# Patient Record
Sex: MALE | Race: WHITE | Employment: PART TIME | URBAN - METROPOLITAN AREA
[De-identification: names, ages, dates, MRNs, and addresses within clinical notes are randomized per-mention and may not be internally consistent; named-entity substitution may affect disease eponyms.]

---

## 2017-03-02 ENCOUNTER — EMPLOYEE WELLNESS (OUTPATIENT)
Dept: OTHER | Age: 62
End: 2017-03-02

## 2018-03-04 ENCOUNTER — NURSE TRIAGE (OUTPATIENT)
Dept: OTHER | Facility: CLINIC | Age: 63
End: 2018-03-04

## 2018-03-04 PROBLEM — R55 NEAR SYNCOPE: Status: ACTIVE | Noted: 2018-03-04

## 2018-03-12 ENCOUNTER — TELEPHONE (OUTPATIENT)
Dept: CARDIOLOGY CLINIC | Age: 63
End: 2018-03-12

## 2018-03-20 VITALS — BODY MASS INDEX: 34.21 KG/M2 | WEIGHT: 225 LBS

## 2018-04-02 ENCOUNTER — HOSPITAL ENCOUNTER (OUTPATIENT)
Dept: CARDIOLOGY | Age: 63
Discharge: HOME | End: 2018-04-02
Payer: COMMERCIAL

## 2018-04-02 LAB
LV EF: 60 %
LVEF MODALITY: NORMAL

## 2018-04-02 PROCEDURE — 93306 TTE W/DOPPLER COMPLETE: CPT

## 2018-05-07 ENCOUNTER — OFFICE VISIT (OUTPATIENT)
Dept: CARDIOLOGY CLINIC | Age: 63
End: 2018-05-07
Payer: COMMERCIAL

## 2018-05-07 VITALS
SYSTOLIC BLOOD PRESSURE: 100 MMHG | BODY MASS INDEX: 33.04 KG/M2 | HEIGHT: 68 IN | HEART RATE: 53 BPM | DIASTOLIC BLOOD PRESSURE: 70 MMHG | WEIGHT: 218 LBS | RESPIRATION RATE: 12 BRPM

## 2018-05-07 DIAGNOSIS — E78.2 MIXED HYPERLIPIDEMIA: ICD-10-CM

## 2018-05-07 DIAGNOSIS — E66.8 MODERATE OBESITY: ICD-10-CM

## 2018-05-07 DIAGNOSIS — I25.10 CORONARY ARTERY DISEASE INVOLVING NATIVE CORONARY ARTERY OF NATIVE HEART WITHOUT ANGINA PECTORIS: Primary | ICD-10-CM

## 2018-05-07 DIAGNOSIS — I10 ESSENTIAL HYPERTENSION: ICD-10-CM

## 2018-05-07 PROCEDURE — 99214 OFFICE O/P EST MOD 30 MIN: CPT | Performed by: INTERNAL MEDICINE

## 2018-05-07 PROCEDURE — 93000 ELECTROCARDIOGRAM COMPLETE: CPT | Performed by: INTERNAL MEDICINE

## 2018-05-18 ENCOUNTER — EMPLOYEE WELLNESS (OUTPATIENT)
Dept: OTHER | Age: 63
End: 2018-05-18

## 2018-05-19 LAB
CHOLESTEROL, TOTAL: 141 MG/DL (ref 0–199)
GLUCOSE BLD-MCNC: 123 MG/DL (ref 74–107)
HDLC SERPL-MCNC: 29 MG/DL
LDL CHOLESTEROL CALCULATED: 82 MG/DL (ref 0–99)
TRIGL SERPL-MCNC: 150 MG/DL (ref 0–149)

## 2018-05-29 VITALS — BODY MASS INDEX: 32.84 KG/M2 | WEIGHT: 216 LBS

## 2018-09-13 ENCOUNTER — HOSPITAL ENCOUNTER (OUTPATIENT)
Age: 63
Discharge: HOME | End: 2018-09-13
Payer: COMMERCIAL

## 2018-09-13 LAB
ALBUMIN SERPL-MCNC: 4.2 G/DL (ref 3.5–5.2)
ALP BLD-CCNC: 88 U/L (ref 40–129)
ALT SERPL-CCNC: 26 U/L (ref 0–40)
ANION GAP SERPL CALCULATED.3IONS-SCNC: 11 MMOL/L (ref 7–16)
AST SERPL-CCNC: 21 U/L (ref 0–39)
BASOPHILS ABSOLUTE: 0.06 E9/L (ref 0–0.2)
BASOPHILS RELATIVE PERCENT: 0.6 % (ref 0–2)
BILIRUB SERPL-MCNC: 1 MG/DL (ref 0–1.2)
BUN BLDV-MCNC: 18 MG/DL (ref 8–23)
CALCIUM SERPL-MCNC: 9.8 MG/DL (ref 8.6–10.2)
CHLORIDE BLD-SCNC: 100 MMOL/L (ref 98–107)
CHOLESTEROL, TOTAL: 150 MG/DL (ref 0–199)
CO2: 28 MMOL/L (ref 22–29)
CREAT SERPL-MCNC: 1.2 MG/DL (ref 0.7–1.2)
EOSINOPHILS ABSOLUTE: 0.23 E9/L (ref 0.05–0.5)
EOSINOPHILS RELATIVE PERCENT: 2.4 % (ref 0–6)
GFR AFRICAN AMERICAN: >60
GFR NON-AFRICAN AMERICAN: >60 ML/MIN/1.73
GLUCOSE BLD-MCNC: 126 MG/DL (ref 74–109)
HCT VFR BLD CALC: 43.7 % (ref 37–54)
HDLC SERPL-MCNC: 32 MG/DL
HEMOGLOBIN: 15.2 G/DL (ref 12.5–16.5)
IMMATURE GRANULOCYTES #: 0.04 E9/L
IMMATURE GRANULOCYTES %: 0.4 % (ref 0–5)
LDL CHOLESTEROL CALCULATED: 83 MG/DL (ref 0–99)
LYMPHOCYTES ABSOLUTE: 1.81 E9/L (ref 1.5–4)
LYMPHOCYTES RELATIVE PERCENT: 19.2 % (ref 20–42)
MCH RBC QN AUTO: 29.5 PG (ref 26–35)
MCHC RBC AUTO-ENTMCNC: 34.8 % (ref 32–34.5)
MCV RBC AUTO: 84.9 FL (ref 80–99.9)
MONOCYTES ABSOLUTE: 0.82 E9/L (ref 0.1–0.95)
MONOCYTES RELATIVE PERCENT: 8.7 % (ref 2–12)
NEUTROPHILS ABSOLUTE: 6.48 E9/L (ref 1.8–7.3)
NEUTROPHILS RELATIVE PERCENT: 68.7 % (ref 43–80)
PDW BLD-RTO: 12.9 FL (ref 11.5–15)
PLATELET # BLD: 274 E9/L (ref 130–450)
PMV BLD AUTO: 10.5 FL (ref 7–12)
POTASSIUM SERPL-SCNC: 3.8 MMOL/L (ref 3.5–5)
PROSTATE SPECIFIC ANTIGEN: 3.36 NG/ML (ref 0–4)
RBC # BLD: 5.15 E12/L (ref 3.8–5.8)
SODIUM BLD-SCNC: 139 MMOL/L (ref 132–146)
TOTAL PROTEIN: 7 G/DL (ref 6.4–8.3)
TRIGL SERPL-MCNC: 173 MG/DL (ref 0–149)
TSH SERPL DL<=0.05 MIU/L-ACNC: 1.65 UIU/ML (ref 0.27–4.2)
VITAMIN D 25-HYDROXY: 28 NG/ML (ref 30–100)
VLDLC SERPL CALC-MCNC: 35 MG/DL
WBC # BLD: 9.4 E9/L (ref 4.5–11.5)

## 2018-09-13 PROCEDURE — 80053 COMPREHEN METABOLIC PANEL: CPT

## 2018-09-13 PROCEDURE — 85025 COMPLETE CBC W/AUTO DIFF WBC: CPT

## 2018-09-13 PROCEDURE — 84443 ASSAY THYROID STIM HORMONE: CPT

## 2018-09-13 PROCEDURE — 82306 VITAMIN D 25 HYDROXY: CPT

## 2018-09-13 PROCEDURE — 80061 LIPID PANEL: CPT

## 2018-09-13 PROCEDURE — 84153 ASSAY OF PSA TOTAL: CPT

## 2018-09-13 PROCEDURE — 36415 COLL VENOUS BLD VENIPUNCTURE: CPT

## 2019-02-22 ENCOUNTER — HOSPITAL ENCOUNTER (OUTPATIENT)
Age: 64
Discharge: HOME | End: 2019-02-24
Payer: COMMERCIAL

## 2019-02-22 ENCOUNTER — HOSPITAL ENCOUNTER (OUTPATIENT)
Dept: GENERAL RADIOLOGY | Age: 64
Discharge: HOME | End: 2019-02-24
Payer: COMMERCIAL

## 2019-02-22 DIAGNOSIS — R52 PAIN: ICD-10-CM

## 2019-02-22 PROCEDURE — 71100 X-RAY EXAM RIBS UNI 2 VIEWS: CPT

## 2019-04-07 ENCOUNTER — HOSPITAL ENCOUNTER (OUTPATIENT)
Age: 64
Discharge: HOME | End: 2019-04-07
Payer: COMMERCIAL

## 2019-04-07 LAB
ALBUMIN SERPL-MCNC: 4 G/DL (ref 3.5–5.2)
ALP BLD-CCNC: 100 U/L (ref 40–129)
ALT SERPL-CCNC: 49 U/L (ref 0–40)
ANION GAP SERPL CALCULATED.3IONS-SCNC: 13 MMOL/L (ref 7–16)
AST SERPL-CCNC: 40 U/L (ref 0–39)
BASOPHILS ABSOLUTE: 0.06 E9/L (ref 0–0.2)
BASOPHILS RELATIVE PERCENT: 0.4 % (ref 0–2)
BILIRUB SERPL-MCNC: 0.8 MG/DL (ref 0–1.2)
BUN BLDV-MCNC: 16 MG/DL (ref 8–23)
CALCIUM SERPL-MCNC: 9.2 MG/DL (ref 8.6–10.2)
CHLORIDE BLD-SCNC: 102 MMOL/L (ref 98–107)
CHOLESTEROL, TOTAL: 131 MG/DL (ref 0–199)
CO2: 26 MMOL/L (ref 22–29)
CREAT SERPL-MCNC: 0.8 MG/DL (ref 0.7–1.2)
EOSINOPHILS ABSOLUTE: 0.29 E9/L (ref 0.05–0.5)
EOSINOPHILS RELATIVE PERCENT: 2 % (ref 0–6)
GFR AFRICAN AMERICAN: >60
GFR NON-AFRICAN AMERICAN: >60 ML/MIN/1.73
GLUCOSE BLD-MCNC: 128 MG/DL (ref 74–99)
HCT VFR BLD CALC: 42.7 % (ref 37–54)
HDLC SERPL-MCNC: 26 MG/DL
HEMOGLOBIN: 15.1 G/DL (ref 12.5–16.5)
IMMATURE GRANULOCYTES #: 0.08 E9/L
IMMATURE GRANULOCYTES %: 0.5 % (ref 0–5)
LDL CHOLESTEROL CALCULATED: 67 MG/DL (ref 0–99)
LYMPHOCYTES ABSOLUTE: 1.9 E9/L (ref 1.5–4)
LYMPHOCYTES RELATIVE PERCENT: 12.8 % (ref 20–42)
MCH RBC QN AUTO: 29.3 PG (ref 26–35)
MCHC RBC AUTO-ENTMCNC: 35.4 % (ref 32–34.5)
MCV RBC AUTO: 82.9 FL (ref 80–99.9)
MONOCYTES ABSOLUTE: 0.87 E9/L (ref 0.1–0.95)
MONOCYTES RELATIVE PERCENT: 5.9 % (ref 2–12)
NEUTROPHILS ABSOLUTE: 11.62 E9/L (ref 1.8–7.3)
NEUTROPHILS RELATIVE PERCENT: 78.4 % (ref 43–80)
PDW BLD-RTO: 13 FL (ref 11.5–15)
PLATELET # BLD: 250 E9/L (ref 130–450)
PMV BLD AUTO: 10 FL (ref 7–12)
POTASSIUM SERPL-SCNC: 3.8 MMOL/L (ref 3.5–5)
PROSTATE SPECIFIC ANTIGEN: 2.9 NG/ML (ref 0–4)
RBC # BLD: 5.15 E12/L (ref 3.8–5.8)
SODIUM BLD-SCNC: 141 MMOL/L (ref 132–146)
TOTAL PROTEIN: 6.8 G/DL (ref 6.4–8.3)
TRIGL SERPL-MCNC: 190 MG/DL (ref 0–149)
TSH SERPL DL<=0.05 MIU/L-ACNC: 0.99 UIU/ML (ref 0.27–4.2)
VITAMIN D 25-HYDROXY: 25 NG/ML (ref 30–100)
VLDLC SERPL CALC-MCNC: 38 MG/DL
WBC # BLD: 14.8 E9/L (ref 4.5–11.5)

## 2019-04-07 PROCEDURE — 85025 COMPLETE CBC W/AUTO DIFF WBC: CPT

## 2019-04-07 PROCEDURE — G0103 PSA SCREENING: HCPCS

## 2019-04-07 PROCEDURE — 84443 ASSAY THYROID STIM HORMONE: CPT

## 2019-04-07 PROCEDURE — 36415 COLL VENOUS BLD VENIPUNCTURE: CPT

## 2019-04-07 PROCEDURE — 82306 VITAMIN D 25 HYDROXY: CPT

## 2019-04-07 PROCEDURE — 80061 LIPID PANEL: CPT

## 2019-04-07 PROCEDURE — 80053 COMPREHEN METABOLIC PANEL: CPT

## 2019-05-01 ENCOUNTER — EMPLOYEE WELLNESS (OUTPATIENT)
Dept: OTHER | Age: 64
End: 2019-05-01

## 2019-05-01 LAB
CHOLESTEROL, TOTAL: 139 MG/DL (ref 0–199)
GLUCOSE BLD-MCNC: 214 MG/DL (ref 74–107)
HDLC SERPL-MCNC: 32 MG/DL
LDL CHOLESTEROL CALCULATED: 69 MG/DL (ref 0–99)
TRIGL SERPL-MCNC: 190 MG/DL (ref 0–149)

## 2019-05-02 LAB — HBA1C MFR BLD: 6.4 % (ref 4–5.6)

## 2019-05-06 VITALS — BODY MASS INDEX: 32.54 KG/M2 | WEIGHT: 214 LBS

## 2019-05-16 ENCOUNTER — OFFICE VISIT (OUTPATIENT)
Dept: CARDIOLOGY CLINIC | Age: 64
End: 2019-05-16
Payer: COMMERCIAL

## 2019-05-16 VITALS
DIASTOLIC BLOOD PRESSURE: 72 MMHG | HEART RATE: 52 BPM | RESPIRATION RATE: 12 BRPM | HEIGHT: 68 IN | WEIGHT: 214.2 LBS | BODY MASS INDEX: 32.46 KG/M2 | SYSTOLIC BLOOD PRESSURE: 132 MMHG

## 2019-05-16 DIAGNOSIS — I10 ESSENTIAL HYPERTENSION: ICD-10-CM

## 2019-05-16 DIAGNOSIS — E66.8 MODERATE OBESITY: ICD-10-CM

## 2019-05-16 DIAGNOSIS — I25.10 CORONARY ARTERY DISEASE INVOLVING NATIVE CORONARY ARTERY OF NATIVE HEART WITHOUT ANGINA PECTORIS: Primary | ICD-10-CM

## 2019-05-16 DIAGNOSIS — E78.00 PURE HYPERCHOLESTEROLEMIA: ICD-10-CM

## 2019-05-16 PROCEDURE — 99214 OFFICE O/P EST MOD 30 MIN: CPT | Performed by: INTERNAL MEDICINE

## 2019-05-16 PROCEDURE — 93000 ELECTROCARDIOGRAM COMPLETE: CPT | Performed by: INTERNAL MEDICINE

## 2019-05-16 NOTE — PROGRESS NOTES
OUTPATIENT CARDIOLOGY FOLLOW-UP    Name: Lois Dotson    Age: 58 y.o. Primary Care Physician: Rufina Soto DO    Date of Service: 5/16/2019    Chief Complaint: Coronary atherosclerosis, hypertension, moderate obesity    Interim History: Since most recent evaluation, the patient remains stable from a cardiovascular standpoint with no changes of his functional capabilities and no symptoms of anginal-like chest discomfort or other ischemic equivalents. He relates the development of mild dependent edema towards the evening hours with no additional clinical manifestations of decompensated left ventricular systolic dysfunction or volume overload. He denies any arrhythmia related symptoms. While on the day of his assessment borderline control of his blood pressure was documented, he relates an interim monitoring by nurses at his office that acceptable blood pressure control has been maintained. To his credit, he has lost approximately 5 pounds since his most recent assessment. Review of Systems: The remainder of a complete multisystem review including consitutional, central nervous, respiratory, circulatory, gastrointestinal, genitourinary, endocrinologic, hematologic, musculoskeletal and psychiatric are negative. Past Medical History:  Past Medical History:   Diagnosis Date    Anemia     Ankle fracture 8/2007    ORIF RIGHT    Anxiety attack     CAD (coronary artery disease) 16 YRS AGO    MI X 2. WITH STENTS, FOLLOWS WITH MERLE DOLL,  LAST VISIT 5/2014, SEE EPIC BOTES    GERD (gastroesophageal reflux disease)     Hiatal hernia     Hypercholesterolemia 1994    Hyperlipidemia     Hypertension 6/12/14    B/P IS ELEVATED, PATIENT STATES HE NEEDS TO GET HIS LOPRESSOR REFILLED    Left knee DJD 6/10/2014    OSTEO    NSTEMI (non-ST elevated myocardial infarction) ([facility] [address] 05/28/2012    Tilt table evaluation 03/2000    Negative.        Past Surgical History:  Past Surgical History:   Procedure Laterality Date    ANKLE SURGERY  6 YRS AGO    right ORIF    COLONOSCOPY      COLONOSCOPY  2015    CORONARY ANGIOPLASTY WITH STENT PLACEMENT  2012    Dr. Nathaniel Kenny, Stent Mid LAD    DIAGNOSTIC CARDIAC CATH LAB PROCEDURE  1998    [facility]. Cath/PTCA/stent of RCA; PTCA/stent of mid LAD with adjunctive ReoPro administration.  DIAGNOSTIC CARDIAC CATH LAB PROCEDURE  2002    [facility]. Kissing balloon angioplasty LAD>diag Perclose. Dr. Nathaniel Kenny and Dr. Bart Lerma.  DIAGNOSTIC CARDIAC CATH LAB PROCEDURE  2005    [facility]. Cath/stent (TAX\"US) to diagonal at [facility].  FINGER TRIGGER RELEASE Right 2015    Release right long trigger finger. Tee Brasher MD    HERNIA REPAIR [facility] YRS A[facility]    [facility]    INGUINAL HERNIA REPAIR Right 2017    robotic assisted    KNEE ARTHROPLASTY Left 2014    Left TKA.   Tee Brasher MD    UPPER GASTROINTESTINAL ENDOSCOPY         Family History:  Family History   Problem Relation Age of Onset    Hypertension Mother [de-identified]         from renal failure    Diabetes Mother     Coronary Art Dis Mother     Osteoporosis Mother     Osteoarthritis Father 80    High Cholesterol Brother     High Cholesterol Brother        Social History:  Social History     Socioeconomic History    Marital status:      Spouse name: Not on file    Number of children: Not on file    Years of education: Not on file    Highest education level: Not on file   Occupational History    Not on file   Social Needs    Financial resource strain: Not on file    Food insecurity:     Worry: Not on file     Inability: Not on file    Transportation needs:     Medical: Not on file     Non-medical: Not on file   Tobacco Use    Smoking status: Never Smoker    Smokeless tobacco: Never Used   Substance and Sexual Activity    Alcohol use: No     Comment:  2 cups coffee/day    Drug use: No    Sexual activity: Not on file   Lifestyle    Physical activity:     Days per week: Not on file     Minutes per session: Not on file    Stress: Not on file   Relationships    Social connections:     Talks on phone: Not on file     Gets together: Not on file     Attends [employer] service: Not on file     Active member of club or organization: Not on file     Attends meetings of clubs or organizations: Not on file     Relationship status: Not on file    Intimate partner violence:     Fear of current or ex partner: Not on file     Emotionally abused: Not on file     Physically abused: Not on file     Forced sexual activity: Not on file   Other Topics Concern    Not on file   Social History Narrative    Not on file       Allergies: Allergies   Allergen Reactions    Penicillins Hives       Current Medications:  Current Outpatient Medications   Medication Sig Dispense Refill    amLODIPine (NORVASC) 5 MG tablet Take 1 tablet by mouth daily (Patient taking differently: Take 10 mg by mouth daily ) 30 tablet 3    ferrous sulfate 325 (65 Fe) MG tablet Take 325 mg by mouth daily (with breakfast)      cloNIDine (CATAPRES) 0.3 MG tablet Take 0.5 tablets by mouth daily (Patient taking differently: Take 0.3 mg by mouth nightly ) 30 tablet 3    Cholecalciferol (VITAMIN D3) 5000 UNITS TABS Take by mouth daily      aspirin EC 81 MG EC tablet Take 1 tablet by mouth daily. (Patient taking differently:   Take 81 mg by mouth daily LD 7/16/15) 30 tablet 3    losartan-hydrochlorothiazide (HYZAAR) 100-25 MG per tablet   Take 1 tablet by mouth daily       metoprolol (LOPRESSOR) 25 MG tablet Take 25 mg by mouth 2 times daily. Instructed to take morning of surgery with a sip of water      omeprazole (PRILOSEC) 40 MG capsule Take 40 mg by mouth 2 times daily BRING       nitroGLYCERIN (NITROSTAT) 0.4 MG SL tablet Place 1 tablet under the tongue every 5 minutes as needed for Chest pain. If chest pain: put 1 NTG tab under tongue - sit down - wait 5 min - if no relief, call 911.   May repeat dose 2 more times 5 min apart while waiting for EMS (total of 3 doses). If dizzy do not take any further doses. 30 tablet 6    Ascorbic Acid (VITAMIN C) 500 MG tablet Take 500 mg by mouth daily       fish oil-omega-3 fatty acids 1000 MG capsule   Take 3 capsules by mouth daily LD 7/16/15      Citalopram Hydrobromide (CELEXA PO) Take 60 mg by mouth daily Instructed to take morning of surgery with a sip of water      Ezetimibe (ZETIA PO) Take 10 mg by mouth nightly.  Atorvastatin Calcium (LIPITOR PO) Take 80 mg by mouth nightly. No current facility-administered medications for this visit. Physical Exam:  /72   Pulse 52   Resp 12   Ht 5' 8\" (1.727 m)   Wt 214 lb 3.2 oz (97.2 kg)   BMI 32.57 kg/m²   Wt Readings from Last 3 Encounters:   05/16/19 214 lb 3.2 oz (97.2 kg)   05/01/19 214 lb (97.1 kg)   05/18/18 216 lb (98 kg)     The patient is awake, alert and in no discomfort or distress. No gross musculoskeletal deformity is present. No significant skin or nail changes are present. Gross examination of head, eyes, nose and throat are negative. Jugular venous pressure is normal and no carotid bruits are present. Normal respiratory effort is noted with no accessory muscle usage present. Lung fields are clear to ascultation. Cardiac examination is notable for a regular rate and rhythm with no palpable thrill. No gallop rhythm or cardiac murmur are identified. A benign abdominal examination is present with no masses or organomegaly. Intact pulses are present throughout all extremities and no peripheral edema is present. No focal neurologic deficits are present.     Laboratory Tests:  Lab Results   Component Value Date    CREATININE 0.8 04/07/2019    BUN 16 04/07/2019     04/07/2019    K 3.8 04/07/2019     04/07/2019    CO2 26 04/07/2019     Lab Results   Component Value Date    BNP 32 05/27/2012     Lab Results   Component Value Date    WBC 14.8 04/07/2019    RBC 5.15 04/07/2019    HGB 15.1 04/07/2019    HCT 42.7 04/07/2019    MCV 82.9 04/07/2019    MCH 29.3 04/07/2019    MCHC 35.4 04/07/2019    RDW 13.0 04/07/2019     04/07/2019    MPV 10.0 04/07/2019     No results for input(s): ALKPHOS, ALT, AST, PROT, BILITOT, BILIDIR, LABALBU in the last 72 hours. Lab Results   Component Value Date    MG 1.7 03/04/2018     Lab Results   Component Value Date    PROTIME 11.3 03/04/2018    PROTIME 12.4 05/29/2012    INR 1.0 03/04/2018     Lab Results   Component Value Date    TSH 0.986 04/07/2019     No components found for: CHLPL  Lab Results   Component Value Date    TRIG 190 (H) 05/01/2019    TRIG 190 (H) 04/07/2019    TRIG 173 (H) 09/13/2018     Lab Results   Component Value Date    HDL 32 05/01/2019    HDL 26 04/07/2019    HDL 32 09/13/2018     Lab Results   Component Value Date    LDLCALC 69 05/01/2019    LDLCALC 67 04/07/2019    LDLCALC 83 09/13/2018       Cardiac Tests:  ECG: A echocardiogram demonstrates evidence of sinus bradycardia with nonspecific ST changes      ASSESSMENT / PLAN:  On a clinical basis, the patient remains stable from a cardiovascular standpoint with mild dependent edema likely multifactorial nature including the effects of his amlodipine. At present, I have not altered his existing medical regimen and have encouraged continued appropriate lifestyle modification to achieve additional weight reduction which will further benefit diastolic cardiac performance. Continued aggressive risk factor modification of blood pressure and serum lipids will remain essential to reducing risk of future atherosclerotic development. Based on the duration since most recent objective assessment of his coronary circulation, I have recommended a repeat functional assessment predominantly on a prognostic basis in the form of a gated vasodilator myocardial perfusion imaging study. I will provide additional recommendations following its completion is appropriate and otherwise plan his clinical reassessment in one year.  I would happily reassess him in the interim should additional cardiovascular difficulties or concerns arise. The patient's current medication list, allergies, problem list and results of all previously ordered testing were reviewed at today's visit. Follow-up office visit in 1 year      Note: This report was completed using computerized voice recognition software. Every effort has been made to ensure accuracy, however; and invert and computerized transcription errors may be present. Teresa De Santiago.  Gloria Bates, [facility]

## 2019-05-29 ENCOUNTER — TELEPHONE (OUTPATIENT)
Dept: CARDIOLOGY CLINIC | Age: 64
End: 2019-05-29

## 2019-05-29 ENCOUNTER — HOSPITAL ENCOUNTER (OUTPATIENT)
Dept: CARDIOLOGY | Age: 64
Discharge: HOME | End: 2019-05-29
Payer: COMMERCIAL

## 2019-05-29 VITALS
WEIGHT: 214 LBS | DIASTOLIC BLOOD PRESSURE: 80 MMHG | BODY MASS INDEX: 32.43 KG/M2 | SYSTOLIC BLOOD PRESSURE: 142 MMHG | HEART RATE: 56 BPM | HEIGHT: 68 IN

## 2019-05-29 DIAGNOSIS — I25.10 CORONARY ARTERY DISEASE INVOLVING NATIVE CORONARY ARTERY OF NATIVE HEART WITHOUT ANGINA PECTORIS: ICD-10-CM

## 2019-05-29 PROCEDURE — 6360000002 HC RX W HCPCS: Performed by: INTERNAL MEDICINE

## 2019-05-29 PROCEDURE — 2580000003 HC RX 258: Performed by: INTERNAL MEDICINE

## 2019-05-29 PROCEDURE — 3430000000 HC RX DIAGNOSTIC RADIOPHARMACEUTICAL: Performed by: INTERNAL MEDICINE

## 2019-05-29 PROCEDURE — A9502 TC99M TETROFOSMIN: HCPCS | Performed by: INTERNAL MEDICINE

## 2019-05-29 PROCEDURE — 78452 HT MUSCLE IMAGE SPECT MULT: CPT

## 2019-05-29 PROCEDURE — 93017 CV STRESS TEST TRACING ONLY: CPT

## 2019-05-29 RX ORDER — SODIUM CHLORIDE 0.9 % (FLUSH) 0.9 %
10 SYRINGE (ML) INJECTION PRN
Status: DISCONTINUED | OUTPATIENT
Start: 2019-05-29 | End: 2019-05-30 | Stop reason: HOSPADM

## 2019-05-29 RX ADMIN — Medication 10 ML: at 08:49

## 2019-05-29 RX ADMIN — Medication 10 ML: at 08:44

## 2019-05-29 RX ADMIN — TETROFOSMIN 8 MILLICURIE: 0.23 INJECTION, POWDER, LYOPHILIZED, FOR SOLUTION INTRAVENOUS at 07:49

## 2019-05-29 RX ADMIN — Medication 10 ML: at 07:48

## 2019-05-29 RX ADMIN — REGADENOSON 0.4 MG: 0.08 INJECTION, SOLUTION INTRAVENOUS at 08:45

## 2019-05-29 RX ADMIN — TETROFOSMIN 26.4 MILLICURIE: 0.23 INJECTION, POWDER, LYOPHILIZED, FOR SOLUTION INTRAVENOUS at 08:50

## 2019-05-29 NOTE — PROCEDURES
[address] [facility] [address] and Vascular Lab - [address]. [address]  [phone]               Pharmacologic Stress Nuclear Gated SPECT Study    Name: [facility] Account Number: [de-identified]    :  1955          Sex: male         Date of Study:  2019    Height: 5' 8\" (172.7 cm)         Weight: 214 lb (97.1 kg)     Ordering Provider: Khushboo Lopez          PCP: Anali Laurent DO      Cardiologist: Khushboo Lopez             Interpreting Physician: Khushboo Lopez  _________________________________________________________________________________    Indication:   Evaluation of extent and severity of coronary artery disease    Clinical History:   Patient has prior history of coronary artery disease. Resting ECG:     HR 52 bpm  Nonspecific ST-T wave changes    Procedure:   Pharmacologic stress testing was performed with regadenoson 0.4 mg for 15 seconds. The heart rate was 52 at baseline and sigrid to 60 beats during the infusion. The blood pressure at baseline was 132/80 and blood pressure at the end of infusion was 130/80. Blood pressure response was normal during the stress procedure. The patient tolerated the infusion well. ECG during the infusion did not change. IMAGING: Myocardial perfusion imaging was performed at rest 30-35 minutes following the intravenous injection of 8.0 mCi of (Tc-tetrofosmin) followed by 10 ml of Normal Saline. As per infusion protocol, the patient was injected intravenously with 26.4 mCi of (Tc-tetrofosmin) followed by 10 ml of Normal Saline. Gated post-stress tomographic imaging was performed 45 minutes after stress. FINDINGS: The overall quality of the study was good. Left ventricular cavity size was noted to be normal.    Rotational analog analysis demonstrated no patient motion or abnormal extracardiac radioactivity.     The gated SPECT stress imaging in the short, vertical long, and horizontal long axis demonstrated normal homogeneous tracer distribution throughout the myocardium both on the post regadenoson and resting images. Gated SPECT left ventricular ejection fraction was calculated to be 72%, with normal myocardial thickening and wall motion. Impression:    1. Electrocardiographically normal regadenoson infusion with a clinicallynonischemic response. 2. Myocardial perfusion imaging was normal.    3. Overall left ventricular systolic function was normal without regional wall motion abnormalities. 4. Low risk pharmacologic stress test  5. Compared to perfusion imaging of June, 2013, no significant changes are present. Thank you for sending your patient to this [employer].      Electronically signed by Kishor Larson MD on 5/29/19 at 10:38 AM

## 2019-05-29 NOTE — TELEPHONE ENCOUNTER
----- Message from Amari Sutton MD sent at 5/29/2019 10:39 AM EDT -----  Please notify patient that stress test is normal, continuous directed and follow-up as scheduled

## 2019-05-29 NOTE — TELEPHONE ENCOUNTER
Spoke with patient per Dr. Mary Jane Arevalo on stress results  Please notify patient that stress test is normal, continuous directed and follow-up as scheduled  No further testing is required  F/u 4/2020  Copy sent to PCP Dr. Ethan Wiley. Continue current medications as directed.     JEOVANNY

## 2019-07-05 ENCOUNTER — APPOINTMENT (OUTPATIENT)
Dept: CT IMAGING | Age: 64
End: 2019-07-05
Payer: COMMERCIAL

## 2019-07-05 ENCOUNTER — HOSPITAL ENCOUNTER (EMERGENCY)
Age: 64
Discharge: HOME | End: 2019-07-05
Attending: EMERGENCY MEDICINE
Payer: COMMERCIAL

## 2019-07-05 VITALS
WEIGHT: 210 LBS | TEMPERATURE: 98.3 F | HEIGHT: 68 IN | HEART RATE: 61 BPM | DIASTOLIC BLOOD PRESSURE: 84 MMHG | SYSTOLIC BLOOD PRESSURE: 137 MMHG | BODY MASS INDEX: 31.83 KG/M2 | OXYGEN SATURATION: 97 % | RESPIRATION RATE: 14 BRPM

## 2019-07-05 DIAGNOSIS — R10.84 GENERALIZED ABDOMINAL PAIN: ICD-10-CM

## 2019-07-05 DIAGNOSIS — R10.9 FLANK PAIN: Primary | ICD-10-CM

## 2019-07-05 DIAGNOSIS — K80.20 GALL STONES: ICD-10-CM

## 2019-07-05 LAB
ALBUMIN SERPL-MCNC: 4.3 G/DL (ref 3.5–5.2)
ALP BLD-CCNC: 95 U/L (ref 40–129)
ALT SERPL-CCNC: 28 U/L (ref 0–40)
ANION GAP SERPL CALCULATED.3IONS-SCNC: 10 MMOL/L (ref 7–16)
AST SERPL-CCNC: 19 U/L (ref 0–39)
BASOPHILS ABSOLUTE: 0.07 E9/L (ref 0–0.2)
BASOPHILS RELATIVE PERCENT: 0.7 % (ref 0–2)
BILIRUB SERPL-MCNC: 0.8 MG/DL (ref 0–1.2)
BILIRUBIN URINE: NEGATIVE
BLOOD, URINE: NEGATIVE
BUN BLDV-MCNC: 19 MG/DL (ref 8–23)
CALCIUM SERPL-MCNC: 10.1 MG/DL (ref 8.6–10.2)
CHLORIDE BLD-SCNC: 98 MMOL/L (ref 98–107)
CLARITY: CLEAR
CO2: 29 MMOL/L (ref 22–29)
COLOR: YELLOW
CREAT SERPL-MCNC: 1 MG/DL (ref 0.7–1.2)
D DIMER: 269 NG/ML DDU
EOSINOPHILS ABSOLUTE: 0.37 E9/L (ref 0.05–0.5)
EOSINOPHILS RELATIVE PERCENT: 3.7 % (ref 0–6)
GFR AFRICAN AMERICAN: >60
GFR NON-AFRICAN AMERICAN: >60 ML/MIN/1.73
GLUCOSE BLD-MCNC: 132 MG/DL (ref 74–99)
GLUCOSE URINE: NEGATIVE MG/DL
HCT VFR BLD CALC: 43.1 % (ref 37–54)
HEMOGLOBIN: 15.2 G/DL (ref 12.5–16.5)
IMMATURE GRANULOCYTES #: 0.04 E9/L
IMMATURE GRANULOCYTES %: 0.4 % (ref 0–5)
KETONES, URINE: NEGATIVE MG/DL
LEUKOCYTE ESTERASE, URINE: NEGATIVE
LIPASE: 39 U/L (ref 13–60)
LYMPHOCYTES ABSOLUTE: 2.62 E9/L (ref 1.5–4)
LYMPHOCYTES RELATIVE PERCENT: 25.9 % (ref 20–42)
MCH RBC QN AUTO: 30.1 PG (ref 26–35)
MCHC RBC AUTO-ENTMCNC: 35.3 % (ref 32–34.5)
MCV RBC AUTO: 85.3 FL (ref 80–99.9)
MONOCYTES ABSOLUTE: 0.81 E9/L (ref 0.1–0.95)
MONOCYTES RELATIVE PERCENT: 8 % (ref 2–12)
NEUTROPHILS ABSOLUTE: 6.22 E9/L (ref 1.8–7.3)
NEUTROPHILS RELATIVE PERCENT: 61.3 % (ref 43–80)
NITRITE, URINE: NEGATIVE
PDW BLD-RTO: 12.7 FL (ref 11.5–15)
PH UA: 6 (ref 5–9)
PLATELET # BLD: 249 E9/L (ref 130–450)
PMV BLD AUTO: 10.3 FL (ref 7–12)
POTASSIUM REFLEX MAGNESIUM: 3.8 MMOL/L (ref 3.5–5)
PROTEIN UA: NEGATIVE MG/DL
RBC # BLD: 5.05 E12/L (ref 3.8–5.8)
SODIUM BLD-SCNC: 137 MMOL/L (ref 132–146)
SPECIFIC GRAVITY UA: 1.02 (ref 1–1.03)
TOTAL PROTEIN: 6.9 G/DL (ref 6.4–8.3)
TROPONIN: <0.01 NG/ML (ref 0–0.03)
UROBILINOGEN, URINE: 0.2 E.U./DL
WBC # BLD: 10.1 E9/L (ref 4.5–11.5)

## 2019-07-05 PROCEDURE — 6360000004 HC RX CONTRAST MEDICATION: Performed by: RADIOLOGY

## 2019-07-05 PROCEDURE — 85378 FIBRIN DEGRADE SEMIQUANT: CPT

## 2019-07-05 PROCEDURE — 2580000003 HC RX 258: Performed by: RADIOLOGY

## 2019-07-05 PROCEDURE — 85025 COMPLETE CBC W/AUTO DIFF WBC: CPT

## 2019-07-05 PROCEDURE — 71275 CT ANGIOGRAPHY CHEST: CPT

## 2019-07-05 PROCEDURE — 81003 URINALYSIS AUTO W/O SCOPE: CPT

## 2019-07-05 PROCEDURE — 74176 CT ABD & PELVIS W/O CONTRAST: CPT

## 2019-07-05 PROCEDURE — 99284 EMERGENCY DEPT VISIT MOD MDM: CPT

## 2019-07-05 PROCEDURE — 84484 ASSAY OF TROPONIN QUANT: CPT

## 2019-07-05 PROCEDURE — 2580000003 HC RX 258: Performed by: EMERGENCY MEDICINE

## 2019-07-05 PROCEDURE — 36415 COLL VENOUS BLD VENIPUNCTURE: CPT

## 2019-07-05 PROCEDURE — 83690 ASSAY OF LIPASE: CPT

## 2019-07-05 PROCEDURE — 80053 COMPREHEN METABOLIC PANEL: CPT

## 2019-07-05 PROCEDURE — 93005 ELECTROCARDIOGRAM TRACING: CPT | Performed by: EMERGENCY MEDICINE

## 2019-07-05 RX ORDER — SODIUM CHLORIDE 0.9 % (FLUSH) 0.9 %
10 SYRINGE (ML) INJECTION PRN
Status: COMPLETED | OUTPATIENT
Start: 2019-07-05 | End: 2019-07-05

## 2019-07-05 RX ORDER — IBUPROFEN 600 MG/1
600 TABLET ORAL EVERY 8 HOURS PRN
Qty: 30 TABLET | Refills: 1 | Status: ON HOLD | OUTPATIENT
Start: 2019-07-05 | End: 2019-07-17 | Stop reason: HOSPADM

## 2019-07-05 RX ORDER — 0.9 % SODIUM CHLORIDE 0.9 %
1000 INTRAVENOUS SOLUTION INTRAVENOUS ONCE
Status: COMPLETED | OUTPATIENT
Start: 2019-07-05 | End: 2019-07-05

## 2019-07-05 RX ADMIN — IOPAMIDOL 80 ML: 755 INJECTION, SOLUTION INTRAVENOUS at 19:10

## 2019-07-05 RX ADMIN — Medication 10 ML: at 19:08

## 2019-07-05 RX ADMIN — SODIUM CHLORIDE 1000 ML: 9 INJECTION, SOLUTION INTRAVENOUS at 18:09

## 2019-07-05 ASSESSMENT — ENCOUNTER SYMPTOMS
RESPIRATORY NEGATIVE: 1
SORE THROAT: 0
FLATUS: 0
DIARRHEA: 0
ABDOMINAL PAIN: 1
COUGH: 0
VOMITING: 0
SHORTNESS OF BREATH: 0
BELCHING: 0
HEMATEMESIS: 0
EYES NEGATIVE: 1
HEMATOCHEZIA: 0
NAUSEA: 0
CONSTIPATION: 0
ALLERGIC/IMMUNOLOGIC NEGATIVE: 1

## 2019-07-05 ASSESSMENT — PAIN DESCRIPTION - FREQUENCY: FREQUENCY: CONTINUOUS

## 2019-07-05 ASSESSMENT — PAIN DESCRIPTION - ORIENTATION: ORIENTATION: RIGHT

## 2019-07-05 ASSESSMENT — PAIN DESCRIPTION - PROGRESSION: CLINICAL_PROGRESSION: GRADUALLY WORSENING

## 2019-07-05 ASSESSMENT — PAIN SCALES - GENERAL: PAINLEVEL_OUTOF10: 10

## 2019-07-05 ASSESSMENT — PAIN DESCRIPTION - PAIN TYPE: TYPE: ACUTE PAIN

## 2019-07-05 ASSESSMENT — PAIN DESCRIPTION - LOCATION: LOCATION: FLANK

## 2019-07-05 ASSESSMENT — PAIN DESCRIPTION - DESCRIPTORS: DESCRIPTORS: ACHING

## 2019-07-05 ASSESSMENT — PAIN DESCRIPTION - ONSET: ONSET: ON-GOING

## 2019-07-05 NOTE — ED PROVIDER NOTES
This is a 71years old male with a past medical history significant for hypertension who presented to our emergency room with a chief complaint of right flank pain off and on for past 2 months, and recently has been radiating to the right upper and right lower quadrant abdominal area. Patient denies nausea vomiting diarrhea. Patient denies fever. No chest pain or shortness of breath. No other complaints at this time. The history is provided by the patient. No  was used. Abdominal Pain   Pain location:  R flank  Pain quality: aching    Pain radiates to:  RUQ and RLQ  Pain severity:  Moderate  Onset quality:  Gradual  Duration:  8 weeks (Approximately 2 months)  Timing:  Intermittent  Progression:  Waxing and waning  Chronicity:  New  Context: not alcohol use, not awakening from sleep, not diet changes, not eating, not laxative use, not medication withdrawal, not previous surgeries, not recent illness, not recent sexual activity, not recent travel, not retching, not sick contacts, not suspicious food intake and not trauma    Relieved by:  Nothing  Worsened by:  Nothing  Ineffective treatments:  None tried  Associated symptoms: no anorexia, no belching, no chest pain, no chills, no constipation, no cough, no diarrhea, no dysuria, no fatigue, no fever, no flatus, no hematemesis, no hematochezia, no hematuria, no melena, no nausea, no shortness of breath, no sore throat and no vomiting    Risk factors: no alcohol abuse, no aspirin use, not elderly, has not had multiple surgeries, no NSAID use, not obese and no recent hospitalization        Review of Systems   Constitutional: Negative. Negative for chills, fatigue and fever. HENT: Negative. Negative for sore throat. Eyes: Negative. Respiratory: Negative. Negative for cough and shortness of breath. Cardiovascular: Negative for chest pain. Gastrointestinal: Positive for abdominal pain.  Negative for anorexia, constipation, Neutrophils # 6.22 1.80 - 7.30 E9/L    Immature Granulocytes # 0.04 E9/L    Lymphocytes # 2.62 1.50 - 4.00 E9/L    Monocytes # 0.81 0.10 - 0.95 E9/L    Eosinophils # 0.37 0.05 - 0.50 E9/L    Basophils # 0.07 0.00 - 0.20 E9/L   Comprehensive Metabolic Panel w/ Reflex to MG   Result Value Ref Range    Sodium 137 132 - 146 mmol/L    Potassium reflex Magnesium 3.8 3.5 - 5.0 mmol/L    Chloride 98 98 - 107 mmol/L    CO2 29 22 - 29 mmol/L    Anion Gap 10 7 - 16 mmol/L    Glucose 132 (H) 74 - 99 mg/dL    BUN 19 8 - 23 mg/dL    CREATININE 1.0 0.7 - 1.2 mg/dL    GFR Non-African American >60 >=60 mL/min/1.73    GFR African American >60     Calcium 10.1 8.6 - 10.2 mg/dL    Total Protein 6.9 6.4 - 8.3 g/dL    Alb 4.3 3.5 - 5.2 g/dL    Total Bilirubin 0.8 0.0 - 1.2 mg/dL    Alkaline Phosphatase 95 40 - 129 U/L    ALT 28 0 - 40 U/L    AST 19 0 - 39 U/L   Lipase   Result Value Ref Range    Lipase 39 13 - 60 U/L   Urinalysis, reflex to microscopic   Result Value Ref Range    Color, UA Yellow Straw/Yellow    Clarity, UA Clear Clear    Glucose, Ur Negative Negative mg/dL    Bilirubin Urine Negative Negative    Ketones, Urine Negative Negative mg/dL    Specific Gravity, UA 1.025 1.005 - 1.030    Blood, Urine Negative Negative    pH, UA 6.0 5.0 - 9.0    Protein, UA Negative Negative mg/dL    Urobilinogen, Urine 0.2 <2.0 E.U./dL    Nitrite, Urine Negative Negative    Leukocyte Esterase, Urine Negative Negative   Troponin   Result Value Ref Range    Troponin <0.01 0.00 - 0.03 ng/mL   D-Dimer, Quantitative   Result Value Ref Range    D-Dimer, Quant 269 ng/mL DDU       Radiology:  CTA CHEST W CONTRAST   Final Result      1. No evidence of acute pulmonary embolic disease. 2. Large hiatus hernia. 3. Cardiomegaly. CT ABDOMEN PELVIS WO CONTRAST Additional Contrast? None   Final Result   No evidence of urinary tract stone disease or urinary tract   obstruction. Cholelithiasis. Large hiatus hernia.

## 2019-07-06 LAB
EKG ATRIAL RATE: 57 BPM
EKG P AXIS: 68 DEGREES
EKG P-R INTERVAL: 152 MS
EKG Q-T INTERVAL: 438 MS
EKG QRS DURATION: 98 MS
EKG QTC CALCULATION (BAZETT): 426 MS
EKG R AXIS: 2 DEGREES
EKG T AXIS: 13 DEGREES
EKG VENTRICULAR RATE: 57 BPM

## 2019-07-06 PROCEDURE — 93010 ELECTROCARDIOGRAM REPORT: CPT | Performed by: INTERNAL MEDICINE

## 2019-07-08 ENCOUNTER — TELEPHONE (OUTPATIENT)
Dept: ADMINISTRATIVE | Age: 64
End: 2019-07-08

## 2019-07-15 ENCOUNTER — OFFICE VISIT (OUTPATIENT)
Dept: SURGERY | Age: 64
End: 2019-07-15
Payer: COMMERCIAL

## 2019-07-15 VITALS
SYSTOLIC BLOOD PRESSURE: 129 MMHG | DIASTOLIC BLOOD PRESSURE: 83 MMHG | HEIGHT: 69 IN | TEMPERATURE: 98 F | OXYGEN SATURATION: 94 % | HEART RATE: 53 BPM | RESPIRATION RATE: 16 BRPM | WEIGHT: 216.4 LBS | BODY MASS INDEX: 32.05 KG/M2

## 2019-07-15 DIAGNOSIS — K44.9 HIATAL HERNIA: ICD-10-CM

## 2019-07-15 DIAGNOSIS — K80.50 BILIARY COLIC: ICD-10-CM

## 2019-07-15 DIAGNOSIS — K80.20 GALLSTONES: Primary | ICD-10-CM

## 2019-07-15 PROCEDURE — 99214 OFFICE O/P EST MOD 30 MIN: CPT | Performed by: SURGERY

## 2019-07-15 NOTE — PROGRESS NOTES
Cath/PTCA/stent of RCA; PTCA/stent of mid LAD with adjunctive ReoPro administration.  DIAGNOSTIC CARDIAC CATH LAB PROCEDURE  6/25/2002    [facility]. Kissing balloon angioplasty LAD>diag Perclose. Dr. Calvin Velazquez and Dr. Jemal Davila.  DIAGNOSTIC CARDIAC CATH LAB PROCEDURE  4/13/2005    [address] Cath/stent (TAX\"US) to diagonal at Heart Lab.  FINGER TRIGGER RELEASE Right 11/20/2015    Release right long trigger finger. Monica Noe MD    HERNIA REPAIR Left YRS AGO    [facility]    INGUINAL HERNIA REPAIR Right 08/01/2017    robotic assisted    KNEE ARTHROPLASTY Left 06/16/2014    Left TKA. Monica Noe MD    UPPER GASTROINTESTINAL ENDOSCOPY          Allergies: Penicillins     Medications:   Current Outpatient Medications   Medication Sig Dispense Refill    ibuprofen (IBU) 600 MG tablet Take 1 tablet by mouth every 8 hours as needed for Pain Take with food. 30 tablet 1    amLODIPine (NORVASC) 5 MG tablet Take 1 tablet by mouth daily (Patient taking differently: Take 10 mg by mouth daily ) 30 tablet 3    ferrous sulfate 325 (65 Fe) MG tablet Take 325 mg by mouth daily (with breakfast)      cloNIDine (CATAPRES) 0.3 MG tablet Take 0.5 tablets by mouth daily (Patient taking differently: Take 0.3 mg by mouth nightly ) 30 tablet 3    Cholecalciferol (VITAMIN D3) 5000 UNITS TABS Take by mouth daily      aspirin EC 81 MG EC tablet Take 1 tablet by mouth daily. (Patient taking differently:   Take 81 mg by mouth daily LD 7/16/15) 30 tablet 3    losartan-hydrochlorothiazide (HYZAAR) 100-25 MG per tablet   Take 1 tablet by mouth daily       metoprolol (LOPRESSOR) 25 MG tablet Take 25 mg by mouth 2 times daily. Instructed to take morning of surgery with a sip of water      omeprazole (PRILOSEC) 40 MG capsule Take 40 mg by mouth 2 times daily BRING       nitroGLYCERIN (NITROSTAT) 0.4 MG SL tablet Place 1 tablet under the tongue every 5 minutes as needed for Chest pain.  If chest pain: put 1 NTG tab under tongue - sit down - wait 5 min - if no relief, call 911. May repeat dose 2 more times 5 min apart while waiting for EMS (total of 3 doses). If dizzy do not take any further doses. 30 tablet 6    Ascorbic Acid (VITAMIN C) 500 MG tablet Take 500 mg by mouth daily       fish oil-omega-3 fatty acids 1000 MG capsule   Take 3 capsules by mouth daily LD 7/16/15      Citalopram Hydrobromide (CELEXA PO) Take 60 mg by mouth daily Instructed to take morning of surgery with a sip of water      Ezetimibe (ZETIA PO) Take 10 mg by mouth nightly.  Atorvastatin Calcium (LIPITOR PO) Take 80 mg by mouth nightly. No current facility-administered medications for this visit. Social History:   Social History     Tobacco Use    Smoking status: Never Smoker    Smokeless tobacco: Never Used   Substance Use Topics    Alcohol use: No     Comment:  2 cups coffee/day        Family History:   Family History   Problem Relation Age of Onset    Hypertension Mother [de-identified]         from renal failure    Diabetes Mother     Coronary Art Dis Mother     Osteoporosis Mother     Osteoarthritis Father 80    High Cholesterol Brother     High Cholesterol Brother        REVIEW OF SYSTEMS:    Constitutional: negative  Eyes: negative  Ears, nose, mouth, throat, and face: negative  Respiratory: negative  Cardiovascular: negative  Gastrointestinal: as in HPI   Genitourinary:negative  Integument/breast: negative   Hematologic/lymphatic: negative  Musculoskeletal:negative  Neurological: negative  Allergic/Immunologic: negative    PHYSICAL EXAM   /83 (Site: Right Upper Arm, Position: Sitting, Cuff Size: Medium Adult)   Pulse 53   Temp 98 °F (36.7 °C) (Oral)   Resp 16   Ht 5' 8.5\" (1.74 m)   Wt 216 lb 6.4 oz (98.2 kg)   SpO2 94%   BMI 32.42 kg/m²     General appearance: alert, cooperative and in no acute distress.   Eyes: Grossly normal   Lungs: Clear to auscultation bilaterally  Heart: regular rate and rhythm  Abdomen: mild RUQ tenderness,

## 2019-07-16 ENCOUNTER — TELEPHONE (OUTPATIENT)
Dept: SURGERY | Age: 64
End: 2019-07-16

## 2019-07-16 DIAGNOSIS — R10.11 RIGHT UPPER QUADRANT ABDOMINAL PAIN: Primary | ICD-10-CM

## 2019-07-16 NOTE — PROGRESS NOTES
Spoke to pt. With date,time and prep of us and made f/u appt.   Electronically signed by Nisa Solis MA on 7/16/2019 at 2:40 PM

## 2019-07-17 ENCOUNTER — APPOINTMENT (OUTPATIENT)
Dept: ULTRASOUND IMAGING | Age: 64
End: 2019-07-17
Payer: COMMERCIAL

## 2019-07-17 ENCOUNTER — HOSPITAL ENCOUNTER (OUTPATIENT)
Age: 64
Setting detail: OBSERVATION
Discharge: HOME | End: 2019-07-17
Attending: EMERGENCY MEDICINE | Admitting: SURGERY
Payer: COMMERCIAL

## 2019-07-17 ENCOUNTER — APPOINTMENT (OUTPATIENT)
Dept: NUCLEAR MEDICINE | Age: 64
End: 2019-07-17
Payer: COMMERCIAL

## 2019-07-17 VITALS
WEIGHT: 211.2 LBS | HEIGHT: 68 IN | TEMPERATURE: 98.9 F | BODY MASS INDEX: 32.01 KG/M2 | DIASTOLIC BLOOD PRESSURE: 86 MMHG | HEART RATE: 66 BPM | SYSTOLIC BLOOD PRESSURE: 158 MMHG | OXYGEN SATURATION: 98 % | RESPIRATION RATE: 18 BRPM

## 2019-07-17 DIAGNOSIS — K80.50 BILIARY COLIC: ICD-10-CM

## 2019-07-17 DIAGNOSIS — K80.80 BILIARY CALCULUS OF OTHER SITE WITHOUT OBSTRUCTION: Primary | ICD-10-CM

## 2019-07-17 PROBLEM — K80.20 SYMPTOMATIC CHOLELITHIASIS: Status: ACTIVE | Noted: 2019-07-17

## 2019-07-17 LAB
ALBUMIN SERPL-MCNC: 4.2 G/DL (ref 3.5–5.2)
ALP BLD-CCNC: 93 U/L (ref 40–129)
ALT SERPL-CCNC: 26 U/L (ref 0–40)
ANION GAP SERPL CALCULATED.3IONS-SCNC: 11 MMOL/L (ref 7–16)
AST SERPL-CCNC: 18 U/L (ref 0–39)
BASOPHILS ABSOLUTE: 0.06 E9/L (ref 0–0.2)
BASOPHILS RELATIVE PERCENT: 0.6 % (ref 0–2)
BILIRUB SERPL-MCNC: 0.6 MG/DL (ref 0–1.2)
BILIRUBIN URINE: NEGATIVE
BLOOD, URINE: NEGATIVE
BUN BLDV-MCNC: 15 MG/DL (ref 8–23)
CALCIUM SERPL-MCNC: 9.9 MG/DL (ref 8.6–10.2)
CHLORIDE BLD-SCNC: 98 MMOL/L (ref 98–107)
CLARITY: CLEAR
CO2: 28 MMOL/L (ref 22–29)
COLOR: NORMAL
CREAT SERPL-MCNC: 1 MG/DL (ref 0.7–1.2)
EKG ATRIAL RATE: 61 BPM
EKG P AXIS: 55 DEGREES
EKG P-R INTERVAL: 160 MS
EKG Q-T INTERVAL: 400 MS
EKG QRS DURATION: 96 MS
EKG QTC CALCULATION (BAZETT): 402 MS
EKG R AXIS: -2 DEGREES
EKG T AXIS: 31 DEGREES
EKG VENTRICULAR RATE: 61 BPM
EOSINOPHILS ABSOLUTE: 0.24 E9/L (ref 0.05–0.5)
EOSINOPHILS RELATIVE PERCENT: 2.4 % (ref 0–6)
GFR AFRICAN AMERICAN: >60
GFR NON-AFRICAN AMERICAN: >60 ML/MIN/1.73
GLUCOSE BLD-MCNC: 231 MG/DL (ref 74–99)
GLUCOSE URINE: NEGATIVE MG/DL
HCT VFR BLD CALC: 41.5 % (ref 37–54)
HEMOGLOBIN: 14.9 G/DL (ref 12.5–16.5)
IMMATURE GRANULOCYTES #: 0.04 E9/L
IMMATURE GRANULOCYTES %: 0.4 % (ref 0–5)
KETONES, URINE: NEGATIVE MG/DL
LEUKOCYTE ESTERASE, URINE: NEGATIVE
LIPASE: 45 U/L (ref 13–60)
LYMPHOCYTES ABSOLUTE: 2.16 E9/L (ref 1.5–4)
LYMPHOCYTES RELATIVE PERCENT: 21.5 % (ref 20–42)
MAGNESIUM: 1.5 MG/DL (ref 1.6–2.6)
MCH RBC QN AUTO: 30.3 PG (ref 26–35)
MCHC RBC AUTO-ENTMCNC: 35.9 % (ref 32–34.5)
MCV RBC AUTO: 84.3 FL (ref 80–99.9)
MONOCYTES ABSOLUTE: 0.5 E9/L (ref 0.1–0.95)
MONOCYTES RELATIVE PERCENT: 5 % (ref 2–12)
NEUTROPHILS ABSOLUTE: 7.05 E9/L (ref 1.8–7.3)
NEUTROPHILS RELATIVE PERCENT: 70.1 % (ref 43–80)
NITRITE, URINE: NEGATIVE
PDW BLD-RTO: 12.8 FL (ref 11.5–15)
PH UA: 6 (ref 5–9)
PLATELET # BLD: 228 E9/L (ref 130–450)
PMV BLD AUTO: 10.4 FL (ref 7–12)
POTASSIUM REFLEX MAGNESIUM: 3.2 MMOL/L (ref 3.5–5)
PROTEIN UA: NEGATIVE MG/DL
RBC # BLD: 4.92 E12/L (ref 3.8–5.8)
SODIUM BLD-SCNC: 137 MMOL/L (ref 132–146)
SPECIFIC GRAVITY UA: 1.01 (ref 1–1.03)
TOTAL PROTEIN: 6.7 G/DL (ref 6.4–8.3)
TROPONIN: <0.01 NG/ML (ref 0–0.03)
UROBILINOGEN, URINE: 0.2 E.U./DL
WBC # BLD: 10.1 E9/L (ref 4.5–11.5)

## 2019-07-17 PROCEDURE — 2580000003 HC RX 258: Performed by: STUDENT IN AN ORGANIZED HEALTH CARE EDUCATION/TRAINING PROGRAM

## 2019-07-17 PROCEDURE — 6360000002 HC RX W HCPCS: Performed by: STUDENT IN AN ORGANIZED HEALTH CARE EDUCATION/TRAINING PROGRAM

## 2019-07-17 PROCEDURE — A9537 TC99M MEBROFENIN: HCPCS | Performed by: RADIOLOGY

## 2019-07-17 PROCEDURE — 93005 ELECTROCARDIOGRAM TRACING: CPT | Performed by: EMERGENCY MEDICINE

## 2019-07-17 PROCEDURE — 80053 COMPREHEN METABOLIC PANEL: CPT

## 2019-07-17 PROCEDURE — 3430000000 HC RX DIAGNOSTIC RADIOPHARMACEUTICAL: Performed by: RADIOLOGY

## 2019-07-17 PROCEDURE — 6370000000 HC RX 637 (ALT 250 FOR IP): Performed by: EMERGENCY MEDICINE

## 2019-07-17 PROCEDURE — 6370000000 HC RX 637 (ALT 250 FOR IP): Performed by: SURGERY

## 2019-07-17 PROCEDURE — APPSS60 APP SPLIT SHARED TIME 46-60 MINUTES: Performed by: NURSE PRACTITIONER

## 2019-07-17 PROCEDURE — 6360000002 HC RX W HCPCS: Performed by: EMERGENCY MEDICINE

## 2019-07-17 PROCEDURE — 78226 HEPATOBILIARY SYSTEM IMAGING: CPT

## 2019-07-17 PROCEDURE — G0378 HOSPITAL OBSERVATION PER HR: HCPCS

## 2019-07-17 PROCEDURE — 96372 THER/PROPH/DIAG INJ SC/IM: CPT

## 2019-07-17 PROCEDURE — 96365 THER/PROPH/DIAG IV INF INIT: CPT

## 2019-07-17 PROCEDURE — 99244 OFF/OP CNSLTJ NEW/EST MOD 40: CPT | Performed by: INTERNAL MEDICINE

## 2019-07-17 PROCEDURE — 6360000002 HC RX W HCPCS: Performed by: NURSE PRACTITIONER

## 2019-07-17 PROCEDURE — 96366 THER/PROPH/DIAG IV INF ADDON: CPT

## 2019-07-17 PROCEDURE — 99219 PR INITIAL OBSERVATION CARE/DAY 50 MINUTES: CPT | Performed by: SURGERY

## 2019-07-17 PROCEDURE — 2580000003 HC RX 258

## 2019-07-17 PROCEDURE — 83735 ASSAY OF MAGNESIUM: CPT

## 2019-07-17 PROCEDURE — 81003 URINALYSIS AUTO W/O SCOPE: CPT

## 2019-07-17 PROCEDURE — 76705 ECHO EXAM OF ABDOMEN: CPT

## 2019-07-17 PROCEDURE — 96375 TX/PRO/DX INJ NEW DRUG ADDON: CPT

## 2019-07-17 PROCEDURE — 85025 COMPLETE CBC W/AUTO DIFF WBC: CPT

## 2019-07-17 PROCEDURE — 96376 TX/PRO/DX INJ SAME DRUG ADON: CPT

## 2019-07-17 PROCEDURE — 99285 EMERGENCY DEPT VISIT HI MDM: CPT

## 2019-07-17 PROCEDURE — 84484 ASSAY OF TROPONIN QUANT: CPT

## 2019-07-17 PROCEDURE — 83690 ASSAY OF LIPASE: CPT

## 2019-07-17 PROCEDURE — 93010 ELECTROCARDIOGRAM REPORT: CPT | Performed by: INTERNAL MEDICINE

## 2019-07-17 RX ORDER — POTASSIUM CHLORIDE 20 MEQ/1
20 TABLET, EXTENDED RELEASE ORAL ONCE
Status: COMPLETED | OUTPATIENT
Start: 2019-07-17 | End: 2019-07-17

## 2019-07-17 RX ORDER — DICYCLOMINE HCL 20 MG
20 TABLET ORAL EVERY 6 HOURS PRN
COMMUNITY
End: 2019-10-21

## 2019-07-17 RX ORDER — ACETAMINOPHEN 325 MG/1
650 TABLET ORAL EVERY 4 HOURS PRN
Status: DISCONTINUED | OUTPATIENT
Start: 2019-07-17 | End: 2019-07-17 | Stop reason: HOSPADM

## 2019-07-17 RX ORDER — ONDANSETRON 2 MG/ML
4 INJECTION INTRAMUSCULAR; INTRAVENOUS EVERY 6 HOURS PRN
Status: DISCONTINUED | OUTPATIENT
Start: 2019-07-17 | End: 2019-07-17 | Stop reason: HOSPADM

## 2019-07-17 RX ORDER — OXYCODONE HYDROCHLORIDE AND ACETAMINOPHEN 5; 325 MG/1; MG/1
2 TABLET ORAL EVERY 4 HOURS PRN
Status: DISCONTINUED | OUTPATIENT
Start: 2019-07-17 | End: 2019-07-17 | Stop reason: HOSPADM

## 2019-07-17 RX ORDER — SODIUM CHLORIDE 0.9 % (FLUSH) 0.9 %
10 SYRINGE (ML) INJECTION EVERY 12 HOURS SCHEDULED
Status: DISCONTINUED | OUTPATIENT
Start: 2019-07-17 | End: 2019-07-17 | Stop reason: HOSPADM

## 2019-07-17 RX ORDER — ATORVASTATIN CALCIUM 80 MG/1
80 TABLET, FILM COATED ORAL NIGHTLY
COMMUNITY
End: 2019-11-07 | Stop reason: SDUPTHER

## 2019-07-17 RX ORDER — MORPHINE SULFATE 2 MG/ML
2 INJECTION, SOLUTION INTRAMUSCULAR; INTRAVENOUS
Status: DISCONTINUED | OUTPATIENT
Start: 2019-07-17 | End: 2019-07-17 | Stop reason: HOSPADM

## 2019-07-17 RX ORDER — SODIUM CHLORIDE 0.9 % (FLUSH) 0.9 %
SYRINGE (ML) INJECTION
Status: COMPLETED
Start: 2019-07-17 | End: 2019-07-17

## 2019-07-17 RX ORDER — EZETIMIBE 10 MG/1
10 TABLET ORAL NIGHTLY
COMMUNITY
End: 2019-12-27 | Stop reason: SDUPTHER

## 2019-07-17 RX ORDER — OXYCODONE HYDROCHLORIDE AND ACETAMINOPHEN 5; 325 MG/1; MG/1
1 TABLET ORAL EVERY 4 HOURS PRN
Status: DISCONTINUED | OUTPATIENT
Start: 2019-07-17 | End: 2019-07-17 | Stop reason: HOSPADM

## 2019-07-17 RX ORDER — MAGNESIUM SULFATE 1 G/100ML
1 INJECTION INTRAVENOUS ONCE
Status: COMPLETED | OUTPATIENT
Start: 2019-07-17 | End: 2019-07-17

## 2019-07-17 RX ORDER — SODIUM CHLORIDE, SODIUM LACTATE, POTASSIUM CHLORIDE, CALCIUM CHLORIDE 600; 310; 30; 20 MG/100ML; MG/100ML; MG/100ML; MG/100ML
INJECTION, SOLUTION INTRAVENOUS CONTINUOUS
Status: DISCONTINUED | OUTPATIENT
Start: 2019-07-17 | End: 2019-07-17 | Stop reason: HOSPADM

## 2019-07-17 RX ORDER — SODIUM CHLORIDE 0.9 % (FLUSH) 0.9 %
10 SYRINGE (ML) INJECTION PRN
Status: DISCONTINUED | OUTPATIENT
Start: 2019-07-17 | End: 2019-07-17 | Stop reason: HOSPADM

## 2019-07-17 RX ORDER — ALPHA-D-GALACTOSIDASE
2 TABLET ORAL 3 TIMES DAILY PRN
COMMUNITY
End: 2019-09-09

## 2019-07-17 RX ORDER — MORPHINE SULFATE 4 MG/ML
4 INJECTION, SOLUTION INTRAMUSCULAR; INTRAVENOUS
Status: DISCONTINUED | OUTPATIENT
Start: 2019-07-17 | End: 2019-07-17 | Stop reason: HOSPADM

## 2019-07-17 RX ORDER — CITALOPRAM HYDROBROMIDE 40 MG
60 TABLET ORAL EVERY MORNING
COMMUNITY
End: 2019-12-27 | Stop reason: SDUPTHER

## 2019-07-17 RX ORDER — OXYCODONE HYDROCHLORIDE AND ACETAMINOPHEN 5; 325 MG/1; MG/1
1 TABLET ORAL EVERY 6 HOURS PRN
Qty: 28 TABLET | Refills: 0 | Status: SHIPPED | OUTPATIENT
Start: 2019-07-17 | End: 2019-07-24

## 2019-07-17 RX ORDER — ONDANSETRON 2 MG/ML
4 INJECTION INTRAMUSCULAR; INTRAVENOUS ONCE
Status: COMPLETED | OUTPATIENT
Start: 2019-07-17 | End: 2019-07-17

## 2019-07-17 RX ORDER — ONDANSETRON 4 MG/1
4 TABLET, FILM COATED ORAL 3 TIMES DAILY PRN
Qty: 30 TABLET | Refills: 0 | Status: SHIPPED | OUTPATIENT
Start: 2019-07-17 | End: 2019-09-09

## 2019-07-17 RX ADMIN — POTASSIUM CHLORIDE 20 MEQ: 20 TABLET, EXTENDED RELEASE ORAL at 04:39

## 2019-07-17 RX ADMIN — ONDANSETRON 4 MG: 2 INJECTION INTRAMUSCULAR; INTRAVENOUS at 03:35

## 2019-07-17 RX ADMIN — MAGNESIUM SULFATE HEPTAHYDRATE 1 G: 1 INJECTION, SOLUTION INTRAVENOUS at 04:38

## 2019-07-17 RX ADMIN — MAGNESIUM SULFATE HEPTAHYDRATE 1 G: 1 INJECTION, SOLUTION INTRAVENOUS at 14:04

## 2019-07-17 RX ADMIN — Medication 10 ML: at 07:18

## 2019-07-17 RX ADMIN — OXYCODONE HYDROCHLORIDE AND ACETAMINOPHEN 1 TABLET: 5; 325 TABLET ORAL at 15:25

## 2019-07-17 RX ADMIN — Medication 10 ML: at 10:29

## 2019-07-17 RX ADMIN — Medication 6 MILLICURIE: at 12:30

## 2019-07-17 RX ADMIN — MORPHINE SULFATE 2 MG: 2 INJECTION, SOLUTION INTRAMUSCULAR; INTRAVENOUS at 14:08

## 2019-07-17 RX ADMIN — HYDROMORPHONE HYDROCHLORIDE 1 MG: 1 INJECTION, SOLUTION INTRAMUSCULAR; INTRAVENOUS; SUBCUTANEOUS at 03:36

## 2019-07-17 RX ADMIN — SODIUM CHLORIDE, POTASSIUM CHLORIDE, SODIUM LACTATE AND CALCIUM CHLORIDE: 600; 310; 30; 20 INJECTION, SOLUTION INTRAVENOUS at 10:28

## 2019-07-17 RX ADMIN — HYDROMORPHONE HYDROCHLORIDE 1 MG: 1 INJECTION, SOLUTION INTRAMUSCULAR; INTRAVENOUS; SUBCUTANEOUS at 07:18

## 2019-07-17 RX ADMIN — MORPHINE SULFATE 2 MG: 2 INJECTION, SOLUTION INTRAMUSCULAR; INTRAVENOUS at 10:36

## 2019-07-17 ASSESSMENT — PAIN SCALES - GENERAL
PAINLEVEL_OUTOF10: 10
PAINLEVEL_OUTOF10: 2
PAINLEVEL_OUTOF10: 3
PAINLEVEL_OUTOF10: 0
PAINLEVEL_OUTOF10: 6
PAINLEVEL_OUTOF10: 8
PAINLEVEL_OUTOF10: 10
PAINLEVEL_OUTOF10: 3
PAINLEVEL_OUTOF10: 5
PAINLEVEL_OUTOF10: 8
PAINLEVEL_OUTOF10: 3

## 2019-07-17 ASSESSMENT — PAIN DESCRIPTION - ORIENTATION
ORIENTATION: RIGHT

## 2019-07-17 ASSESSMENT — PAIN DESCRIPTION - PROGRESSION
CLINICAL_PROGRESSION: NOT CHANGED
CLINICAL_PROGRESSION: GRADUALLY IMPROVING

## 2019-07-17 ASSESSMENT — PAIN DESCRIPTION - FREQUENCY
FREQUENCY: CONTINUOUS

## 2019-07-17 ASSESSMENT — PAIN - FUNCTIONAL ASSESSMENT
PAIN_FUNCTIONAL_ASSESSMENT: ACTIVITIES ARE NOT PREVENTED
PAIN_FUNCTIONAL_ASSESSMENT: PREVENTS OR INTERFERES SOME ACTIVE ACTIVITIES AND ADLS
PAIN_FUNCTIONAL_ASSESSMENT: PREVENTS OR INTERFERES SOME ACTIVE ACTIVITIES AND ADLS

## 2019-07-17 ASSESSMENT — PAIN DESCRIPTION - LOCATION
LOCATION: ABDOMEN
LOCATION: ABDOMEN
LOCATION: FLANK

## 2019-07-17 ASSESSMENT — PAIN DESCRIPTION - DESCRIPTORS
DESCRIPTORS: ACHING;DISCOMFORT;SORE
DESCRIPTORS: CONSTANT
DESCRIPTORS: CONSTANT;THROBBING

## 2019-07-17 ASSESSMENT — ENCOUNTER SYMPTOMS
CONSTIPATION: 0
RESPIRATORY NEGATIVE: 1
SORE THROAT: 0
SHORTNESS OF BREATH: 0
EYES NEGATIVE: 1
ABDOMINAL PAIN: 1
VOMITING: 0
NAUSEA: 0
BELCHING: 0
FLATUS: 0
HEMATEMESIS: 0
DIARRHEA: 0
COUGH: 0
HEMATOCHEZIA: 0

## 2019-07-17 ASSESSMENT — PAIN DESCRIPTION - PAIN TYPE
TYPE: ACUTE PAIN

## 2019-07-17 ASSESSMENT — PAIN DESCRIPTION - ONSET
ONSET: ON-GOING

## 2019-07-17 NOTE — ED NOTES
Pt states that he was seen earlier in the month for the same pain and was diagnosed with gallstones. Pt was following up with pcp and there are more tests scheduled. Pt states that the pain became severe this evening and felt like he needed to be seen. Pt states that he took a tramadol PTA and there was no relief. Pt has bowel sounds in all 4 quadrants. Pt is in NARD with ABC's in tact. Pt states that there are no changes in his bowel or bladder habits. Blood work obtained and sent to lab. Pt is resting in bed at this time. Will continue to monitor.      Kehinde Arboleda, WU  07/17/19 [id number]

## 2019-07-17 NOTE — CONSULTS
Inpatient Cardiology Consultation      Reason for Consult:  Cardiac Clearance for future Cholcystectomy    Consulting Physician: Dr. Marco Evangelista     Requesting Physician:  Dr. Rupinder Dugan    Date of Consultation: 7/17/2019    HISTORY OF PRESENT ILLNESS:   Mr. Elicia Dumont is a 71year old  male who follows with Dr. Samuel Roth. He was most recently seen in the office with Dr. Samuel Roth on 05/16/2019. His medical history includes CAD with most recent PCI in 05/2012, HTN, HLD, GERD, obesity, and anxiety. Mr. Elicia Dumont presented to [facility] ED on 07/17/2019 with complaints of right flank pain. He states that over the past 6 months he has been having right flank intermittent pain and has been evaluated for \"gallstones\". He states taht he has been ahaving accompanying intermittent diarrhea without nausea or vomiting. He states that his right flanl pain \"has really ramped up in the past 3-4 weeks\". He states that he ambulates well up and down stairs without accomanying chest pain or dyspnea. He also denies orthopnea, PND, and edema to BLE. Upon arrival to the ED his VS were 97.7-64-/94-96% on RA. EKG SR. RUQ ultrasound with suspected small gallstones without gallbladder wall thickening or pericholecystic fluid. He received Dilaudid, Zofran and was placed on IVF. General Surgery was consulted. Subsequently, Cardiology was consulted for cardiac clearance prior to future cholecystectomy. Please note: past medical records were reviewed per electronic medical record (EMR) - see detailed reports under Past Medical/ Surgical History. Past Medical and Surgical History:    1. Reported cardiac catheterization 09/17/1998 with stenting to the PCA and mid LAD (specifics unknown)  2. Reported cardiac catheterization 06/25/2002 with lissing balloon angioplasty to LAD>Diagonal (specifics unknown)  3. Reported cardiac catheterization with stenting to the Diagonal 04/13/2005 (specifics unknown)  4.  Cardiac catheterization (Dr. Josue Stevenson)
left ventricular concentric hypertrophy noted.   No regional wall motion abnormalities seen.   Normal left ventricular ejection fraction.   The left atrium is borderline dilated.   Mild mitral regurgitation is present.   Physiologic and/or trace aortic regurgitation is noted.   Mild tricuspid regurgitation.     Assessment;  · Preop cardiac evaluation  · CAD status post multiple stents, status post PCI/BERRY to LAD and RCA in the last intervention was performed in 2012 -asymptomatic and normal myocardial perfusion scan - 5/2019  · Right upper quadrant pain secondary to cholelithiasis  · Hypertension not well controlled secondary to abdominal pain and also not taking his medications  · Hyperlipidemia on statin  · Anxiety disorder  · Mild hypokalemia  Plan:  · His revised cardiac risk index is 0 and has good functional capacity. He had a recent stress test in May 2019 which showed no ischemia or infarct with normal LV function. He does not have any active cardiac conditions such as chest pain, decompensated failure, uncontrolled arrhythmias or obstructive valve lesions. He is at low risk for perioperative cardiac events and should proceed with cholecystectomy as planned. No further cardiac testing is planned at this time. · Continue beta-blocker metoprolol perioperatively and rest of the antihypertensive medical regimen. · Continue aspirin if no concern for bleeding. · Will see him as needed and please call us if you have any questions or concerns.     Thank you for consulting and allowing us to participate in . Marline Tuttle MD, [facility] Cardiology

## 2019-07-17 NOTE — PATIENT CARE CONFERENCE
[facility] Quality Flow/Interdisciplinary Rounds Progress Note        Quality Flow Rounds held on July 17, 2019    Disciplines Attending:  Bedside Nurse, ,  and Nursing Unit Leadership    Guanako Bianchi was admitted on 7/17/2019  3:01 AM    Anticipated Discharge Date:  Expected Discharge Date: 07/22/19    Disposition:    Amos Score:  Amos Scale Score: 22    Readmission Risk              Risk of Unplanned Readmission:        8           Discussed patient goal for the day, patient clinical progression, and barriers to discharge.   The following Goal(s) of the Day/Commitment(s) have been identified:  Diagnostics - Report Results and Labs - Report Results      Sharri Leiva  July 17, 2019

## 2019-07-18 ENCOUNTER — CARE COORDINATION (OUTPATIENT)
Dept: CASE MANAGEMENT | Age: 64
End: 2019-07-18

## 2019-07-18 RX ORDER — CLONIDINE HYDROCHLORIDE 0.3 MG/1
0.3 TABLET ORAL NIGHTLY
COMMUNITY
End: 2019-11-07 | Stop reason: SDUPTHER

## 2019-07-19 ENCOUNTER — ANESTHESIA (OUTPATIENT)
Dept: OPERATING ROOM | Age: 64
End: 2019-07-19
Payer: COMMERCIAL

## 2019-07-19 ENCOUNTER — ANESTHESIA EVENT (OUTPATIENT)
Dept: OPERATING ROOM | Age: 64
End: 2019-07-19
Payer: COMMERCIAL

## 2019-07-19 ENCOUNTER — HOSPITAL ENCOUNTER (OUTPATIENT)
Dept: GENERAL RADIOLOGY | Age: 64
Discharge: HOME | End: 2019-07-21
Attending: SURGERY
Payer: COMMERCIAL

## 2019-07-19 ENCOUNTER — HOSPITAL ENCOUNTER (OUTPATIENT)
Age: 64
Setting detail: OUTPATIENT SURGERY
Discharge: HOME | End: 2019-07-19
Attending: SURGERY | Admitting: SURGERY
Payer: COMMERCIAL

## 2019-07-19 VITALS
OXYGEN SATURATION: 99 % | DIASTOLIC BLOOD PRESSURE: 96 MMHG | SYSTOLIC BLOOD PRESSURE: 179 MMHG | TEMPERATURE: 98.8 F | RESPIRATION RATE: 1 BRPM

## 2019-07-19 VITALS
OXYGEN SATURATION: 93 % | DIASTOLIC BLOOD PRESSURE: 83 MMHG | SYSTOLIC BLOOD PRESSURE: 153 MMHG | BODY MASS INDEX: 31.83 KG/M2 | HEART RATE: 64 BPM | WEIGHT: 210 LBS | TEMPERATURE: 98 F | RESPIRATION RATE: 16 BRPM | HEIGHT: 68 IN

## 2019-07-19 DIAGNOSIS — R52 PAIN: ICD-10-CM

## 2019-07-19 LAB — METER GLUCOSE: 139 MG/DL (ref 74–99)

## 2019-07-19 PROCEDURE — 3700000001 HC ADD 15 MINUTES (ANESTHESIA): Performed by: SURGERY

## 2019-07-19 PROCEDURE — 6360000002 HC RX W HCPCS: Performed by: SURGERY

## 2019-07-19 PROCEDURE — 7100000001 HC PACU RECOVERY - ADDTL 15 MIN: Performed by: SURGERY

## 2019-07-19 PROCEDURE — 2709999900 HC NON-CHARGEABLE SUPPLY: Performed by: SURGERY

## 2019-07-19 PROCEDURE — 3700000000 HC ANESTHESIA ATTENDED CARE: Performed by: SURGERY

## 2019-07-19 PROCEDURE — 7100000010 HC PHASE II RECOVERY - FIRST 15 MIN: Performed by: SURGERY

## 2019-07-19 PROCEDURE — 7100000011 HC PHASE II RECOVERY - ADDTL 15 MIN: Performed by: SURGERY

## 2019-07-19 PROCEDURE — 88304 TISSUE EXAM BY PATHOLOGIST: CPT

## 2019-07-19 PROCEDURE — 2500000003 HC RX 250 WO HCPCS: Performed by: SURGERY

## 2019-07-19 PROCEDURE — 2500000003 HC RX 250 WO HCPCS: Performed by: NURSE ANESTHETIST, CERTIFIED REGISTERED

## 2019-07-19 PROCEDURE — 6370000000 HC RX 637 (ALT 250 FOR IP): Performed by: ANESTHESIOLOGY

## 2019-07-19 PROCEDURE — 47562 LAPAROSCOPIC CHOLECYSTECTOMY: CPT | Performed by: SURGERY

## 2019-07-19 PROCEDURE — 2780000010 HC IMPLANT OTHER: Performed by: SURGERY

## 2019-07-19 PROCEDURE — 82962 GLUCOSE BLOOD TEST: CPT

## 2019-07-19 PROCEDURE — 3600000004 HC SURGERY LEVEL 4 BASE: Performed by: SURGERY

## 2019-07-19 PROCEDURE — 3600000014 HC SURGERY LEVEL 4 ADDTL 15MIN: Performed by: SURGERY

## 2019-07-19 PROCEDURE — 2580000003 HC RX 258: Performed by: NURSE ANESTHETIST, CERTIFIED REGISTERED

## 2019-07-19 PROCEDURE — 7100000000 HC PACU RECOVERY - FIRST 15 MIN: Performed by: SURGERY

## 2019-07-19 PROCEDURE — 6360000002 HC RX W HCPCS: Performed by: NURSE ANESTHETIST, CERTIFIED REGISTERED

## 2019-07-19 RX ORDER — SODIUM CHLORIDE 9 MG/ML
INJECTION, SOLUTION INTRAVENOUS CONTINUOUS PRN
Status: DISCONTINUED | OUTPATIENT
Start: 2019-07-19 | End: 2019-07-19 | Stop reason: SDUPTHER

## 2019-07-19 RX ORDER — SODIUM CHLORIDE 0.9 % (FLUSH) 0.9 %
10 SYRINGE (ML) INJECTION PRN
Status: DISCONTINUED | OUTPATIENT
Start: 2019-07-19 | End: 2019-07-19 | Stop reason: HOSPADM

## 2019-07-19 RX ORDER — EPHEDRINE SULFATE/0.9% NACL/PF 50 MG/5 ML
SYRINGE (ML) INTRAVENOUS PRN
Status: DISCONTINUED | OUTPATIENT
Start: 2019-07-19 | End: 2019-07-19 | Stop reason: SDUPTHER

## 2019-07-19 RX ORDER — SODIUM CHLORIDE, SODIUM LACTATE, POTASSIUM CHLORIDE, CALCIUM CHLORIDE 600; 310; 30; 20 MG/100ML; MG/100ML; MG/100ML; MG/100ML
INJECTION, SOLUTION INTRAVENOUS CONTINUOUS PRN
Status: DISCONTINUED | OUTPATIENT
Start: 2019-07-19 | End: 2019-07-19 | Stop reason: SDUPTHER

## 2019-07-19 RX ORDER — NEOSTIGMINE METHYLSULFATE 1 MG/ML
INJECTION, SOLUTION INTRAVENOUS PRN
Status: DISCONTINUED | OUTPATIENT
Start: 2019-07-19 | End: 2019-07-19 | Stop reason: SDUPTHER

## 2019-07-19 RX ORDER — SODIUM CHLORIDE 0.9 % (FLUSH) 0.9 %
10 SYRINGE (ML) INJECTION EVERY 12 HOURS SCHEDULED
Status: DISCONTINUED | OUTPATIENT
Start: 2019-07-19 | End: 2019-07-19 | Stop reason: HOSPADM

## 2019-07-19 RX ORDER — LIDOCAINE HYDROCHLORIDE 20 MG/ML
INJECTION, SOLUTION EPIDURAL; INFILTRATION; INTRACAUDAL; PERINEURAL PRN
Status: DISCONTINUED | OUTPATIENT
Start: 2019-07-19 | End: 2019-07-19 | Stop reason: SDUPTHER

## 2019-07-19 RX ORDER — DEXAMETHASONE SODIUM PHOSPHATE 4 MG/ML
INJECTION, SOLUTION INTRA-ARTICULAR; INTRALESIONAL; INTRAMUSCULAR; INTRAVENOUS; SOFT TISSUE PRN
Status: DISCONTINUED | OUTPATIENT
Start: 2019-07-19 | End: 2019-07-19 | Stop reason: SDUPTHER

## 2019-07-19 RX ORDER — FENTANYL CITRATE 50 UG/ML
25 INJECTION, SOLUTION INTRAMUSCULAR; INTRAVENOUS EVERY 5 MIN PRN
Status: DISCONTINUED | OUTPATIENT
Start: 2019-07-19 | End: 2019-07-19 | Stop reason: HOSPADM

## 2019-07-19 RX ORDER — HYDROCODONE BITARTRATE AND ACETAMINOPHEN 5; 325 MG/1; MG/1
1 TABLET ORAL
Status: COMPLETED | OUTPATIENT
Start: 2019-07-19 | End: 2019-07-19

## 2019-07-19 RX ORDER — INDOCYANINE GREEN AND WATER 25 MG
2.5 KIT INJECTION ONCE
Status: COMPLETED | OUTPATIENT
Start: 2019-07-19 | End: 2019-07-19

## 2019-07-19 RX ORDER — GLYCOPYRROLATE 1 MG/5 ML
SYRINGE (ML) INTRAVENOUS PRN
Status: DISCONTINUED | OUTPATIENT
Start: 2019-07-19 | End: 2019-07-19 | Stop reason: SDUPTHER

## 2019-07-19 RX ORDER — MIDAZOLAM HYDROCHLORIDE 1 MG/ML
INJECTION INTRAMUSCULAR; INTRAVENOUS PRN
Status: DISCONTINUED | OUTPATIENT
Start: 2019-07-19 | End: 2019-07-19 | Stop reason: SDUPTHER

## 2019-07-19 RX ORDER — ROCURONIUM BROMIDE 10 MG/ML
INJECTION, SOLUTION INTRAVENOUS PRN
Status: DISCONTINUED | OUTPATIENT
Start: 2019-07-19 | End: 2019-07-19 | Stop reason: SDUPTHER

## 2019-07-19 RX ORDER — LABETALOL HYDROCHLORIDE 5 MG/ML
INJECTION, SOLUTION INTRAVENOUS PRN
Status: DISCONTINUED | OUTPATIENT
Start: 2019-07-19 | End: 2019-07-19 | Stop reason: SDUPTHER

## 2019-07-19 RX ORDER — PROPOFOL 10 MG/ML
INJECTION, EMULSION INTRAVENOUS PRN
Status: DISCONTINUED | OUTPATIENT
Start: 2019-07-19 | End: 2019-07-19 | Stop reason: SDUPTHER

## 2019-07-19 RX ORDER — FENTANYL CITRATE 50 UG/ML
INJECTION, SOLUTION INTRAMUSCULAR; INTRAVENOUS PRN
Status: DISCONTINUED | OUTPATIENT
Start: 2019-07-19 | End: 2019-07-19 | Stop reason: SDUPTHER

## 2019-07-19 RX ORDER — ONDANSETRON 2 MG/ML
INJECTION INTRAMUSCULAR; INTRAVENOUS PRN
Status: DISCONTINUED | OUTPATIENT
Start: 2019-07-19 | End: 2019-07-19 | Stop reason: SDUPTHER

## 2019-07-19 RX ADMIN — LIDOCAINE HYDROCHLORIDE 100 MG: 20 INJECTION, SOLUTION EPIDURAL; INFILTRATION; INTRACAUDAL; PERINEURAL at 12:49

## 2019-07-19 RX ADMIN — LABETALOL HYDROCHLORIDE 5 MG: 5 INJECTION INTRAVENOUS at 13:58

## 2019-07-19 RX ADMIN — Medication 2 G: at 12:42

## 2019-07-19 RX ADMIN — HYDROCODONE BITARTRATE AND ACETAMINOPHEN 1 TABLET: 5; 325 TABLET ORAL at 15:49

## 2019-07-19 RX ADMIN — FENTANYL CITRATE 100 MCG: 50 INJECTION, SOLUTION INTRAMUSCULAR; INTRAVENOUS at 12:49

## 2019-07-19 RX ADMIN — Medication 5 MG: at 13:40

## 2019-07-19 RX ADMIN — ROCURONIUM BROMIDE 10 MG: 10 SOLUTION INTRAVENOUS at 13:32

## 2019-07-19 RX ADMIN — ROCURONIUM BROMIDE 40 MG: 10 SOLUTION INTRAVENOUS at 12:49

## 2019-07-19 RX ADMIN — Medication 10 MG: at 13:38

## 2019-07-19 RX ADMIN — SODIUM CHLORIDE, POTASSIUM CHLORIDE, SODIUM LACTATE AND CALCIUM CHLORIDE: 600; 310; 30; 20 INJECTION, SOLUTION INTRAVENOUS at 13:58

## 2019-07-19 RX ADMIN — FENTANYL CITRATE 50 MCG: 50 INJECTION, SOLUTION INTRAMUSCULAR; INTRAVENOUS at 13:22

## 2019-07-19 RX ADMIN — INDOCYANINE GREEN AND WATER 2.5 MG: KIT at 11:49

## 2019-07-19 RX ADMIN — MIDAZOLAM HYDROCHLORIDE 2 MG: 1 INJECTION, SOLUTION INTRAMUSCULAR; INTRAVENOUS at 12:42

## 2019-07-19 RX ADMIN — DEXAMETHASONE SODIUM PHOSPHATE 10 MG: 4 INJECTION, SOLUTION INTRAMUSCULAR; INTRAVENOUS at 12:49

## 2019-07-19 RX ADMIN — Medication 10 MG: at 13:37

## 2019-07-19 RX ADMIN — Medication 0.6 MG: at 14:01

## 2019-07-19 RX ADMIN — ONDANSETRON HYDROCHLORIDE 4 MG: 2 INJECTION, SOLUTION INTRAMUSCULAR; INTRAVENOUS at 12:49

## 2019-07-19 RX ADMIN — SODIUM CHLORIDE: 9 INJECTION, SOLUTION INTRAVENOUS at 12:42

## 2019-07-19 RX ADMIN — Medication 3 MG: at 14:01

## 2019-07-19 RX ADMIN — FENTANYL CITRATE 25 MCG: 50 INJECTION, SOLUTION INTRAMUSCULAR; INTRAVENOUS at 13:57

## 2019-07-19 RX ADMIN — PROPOFOL 200 MG: 10 INJECTION, EMULSION INTRAVENOUS at 12:49

## 2019-07-19 ASSESSMENT — PULMONARY FUNCTION TESTS
PIF_VALUE: 20
PIF_VALUE: 22
PIF_VALUE: 28
PIF_VALUE: 20
PIF_VALUE: 29
PIF_VALUE: 22
PIF_VALUE: 1
PIF_VALUE: 23
PIF_VALUE: 19
PIF_VALUE: 20
PIF_VALUE: 29
PIF_VALUE: 29
PIF_VALUE: 28
PIF_VALUE: 17
PIF_VALUE: 2
PIF_VALUE: 29
PIF_VALUE: 17
PIF_VALUE: 19
PIF_VALUE: 19
PIF_VALUE: 1
PIF_VALUE: 28
PIF_VALUE: 29
PIF_VALUE: 1
PIF_VALUE: 28
PIF_VALUE: 21
PIF_VALUE: 54
PIF_VALUE: 3
PIF_VALUE: 20
PIF_VALUE: 30
PIF_VALUE: 29
PIF_VALUE: 4
PIF_VALUE: 28
PIF_VALUE: 28
PIF_VALUE: 3
PIF_VALUE: 27
PIF_VALUE: 29
PIF_VALUE: 1
PIF_VALUE: 3
PIF_VALUE: 29
PIF_VALUE: 29
PIF_VALUE: 28
PIF_VALUE: 29
PIF_VALUE: 1
PIF_VALUE: 20
PIF_VALUE: 20
PIF_VALUE: 19
PIF_VALUE: 1
PIF_VALUE: 29
PIF_VALUE: 21
PIF_VALUE: 1
PIF_VALUE: 19
PIF_VALUE: 27
PIF_VALUE: 29
PIF_VALUE: 8
PIF_VALUE: 3
PIF_VALUE: 19
PIF_VALUE: 3
PIF_VALUE: 19
PIF_VALUE: 28
PIF_VALUE: 29
PIF_VALUE: 28
PIF_VALUE: 29
PIF_VALUE: 29
PIF_VALUE: 20
PIF_VALUE: 21
PIF_VALUE: 11
PIF_VALUE: 3
PIF_VALUE: 28
PIF_VALUE: 1
PIF_VALUE: 19
PIF_VALUE: 1
PIF_VALUE: 3
PIF_VALUE: 29
PIF_VALUE: 28
PIF_VALUE: 19
PIF_VALUE: 29
PIF_VALUE: 29
PIF_VALUE: 19
PIF_VALUE: 1
PIF_VALUE: 29

## 2019-07-19 ASSESSMENT — PAIN SCALES - GENERAL
PAINLEVEL_OUTOF10: 3
PAINLEVEL_OUTOF10: 2
PAINLEVEL_OUTOF10: 5
PAINLEVEL_OUTOF10: 2
PAINLEVEL_OUTOF10: 3

## 2019-07-19 ASSESSMENT — ENCOUNTER SYMPTOMS: SHORTNESS OF BREATH: 1

## 2019-07-19 NOTE — H&P
with adjunctive ReoPro administration.  DIAGNOSTIC CARDIAC CATH LAB PROCEDURE  2002    [facility]. Kissing balloon angioplasty LAD>diag Perclose. Dr. Rachid Chery and Dr. Debby Jenkins.  DIAGNOSTIC CARDIAC CATH LAB PROCEDURE  2005    [address] Cath/stent (TAX\"US) to diagonal at Heart Lab.  FINGER TRIGGER RELEASE Right 2015    Release right long trigger finger. Carlin Severe, MD    HERNIA REPAIR Left YRS AGO    [facility]    INGUINAL HERNIA REPAIR Right 2017    robotic assisted    KNEE ARTHROPLASTY Left 2014    Left TKA. Carlin Severe, MD    UPPER GASTROINTESTINAL ENDOSCOPY          Allergies: Penicillins     Medications:   No current facility-administered medications for this encounter. Social History:   Social History     Tobacco Use    Smoking status: Never Smoker    Smokeless tobacco: Never Used   Substance Use Topics    Alcohol use: No     Comment:  2 cups coffee/day        Family History:   Family History   Problem Relation Age of Onset    Hypertension Mother [de-identified]         from renal failure    Diabetes Mother     Coronary Art Dis Mother     Osteoporosis Mother     Osteoarthritis Father 80    High Cholesterol Brother     High Cholesterol Brother        Medications Prior to Admission:   Medications Prior to Admission: cloNIDine (CATAPRES) 0.3 MG tablet, Take 0.3 mg by mouth nightly  aspirin 81 MG tablet, Take 81 mg by mouth daily 7/15/19 per surgeon  atorvastatin (LIPITOR) 80 MG tablet, Take 80 mg by mouth nightly  citalopram (CELEXA) 40 MG tablet, Take 60 mg by mouth every morning   ezetimibe (ZETIA) 10 MG tablet, Take 10 mg by mouth nightly  dicyclomine (BENTYL) 20 MG tablet, Take 20 mg by mouth every 6 hours as needed  alpha-galactosidase (BEANO) TABS chewable tablet, Take 2 tablets by mouth 3 times daily as needed  oxyCODONE-acetaminophen (PERCOCET) 5-325 MG per tablet, Take 1 tablet by mouth every 6 hours as needed for Pain for up to 7 days. Intended supply: 7 days. Take lowest dose possible to manage pain  ondansetron (ZOFRAN) 4 MG tablet, Take 1 tablet by mouth 3 times daily as needed for Nausea or Vomiting  amLODIPine (NORVASC) 5 MG tablet, Take 1 tablet by mouth daily (Patient taking differently: Take 5 mg by mouth every morning )  ferrous sulfate 325 (65 Fe) MG tablet, Take 325 mg by mouth daily (with breakfast)  7/15/19  Cholecalciferol (VITAMIN D3) 5000 UNITS TABS, Take 1 tablet by mouth every morning  7/15/19  losartan-hydrochlorothiazide (HYZAAR) 100-25 MG per tablet, Take 1 tablet by mouth every morning   metoprolol (LOPRESSOR) 25 MG tablet, Take 25 mg by mouth 2 times daily Take morning of surgery with a sip of water  omeprazole (PRILOSEC) 40 MG capsule, Take 40 mg by mouth 2 times daily   nitroGLYCERIN (NITROSTAT) 0.4 MG SL tablet, Place 1 tablet under the tongue every 5 minutes as needed for Chest pain. If chest pain: put 1 NTG tab under tongue - sit down - wait 5 min - if no relief, call 911. May repeat dose 2 more times 5 min apart while waiting for EMS (total of 3 doses). If dizzy do not take any further doses. Ascorbic Acid (VITAMIN C) 500 MG tablet, Take 500 mg by mouth every morning  7/15/19  fish oil-omega-3 fatty acids 1000 MG capsule, Take 1 g by mouth every morning  7/15/19    REVIEW OF SYSTEMS:    Constitutional: negative  Eyes: negative  Ears, nose, mouth, throat, and face: negative  Respiratory: negative  Cardiovascular: negative  Gastrointestinal: as in hpi  Genitourinary:negative  Integument/breast: negative  Hematologic/lymphatic: negative  Musculoskeletal:negative  Neurological: negative  Allergic/Immunologic: negative    PHYSICAL EXAM   BP (!) 148/89   Pulse (!) 47   Temp 98.4 °F (36.9 °C) (Temporal)   Resp 18   Ht 5' 8\" (1.727 m)   Wt 210 lb (95.3 kg)   SpO2 93%   BMI 31.93 kg/m²     General appearance: alert, cooperative and in no acute distress.   Head: NCAT  Eyes: PERRLA  Chest: no skin abnormalities, no masses  Lungs: clear

## 2019-07-19 NOTE — ANESTHESIA PRE PROCEDURE
Never Smoker    Smokeless tobacco: Never Used   Substance Use Topics    Alcohol use: No     Comment:  2 cups coffee/day                                Counseling given: Not Answered      Vital Signs (Current):   Vitals:    07/18/19 1600 07/19/19 1020   BP:  (!) 148/89   Pulse:  (!) 47   Resp:  18   Temp:  98.4 °F (36.9 °C)   TempSrc:  Temporal   SpO2:  93%   Weight: 210 lb (95.3 kg) 210 lb (95.3 kg)   Height: 5' 8\" (1.727 m) 5' 8\" (1.727 m)                                              BP Readings from Last 3 Encounters:   07/19/19 (!) 148/89   07/17/19 (!) 158/86   07/15/19 129/83       NPO Status: Time of last liquid consumption: 2030                        Time of last solid consumption: 2030                        Date of last liquid consumption: 07/18/19                        Date of last solid food consumption: 07/18/19    BMI:   Wt Readings from Last 3 Encounters:   07/19/19 210 lb (95.3 kg)   07/17/19 211 lb 3.2 oz (95.8 kg)   07/15/19 216 lb 6.4 oz (98.2 kg)     Body mass index is 31.93 kg/m². CBC:   Lab Results   Component Value Date    WBC 10.1 07/17/2019    RBC 4.92 07/17/2019    HGB 14.9 07/17/2019    HCT 41.5 07/17/2019    MCV 84.3 07/17/2019    RDW 12.8 07/17/2019     07/17/2019       CMP:   Lab Results   Component Value Date     07/17/2019    K 3.2 07/17/2019    CL 98 07/17/2019    CO2 28 07/17/2019    BUN 15 07/17/2019    CREATININE 1.0 07/17/2019    GFRAA >60 07/17/2019    LABGLOM >60 07/17/2019    GLUCOSE 231 07/17/2019    GLUCOSE 103 05/30/2012    PROT 6.7 07/17/2019    CALCIUM 9.9 07/17/2019    BILITOT 0.6 07/17/2019    ALKPHOS 93 07/17/2019    AST 18 07/17/2019    ALT 26 07/17/2019       POC Tests: No results for input(s): POCGLU, POCNA, POCK, POCCL, POCBUN, POCHEMO, POCHCT in the last 72 hours.     Coags:   Lab Results   Component Value Date    PROTIME 11.3 03/04/2018    PROTIME 12.4 05/29/2012    INR 1.0 03/04/2018    APTT 24.7 05/27/2012       HCG (If Applicable): No results

## 2019-07-19 NOTE — OP NOTE
Operative Note - Robotic Assisted Laparoscopic Cholecystectomy    Guerline Espinoza     DATE OF PROCEDURE: 7/19/2019    SURGEON: Surgeon(s):  Taylor Aleman MD    PREOPERATIVE DIAGNOSIS: Biliary colic, gallstones    POSTOPERATIVE DIAGNOSIS: Same    OPERATION: Robotic Assisted Laparoscopic cholecystectomy. ANESTHESIA: General endotracheal.     ESTIMATED BLOOD LOSS: less than 50ml    SPECIMEN: Gallbladder    COMPLICATIONS: None. BRIEF HISTORY: Guerline Espinoza is a 71 y.o.  male who presented with RUQ pain. I discussed the procedure with the patient, including the procedure, benefits, risks, and alternatives. He agreed. ICG was given in preoperative holding prior to the procedure. PROCEDURE: The patient was taken to the operative suite and was placed on the table in the supine position. General endotracheal anesthesia was administered. An orogastric tube was placed to decompress the stomach. The abdomen was then prepped with Chloraprep and draped in a sterile fashion. An 8 mm periumbilical incision was made with an 11-blade scalpel, and then while tenting the abdominal wall upward, a Veress needle was easily inserted through the abdominal cavity. After confirmation of its placement using the saline drop test, a total of approximately 3 L of carbon dioxide was insufflated, creating a pneumoperitoneum. The Veress needle was removed, and an 8 mm trocar was inserted while tenting the abdominal wall upward. A prepared laparoscope was inserted, and the right upper quadrant was visualized. An 8-mm port was placed in the left upper quadrant, another two 8 mm ports were placed in the RLQ. There was no injury from port placement. The robot was then placed over the patient and the ports docked. I then broke scrub and began the case at the surgeon console. The robotic scope was introduced into the abdomen and two Cadiere graspers were placed in arms 1 and 2 under direct vision.  A hook was then

## 2019-08-01 ENCOUNTER — OFFICE VISIT (OUTPATIENT)
Dept: SURGERY | Age: 64
End: 2019-08-01

## 2019-08-01 VITALS
RESPIRATION RATE: 16 BRPM | DIASTOLIC BLOOD PRESSURE: 76 MMHG | HEIGHT: 68 IN | HEART RATE: 56 BPM | SYSTOLIC BLOOD PRESSURE: 116 MMHG | BODY MASS INDEX: 31.8 KG/M2 | WEIGHT: 209.8 LBS | OXYGEN SATURATION: 97 %

## 2019-08-01 DIAGNOSIS — K80.20 GALLSTONES: Primary | ICD-10-CM

## 2019-08-01 DIAGNOSIS — K44.9 HIATAL HERNIA: Primary | ICD-10-CM

## 2019-08-01 PROCEDURE — 99024 POSTOP FOLLOW-UP VISIT: CPT | Performed by: SURGERY

## 2019-08-01 NOTE — LETTER
[facility] CARE AT [address], [address] [facility] [address]  Phone: [phone]  Fax: [phone]    Fede Mcneal DO        August 1, 2019       Patient: Arden Peace   MR Number: [id number]   YOB: 1955   Date of Visit: 8/1/2019       Dear Dr. Arianna Walker:    Thank you for the request for consultation for Arden Peace to me for the evaluation of his GI issues. Below are the relevant portions of my assessment and plan of care. Data Reviewed: Pathology reviewed with patient  Diagnosis:  Gallbladder: Chronic cholecystitis    Assessment/Plan:    71y.o. year old male s/p robotic assisted laparoscopic cholecystectomy    Patient recovering well from surgery  Wound care discussed  Discussed with him his hiatal hernia. Will make referral to Dr. Pia Major for evaluation. If you have questions, please do not hesitate to call me. I look forward to following Tabatha Alegria along with you.     Sincerely,        Nicole Courtney MD    CC providers:  Lula Tena DO  [address] [facility]  [phone]  VIA Mail

## 2019-08-01 NOTE — PROGRESS NOTES
Progress Note - Post-op follow up     Patient's Name/Date of Birth: Guerline Espinoza / 1955    Date: 8/1/2019    PCP: Mitchell Crocker DO    Referring Physician:   Candace Mandujano [place]  [phone]    Chief Complaint   Patient presents with    Post-Op Check     Gallbladder       Subjective:  Patient presents to the office following cholecystectomy. The patient is tolerating a regular diet. Denies n/v/d/c. Pain controlled with pain medication. Patient's medications, allergies, past medical, surgical, social and family histories were reviewed and updated as appropriate. Physical Exam:  /76   Pulse 56   Resp 16   Ht 5' 8\" (1.727 m)   Wt 209 lb 12.8 oz (95.2 kg)   SpO2 97%   BMI 31.90 kg/m²   General Appearance: NAD  Abdomen: soft, non-distended mild incisional tenderness, no rebound or guarding, incision C/D/I    Data Reviewed: Pathology reviewed with patient  Diagnosis:  Gallbladder: Chronic cholecystitis    Assessment/Plan:    71y.o. year old male s/p robotic assisted laparoscopic cholecystectomy    Patient recovering well from surgery  Wound care discussed  Discussed with him his hiatal hernia. Will make referral to Dr. Stephie Munoz for evaluation.     Taylor Aleman MD 8/1/2019 9:26 AM     Send copy of H&P to PCP, Mitchell Crocker DO and referring physician, Candace Mandujano DO

## 2019-08-26 ENCOUNTER — OFFICE VISIT (OUTPATIENT)
Dept: SURGERY | Age: 64
End: 2019-08-26
Payer: COMMERCIAL

## 2019-08-26 ENCOUNTER — TELEPHONE (OUTPATIENT)
Dept: SURGERY | Age: 64
End: 2019-08-26

## 2019-08-26 VITALS
RESPIRATION RATE: 20 BRPM | BODY MASS INDEX: 31.22 KG/M2 | DIASTOLIC BLOOD PRESSURE: 72 MMHG | HEIGHT: 68 IN | WEIGHT: 206 LBS | HEART RATE: 84 BPM | SYSTOLIC BLOOD PRESSURE: 135 MMHG

## 2019-08-26 DIAGNOSIS — K21.9 GASTROESOPHAGEAL REFLUX DISEASE, ESOPHAGITIS PRESENCE NOT SPECIFIED: ICD-10-CM

## 2019-08-26 DIAGNOSIS — K44.9 PARAESOPHAGEAL HERNIA: Primary | ICD-10-CM

## 2019-08-26 DIAGNOSIS — T81.32XD DISRUPTION OR DEHISCENCE OF CLOSURE OF MUSCLE OR MUSCLE FLAP, SUBSEQUENT ENCOUNTER: ICD-10-CM

## 2019-08-26 PROCEDURE — 99204 OFFICE O/P NEW MOD 45 MIN: CPT | Performed by: SURGERY

## 2019-08-26 NOTE — TELEPHONE ENCOUNTER
Per Dr. Mariah Lu, patient is scheduled for EGD at [facility] on 8/29/19. Surgery scheduling form faxed with confirmation, surgeon's calendar updated. Dr. Mariah Lu to enter orders. Follow up appointment scheduled. Will check if pre-cert is required. Per Dr. Mariah Lu, patient is scheduled for Paraesophageal hernia repair, laparoscopic, robotic at [facility] on 10/22/19. Surgery scheduling form faxed with confirmation, surgeon's calendar updated. Dr. Mariah Lu to enter orders. Follow up appointment scheduled. Will check if pre-cert is required. Electronically signed by Farrukh Culp RN on 8/26/2019 at 9:49 AM    Per Chinyere Robins [facility] rep, no prior authorization is required for scheduled outpatient procedure (cpt [phone] [id number] [phone]). Call ref# [id number].     Electronically signed by Farrukh Culp RN on 8/27/2019 at 3:09 PM

## 2019-08-26 NOTE — PROGRESS NOTES
Alcohol use: No     Comment:  2 cups coffee/day    Drug use: No    Sexual activity: Not on file   Lifestyle    Physical activity:     Days per week: Not on file     Minutes per session: Not on file    Stress: Not on file   Relationships    Social connections:     Talks on phone: Not on file     Gets together: Not on file     Attends Restoration service: Not on file     Active member of club or organization: Not on file     Attends meetings of clubs or organizations: Not on file     Relationship status: Not on file    Intimate partner violence:     Fear of current or ex partner: Not on file     Emotionally abused: Not on file     Physically abused: Not on file     Forced sexual activity: Not on file   Other Topics Concern    Not on file   Social History Narrative    Not on file           Review of Systems  Review of Systems -  General ROS: negative for - chills, fatigue or malaise  ENT ROS: negative for - hearing change, nasal congestion or nasal discharge  Allergy and Immunology ROS: negative for - hives, itchy/watery eyes or nasal congestion  Hematological and Lymphatic ROS: negative for - blood clots, blood transfusions, bruising or fatigue  Endocrine ROS: negative for - malaise/lethargy, mood swings, palpitations or polydipsia/polyuria  Respiratory ROS: negative for - sputum changes, stridor, tachypnea or wheezing  Cardiovascular ROS: negative for - irregular heartbeat, loss of consciousness, murmur or orthopnea  Gastrointestinal ROS: negative for - constipation, diarrhea, gas/bloating, heartburn or hematemesis  Genito-Urinary ROS: negative for -  genital discharge, genital ulcers or hematuria  Musculoskeletal ROS: negative for - gait disturbance, muscle pain or muscular weakness  Psych/Soc ROS: Neg suicidal ideation or visual/auditory hallucinations    Physical exam:   /72   Pulse 84   Resp 20   Ht 5' 8\" (1.727 m)   Wt 206 lb (93.4 kg)   BMI 31.32 kg/m²   General appearance:  NAD  Head: NCAT,

## 2019-08-27 NOTE — TELEPHONE ENCOUNTER
Per patient request, EGD has been rescheduled from 8/29/19 to 9/3/19. Keo  from surgery scheduling notified, surgeons calendar updated.      Electronically signed by Madina Jerry RN on 8/27/2019 at 9:48 AM

## 2019-08-27 NOTE — TELEPHONE ENCOUNTER
Patient requested to change EGD again due to inability to take day off of work and requested 9/10/19. Angie from surgery scheduling confirmed date/time availability. EGD rescheduled from 9/3/19 to 9/10/19. Surgeons calendar updated.      Electronically signed by Neftali Ordonez RN on 8/27/2019 at 3:07 PM

## 2019-09-10 ENCOUNTER — ANESTHESIA EVENT (OUTPATIENT)
Dept: ENDOSCOPY | Age: 64
End: 2019-09-10
Payer: COMMERCIAL

## 2019-09-10 ENCOUNTER — HOSPITAL ENCOUNTER (OUTPATIENT)
Age: 64
Setting detail: OUTPATIENT SURGERY
Discharge: HOME | End: 2019-09-10
Attending: SURGERY | Admitting: SURGERY
Payer: COMMERCIAL

## 2019-09-10 ENCOUNTER — ANESTHESIA (OUTPATIENT)
Dept: ENDOSCOPY | Age: 64
End: 2019-09-10
Payer: COMMERCIAL

## 2019-09-10 VITALS
HEART RATE: 48 BPM | RESPIRATION RATE: 18 BRPM | SYSTOLIC BLOOD PRESSURE: 132 MMHG | OXYGEN SATURATION: 97 % | BODY MASS INDEX: 18.46 KG/M2 | HEIGHT: 68 IN | TEMPERATURE: 97.3 F | WEIGHT: 121.8 LBS | DIASTOLIC BLOOD PRESSURE: 70 MMHG

## 2019-09-10 VITALS
DIASTOLIC BLOOD PRESSURE: 61 MMHG | RESPIRATION RATE: 8 BRPM | SYSTOLIC BLOOD PRESSURE: 115 MMHG | OXYGEN SATURATION: 97 %

## 2019-09-10 PROCEDURE — 2580000003 HC RX 258: Performed by: ANESTHESIOLOGY

## 2019-09-10 PROCEDURE — 43239 EGD BIOPSY SINGLE/MULTIPLE: CPT | Performed by: SURGERY

## 2019-09-10 PROCEDURE — 2709999900 HC NON-CHARGEABLE SUPPLY: Performed by: SURGERY

## 2019-09-10 PROCEDURE — 3700000000 HC ANESTHESIA ATTENDED CARE: Performed by: SURGERY

## 2019-09-10 PROCEDURE — 7100000010 HC PHASE II RECOVERY - FIRST 15 MIN: Performed by: SURGERY

## 2019-09-10 PROCEDURE — 7100000011 HC PHASE II RECOVERY - ADDTL 15 MIN: Performed by: SURGERY

## 2019-09-10 PROCEDURE — 3700000001 HC ADD 15 MINUTES (ANESTHESIA): Performed by: SURGERY

## 2019-09-10 PROCEDURE — 88305 TISSUE EXAM BY PATHOLOGIST: CPT

## 2019-09-10 PROCEDURE — 88342 IMHCHEM/IMCYTCHM 1ST ANTB: CPT

## 2019-09-10 PROCEDURE — 6360000002 HC RX W HCPCS: Performed by: NURSE ANESTHETIST, CERTIFIED REGISTERED

## 2019-09-10 PROCEDURE — 3609012400 HC EGD TRANSORAL BIOPSY SINGLE/MULTIPLE: Performed by: SURGERY

## 2019-09-10 RX ORDER — PROPOFOL 10 MG/ML
INJECTION, EMULSION INTRAVENOUS PRN
Status: DISCONTINUED | OUTPATIENT
Start: 2019-09-10 | End: 2019-09-10 | Stop reason: SDUPTHER

## 2019-09-10 RX ORDER — SODIUM CHLORIDE 0.9 % (FLUSH) 0.9 %
10 SYRINGE (ML) INJECTION EVERY 12 HOURS SCHEDULED
Status: DISCONTINUED | OUTPATIENT
Start: 2019-09-10 | End: 2019-09-10 | Stop reason: HOSPADM

## 2019-09-10 RX ORDER — SODIUM CHLORIDE 0.9 % (FLUSH) 0.9 %
10 SYRINGE (ML) INJECTION PRN
Status: DISCONTINUED | OUTPATIENT
Start: 2019-09-10 | End: 2019-09-10 | Stop reason: HOSPADM

## 2019-09-10 RX ORDER — SODIUM CHLORIDE, SODIUM LACTATE, POTASSIUM CHLORIDE, CALCIUM CHLORIDE 600; 310; 30; 20 MG/100ML; MG/100ML; MG/100ML; MG/100ML
INJECTION, SOLUTION INTRAVENOUS CONTINUOUS
Status: DISCONTINUED | OUTPATIENT
Start: 2019-09-10 | End: 2019-09-10 | Stop reason: HOSPADM

## 2019-09-10 RX ADMIN — SODIUM CHLORIDE, POTASSIUM CHLORIDE, SODIUM LACTATE AND CALCIUM CHLORIDE: 600; 310; 30; 20 INJECTION, SOLUTION INTRAVENOUS at 12:03

## 2019-09-10 RX ADMIN — PROPOFOL 25 MG: 10 INJECTION, EMULSION INTRAVENOUS at 14:00

## 2019-09-10 RX ADMIN — PROPOFOL 100 MG: 10 INJECTION, EMULSION INTRAVENOUS at 13:55

## 2019-09-10 RX ADMIN — PROPOFOL 25 MG: 10 INJECTION, EMULSION INTRAVENOUS at 13:57

## 2019-09-10 ASSESSMENT — PAIN - FUNCTIONAL ASSESSMENT: PAIN_FUNCTIONAL_ASSESSMENT: 0-10

## 2019-09-10 ASSESSMENT — ENCOUNTER SYMPTOMS: SHORTNESS OF BREATH: 1

## 2019-09-10 ASSESSMENT — PAIN SCALES - GENERAL: PAINLEVEL_OUTOF10: 0

## 2019-09-10 NOTE — PROGRESS NOTES
Pt has an order for Bravo placement, discussed plan to hold Bravo and discuss further treatment with patient for paraesophageal hernia as per Dr Priscilla Aguilar.

## 2019-09-30 ENCOUNTER — OFFICE VISIT (OUTPATIENT)
Dept: SURGERY | Age: 64
End: 2019-09-30
Payer: COMMERCIAL

## 2019-09-30 VITALS
TEMPERATURE: 98.5 F | SYSTOLIC BLOOD PRESSURE: 157 MMHG | WEIGHT: 207 LBS | HEART RATE: 51 BPM | HEIGHT: 68 IN | OXYGEN SATURATION: 99 % | BODY MASS INDEX: 31.37 KG/M2 | DIASTOLIC BLOOD PRESSURE: 81 MMHG

## 2019-09-30 DIAGNOSIS — T81.32XD DISRUPTION OR DEHISCENCE OF CLOSURE OF MUSCLE OR MUSCLE FLAP, SUBSEQUENT ENCOUNTER: ICD-10-CM

## 2019-09-30 DIAGNOSIS — K21.9 GASTROESOPHAGEAL REFLUX DISEASE, ESOPHAGITIS PRESENCE NOT SPECIFIED: ICD-10-CM

## 2019-09-30 DIAGNOSIS — K44.9 PARAESOPHAGEAL HERNIA: Primary | ICD-10-CM

## 2019-09-30 PROCEDURE — 1036F TOBACCO NON-USER: CPT | Performed by: SURGERY

## 2019-09-30 PROCEDURE — 4040F PNEUMOC VAC/ADMIN/RCVD: CPT | Performed by: SURGERY

## 2019-09-30 PROCEDURE — G8598 ASA/ANTIPLAT THER USED: HCPCS | Performed by: SURGERY

## 2019-09-30 PROCEDURE — G8417 CALC BMI ABV UP PARAM F/U: HCPCS | Performed by: SURGERY

## 2019-09-30 PROCEDURE — 1123F ACP DISCUSS/DSCN MKR DOCD: CPT | Performed by: SURGERY

## 2019-09-30 PROCEDURE — 3017F COLORECTAL CA SCREEN DOC REV: CPT | Performed by: SURGERY

## 2019-09-30 PROCEDURE — G8427 DOCREV CUR MEDS BY ELIG CLIN: HCPCS | Performed by: SURGERY

## 2019-09-30 PROCEDURE — 99214 OFFICE O/P EST MOD 30 MIN: CPT | Performed by: SURGERY

## 2019-09-30 NOTE — PROGRESS NOTES
History:   Procedure Laterality Date    ANKLE SURGERY  6 YRS AGO    right ORIF    CHOLECYSTECTOMY, LAPAROSCOPIC N/A 7/19/2019    CHOLECYSTECTOMY LAPAROSCOPIC ROBOTIC ASSISTED  XI performed by Michael Morel MD at [address] COLONOSCOPY  07/20/2015    CORONARY ANGIOPLASTY WITH STENT PLACEMENT  5/29/2012    Dr. Christina Wallace, Stent Mid LAD    DIAGNOSTIC CARDIAC CATH LAB PROCEDURE  9/17/1998    [facility]. Cath/PTCA/stent of RCA; PTCA/stent of mid LAD with adjunctive ReoPro administration.  DIAGNOSTIC CARDIAC CATH LAB PROCEDURE  6/25/2002    [facility]. Kissing balloon angioplasty LAD>diag Perclose. Dr. Christina Wallace and Dr. Jeanne Khanna.  DIAGNOSTIC CARDIAC CATH LAB PROCEDURE  4/13/2005    [facility]. Cath/stent (TAX\"US) to diagonal at Heart Lab.  ENDOSCOPY, COLON, DIAGNOSTIC      FINGER TRIGGER RELEASE Right 11/20/2015    Release right long trigger finger. Shruthi Canas MD    [facility]ERN[facility]PA[facility]    [facility]    INGUINAL HERNIA REPAIR Right 08/01/2017    robotic assisted    KNEE ARTHROPLASTY Left 06/16/2014    Left TKA. Shruthi Canas MD    [facility] UPPER GASTROINTESTINAL ENDOSCOPY      UPPER GASTROINTESTINAL ENDOSCOPY N/A 9/10/2019    EGD BIOPSY performed by Nette Miller MD at [address] [place]   Allergen Reactions    Penicillins Hives       The patient has a family history that is negative for severe cardiovascular or respiratory issues, negative for reaction to anesthesia. Time spent reviewing past medical, surgical, social and family history, vitals, nursing assessment and images. No changes from above documented history.     Social History     Socioeconomic History    Marital status:      Spouse name: Not on file    Number of children: Not on file    Years of education: Not on file    Highest education level: Not on file   Occupational History    Not on file   Social Needs    Financial resource strain: Not on file    Food insecurity:     Worry: Not to the patient's satisfaction and they freely signed the consent.              Ruth Bender MD  8:41 AM  9/30/2019

## 2019-10-01 ENCOUNTER — OFFICE VISIT (OUTPATIENT)
Dept: PRIMARY CARE CLINIC | Age: 64
End: 2019-10-01
Payer: COMMERCIAL

## 2019-10-01 VITALS
WEIGHT: 214 LBS | HEIGHT: 68 IN | TEMPERATURE: 98 F | SYSTOLIC BLOOD PRESSURE: 130 MMHG | OXYGEN SATURATION: 98 % | DIASTOLIC BLOOD PRESSURE: 80 MMHG | HEART RATE: 47 BPM | BODY MASS INDEX: 32.43 KG/M2

## 2019-10-01 DIAGNOSIS — I25.10 CORONARY ARTERY DISEASE INVOLVING NATIVE CORONARY ARTERY OF NATIVE HEART WITHOUT ANGINA PECTORIS: Primary | ICD-10-CM

## 2019-10-01 DIAGNOSIS — I10 ESSENTIAL HYPERTENSION: ICD-10-CM

## 2019-10-01 DIAGNOSIS — E78.00 PURE HYPERCHOLESTEROLEMIA: ICD-10-CM

## 2019-10-01 PROCEDURE — 3017F COLORECTAL CA SCREEN DOC REV: CPT | Performed by: INTERNAL MEDICINE

## 2019-10-01 PROCEDURE — G8427 DOCREV CUR MEDS BY ELIG CLIN: HCPCS | Performed by: INTERNAL MEDICINE

## 2019-10-01 PROCEDURE — G8417 CALC BMI ABV UP PARAM F/U: HCPCS | Performed by: INTERNAL MEDICINE

## 2019-10-01 PROCEDURE — 4040F PNEUMOC VAC/ADMIN/RCVD: CPT | Performed by: INTERNAL MEDICINE

## 2019-10-01 PROCEDURE — 1036F TOBACCO NON-USER: CPT | Performed by: INTERNAL MEDICINE

## 2019-10-01 PROCEDURE — G8484 FLU IMMUNIZE NO ADMIN: HCPCS | Performed by: INTERNAL MEDICINE

## 2019-10-01 PROCEDURE — 99203 OFFICE O/P NEW LOW 30 MIN: CPT | Performed by: INTERNAL MEDICINE

## 2019-10-01 PROCEDURE — 1123F ACP DISCUSS/DSCN MKR DOCD: CPT | Performed by: INTERNAL MEDICINE

## 2019-10-01 PROCEDURE — G8598 ASA/ANTIPLAT THER USED: HCPCS | Performed by: INTERNAL MEDICINE

## 2019-10-01 ASSESSMENT — PATIENT HEALTH QUESTIONNAIRE - PHQ9
SUM OF ALL RESPONSES TO PHQ9 QUESTIONS 1 & 2: 0
SUM OF ALL RESPONSES TO PHQ QUESTIONS 1-9: 0
SUM OF ALL RESPONSES TO PHQ QUESTIONS 1-9: 0
2. FEELING DOWN, DEPRESSED OR HOPELESS: 0
1. LITTLE INTEREST OR PLEASURE IN DOING THINGS: 0

## 2019-10-21 ENCOUNTER — HOSPITAL ENCOUNTER (OUTPATIENT)
Dept: PREADMISSION TESTING | Age: 64
Discharge: HOME | End: 2019-10-21
Payer: COMMERCIAL

## 2019-10-21 VITALS
DIASTOLIC BLOOD PRESSURE: 81 MMHG | RESPIRATION RATE: 16 BRPM | WEIGHT: 213 LBS | BODY MASS INDEX: 32.28 KG/M2 | HEIGHT: 68 IN | HEART RATE: 53 BPM | SYSTOLIC BLOOD PRESSURE: 160 MMHG | TEMPERATURE: 98.2 F | OXYGEN SATURATION: 97 %

## 2019-10-21 DIAGNOSIS — Z01.818 PREOPERATIVE TESTING: Primary | ICD-10-CM

## 2019-10-21 LAB
ANION GAP SERPL CALCULATED.3IONS-SCNC: 9 MMOL/L (ref 7–16)
BUN BLDV-MCNC: 17 MG/DL (ref 8–23)
CALCIUM SERPL-MCNC: 9.5 MG/DL (ref 8.6–10.2)
CHLORIDE BLD-SCNC: 99 MMOL/L (ref 98–107)
CO2: 31 MMOL/L (ref 22–29)
CREAT SERPL-MCNC: 0.9 MG/DL (ref 0.7–1.2)
GFR AFRICAN AMERICAN: >60
GFR NON-AFRICAN AMERICAN: >60 ML/MIN/1.73
GLUCOSE BLD-MCNC: 187 MG/DL (ref 74–99)
HCT VFR BLD CALC: 42.1 % (ref 37–54)
HEMOGLOBIN: 14.5 G/DL (ref 12.5–16.5)
MCH RBC QN AUTO: 29.2 PG (ref 26–35)
MCHC RBC AUTO-ENTMCNC: 34.4 % (ref 32–34.5)
MCV RBC AUTO: 84.9 FL (ref 80–99.9)
PDW BLD-RTO: 12.5 FL (ref 11.5–15)
PLATELET # BLD: 233 E9/L (ref 130–450)
PMV BLD AUTO: 10.4 FL (ref 7–12)
POTASSIUM REFLEX MAGNESIUM: 4 MMOL/L (ref 3.5–5)
RBC # BLD: 4.96 E12/L (ref 3.8–5.8)
SODIUM BLD-SCNC: 139 MMOL/L (ref 132–146)
WBC # BLD: 9.4 E9/L (ref 4.5–11.5)

## 2019-10-21 PROCEDURE — 85027 COMPLETE CBC AUTOMATED: CPT

## 2019-10-21 PROCEDURE — 36415 COLL VENOUS BLD VENIPUNCTURE: CPT

## 2019-10-21 PROCEDURE — 80048 BASIC METABOLIC PNL TOTAL CA: CPT

## 2019-10-22 ENCOUNTER — ANESTHESIA EVENT (OUTPATIENT)
Dept: OPERATING ROOM | Age: 64
End: 2019-10-22
Payer: COMMERCIAL

## 2019-10-22 ENCOUNTER — ANESTHESIA (OUTPATIENT)
Dept: OPERATING ROOM | Age: 64
End: 2019-10-22
Payer: COMMERCIAL

## 2019-10-22 ENCOUNTER — HOSPITAL ENCOUNTER (OUTPATIENT)
Age: 64
Setting detail: OBSERVATION
Discharge: HOME | End: 2019-10-23
Attending: SURGERY | Admitting: SURGERY
Payer: COMMERCIAL

## 2019-10-22 VITALS
RESPIRATION RATE: 18 BRPM | TEMPERATURE: 98 F | OXYGEN SATURATION: 100 % | SYSTOLIC BLOOD PRESSURE: 124 MMHG | DIASTOLIC BLOOD PRESSURE: 71 MMHG

## 2019-10-22 DIAGNOSIS — G89.18 POSTOPERATIVE PAIN: Primary | ICD-10-CM

## 2019-10-22 PROCEDURE — 6360000002 HC RX W HCPCS: Performed by: NURSE ANESTHETIST, CERTIFIED REGISTERED

## 2019-10-22 PROCEDURE — 7100000000 HC PACU RECOVERY - FIRST 15 MIN: Performed by: SURGERY

## 2019-10-22 PROCEDURE — 2580000003 HC RX 258: Performed by: SURGERY

## 2019-10-22 PROCEDURE — 2500000003 HC RX 250 WO HCPCS: Performed by: SURGERY

## 2019-10-22 PROCEDURE — 2580000003 HC RX 258: Performed by: NURSE ANESTHETIST, CERTIFIED REGISTERED

## 2019-10-22 PROCEDURE — 3700000000 HC ANESTHESIA ATTENDED CARE: Performed by: SURGERY

## 2019-10-22 PROCEDURE — 49659 UNLSTD LAPS PX HRNAP HRNRPHY: CPT | Performed by: SURGERY

## 2019-10-22 PROCEDURE — 7100000001 HC PACU RECOVERY - ADDTL 15 MIN: Performed by: SURGERY

## 2019-10-22 PROCEDURE — 43281 LAP PARAESOPHAG HERN REPAIR: CPT | Performed by: SURGERY

## 2019-10-22 PROCEDURE — 3700000001 HC ADD 15 MINUTES (ANESTHESIA): Performed by: SURGERY

## 2019-10-22 PROCEDURE — 2500000003 HC RX 250 WO HCPCS: Performed by: NURSE ANESTHETIST, CERTIFIED REGISTERED

## 2019-10-22 PROCEDURE — 2720000010 HC SURG SUPPLY STERILE: Performed by: SURGERY

## 2019-10-22 PROCEDURE — S2900 ROBOTIC SURGICAL SYSTEM: HCPCS | Performed by: SURGERY

## 2019-10-22 PROCEDURE — 88302 TISSUE EXAM BY PATHOLOGIST: CPT

## 2019-10-22 PROCEDURE — 3600000009 HC SURGERY ROBOT BASE: Performed by: SURGERY

## 2019-10-22 PROCEDURE — 6370000000 HC RX 637 (ALT 250 FOR IP): Performed by: INTERNAL MEDICINE

## 2019-10-22 PROCEDURE — G0378 HOSPITAL OBSERVATION PER HR: HCPCS

## 2019-10-22 PROCEDURE — 6360000002 HC RX W HCPCS: Performed by: SURGERY

## 2019-10-22 PROCEDURE — 6370000000 HC RX 637 (ALT 250 FOR IP): Performed by: SURGERY

## 2019-10-22 PROCEDURE — 3600000019 HC SURGERY ROBOT ADDTL 15MIN: Performed by: SURGERY

## 2019-10-22 PROCEDURE — 2709999900 HC NON-CHARGEABLE SUPPLY: Performed by: SURGERY

## 2019-10-22 RX ORDER — PHENYLEPHRINE HYDROCHLORIDE 10 MG/ML
INJECTION INTRAVENOUS PRN
Status: DISCONTINUED | OUTPATIENT
Start: 2019-10-22 | End: 2019-10-22 | Stop reason: SDUPTHER

## 2019-10-22 RX ORDER — PROPOFOL 10 MG/ML
INJECTION, EMULSION INTRAVENOUS PRN
Status: DISCONTINUED | OUTPATIENT
Start: 2019-10-22 | End: 2019-10-22 | Stop reason: SDUPTHER

## 2019-10-22 RX ORDER — BUPIVACAINE HYDROCHLORIDE AND EPINEPHRINE 2.5; 5 MG/ML; UG/ML
INJECTION, SOLUTION EPIDURAL; INFILTRATION; INTRACAUDAL; PERINEURAL PRN
Status: DISCONTINUED | OUTPATIENT
Start: 2019-10-22 | End: 2019-10-22 | Stop reason: ALTCHOICE

## 2019-10-22 RX ORDER — MORPHINE SULFATE 2 MG/ML
2 INJECTION, SOLUTION INTRAMUSCULAR; INTRAVENOUS
Status: DISCONTINUED | OUTPATIENT
Start: 2019-10-22 | End: 2019-10-23 | Stop reason: HOSPADM

## 2019-10-22 RX ORDER — LIDOCAINE HYDROCHLORIDE 20 MG/ML
INJECTION, SOLUTION INTRAVENOUS PRN
Status: DISCONTINUED | OUTPATIENT
Start: 2019-10-22 | End: 2019-10-22 | Stop reason: SDUPTHER

## 2019-10-22 RX ORDER — PROMETHAZINE HYDROCHLORIDE 25 MG/ML
6.25 INJECTION, SOLUTION INTRAMUSCULAR; INTRAVENOUS
Status: DISCONTINUED | OUTPATIENT
Start: 2019-10-22 | End: 2019-10-22 | Stop reason: HOSPADM

## 2019-10-22 RX ORDER — HYDROMORPHONE HYDROCHLORIDE 1 MG/ML
0.25 INJECTION, SOLUTION INTRAMUSCULAR; INTRAVENOUS; SUBCUTANEOUS EVERY 5 MIN PRN
Status: DISCONTINUED | OUTPATIENT
Start: 2019-10-22 | End: 2019-10-22 | Stop reason: HOSPADM

## 2019-10-22 RX ORDER — ATORVASTATIN CALCIUM 40 MG/1
80 TABLET, FILM COATED ORAL NIGHTLY
Status: DISCONTINUED | OUTPATIENT
Start: 2019-10-22 | End: 2019-10-23 | Stop reason: HOSPADM

## 2019-10-22 RX ORDER — DEXAMETHASONE SODIUM PHOSPHATE 10 MG/ML
INJECTION, SOLUTION INTRAMUSCULAR; INTRAVENOUS PRN
Status: DISCONTINUED | OUTPATIENT
Start: 2019-10-22 | End: 2019-10-22 | Stop reason: SDUPTHER

## 2019-10-22 RX ORDER — MIDAZOLAM HYDROCHLORIDE 1 MG/ML
INJECTION INTRAMUSCULAR; INTRAVENOUS PRN
Status: DISCONTINUED | OUTPATIENT
Start: 2019-10-22 | End: 2019-10-22 | Stop reason: SDUPTHER

## 2019-10-22 RX ORDER — DEXTROSE AND SODIUM CHLORIDE 5; .45 G/100ML; G/100ML
INJECTION, SOLUTION INTRAVENOUS CONTINUOUS
Status: DISCONTINUED | OUTPATIENT
Start: 2019-10-22 | End: 2019-10-23 | Stop reason: HOSPADM

## 2019-10-22 RX ORDER — ONDANSETRON 2 MG/ML
INJECTION INTRAMUSCULAR; INTRAVENOUS PRN
Status: DISCONTINUED | OUTPATIENT
Start: 2019-10-22 | End: 2019-10-22 | Stop reason: SDUPTHER

## 2019-10-22 RX ORDER — SODIUM CHLORIDE 0.9 % (FLUSH) 0.9 %
10 SYRINGE (ML) INJECTION PRN
Status: DISCONTINUED | OUTPATIENT
Start: 2019-10-22 | End: 2019-10-22 | Stop reason: HOSPADM

## 2019-10-22 RX ORDER — FERROUS SULFATE 325(65) MG
325 TABLET ORAL
Status: DISCONTINUED | OUTPATIENT
Start: 2019-10-23 | End: 2019-10-23 | Stop reason: HOSPADM

## 2019-10-22 RX ORDER — CITALOPRAM 20 MG/1
60 TABLET ORAL EVERY MORNING
Status: DISCONTINUED | OUTPATIENT
Start: 2019-10-23 | End: 2019-10-23 | Stop reason: HOSPADM

## 2019-10-22 RX ORDER — SODIUM CHLORIDE 0.9 % (FLUSH) 0.9 %
10 SYRINGE (ML) INJECTION PRN
Status: DISCONTINUED | OUTPATIENT
Start: 2019-10-22 | End: 2019-10-23 | Stop reason: HOSPADM

## 2019-10-22 RX ORDER — MEPERIDINE HYDROCHLORIDE 25 MG/ML
12.5 INJECTION INTRAMUSCULAR; INTRAVENOUS; SUBCUTANEOUS EVERY 5 MIN PRN
Status: DISCONTINUED | OUTPATIENT
Start: 2019-10-22 | End: 2019-10-22 | Stop reason: HOSPADM

## 2019-10-22 RX ORDER — HYDROMORPHONE HYDROCHLORIDE 1 MG/ML
0.5 INJECTION, SOLUTION INTRAMUSCULAR; INTRAVENOUS; SUBCUTANEOUS EVERY 5 MIN PRN
Status: DISCONTINUED | OUTPATIENT
Start: 2019-10-22 | End: 2019-10-22 | Stop reason: HOSPADM

## 2019-10-22 RX ORDER — NEOSTIGMINE METHYLSULFATE 1 MG/ML
INJECTION, SOLUTION INTRAVENOUS PRN
Status: DISCONTINUED | OUTPATIENT
Start: 2019-10-22 | End: 2019-10-22 | Stop reason: SDUPTHER

## 2019-10-22 RX ORDER — AMLODIPINE BESYLATE 5 MG/1
5 TABLET ORAL DAILY
Status: DISCONTINUED | OUTPATIENT
Start: 2019-10-22 | End: 2019-10-23 | Stop reason: HOSPADM

## 2019-10-22 RX ORDER — ROCURONIUM BROMIDE 10 MG/ML
INJECTION, SOLUTION INTRAVENOUS PRN
Status: DISCONTINUED | OUTPATIENT
Start: 2019-10-22 | End: 2019-10-22 | Stop reason: SDUPTHER

## 2019-10-22 RX ORDER — ONDANSETRON 2 MG/ML
4 INJECTION INTRAMUSCULAR; INTRAVENOUS
Status: DISCONTINUED | OUTPATIENT
Start: 2019-10-22 | End: 2019-10-22 | Stop reason: HOSPADM

## 2019-10-22 RX ORDER — DOCUSATE SODIUM 100 MG/1
100 CAPSULE, LIQUID FILLED ORAL 2 TIMES DAILY
Status: DISCONTINUED | OUTPATIENT
Start: 2019-10-22 | End: 2019-10-23 | Stop reason: HOSPADM

## 2019-10-22 RX ORDER — EZETIMIBE 10 MG/1
10 TABLET ORAL NIGHTLY
Status: DISCONTINUED | OUTPATIENT
Start: 2019-10-22 | End: 2019-10-22 | Stop reason: ALTCHOICE

## 2019-10-22 RX ORDER — LABETALOL 20 MG/4 ML (5 MG/ML) INTRAVENOUS SYRINGE
5 EVERY 10 MIN PRN
Status: DISCONTINUED | OUTPATIENT
Start: 2019-10-22 | End: 2019-10-22 | Stop reason: HOSPADM

## 2019-10-22 RX ORDER — METHOCARBAMOL 500 MG/1
500 TABLET, FILM COATED ORAL 4 TIMES DAILY PRN
Status: DISCONTINUED | OUTPATIENT
Start: 2019-10-22 | End: 2019-10-23 | Stop reason: HOSPADM

## 2019-10-22 RX ORDER — MEPERIDINE HYDROCHLORIDE 50 MG/ML
12.5 INJECTION INTRAMUSCULAR; INTRAVENOUS; SUBCUTANEOUS EVERY 5 MIN PRN
Status: DISCONTINUED | OUTPATIENT
Start: 2019-10-22 | End: 2019-10-22 | Stop reason: SDUPTHER

## 2019-10-22 RX ORDER — KETOROLAC TROMETHAMINE 30 MG/ML
INJECTION, SOLUTION INTRAMUSCULAR; INTRAVENOUS PRN
Status: DISCONTINUED | OUTPATIENT
Start: 2019-10-22 | End: 2019-10-22 | Stop reason: SDUPTHER

## 2019-10-22 RX ORDER — ACETAMINOPHEN 325 MG/1
650 TABLET ORAL EVERY 4 HOURS PRN
Status: DISCONTINUED | OUTPATIENT
Start: 2019-10-22 | End: 2019-10-23 | Stop reason: HOSPADM

## 2019-10-22 RX ORDER — SODIUM CHLORIDE 0.9 % (FLUSH) 0.9 %
10 SYRINGE (ML) INJECTION EVERY 12 HOURS SCHEDULED
Status: DISCONTINUED | OUTPATIENT
Start: 2019-10-22 | End: 2019-10-22 | Stop reason: HOSPADM

## 2019-10-22 RX ORDER — SODIUM CHLORIDE 9 MG/ML
INJECTION, SOLUTION INTRAVENOUS CONTINUOUS PRN
Status: DISCONTINUED | OUTPATIENT
Start: 2019-10-22 | End: 2019-10-22 | Stop reason: SDUPTHER

## 2019-10-22 RX ORDER — SODIUM CHLORIDE 0.9 % (FLUSH) 0.9 %
10 SYRINGE (ML) INJECTION EVERY 12 HOURS SCHEDULED
Status: DISCONTINUED | OUTPATIENT
Start: 2019-10-22 | End: 2019-10-23 | Stop reason: HOSPADM

## 2019-10-22 RX ORDER — KETOROLAC TROMETHAMINE 15 MG/ML
15 INJECTION, SOLUTION INTRAMUSCULAR; INTRAVENOUS EVERY 6 HOURS PRN
Status: DISCONTINUED | OUTPATIENT
Start: 2019-10-22 | End: 2019-10-23 | Stop reason: HOSPADM

## 2019-10-22 RX ORDER — CIPROFLOXACIN 2 MG/ML
400 INJECTION, SOLUTION INTRAVENOUS ONCE
Status: COMPLETED | OUTPATIENT
Start: 2019-10-22 | End: 2019-10-22

## 2019-10-22 RX ORDER — OXYCODONE HYDROCHLORIDE 5 MG/1
5 TABLET ORAL EVERY 4 HOURS PRN
Status: DISCONTINUED | OUTPATIENT
Start: 2019-10-22 | End: 2019-10-23 | Stop reason: HOSPADM

## 2019-10-22 RX ORDER — SODIUM CHLORIDE 9 MG/ML
INJECTION, SOLUTION INTRAVENOUS CONTINUOUS
Status: DISCONTINUED | OUTPATIENT
Start: 2019-10-22 | End: 2019-10-22

## 2019-10-22 RX ORDER — HYDRALAZINE HYDROCHLORIDE 20 MG/ML
5 INJECTION INTRAMUSCULAR; INTRAVENOUS EVERY 10 MIN PRN
Status: DISCONTINUED | OUTPATIENT
Start: 2019-10-22 | End: 2019-10-22 | Stop reason: HOSPADM

## 2019-10-22 RX ORDER — ONDANSETRON 2 MG/ML
4 INJECTION INTRAMUSCULAR; INTRAVENOUS EVERY 6 HOURS PRN
Status: DISCONTINUED | OUTPATIENT
Start: 2019-10-22 | End: 2019-10-23 | Stop reason: HOSPADM

## 2019-10-22 RX ORDER — OXYCODONE HYDROCHLORIDE 5 MG/1
10 TABLET ORAL EVERY 4 HOURS PRN
Status: DISCONTINUED | OUTPATIENT
Start: 2019-10-22 | End: 2019-10-23 | Stop reason: HOSPADM

## 2019-10-22 RX ORDER — GLYCOPYRROLATE 1 MG/5 ML
SYRINGE (ML) INTRAVENOUS PRN
Status: DISCONTINUED | OUTPATIENT
Start: 2019-10-22 | End: 2019-10-22 | Stop reason: SDUPTHER

## 2019-10-22 RX ORDER — ASPIRIN 81 MG/1
81 TABLET ORAL DAILY
Status: DISCONTINUED | OUTPATIENT
Start: 2019-10-23 | End: 2019-10-23 | Stop reason: HOSPADM

## 2019-10-22 RX ORDER — EPHEDRINE SULFATE/0.9% NACL/PF 50 MG/5 ML
SYRINGE (ML) INTRAVENOUS PRN
Status: DISCONTINUED | OUTPATIENT
Start: 2019-10-22 | End: 2019-10-22 | Stop reason: SDUPTHER

## 2019-10-22 RX ORDER — FENTANYL CITRATE 50 UG/ML
INJECTION, SOLUTION INTRAMUSCULAR; INTRAVENOUS PRN
Status: DISCONTINUED | OUTPATIENT
Start: 2019-10-22 | End: 2019-10-22 | Stop reason: SDUPTHER

## 2019-10-22 RX ADMIN — Medication 0.2 MG: at 15:39

## 2019-10-22 RX ADMIN — Medication 0.6 MG: at 17:33

## 2019-10-22 RX ADMIN — DEXTROSE AND SODIUM CHLORIDE: 5; 450 INJECTION, SOLUTION INTRAVENOUS at 20:01

## 2019-10-22 RX ADMIN — Medication 10 MG: at 16:53

## 2019-10-22 RX ADMIN — FENTANYL CITRATE 50 MCG: 50 INJECTION, SOLUTION INTRAMUSCULAR; INTRAVENOUS at 16:53

## 2019-10-22 RX ADMIN — METOPROLOL TARTRATE 25 MG: 25 TABLET ORAL at 21:56

## 2019-10-22 RX ADMIN — Medication 10 MG: at 16:34

## 2019-10-22 RX ADMIN — ATORVASTATIN CALCIUM 80 MG: 40 TABLET, FILM COATED ORAL at 20:01

## 2019-10-22 RX ADMIN — Medication 10 MG: at 16:30

## 2019-10-22 RX ADMIN — FENTANYL CITRATE 50 MCG: 50 INJECTION, SOLUTION INTRAMUSCULAR; INTRAVENOUS at 15:41

## 2019-10-22 RX ADMIN — Medication 10 MG: at 16:05

## 2019-10-22 RX ADMIN — Medication 30 MG: at 15:24

## 2019-10-22 RX ADMIN — SODIUM CHLORIDE: 9 INJECTION, SOLUTION INTRAVENOUS at 15:21

## 2019-10-22 RX ADMIN — ONDANSETRON HYDROCHLORIDE 4 MG: 2 INJECTION, SOLUTION INTRAMUSCULAR; INTRAVENOUS at 17:30

## 2019-10-22 RX ADMIN — SODIUM CHLORIDE: 9 INJECTION, SOLUTION INTRAVENOUS at 16:13

## 2019-10-22 RX ADMIN — CIPROFLOXACIN 400 MG: 2 INJECTION INTRAVENOUS at 15:32

## 2019-10-22 RX ADMIN — SODIUM CHLORIDE: 9 INJECTION, SOLUTION INTRAVENOUS at 12:41

## 2019-10-22 RX ADMIN — Medication 10 MG: at 16:13

## 2019-10-22 RX ADMIN — FENTANYL CITRATE 50 MCG: 50 INJECTION, SOLUTION INTRAMUSCULAR; INTRAVENOUS at 16:54

## 2019-10-22 RX ADMIN — PHENYLEPHRINE HYDROCHLORIDE 100 MCG: 10 INJECTION INTRAVENOUS at 16:01

## 2019-10-22 RX ADMIN — Medication 3 MG: at 17:33

## 2019-10-22 RX ADMIN — Medication 10 MG: at 15:57

## 2019-10-22 RX ADMIN — PHENYLEPHRINE HYDROCHLORIDE 100 MCG: 10 INJECTION INTRAVENOUS at 15:56

## 2019-10-22 RX ADMIN — KETOROLAC TROMETHAMINE 30 MG: 30 INJECTION, SOLUTION INTRAMUSCULAR; INTRAVENOUS at 17:30

## 2019-10-22 RX ADMIN — FENTANYL CITRATE 100 MCG: 50 INJECTION, SOLUTION INTRAMUSCULAR; INTRAVENOUS at 15:24

## 2019-10-22 RX ADMIN — Medication 10 MG: at 16:56

## 2019-10-22 RX ADMIN — DOCUSATE SODIUM 100 MG: 100 CAPSULE, LIQUID FILLED ORAL at 21:56

## 2019-10-22 RX ADMIN — LIDOCAINE HYDROCHLORIDE 100 MG: 20 INJECTION, SOLUTION INTRAVENOUS at 15:24

## 2019-10-22 RX ADMIN — SODIUM CHLORIDE: 9 INJECTION, SOLUTION INTRAVENOUS at 17:09

## 2019-10-22 RX ADMIN — CLONIDINE HYDROCHLORIDE 0.3 MG: 0.2 TABLET ORAL at 21:56

## 2019-10-22 RX ADMIN — DEXAMETHASONE SODIUM PHOSPHATE 10 MG: 10 INJECTION, SOLUTION INTRAMUSCULAR; INTRAVENOUS at 15:41

## 2019-10-22 RX ADMIN — Medication 10 MG: at 16:02

## 2019-10-22 RX ADMIN — MIDAZOLAM 2 MG: 1 INJECTION INTRAMUSCULAR; INTRAVENOUS at 15:21

## 2019-10-22 RX ADMIN — PROPOFOL 200 MG: 10 INJECTION, EMULSION INTRAVENOUS at 15:24

## 2019-10-22 ASSESSMENT — PULMONARY FUNCTION TESTS
PIF_VALUE: 35
PIF_VALUE: 13
PIF_VALUE: 32
PIF_VALUE: 35
PIF_VALUE: 33
PIF_VALUE: 32
PIF_VALUE: 35
PIF_VALUE: 33
PIF_VALUE: 35
PIF_VALUE: 33
PIF_VALUE: 35
PIF_VALUE: 12
PIF_VALUE: 23
PIF_VALUE: 12
PIF_VALUE: 35
PIF_VALUE: 27
PIF_VALUE: 16
PIF_VALUE: 32
PIF_VALUE: 8
PIF_VALUE: 35
PIF_VALUE: 11
PIF_VALUE: 29
PIF_VALUE: 16
PIF_VALUE: 35
PIF_VALUE: 35
PIF_VALUE: 34
PIF_VALUE: 15
PIF_VALUE: 16
PIF_VALUE: 33
PIF_VALUE: 27
PIF_VALUE: 33
PIF_VALUE: 35
PIF_VALUE: 12
PIF_VALUE: 30
PIF_VALUE: 35
PIF_VALUE: 34
PIF_VALUE: 29
PIF_VALUE: 35
PIF_VALUE: 22
PIF_VALUE: 35
PIF_VALUE: 35
PIF_VALUE: 34
PIF_VALUE: 0
PIF_VALUE: 23
PIF_VALUE: 32
PIF_VALUE: 35
PIF_VALUE: 27
PIF_VALUE: 27
PIF_VALUE: 33
PIF_VALUE: 34
PIF_VALUE: 32
PIF_VALUE: 33
PIF_VALUE: 34
PIF_VALUE: 34
PIF_VALUE: 35
PIF_VALUE: 33
PIF_VALUE: 31
PIF_VALUE: 32
PIF_VALUE: 35
PIF_VALUE: 27
PIF_VALUE: 27
PIF_VALUE: 35
PIF_VALUE: 34
PIF_VALUE: 27
PIF_VALUE: 34
PIF_VALUE: 33
PIF_VALUE: 12
PIF_VALUE: 35
PIF_VALUE: 27
PIF_VALUE: 29
PIF_VALUE: 33
PIF_VALUE: 34
PIF_VALUE: 16
PIF_VALUE: 1
PIF_VALUE: 25
PIF_VALUE: 24
PIF_VALUE: 35
PIF_VALUE: 12
PIF_VALUE: 14
PIF_VALUE: 35
PIF_VALUE: 0
PIF_VALUE: 29
PIF_VALUE: 28
PIF_VALUE: 30
PIF_VALUE: 35
PIF_VALUE: 27
PIF_VALUE: 35
PIF_VALUE: 29
PIF_VALUE: 33
PIF_VALUE: 28
PIF_VALUE: 24
PIF_VALUE: 34
PIF_VALUE: 34
PIF_VALUE: 35
PIF_VALUE: 32
PIF_VALUE: 19
PIF_VALUE: 33
PIF_VALUE: 35
PIF_VALUE: 5
PIF_VALUE: 32
PIF_VALUE: 12
PIF_VALUE: 14
PIF_VALUE: 35
PIF_VALUE: 35
PIF_VALUE: 2
PIF_VALUE: 27
PIF_VALUE: 35
PIF_VALUE: 33
PIF_VALUE: 35
PIF_VALUE: 27
PIF_VALUE: 33
PIF_VALUE: 32
PIF_VALUE: 23
PIF_VALUE: 1
PIF_VALUE: 35
PIF_VALUE: 34
PIF_VALUE: 33
PIF_VALUE: 35
PIF_VALUE: 35
PIF_VALUE: 33
PIF_VALUE: 32
PIF_VALUE: 29
PIF_VALUE: 24
PIF_VALUE: 35
PIF_VALUE: 35
PIF_VALUE: 17
PIF_VALUE: 33
PIF_VALUE: 23
PIF_VALUE: 34
PIF_VALUE: 13
PIF_VALUE: 35
PIF_VALUE: 27
PIF_VALUE: 35
PIF_VALUE: 34
PIF_VALUE: 14
PIF_VALUE: 31
PIF_VALUE: 18
PIF_VALUE: 32

## 2019-10-22 ASSESSMENT — PAIN SCALES - GENERAL
PAINLEVEL_OUTOF10: 2
PAINLEVEL_OUTOF10: 3
PAINLEVEL_OUTOF10: 0

## 2019-10-22 ASSESSMENT — PAIN DESCRIPTION - ONSET: ONSET: GRADUAL

## 2019-10-22 ASSESSMENT — PAIN DESCRIPTION - LOCATION
LOCATION: ABDOMEN
LOCATION: ABDOMEN

## 2019-10-22 ASSESSMENT — PAIN DESCRIPTION - DESCRIPTORS
DESCRIPTORS: ACHING
DESCRIPTORS: PRESSURE

## 2019-10-22 ASSESSMENT — PAIN DESCRIPTION - PAIN TYPE: TYPE: SURGICAL PAIN

## 2019-10-22 ASSESSMENT — ENCOUNTER SYMPTOMS: SHORTNESS OF BREATH: 1

## 2019-10-22 ASSESSMENT — PAIN DESCRIPTION - ORIENTATION: ORIENTATION: UPPER

## 2019-10-22 ASSESSMENT — PAIN - FUNCTIONAL ASSESSMENT: PAIN_FUNCTIONAL_ASSESSMENT: 0-10

## 2019-10-23 VITALS
HEIGHT: 68 IN | RESPIRATION RATE: 14 BRPM | WEIGHT: 214 LBS | SYSTOLIC BLOOD PRESSURE: 150 MMHG | TEMPERATURE: 97.7 F | BODY MASS INDEX: 32.43 KG/M2 | OXYGEN SATURATION: 92 % | HEART RATE: 85 BPM | DIASTOLIC BLOOD PRESSURE: 66 MMHG

## 2019-10-23 LAB
ALBUMIN SERPL-MCNC: 4 G/DL (ref 3.5–5.2)
ALP BLD-CCNC: 76 U/L (ref 40–129)
ALT SERPL-CCNC: 56 U/L (ref 0–40)
ANION GAP SERPL CALCULATED.3IONS-SCNC: 12 MMOL/L (ref 7–16)
AST SERPL-CCNC: 42 U/L (ref 0–39)
BASOPHILS ABSOLUTE: 0.01 E9/L (ref 0–0.2)
BASOPHILS RELATIVE PERCENT: 0.1 % (ref 0–2)
BILIRUB SERPL-MCNC: 1.1 MG/DL (ref 0–1.2)
BILIRUBIN DIRECT: 0.3 MG/DL (ref 0–0.3)
BILIRUBIN, INDIRECT: 0.8 MG/DL (ref 0–1)
BUN BLDV-MCNC: 17 MG/DL (ref 8–23)
CALCIUM SERPL-MCNC: 9 MG/DL (ref 8.6–10.2)
CHLORIDE BLD-SCNC: 97 MMOL/L (ref 98–107)
CO2: 25 MMOL/L (ref 22–29)
CREAT SERPL-MCNC: 0.8 MG/DL (ref 0.7–1.2)
EOSINOPHILS ABSOLUTE: 0 E9/L (ref 0.05–0.5)
EOSINOPHILS RELATIVE PERCENT: 0 % (ref 0–6)
GFR AFRICAN AMERICAN: >60
GFR NON-AFRICAN AMERICAN: >60 ML/MIN/1.73
GLUCOSE BLD-MCNC: 199 MG/DL (ref 74–99)
HCT VFR BLD CALC: 42.2 % (ref 37–54)
HEMOGLOBIN: 14.8 G/DL (ref 12.5–16.5)
IMMATURE GRANULOCYTES #: 0.08 E9/L
IMMATURE GRANULOCYTES %: 0.7 % (ref 0–5)
LYMPHOCYTES ABSOLUTE: 0.69 E9/L (ref 1.5–4)
LYMPHOCYTES RELATIVE PERCENT: 5.8 % (ref 20–42)
MAGNESIUM: 1.5 MG/DL (ref 1.6–2.6)
MCH RBC QN AUTO: 29.4 PG (ref 26–35)
MCHC RBC AUTO-ENTMCNC: 35.1 % (ref 32–34.5)
MCV RBC AUTO: 83.7 FL (ref 80–99.9)
MONOCYTES ABSOLUTE: 0.27 E9/L (ref 0.1–0.95)
MONOCYTES RELATIVE PERCENT: 2.3 % (ref 2–12)
NEUTROPHILS ABSOLUTE: 10.93 E9/L (ref 1.8–7.3)
NEUTROPHILS RELATIVE PERCENT: 91.1 % (ref 43–80)
PDW BLD-RTO: 12.4 FL (ref 11.5–15)
PLATELET # BLD: 231 E9/L (ref 130–450)
PMV BLD AUTO: 10.3 FL (ref 7–12)
POTASSIUM REFLEX MAGNESIUM: 3.6 MMOL/L (ref 3.5–5)
RBC # BLD: 5.04 E12/L (ref 3.8–5.8)
SODIUM BLD-SCNC: 134 MMOL/L (ref 132–146)
TOTAL PROTEIN: 6.6 G/DL (ref 6.4–8.3)
TROPONIN: <0.01 NG/ML (ref 0–0.03)
WBC # BLD: 12 E9/L (ref 4.5–11.5)

## 2019-10-23 PROCEDURE — 80076 HEPATIC FUNCTION PANEL: CPT

## 2019-10-23 PROCEDURE — 85025 COMPLETE CBC W/AUTO DIFF WBC: CPT

## 2019-10-23 PROCEDURE — 6360000002 HC RX W HCPCS: Performed by: SURGERY

## 2019-10-23 PROCEDURE — 83735 ASSAY OF MAGNESIUM: CPT

## 2019-10-23 PROCEDURE — 96372 THER/PROPH/DIAG INJ SC/IM: CPT

## 2019-10-23 PROCEDURE — 2700000000 HC OXYGEN THERAPY PER DAY

## 2019-10-23 PROCEDURE — G0378 HOSPITAL OBSERVATION PER HR: HCPCS

## 2019-10-23 PROCEDURE — 80048 BASIC METABOLIC PNL TOTAL CA: CPT

## 2019-10-23 PROCEDURE — 6370000000 HC RX 637 (ALT 250 FOR IP): Performed by: SURGERY

## 2019-10-23 PROCEDURE — 84484 ASSAY OF TROPONIN QUANT: CPT

## 2019-10-23 PROCEDURE — 6370000000 HC RX 637 (ALT 250 FOR IP): Performed by: INTERNAL MEDICINE

## 2019-10-23 PROCEDURE — 96374 THER/PROPH/DIAG INJ IV PUSH: CPT

## 2019-10-23 PROCEDURE — 36415 COLL VENOUS BLD VENIPUNCTURE: CPT

## 2019-10-23 RX ORDER — DOCUSATE SODIUM 100 MG/1
100 CAPSULE, LIQUID FILLED ORAL 2 TIMES DAILY
Qty: 30 CAPSULE | Refills: 0 | Status: SHIPPED | OUTPATIENT
Start: 2019-10-23 | End: 2019-11-07

## 2019-10-23 RX ORDER — IBUPROFEN 800 MG/1
800 TABLET ORAL EVERY 6 HOURS PRN
Qty: 90 TABLET | Refills: 1 | Status: SHIPPED | OUTPATIENT
Start: 2019-10-23 | End: 2021-07-09

## 2019-10-23 RX ORDER — OXYCODONE HYDROCHLORIDE AND ACETAMINOPHEN 5; 325 MG/1; MG/1
1 TABLET ORAL EVERY 6 HOURS PRN
Qty: 12 TABLET | Refills: 0 | Status: SHIPPED | OUTPATIENT
Start: 2019-10-23 | End: 2019-10-28

## 2019-10-23 RX ADMIN — OXYCODONE HYDROCHLORIDE 5 MG: 5 TABLET ORAL at 00:06

## 2019-10-23 RX ADMIN — ASPIRIN 81 MG: 81 TABLET, COATED ORAL at 08:25

## 2019-10-23 RX ADMIN — ENOXAPARIN SODIUM 40 MG: 40 INJECTION SUBCUTANEOUS at 08:24

## 2019-10-23 RX ADMIN — FERROUS SULFATE TAB 325 MG (65 MG ELEMENTAL FE) 325 MG: 325 (65 FE) TAB at 08:25

## 2019-10-23 RX ADMIN — CITALOPRAM HYDROBROMIDE 60 MG: 20 TABLET ORAL at 08:25

## 2019-10-23 RX ADMIN — DOCUSATE SODIUM 100 MG: 100 CAPSULE, LIQUID FILLED ORAL at 08:25

## 2019-10-23 RX ADMIN — AMLODIPINE BESYLATE 5 MG: 5 TABLET ORAL at 08:25

## 2019-10-23 RX ADMIN — METOPROLOL TARTRATE 25 MG: 25 TABLET ORAL at 08:25

## 2019-10-23 RX ADMIN — KETOROLAC TROMETHAMINE 15 MG: 15 INJECTION, SOLUTION INTRAMUSCULAR; INTRAVENOUS at 02:41

## 2019-10-23 ASSESSMENT — PAIN DESCRIPTION - DESCRIPTORS
DESCRIPTORS: TENDER
DESCRIPTORS: ACHING

## 2019-10-23 ASSESSMENT — PAIN SCALES - GENERAL
PAINLEVEL_OUTOF10: 0
PAINLEVEL_OUTOF10: 5
PAINLEVEL_OUTOF10: 4
PAINLEVEL_OUTOF10: 1

## 2019-10-23 ASSESSMENT — PAIN - FUNCTIONAL ASSESSMENT: PAIN_FUNCTIONAL_ASSESSMENT: PREVENTS OR INTERFERES SOME ACTIVE ACTIVITIES AND ADLS

## 2019-10-23 ASSESSMENT — PAIN DESCRIPTION - LOCATION
LOCATION: ABDOMEN
LOCATION: ABDOMEN

## 2019-10-23 ASSESSMENT — PAIN DESCRIPTION - PAIN TYPE
TYPE: SURGICAL PAIN
TYPE: SURGICAL PAIN

## 2019-10-23 ASSESSMENT — PAIN DESCRIPTION - FREQUENCY: FREQUENCY: INTERMITTENT

## 2019-10-23 ASSESSMENT — PAIN DESCRIPTION - ORIENTATION: ORIENTATION: UPPER

## 2019-10-23 ASSESSMENT — PAIN DESCRIPTION - ONSET: ONSET: GRADUAL

## 2019-10-24 ENCOUNTER — CARE COORDINATION (OUTPATIENT)
Dept: CASE MANAGEMENT | Age: 64
End: 2019-10-24

## 2019-10-25 ENCOUNTER — CARE COORDINATION (OUTPATIENT)
Dept: CASE MANAGEMENT | Age: 64
End: 2019-10-25

## 2019-10-25 DIAGNOSIS — K44.9 HIATAL HERNIA: Primary | ICD-10-CM

## 2019-10-28 ENCOUNTER — CARE COORDINATION (OUTPATIENT)
Dept: OTHER | Facility: CLINIC | Age: 64
End: 2019-10-28

## 2019-10-29 ENCOUNTER — CARE COORDINATION (OUTPATIENT)
Dept: OTHER | Facility: CLINIC | Age: 64
End: 2019-10-29

## 2019-10-31 ENCOUNTER — OFFICE VISIT (OUTPATIENT)
Dept: PRIMARY CARE CLINIC | Age: 64
End: 2019-10-31
Payer: COMMERCIAL

## 2019-10-31 VITALS
DIASTOLIC BLOOD PRESSURE: 80 MMHG | HEART RATE: 54 BPM | WEIGHT: 205 LBS | BODY MASS INDEX: 31.17 KG/M2 | TEMPERATURE: 97 F | SYSTOLIC BLOOD PRESSURE: 138 MMHG | OXYGEN SATURATION: 98 %

## 2019-10-31 DIAGNOSIS — I25.10 CORONARY ARTERY DISEASE INVOLVING NATIVE CORONARY ARTERY OF NATIVE HEART WITHOUT ANGINA PECTORIS: Primary | ICD-10-CM

## 2019-10-31 DIAGNOSIS — E78.00 PURE HYPERCHOLESTEROLEMIA: ICD-10-CM

## 2019-10-31 DIAGNOSIS — I10 ESSENTIAL HYPERTENSION: ICD-10-CM

## 2019-10-31 DIAGNOSIS — K44.9 HIATAL HERNIA: ICD-10-CM

## 2019-10-31 PROCEDURE — 99495 TRANSJ CARE MGMT MOD F2F 14D: CPT | Performed by: INTERNAL MEDICINE

## 2019-10-31 PROCEDURE — 1111F DSCHRG MED/CURRENT MED MERGE: CPT | Performed by: INTERNAL MEDICINE

## 2019-10-31 ASSESSMENT — ENCOUNTER SYMPTOMS
CHEST TIGHTNESS: 0
CHOKING: 0
VOMITING: 0
WHEEZING: 0
CONSTIPATION: 0
SHORTNESS OF BREATH: 0
ABDOMINAL DISTENTION: 0
DIARRHEA: 0
NAUSEA: 0
BACK PAIN: 0

## 2019-11-04 ENCOUNTER — OFFICE VISIT (OUTPATIENT)
Dept: SURGERY | Age: 64
End: 2019-11-04

## 2019-11-04 VITALS
BODY MASS INDEX: 31.07 KG/M2 | HEIGHT: 68 IN | SYSTOLIC BLOOD PRESSURE: 141 MMHG | HEART RATE: 57 BPM | DIASTOLIC BLOOD PRESSURE: 85 MMHG | WEIGHT: 205 LBS | OXYGEN SATURATION: 96 % | TEMPERATURE: 97.8 F

## 2019-11-04 DIAGNOSIS — T81.32XD DISRUPTION OR DEHISCENCE OF CLOSURE OF MUSCLE OR MUSCLE FLAP, SUBSEQUENT ENCOUNTER: ICD-10-CM

## 2019-11-04 DIAGNOSIS — K44.9 PARAESOPHAGEAL HERNIA: Primary | ICD-10-CM

## 2019-11-04 DIAGNOSIS — K21.9 GASTROESOPHAGEAL REFLUX DISEASE, ESOPHAGITIS PRESENCE NOT SPECIFIED: ICD-10-CM

## 2019-11-04 PROCEDURE — 99024 POSTOP FOLLOW-UP VISIT: CPT | Performed by: SURGERY

## 2019-11-05 ENCOUNTER — HOSPITAL ENCOUNTER (OUTPATIENT)
Age: 64
Discharge: HOME | End: 2019-11-07
Payer: COMMERCIAL

## 2019-11-05 ENCOUNTER — NURSE ONLY (OUTPATIENT)
Dept: PRIMARY CARE CLINIC | Age: 64
End: 2019-11-05

## 2019-11-05 ENCOUNTER — CARE COORDINATION (OUTPATIENT)
Dept: OTHER | Facility: CLINIC | Age: 64
End: 2019-11-05

## 2019-11-05 DIAGNOSIS — I10 ESSENTIAL HYPERTENSION: ICD-10-CM

## 2019-11-05 DIAGNOSIS — I25.10 CORONARY ARTERY DISEASE INVOLVING NATIVE CORONARY ARTERY OF NATIVE HEART WITHOUT ANGINA PECTORIS: ICD-10-CM

## 2019-11-05 LAB
ALBUMIN SERPL-MCNC: 4.2 G/DL (ref 3.5–5.2)
ALP BLD-CCNC: 100 U/L (ref 40–129)
ALT SERPL-CCNC: 29 U/L (ref 0–40)
ANION GAP SERPL CALCULATED.3IONS-SCNC: 19 MMOL/L (ref 7–16)
AST SERPL-CCNC: 19 U/L (ref 0–39)
BILIRUB SERPL-MCNC: 0.8 MG/DL (ref 0–1.2)
BUN BLDV-MCNC: 15 MG/DL (ref 8–23)
CALCIUM SERPL-MCNC: 10.1 MG/DL (ref 8.6–10.2)
CHLORIDE BLD-SCNC: 94 MMOL/L (ref 98–107)
CHOLESTEROL, TOTAL: 140 MG/DL (ref 0–199)
CO2: 26 MMOL/L (ref 22–29)
CREAT SERPL-MCNC: 1 MG/DL (ref 0.7–1.2)
GFR AFRICAN AMERICAN: >60
GFR NON-AFRICAN AMERICAN: >60 ML/MIN/1.73
GLUCOSE BLD-MCNC: 159 MG/DL (ref 74–99)
HCT VFR BLD CALC: 48.7 % (ref 37–54)
HDLC SERPL-MCNC: 31 MG/DL
HEMOGLOBIN: 16.1 G/DL (ref 12.5–16.5)
LDL CHOLESTEROL CALCULATED: 73 MG/DL (ref 0–99)
MCH RBC QN AUTO: 28.6 PG (ref 26–35)
MCHC RBC AUTO-ENTMCNC: 33.1 % (ref 32–34.5)
MCV RBC AUTO: 86.7 FL (ref 80–99.9)
PDW BLD-RTO: 12.4 FL (ref 11.5–15)
PLATELET # BLD: 303 E9/L (ref 130–450)
PMV BLD AUTO: 11.2 FL (ref 7–12)
POTASSIUM SERPL-SCNC: 3.6 MMOL/L (ref 3.5–5)
RBC # BLD: 5.62 E12/L (ref 3.8–5.8)
SODIUM BLD-SCNC: 139 MMOL/L (ref 132–146)
TOTAL PROTEIN: 7.2 G/DL (ref 6.4–8.3)
TRIGL SERPL-MCNC: 182 MG/DL (ref 0–149)
VLDLC SERPL CALC-MCNC: 36 MG/DL
WBC # BLD: 10.3 E9/L (ref 4.5–11.5)

## 2019-11-05 PROCEDURE — 85027 COMPLETE CBC AUTOMATED: CPT

## 2019-11-05 PROCEDURE — 80053 COMPREHEN METABOLIC PANEL: CPT

## 2019-11-05 PROCEDURE — 80061 LIPID PANEL: CPT

## 2019-11-07 RX ORDER — CLONIDINE HYDROCHLORIDE 0.3 MG/1
0.3 TABLET ORAL NIGHTLY
Qty: 90 TABLET | Refills: 1 | Status: SHIPPED
Start: 2019-11-07 | End: 2020-05-18 | Stop reason: SDUPTHER

## 2019-11-07 RX ORDER — ATORVASTATIN CALCIUM 80 MG/1
80 TABLET, FILM COATED ORAL NIGHTLY
Qty: 90 TABLET | Refills: 1 | Status: SHIPPED
Start: 2019-11-07 | End: 2020-05-18 | Stop reason: SDUPTHER

## 2019-11-12 ENCOUNTER — CARE COORDINATION (OUTPATIENT)
Dept: OTHER | Facility: CLINIC | Age: 64
End: 2019-11-12

## 2019-11-21 RX ORDER — LOSARTAN POTASSIUM AND HYDROCHLOROTHIAZIDE 25; 100 MG/1; MG/1
1 TABLET ORAL EVERY MORNING
Qty: 90 TABLET | Refills: 1 | Status: SHIPPED
Start: 2019-11-21 | End: 2020-05-18

## 2019-12-27 RX ORDER — CITALOPRAM 40 MG/1
60 TABLET ORAL EVERY MORNING
Qty: 135 TABLET | Refills: 1 | Status: SHIPPED
Start: 2019-12-27 | End: 2020-07-01 | Stop reason: SDUPTHER

## 2019-12-27 RX ORDER — EZETIMIBE 10 MG/1
10 TABLET ORAL NIGHTLY
Qty: 90 TABLET | Refills: 1 | Status: SHIPPED
Start: 2019-12-27 | End: 2020-05-18

## 2020-04-13 RX ORDER — AMLODIPINE BESYLATE 5 MG/1
5 TABLET ORAL DAILY
Qty: 30 TABLET | Refills: 3 | Status: SHIPPED
Start: 2020-04-13 | End: 2020-05-18 | Stop reason: SDUPTHER

## 2020-05-08 ENCOUNTER — TELEPHONE (OUTPATIENT)
Dept: FAMILY MEDICINE CLINIC | Age: 65
End: 2020-05-08

## 2020-05-18 ENCOUNTER — VIRTUAL VISIT (OUTPATIENT)
Dept: PRIMARY CARE CLINIC | Age: 65
End: 2020-05-18
Payer: COMMERCIAL

## 2020-05-18 PROCEDURE — G8427 DOCREV CUR MEDS BY ELIG CLIN: HCPCS | Performed by: INTERNAL MEDICINE

## 2020-05-18 PROCEDURE — 4040F PNEUMOC VAC/ADMIN/RCVD: CPT | Performed by: INTERNAL MEDICINE

## 2020-05-18 PROCEDURE — 3017F COLORECTAL CA SCREEN DOC REV: CPT | Performed by: INTERNAL MEDICINE

## 2020-05-18 PROCEDURE — 99214 OFFICE O/P EST MOD 30 MIN: CPT | Performed by: INTERNAL MEDICINE

## 2020-05-18 PROCEDURE — 1036F TOBACCO NON-USER: CPT | Performed by: INTERNAL MEDICINE

## 2020-05-18 PROCEDURE — G8417 CALC BMI ABV UP PARAM F/U: HCPCS | Performed by: INTERNAL MEDICINE

## 2020-05-18 PROCEDURE — 1123F ACP DISCUSS/DSCN MKR DOCD: CPT | Performed by: INTERNAL MEDICINE

## 2020-05-18 RX ORDER — AMLODIPINE BESYLATE 5 MG/1
5 TABLET ORAL DAILY
Qty: 90 TABLET | Refills: 1 | Status: SHIPPED
Start: 2020-05-18 | End: 2020-07-07

## 2020-05-18 RX ORDER — LOSARTAN POTASSIUM AND HYDROCHLOROTHIAZIDE 12.5; 5 MG/1; MG/1
TABLET ORAL
COMMUNITY
Start: 2020-02-19 | End: 2020-05-18 | Stop reason: SDUPTHER

## 2020-05-18 RX ORDER — CLONIDINE HYDROCHLORIDE 0.3 MG/1
0.3 TABLET ORAL NIGHTLY
Qty: 90 TABLET | Refills: 1 | Status: SHIPPED
Start: 2020-05-18 | End: 2020-12-02 | Stop reason: SDUPTHER

## 2020-05-18 RX ORDER — LOSARTAN POTASSIUM AND HYDROCHLOROTHIAZIDE 12.5; 5 MG/1; MG/1
1 TABLET ORAL 2 TIMES DAILY
Qty: 180 TABLET | Refills: 1 | Status: SHIPPED
Start: 2020-05-18 | End: 2020-07-07

## 2020-05-18 RX ORDER — ATORVASTATIN CALCIUM 80 MG/1
80 TABLET, FILM COATED ORAL NIGHTLY
Qty: 90 TABLET | Refills: 1 | Status: SHIPPED
Start: 2020-05-18 | End: 2020-12-02 | Stop reason: SDUPTHER

## 2020-05-18 ASSESSMENT — PATIENT HEALTH QUESTIONNAIRE - PHQ9
1. LITTLE INTEREST OR PLEASURE IN DOING THINGS: 0
SUM OF ALL RESPONSES TO PHQ QUESTIONS 1-9: 0
SUM OF ALL RESPONSES TO PHQ QUESTIONS 1-9: 0
2. FEELING DOWN, DEPRESSED OR HOPELESS: 0
SUM OF ALL RESPONSES TO PHQ9 QUESTIONS 1 & 2: 0

## 2020-05-18 NOTE — PROGRESS NOTES
5/18/20    Bandar Kunz, a male of 71 y.o. came to the office     Bandar Kunz presents today for 6 month follow up. He has been having issues with his bowels. He had a similar issue prior to his gallbladder removal and henrnia repair. Hyperactive bowel, sounds, gas etc.  Food does not change his symptoms. This has been going for the past month. He has been taking a probiotic/ beano that does help occasionally. He also occasionally does take prilosec. Patient Active Problem List   Diagnosis    Coronary artery disease involving native coronary artery of native heart without angina pectoris    Hypertension    Pure hypercholesterolemia    Anemia    GERD (gastroesophageal reflux disease)    GARZA (dyspnea on exertion)    Left knee DJD    Moderate obesity    Near syncope    Anxiety attack    NSTEMI (non-ST elevated myocardial infarction) ([facility] [address]    Hiatal hernia    Dizziness    Cholelithiasis    Biliary colic      Allergies   Allergen Reactions    Penicillins Hives     Current Outpatient Medications on File Prior to Visit   Medication Sig Dispense Refill    citalopram (CELEXA) 40 MG tablet Take 1.5 tablets by mouth every morning 135 tablet 1    ibuprofen (IBU) 800 MG tablet Take 1 tablet by mouth every 6 hours as needed for Pain 90 tablet 1    aspirin 81 MG tablet Take 81 mg by mouth daily Last dose 10/19/19      ferrous sulfate 325 (65 Fe) MG tablet Take 325 mg by mouth daily (with breakfast) LD 7/15/19      Cholecalciferol (VITAMIN D3) 5000 UNITS TABS Take 1 tablet by mouth every morning       omeprazole (PRILOSEC) 40 MG capsule Take 40 mg by mouth 2 times daily       nitroGLYCERIN (NITROSTAT) 0.4 MG SL tablet Place 1 tablet under the tongue every 5 minutes as needed for Chest pain. If chest pain: put 1 NTG tab under tongue - sit down - wait 5 min - if no relief, call 911. May repeat dose 2 more times 5 min apart while waiting for EMS (total of 3 doses).   If dizzy do not take any further

## 2020-05-29 ENCOUNTER — TELEPHONE (OUTPATIENT)
Dept: SURGERY | Age: 65
End: 2020-05-29

## 2020-06-04 ENCOUNTER — OFFICE VISIT (OUTPATIENT)
Dept: SURGERY | Age: 65
End: 2020-06-04
Payer: COMMERCIAL

## 2020-06-04 VITALS
SYSTOLIC BLOOD PRESSURE: 156 MMHG | BODY MASS INDEX: 31.73 KG/M2 | OXYGEN SATURATION: 97 % | RESPIRATION RATE: 18 BRPM | TEMPERATURE: 97.6 F | HEART RATE: 53 BPM | DIASTOLIC BLOOD PRESSURE: 92 MMHG | WEIGHT: 214.2 LBS | HEIGHT: 69 IN

## 2020-06-04 PROCEDURE — 4040F PNEUMOC VAC/ADMIN/RCVD: CPT | Performed by: SURGERY

## 2020-06-04 PROCEDURE — G8427 DOCREV CUR MEDS BY ELIG CLIN: HCPCS | Performed by: SURGERY

## 2020-06-04 PROCEDURE — 3017F COLORECTAL CA SCREEN DOC REV: CPT | Performed by: SURGERY

## 2020-06-04 PROCEDURE — 99213 OFFICE O/P EST LOW 20 MIN: CPT | Performed by: SURGERY

## 2020-06-04 PROCEDURE — G8417 CALC BMI ABV UP PARAM F/U: HCPCS | Performed by: SURGERY

## 2020-06-04 PROCEDURE — 1036F TOBACCO NON-USER: CPT | Performed by: SURGERY

## 2020-06-04 PROCEDURE — 1123F ACP DISCUSS/DSCN MKR DOCD: CPT | Performed by: SURGERY

## 2020-06-04 RX ORDER — CHOLESTYRAMINE 4 G/9G
1 POWDER, FOR SUSPENSION ORAL 2 TIMES DAILY
Qty: 90 PACKET | Refills: 3 | Status: SHIPPED
Start: 2020-06-04 | End: 2022-07-06 | Stop reason: ALTCHOICE

## 2020-06-04 NOTE — PROGRESS NOTES
Progress Note - Follow up    Patient's Name/Date of Birth: Melissa Mason / 1955    Date: 6/4/2020    PCP: Shahzad Martin DO    Referring Physician:   Roz Roy [place]  [phone]    Chief Complaint   Patient presents with    Abdominal Pain     Abdominal pain and constipation.  Constipation       HPI:    The patient said he has started having chronic diarrhea, gas bloating. He said this has increased to 5-6 days a week. I removed his gallbladder a year ago. He said he is taking a probioticand thinks that may make him feel more bloated, too. He said he is belching a lot, too. Dr. Conrad Palafox did a paraesophageal hernia repair last October. Patient's medications, allergies, past medical, surgical, social and family histories were reviewed and updated as appropriate. Review of Systems  Constitutional: negative  Respiratory: negative  Cardiovascular: negative  Gastrointestinal: as in hpi  Genitourinary:negative  Integument/breast: negative    Physical Exam:  BP (!) 156/92 (Site: Left Upper Arm, Position: Sitting, Cuff Size: Medium Adult)   Pulse 53   Temp 97.6 °F (36.4 °C) (Temporal)   Resp 18   Ht 5' 8.5\" (1.74 m)   Wt 214 lb 3.2 oz (97.2 kg)   SpO2 97%   BMI 32.10 kg/m²   General appearance: alert, cooperative and in no acute distress. Lungs: clear to auscultation bilaterally  Heart: regular rate and rhythm  Abdomen:  soft, nontender, nondistended  Musculoskeletal: symmetrical without clubbing cyanosis or edema.   Skin: normal     Data Reviewed:   Pathology: n/a    Impression/Plan:  71y.o. year old male with Diarrhea, urgency, s/p cholecystectomy last year     Cholestyramine  Follow up if symptoms don't resolve    Electronically by Brittanie Zhou MD, General Surgery  on 6/4/2020 at 11:16 AM      Send copy of H&P to PCP, Shahzad Martin DO and referring physician, Roz Roy DO      6/4/2020

## 2020-06-15 ENCOUNTER — TELEPHONE (OUTPATIENT)
Dept: PRIMARY CARE CLINIC | Age: 65
End: 2020-06-15

## 2020-06-15 RX ORDER — FLUTICASONE PROPIONATE 50 MCG
2 SPRAY, SUSPENSION (ML) NASAL DAILY
Qty: 1 BOTTLE | Refills: 0 | Status: SHIPPED
Start: 2020-06-15 | End: 2021-07-09

## 2020-06-15 NOTE — TELEPHONE ENCOUNTER
Pt requesting Flonase be prescribed to save on cost. Rx is less expensive than OTC. Please send to [facility].

## 2020-07-01 RX ORDER — CITALOPRAM 40 MG/1
60 TABLET ORAL EVERY MORNING
Qty: 135 TABLET | Refills: 1 | Status: SHIPPED
Start: 2020-07-01 | End: 2020-12-02 | Stop reason: SDUPTHER

## 2020-07-07 ENCOUNTER — OFFICE VISIT (OUTPATIENT)
Dept: CARDIOLOGY CLINIC | Age: 65
End: 2020-07-07
Payer: COMMERCIAL

## 2020-07-07 VITALS
BODY MASS INDEX: 33.08 KG/M2 | SYSTOLIC BLOOD PRESSURE: 138 MMHG | DIASTOLIC BLOOD PRESSURE: 82 MMHG | HEIGHT: 68 IN | RESPIRATION RATE: 16 BRPM | WEIGHT: 218.3 LBS | HEART RATE: 63 BPM

## 2020-07-07 PROCEDURE — 93000 ELECTROCARDIOGRAM COMPLETE: CPT | Performed by: INTERNAL MEDICINE

## 2020-07-07 PROCEDURE — 99214 OFFICE O/P EST MOD 30 MIN: CPT | Performed by: INTERNAL MEDICINE

## 2020-07-07 RX ORDER — LOSARTAN POTASSIUM AND HYDROCHLOROTHIAZIDE 25; 100 MG/1; MG/1
1 TABLET ORAL DAILY
COMMUNITY
End: 2020-12-02 | Stop reason: SDUPTHER

## 2020-07-07 RX ORDER — AMLODIPINE BESYLATE 10 MG/1
10 TABLET ORAL DAILY
COMMUNITY
End: 2020-12-02 | Stop reason: SDUPTHER

## 2020-07-07 RX ORDER — EZETIMIBE 10 MG/1
10 TABLET ORAL DAILY
COMMUNITY
End: 2020-08-24 | Stop reason: SDUPTHER

## 2020-07-07 NOTE — PROGRESS NOTES
OUTPATIENT CARDIOLOGY FOLLOW-UP    Name: Manuel Jones    Age: 76 y.o. Primary Care Physician: Paul Ramos DO    Date of Service: 7/7/2020    Chief Complaint:   Chief Complaint   Patient presents with    Coronary Artery Disease    1 Year Follow Up    Hypertension        Interim History:   13-year-old male previously seen by my partner Dr. Aliza Dickerson who is requested transfer to my care. He has history of coronary disease status post multiple PCI's over the years to the RCA and mid LAD in 1998, 2002 angioplasty alone to the LAD into the diagonal, diagonal drug-eluting stent 2005, and most recent with NSTEMI in 2012 with drug-eluting stent to the mid LAD. He also has hypertension hyperlipidemia. Presents today for routine annual follow-up. 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 He is doing very well. He has not been exercising at the gym during the quarantine crisis but had been previously. He has chronic mild exertional dyspnea when going up stairs but it is not limiting and is unchanged. Was not getting any symptoms when he was exercising. Knows that he needs to watch what he eats and keep trying to lose some weight. He denies any chest pressure heaviness. 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 Otherwise no palpitations, syncope. Occasional mild edema of the ankles at the end of the day. No orthopnea or PND. Review of Systems:   Negative except as scribed above    Past Medical History:  Past Medical History:   Diagnosis Date    Anemia     Anxiety attack     controlled with citolpram    CAD (coronary artery disease) 16 YRS AGO    MI X 2.  WITH STENTS, FOLLOWS WITH MERLE DOLL,  LAST VISIT 5/2014, SEE EPIC TIKI    GERD (gastroesophageal reflux disease)     Hiatal hernia     Hypercholesterolemia 1994    Hyperlipidemia     Hypertension 6/12/14    B/P IS ELEVATED, PATIENT STATES HE NEEDS TO GET HIS LOPRESSOR REFILLED    Left knee DJD 6/10/2014    OSTEO    NSTEMI (non-ST elevated myocardial infarction) ([facility] [address] 2012    Pre-operative clearance     cardiac clearance done       Past Surgical History:  Past Surgical History:   Procedure Laterality Date    ANKLE SURGERY  6 YRS AGO    right ORIF    CHOLECYSTECTOMY, LAPAROSCOPIC N/A 2019    CHOLECYSTECTOMY LAPAROSCOPIC ROBOTIC ASSISTED  XI performed by Carolin Townsend MD at [facility] COLONOSCOPY  2015    CORONARY ANGIOPLASTY WITH STENT PLACEMENT  2012    Dr. Jenny Plascencia, Stent Mid LAD    DIAGNOSTIC CARDIAC CATH LAB PROCEDURE  1998    [facility]. Cath/PTCA/stent of RCA; PTCA/stent of mid LAD with adjunctive ReoPro administration.  DIAGNOSTIC CARDIAC CATH LAB PROCEDURE  2002    [facility]. Kissing balloon angioplasty LAD>diag Perclose. Dr. Jenny Plascencia and Dr. Parker Aparicio.  DIAGNOSTIC CARDIAC CATH LAB PROCEDURE  2005    [facility]. Cath/stent (TAX\"US) to diagonal at [facility].  ENDOSCOPY, COLON, DIAGNOSTIC      FINGER TRIGGER RELEASE Right 2015    Release right long trigger finger. Deepthi Castanon MD    HERNIA REPAIR Left YRS AGO    [facility]    HIATAL HERNIA REPAIR N/A 10/22/2019    LAPAROSCOPIC ROBOTIC XI ASSISTED PARAESOPHAGEAL HERNIA REPAIR WITH PARTIAL FUNDOPLICATION AND MYOFASCIAL FLAP, UPPER ENDOSCOPY performed by Sofya Chau MD at [facility] Right 2017    robotic assisted    JOINT REPLACEMENT Left 2014    knee    KNEE ARTHROPLASTY Left 2014    Left TKA.   Deepthi Castanon MD    TONSILLECTOMY      UPPER GASTROINTESTINAL ENDOSCOPY      UPPER GASTROINTESTINAL ENDOSCOPY N/A 9/10/2019    EGD BIOPSY performed by Sofya Chau MD at [facility] History:  Family History   Problem Relation Age of Onset    Hypertension Mother [de-identified]         from renal failure    Diabetes Mother     Coronary Art Dis Mother     Osteoporosis Mother     Osteoarthritis Father 80    High Cholesterol Brother     High Cholesterol Brother        Social History:  Social History     Tobacco Use    Smoking status: Never Smoker    Smokeless tobacco: Never Used   Substance Use Topics    Alcohol use: Yes     Comment: socially    Drug use: No        Allergies: Allergies   Allergen Reactions    Penicillins Hives       Current Medications:    Current Outpatient Medications:     losartan-hydrochlorothiazide (HYZAAR) 100-25 MG per tablet, Take 1 tablet by mouth daily, Disp: , Rfl:     Citalopram Hydrobromide (CELEXA PO), Take 60 mg by mouth daily, Disp: , Rfl:     ezetimibe (ZETIA) 10 MG tablet, Take 10 mg by mouth daily, Disp: , Rfl:     amLODIPine (NORVASC) 10 MG tablet, Take 10 mg by mouth daily, Disp: , Rfl:     fluticasone (FLONASE) 50 MCG/ACT nasal spray, 2 sprays by Each Nostril route daily, Disp: 1 Bottle, Rfl: 0    cholestyramine (QUESTRAN) 4 g packet, Take 1 packet by mouth 2 times daily, Disp: 90 packet, Rfl: 3    metoprolol tartrate (LOPRESSOR) 25 MG tablet, Take 1 tablet by mouth 2 times daily, Disp: 180 tablet, Rfl: 1    atorvastatin (LIPITOR) 80 MG tablet, Take 1 tablet by mouth nightly, Disp: 90 tablet, Rfl: 1    cloNIDine (CATAPRES) 0.3 MG tablet, Take 1 tablet by mouth nightly, Disp: 90 tablet, Rfl: 1    aspirin 81 MG tablet, Take 81 mg by mouth daily Last dose 10/19/19, Disp: , Rfl:     ferrous sulfate 325 (65 Fe) MG tablet, Take 325 mg by mouth daily (with breakfast) LD 7/15/19, Disp: , Rfl:     Cholecalciferol (VITAMIN D3) 5000 UNITS TABS, Take 1 tablet by mouth every morning , Disp: , Rfl:     nitroGLYCERIN (NITROSTAT) 0.4 MG SL tablet, Place 1 tablet under the tongue every 5 minutes as needed for Chest pain. If chest pain: put 1 NTG tab under tongue - sit down - wait 5 min - if no relief, call 911. May repeat dose 2 more times 5 min apart while waiting for EMS (total of 3 doses). If dizzy do not take any further doses. , Disp: 30 tablet, Rfl: 6    Ascorbic Acid (VITAMIN C) 500 MG tablet, Take 500 mg by mouth every morning , Disp: , Rfl:     fish oil-omega-3 fatty acids 1000 MG capsule, Take 1 g by mouth every morning , Disp: , Rfl:     citalopram (CELEXA) 40 MG tablet, Take 1.5 tablets by mouth every morning (Patient not taking: Reported on 7/7/2020), Disp: 135 tablet, Rfl: 1    ibuprofen (IBU) 800 MG tablet, Take 1 tablet by mouth every 6 hours as needed for Pain, Disp: 90 tablet, Rfl: 1    omeprazole (PRILOSEC) 40 MG capsule, Take 40 mg by mouth 2 times daily , Disp: , Rfl:     Physical Exam:  /82   Pulse 63   Resp 16   Ht 5' 8\" (1.727 m)   Wt 218 lb 4.8 oz (99 kg)   BMI 33.19 kg/m²   Wt Readings from Last 3 Encounters:   07/07/20 218 lb 4.8 oz (99 kg)   06/04/20 214 lb 3.2 oz (97.2 kg)   11/04/19 205 lb (93 kg)     Appearance: Awake, alert and oriented x 3, no acute respiratory distress  Skin: Intact, no rash  Head: Normocephalic, atraumatic  Eyes: EOMI, no conjunctival erythema  ENMT: No pharyngeal erythema, MMM, no rhinorrhea  Neck: Supple, no elevated JVP, no carotid bruits  Lungs: Clear to auscultation bilaterally. No wheezes, rales, or rhonchi.   Cardiac: Regular rate and rhythm, +S1S2, no murmurs apparent  Abdomen: Soft, nontender, +bowel sounds  Extremities: Moves all extremities x 4, no lower extremity edema  Neurologic: No focal motor deficits apparent, normal mood and affect, alert and oriented x 3  Peripheral Pulses: Intact posterior tibial pulses bilaterally    Laboratory Tests:  Lab Results   Component Value Date    CREATININE 1.0 11/05/2019    BUN 15 11/05/2019     11/05/2019    K 3.6 11/05/2019    CL 94 (L) 11/05/2019    CO2 26 11/05/2019     Lab Results   Component Value Date    MG 1.5 10/23/2019     Lab Results   Component Value Date    WBC 10.3 11/05/2019    HGB 16.1 11/05/2019    HCT 48.7 11/05/2019    MCV 86.7 11/05/2019     11/05/2019     Lab Results   Component Value Date    ALT 29 11/05/2019    AST 19 11/05/2019 ALKPHOS 100 11/05/2019    BILITOT 0.8 11/05/2019     Lab Results   Component Value Date    CKTOTAL 138 05/27/2012    CKMB 5.5 (H) 05/27/2012    TROPONINI <0.01 10/23/2019    TROPONINI <0.01 07/17/2019    TROPONINI <0.01 07/05/2019     Lab Results   Component Value Date    INR 1.0 03/04/2018    INR 1.3 05/29/2012    INR 1.2 05/27/2012    PROTIME 11.3 03/04/2018    PROTIME 12.4 05/29/2012    PROTIME 11.3 05/27/2012     Lab Results   Component Value Date    TSH 0.986 04/07/2019     Lab Results   Component Value Date    LABA1C 6.4 (H) 05/01/2019     No results found for: EAG  Lab Results   Component Value Date    CHOL 140 11/05/2019    CHOL 139 05/01/2019    CHOL 131 04/07/2019     Lab Results   Component Value Date    TRIG 182 (H) 11/05/2019    TRIG 190 (H) 05/01/2019    TRIG 190 (H) 04/07/2019     Lab Results   Component Value Date    HDL 31 11/05/2019    HDL 32 05/01/2019    HDL 26 04/07/2019     Lab Results   Component Value Date    LDLCALC 73 11/05/2019    LDLCALC 69 05/01/2019    LDLCALC 67 04/07/2019     Lab Results   Component Value Date    LABVLDL 36 11/05/2019    LABVLDL 38 04/07/2019    LABVLDL 35 09/13/2018     No results found for: CHOLHDLRATIO  No results for input(s): PROBNP in the last 72 hours. Cardiac Tests:  ECG: Sinus rhythm 63 bpm.  Normal axis. Nonspecific interventricular conduction delay with QRS duration 106 ms. Inferior Q waves suggestive of prior MI. Nonspecific T wave changes. Echocardiogram 04/02/2018 (Dr. Aaron Stewart): Left ventricular internal dimensions were normal in diastole and systole. EF 60%. Mild left ventricular concentric hypertrophy noted. No regional wall motion abnormalities seen. Normal left ventricular ejection fraction. The left atrium is borderline dilated. Mild mitral regurgitation is present. Physiologic and/or trace aortic regurgitation is noted. Mild tricuspid regurgitation.     Stress test:   Pharmacologic MPS 5/2019  Gated SPECT left ventricular ejection fraction was calculated to   be 72%, with normal myocardial thickening and wall motion. Impression:    1. Electrocardiographically normal regadenoson infusion with a   clinicallynonischemic response. 2. Myocardial perfusion imaging was normal.    3. Overall left ventricular systolic function was normal without   regional wall motion abnormalities. 4.   Low risk pharmacologic stress test  5.   Compared to perfusion imaging of June, 2013, no significant   changes are present. Cardiac catheterization:   (Dr. Joseph Ash) 05/27/2012: The left ventricular systolic function is well preserved with an overall ejection fraction of 70%.  There is no mitral regurgitation. The main stem of the left main coronary artery is patent. The left anterior descending coronary artery has an 80% stenosis in mid portion with good run-off.  The diagonal and septal  branches of the LAD appear to be free of significant disease. The circumflex coronary, its obtuse marginal and posterolateral branches appear to be free of significant disease. The right coronary artery is dominant and supplies the posterior descending artery.  The RCA has several 30% stenotic lesions involving its proximal, mid, and distal segments.  The stented segment in the RCA proximally is widely patent. A 2.75 x 12 mm Promus Element drug eluting stent was carefully positioned at the site of the lesion in the mid LAD, JERALD grade 3 flow    Cardiac catheterization 09/17/1998 with stenting to the PCA and mid LAD (specifics unknown)  Cardiac catheterization 06/25/2002 with lissing balloon angioplasty to LAD>Diagonal (specifics unknown)  Cardiac catheterization with stenting to the Diagonal 04/13/2005 (specifics unknown)    Orders Placed This Encounter   Procedures    EKG 12 lead        Requested Prescriptions      No prescriptions requested or ordered in this encounter        ASSESSMENT / PLAN:  1. Coronary artery disease as above. Prior PCI to RCA, diagonal, LAD as above. Stable  2. Hypertension, fairly well controlled  3. Hyperlipidemia  4. Type II diabetes, A1c 6.4% 2019 (previously 7.0), diet controlled  5. Comorbid disease: Cholelithiasis status post cholecystectomy 2019, GERD/hiatal hernia status post paraesophageal repair 2019, left TKA, anxiety    Recommendations:  Presently remain stable from a cardiac standpoint and is doing quite well. · Continue aspirin/statin indefinitely  · Continue metoprolol, amlodipine, losartan-hydrochlorothiazide, and clonidine  · Aggressive risk factor modification including continued efforts for weight loss and increased exercise  · Follow-up in 1 year or sooner if need arises    The patient's current medication list, allergies, problem list and results of all previously ordered testing were reviewed at today's visit.     Savana Ahumada MD   [facility]) Cardiology

## 2020-08-24 RX ORDER — EZETIMIBE 10 MG/1
10 TABLET ORAL DAILY
Qty: 90 TABLET | Refills: 1 | Status: SHIPPED
Start: 2020-08-24 | End: 2020-12-02 | Stop reason: SDUPTHER

## 2020-11-20 ENCOUNTER — HOSPITAL ENCOUNTER (EMERGENCY)
Age: 65
Discharge: HOME | End: 2020-11-20
Attending: EMERGENCY MEDICINE
Payer: COMMERCIAL

## 2020-11-20 ENCOUNTER — APPOINTMENT (OUTPATIENT)
Dept: GENERAL RADIOLOGY | Age: 65
End: 2020-11-20
Payer: COMMERCIAL

## 2020-11-20 VITALS
BODY MASS INDEX: 31.1 KG/M2 | TEMPERATURE: 98.3 F | WEIGHT: 210 LBS | HEART RATE: 57 BPM | SYSTOLIC BLOOD PRESSURE: 138 MMHG | DIASTOLIC BLOOD PRESSURE: 83 MMHG | HEIGHT: 69 IN | OXYGEN SATURATION: 98 % | RESPIRATION RATE: 18 BRPM

## 2020-11-20 LAB
ANION GAP SERPL CALCULATED.3IONS-SCNC: 12 MMOL/L (ref 7–16)
BUN BLDV-MCNC: 27 MG/DL (ref 8–23)
CALCIUM SERPL-MCNC: 9.5 MG/DL (ref 8.6–10.2)
CHLORIDE BLD-SCNC: 96 MMOL/L (ref 98–107)
CO2: 27 MMOL/L (ref 22–29)
CREAT SERPL-MCNC: 1.5 MG/DL (ref 0.7–1.2)
EKG ATRIAL RATE: 49 BPM
EKG P AXIS: 38 DEGREES
EKG P-R INTERVAL: 160 MS
EKG Q-T INTERVAL: 502 MS
EKG QRS DURATION: 102 MS
EKG QTC CALCULATION (BAZETT): 453 MS
EKG R AXIS: -13 DEGREES
EKG T AXIS: -10 DEGREES
EKG VENTRICULAR RATE: 49 BPM
GFR AFRICAN AMERICAN: 57
GFR NON-AFRICAN AMERICAN: 47 ML/MIN/1.73
GLUCOSE BLD-MCNC: 290 MG/DL (ref 74–99)
HCT VFR BLD CALC: 39.2 % (ref 37–54)
HEMOGLOBIN: 13.7 G/DL (ref 12.5–16.5)
MCH RBC QN AUTO: 29.7 PG (ref 26–35)
MCHC RBC AUTO-ENTMCNC: 34.9 % (ref 32–34.5)
MCV RBC AUTO: 85 FL (ref 80–99.9)
PDW BLD-RTO: 13.4 FL (ref 11.5–15)
PLATELET # BLD: 237 E9/L (ref 130–450)
PMV BLD AUTO: 10.4 FL (ref 7–12)
POTASSIUM SERPL-SCNC: 3.6 MMOL/L (ref 3.5–5)
RBC # BLD: 4.61 E12/L (ref 3.8–5.8)
SODIUM BLD-SCNC: 135 MMOL/L (ref 132–146)
TROPONIN: <0.01 NG/ML (ref 0–0.03)
WBC # BLD: 9.4 E9/L (ref 4.5–11.5)

## 2020-11-20 PROCEDURE — 85027 COMPLETE CBC AUTOMATED: CPT

## 2020-11-20 PROCEDURE — 71045 X-RAY EXAM CHEST 1 VIEW: CPT

## 2020-11-20 PROCEDURE — 84484 ASSAY OF TROPONIN QUANT: CPT

## 2020-11-20 PROCEDURE — 93010 ELECTROCARDIOGRAM REPORT: CPT | Performed by: INTERNAL MEDICINE

## 2020-11-20 PROCEDURE — 99285 EMERGENCY DEPT VISIT HI MDM: CPT

## 2020-11-20 PROCEDURE — 80048 BASIC METABOLIC PNL TOTAL CA: CPT

## 2020-11-20 PROCEDURE — 93005 ELECTROCARDIOGRAM TRACING: CPT | Performed by: EMERGENCY MEDICINE

## 2020-11-20 PROCEDURE — 2580000003 HC RX 258: Performed by: EMERGENCY MEDICINE

## 2020-11-20 RX ORDER — 0.9 % SODIUM CHLORIDE 0.9 %
1000 INTRAVENOUS SOLUTION INTRAVENOUS ONCE
Status: COMPLETED | OUTPATIENT
Start: 2020-11-20 | End: 2020-11-20

## 2020-11-20 RX ADMIN — SODIUM CHLORIDE 1000 ML: 9 INJECTION, SOLUTION INTRAVENOUS at 11:39

## 2020-11-20 RX ADMIN — SODIUM CHLORIDE 1000 ML: 9 INJECTION, SOLUTION INTRAVENOUS at 09:55

## 2020-11-20 ASSESSMENT — ENCOUNTER SYMPTOMS
DIFFICULTY BREATHING: 0
SHORTNESS OF BREATH: 1
CHEST TIGHTNESS: 0
NAUSEA: 0
ABDOMINAL PAIN: 0
VOMITING: 0

## 2020-11-20 NOTE — ED PROVIDER NOTES
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 He had a near-syncopal episode on the F. He denies CP. He states at the time he was slightly SOB. He has history of cardiac stents. He admits to not eating breakfast this morning. The history is provided by the patient. Loss of Consciousness   Episode history:  Single  Most recent episode: Today  Duration:  10 minutes  Timing:  Constant  Progression:  Improving  Chronicity:  New  Context: normal activity    Witnessed: yes    Relieved by:  Lying down  Worsened by:  Nothing  Associated symptoms: diaphoresis, recent fall, shortness of breath and weakness    Associated symptoms: no chest pain, no difficulty breathing, no dizziness, no fever, no focal weakness, no headaches, no malaise/fatigue, no nausea, no palpitations and no vomiting    Risk factors: coronary artery disease        Review of Systems   Constitutional: Positive for diaphoresis. Negative for activity change, appetite change, chills, fatigue, fever and malaise/fatigue. HENT: Negative. Respiratory: Positive for shortness of breath. Negative for chest tightness. Cardiovascular: Positive for syncope. Negative for chest pain, palpitations and leg swelling. Gastrointestinal: Negative for abdominal pain, nausea and vomiting. Genitourinary: Negative for flank pain. Musculoskeletal: Negative. Skin: Positive for pallor. Neurological: Positive for weakness and light-headedness. Negative for dizziness, focal weakness, syncope, numbness and headaches. Physical Exam  Vitals signs and nursing note reviewed. Constitutional:       General: He is not in acute distress. Appearance: Normal appearance. He is well-developed and normal weight. He is not ill-appearing or toxic-appearing. HENT:      Head: Normocephalic and atraumatic.       Nose: Nose normal.      Mouth/Throat:      Mouth: Mucous membranes are moist.      Pharynx: Oropharynx (coronary artery disease), GERD (gastroesophageal reflux disease), Hiatal hernia, Hypercholesterolemia, Hyperlipidemia, Hypertension, Left knee DJD, NSTEMI (non-ST elevated myocardial infarction) ([facility] [address] and Pre-operative clearance. Past Surgical History:  has a past surgical history that includes Ankle surgery (6 YRS AGO); Colonoscopy; Upper gastrointestinal endoscopy; Coronary angioplasty with stent (5/29/2012); Diagnostic Cardiac Cath Lab Procedure (9/17/1998); Diagnostic Cardiac Cath Lab Procedure (6/25/2002); Diagnostic Cardiac Cath Lab Procedure (4/13/2005); hernia repair (Left, YRS AGO); Knee Arthroplasty (Left, 06/16/2014); Colonoscopy (07/20/2015); Inguinal hernia repair (Right, 08/01/2017); Finger trigger release (Right, 11/20/2015); Cholecystectomy, laparoscopic (N/A, 7/19/2019); Endoscopy, colon, diagnostic; Tonsillectomy; Upper gastrointestinal endoscopy (N/A, 9/10/2019); joint replacement (Left, 2014); and hiatal hernia repair (N/A, 10/22/2019). Social History:  reports that he has never smoked. He has never used smokeless tobacco. He reports current alcohol use. He reports that he does not use drugs. Family History: family history includes Coronary Art Dis in his mother; Diabetes in his mother; High Cholesterol in his brother and brother; Hypertension (age of onset: [de-identified]) in his mother; Osteoarthritis (age of onset: 80) in his father; Osteoporosis in his mother. The patients home medications have been reviewed.     Allergies: Penicillins    -------------------------------------------------- RESULTS -------------------------------------------------  Labs:  Results for orders placed or performed during the hospital encounter of 77/24/93   Basic metabolic panel   Result Value Ref Range    Sodium 135 132 - 146 mmol/L    Potassium 3.6 3.5 - 5.0 mmol/L    Chloride 96 (L) 98 - 107 mmol/L    CO2 27 22 - 29 mmol/L    Anion Gap 12 7 - 16 mmol/L    Glucose 290 (H) 74 - 99 mg/dL    BUN 27 (H) 8 - 23 mg/dL    CREATININE 1.5 (H) 0.7 - 1.2 mg/dL    GFR Non-African American 47 >=60 mL/min/1.73    GFR African American 57     Calcium 9.5 8.6 - 10.2 mg/dL   CBC   Result Value Ref Range    WBC 9.4 4.5 - 11.5 E9/L    RBC 4.61 3.80 - 5.80 E12/L    Hemoglobin 13.7 12.5 - 16.5 g/dL    Hematocrit 39.2 37.0 - 54.0 %    MCV 85.0 80.0 - 99.9 fL    MCH 29.7 26.0 - 35.0 pg    MCHC 34.9 (H) 32.0 - 34.5 %    RDW 13.4 11.5 - 15.0 fL    Platelets 704 543 - 354 E9/L    MPV 10.4 7.0 - 12.0 fL   Troponin   Result Value Ref Range    Troponin <0.01 0.00 - 0.03 ng/mL   EKG 12 Lead   Result Value Ref Range    Ventricular Rate 49 BPM    Atrial Rate 49 BPM    P-R Interval 160 ms    QRS Duration 102 ms    Q-T Interval 502 ms    QTc Calculation (Bazett) 453 ms    P Axis 38 degrees    R Axis -13 degrees    T Axis -10 degrees       Radiology:  XR CHEST 1 VIEW   Preliminary Result   No acute process. Hypoaeration.          ------------------------- NURSING NOTES AND VITALS REVIEWED ---------------------------  Date / Time Roomed:  11/20/2020  9:26 AM  ED Bed Assignment:  20/20    The nursing notes within the ED encounter and vital signs as below have been reviewed. /65   Pulse 51   Temp 97.7 °F (36.5 °C) (Oral)   Resp 16   Ht 5' 8.5\" (1.74 m)   Wt 210 lb (95.3 kg)   SpO2 95%   BMI 31.47 kg/m²   Oxygen Saturation Interpretation: Normal      ------------------------------------------ PROGRESS NOTES ------------------------------------------  I have spoken with the patient and discussed todays results, in addition to providing specific details for the plan of care and counseling regarding the diagnosis and prognosis. Their questions are answered at this time and they are agreeable with the plan. I discussed at length with them reasons for immediate return here for re evaluation. They will followup with primary care by calling their office tomorrow. 11:30 AM Patient feeling significantly improved after IVF.   His coloring is improved. He will make an apt with his cardiologist and PCP. He understands reasons to return to the ED.  --------------------------------- ADDITIONAL PROVIDER NOTES ---------------------------------  At this time the patient is without objective evidence of an acute process requiring hospitalization or inpatient management. They have remained hemodynamically stable throughout their entire ED visit and are stable for discharge with outpatient follow-up. The plan has been discussed in detail and they are aware of the specific conditions for emergent return, as well as the importance of follow-up. New Prescriptions    No medications on file       Diagnosis:  1. Orthostatic hypotension    2. Acute renal insufficiency        Disposition:  Patient's disposition: Discharge to home  Patient's condition is stable.          Jazmin Jones, DO  11/20/20 [facility], DO  11/20/20 1145

## 2020-11-20 NOTE — ED NOTES
Bed: 20  Expected date:   Expected time:   Means of arrival:   Comments:  rrt     Raymon Lam RN  11/20/20 [id number]

## 2020-11-23 ENCOUNTER — TELEPHONE (OUTPATIENT)
Dept: PRIMARY CARE CLINIC | Age: 65
End: 2020-11-23

## 2020-11-23 NOTE — TELEPHONE ENCOUNTER
Please set up for virtual visit if he has home blood pressure cuff, if not we set him up for an office visit.

## 2020-11-23 NOTE — TELEPHONE ENCOUNTER
Pt reports he was in [place] [id number] er, 11/20, for hypertension; bp was around 55/43, clammy, sweaty, weak; was given 2 ltr fluids. He said he still is not feeling back to normal.  Please advise if Pt can be sched VV or OV.

## 2020-11-24 ENCOUNTER — NURSE TRIAGE (OUTPATIENT)
Dept: OTHER | Facility: CLINIC | Age: 65
End: 2020-11-24

## 2020-11-24 ENCOUNTER — TELEPHONE (OUTPATIENT)
Dept: PRIMARY CARE CLINIC | Age: 65
End: 2020-11-24

## 2020-11-24 NOTE — TELEPHONE ENCOUNTER
Pt called to sched er f/u and added that toes have been numb and reddish blue.  I talked to triage nurse who advised to schedule Pt's appt as er would have seen issue during eval.

## 2020-11-24 NOTE — TELEPHONE ENCOUNTER
Patient called to schedule ED follow up appointment from ED visit yesterday. [facility] wanted to transfer for triage of symptoms that sent patient to ED (hypotension and bilateral lower extremity issues), prior to scheduling appointment. [facility] was advised that since he was seen to schedule appointment, no need for triage as we would likely not have any further disposition than ED gave him last night. Reason for Disposition  Joseph Berumen Caller has already spoken with another triager or PCP (or office), and has further questions and triager able to answer questions.     Protocols used: NO CONTACT OR DUPLICATE CONTACT CALL-ADULT-[place]

## 2020-12-02 ENCOUNTER — OFFICE VISIT (OUTPATIENT)
Dept: PRIMARY CARE CLINIC | Age: 65
End: 2020-12-02
Payer: COMMERCIAL

## 2020-12-02 VITALS
TEMPERATURE: 97.2 F | OXYGEN SATURATION: 94 % | WEIGHT: 217 LBS | DIASTOLIC BLOOD PRESSURE: 88 MMHG | BODY MASS INDEX: 32.51 KG/M2 | SYSTOLIC BLOOD PRESSURE: 138 MMHG | HEART RATE: 62 BPM

## 2020-12-02 LAB
ALBUMIN SERPL-MCNC: 4.3 G/DL (ref 3.5–5.2)
ALP BLD-CCNC: 102 U/L (ref 40–129)
ALT SERPL-CCNC: 37 U/L (ref 0–40)
ANION GAP SERPL CALCULATED.3IONS-SCNC: 13 MMOL/L (ref 7–16)
AST SERPL-CCNC: 25 U/L (ref 0–39)
BILIRUB SERPL-MCNC: 1.3 MG/DL (ref 0–1.2)
BUN BLDV-MCNC: 23 MG/DL (ref 8–23)
CALCIUM SERPL-MCNC: 10 MG/DL (ref 8.6–10.2)
CHLORIDE BLD-SCNC: 97 MMOL/L (ref 98–107)
CO2: 29 MMOL/L (ref 22–29)
CREAT SERPL-MCNC: 1.1 MG/DL (ref 0.7–1.2)
GFR AFRICAN AMERICAN: >60
GFR NON-AFRICAN AMERICAN: >60 ML/MIN/1.73
GLUCOSE BLD-MCNC: 296 MG/DL (ref 74–99)
HCT VFR BLD CALC: 42.3 % (ref 37–54)
HEMOGLOBIN: 14.5 G/DL (ref 12.5–16.5)
MCH RBC QN AUTO: 29.7 PG (ref 26–35)
MCHC RBC AUTO-ENTMCNC: 34.3 % (ref 32–34.5)
MCV RBC AUTO: 86.7 FL (ref 80–99.9)
PDW BLD-RTO: 13.7 FL (ref 11.5–15)
PLATELET # BLD: 256 E9/L (ref 130–450)
PMV BLD AUTO: 11 FL (ref 7–12)
POTASSIUM SERPL-SCNC: 4 MMOL/L (ref 3.5–5)
RBC # BLD: 4.88 E12/L (ref 3.8–5.8)
SODIUM BLD-SCNC: 139 MMOL/L (ref 132–146)
TOTAL PROTEIN: 6.9 G/DL (ref 6.4–8.3)
WBC # BLD: 11.1 E9/L (ref 4.5–11.5)

## 2020-12-02 PROCEDURE — 99214 OFFICE O/P EST MOD 30 MIN: CPT | Performed by: INTERNAL MEDICINE

## 2020-12-02 PROCEDURE — 4040F PNEUMOC VAC/ADMIN/RCVD: CPT | Performed by: INTERNAL MEDICINE

## 2020-12-02 PROCEDURE — G8427 DOCREV CUR MEDS BY ELIG CLIN: HCPCS | Performed by: INTERNAL MEDICINE

## 2020-12-02 PROCEDURE — 90732 PPSV23 VACC 2 YRS+ SUBQ/IM: CPT | Performed by: INTERNAL MEDICINE

## 2020-12-02 PROCEDURE — 1123F ACP DISCUSS/DSCN MKR DOCD: CPT | Performed by: INTERNAL MEDICINE

## 2020-12-02 PROCEDURE — G8484 FLU IMMUNIZE NO ADMIN: HCPCS | Performed by: INTERNAL MEDICINE

## 2020-12-02 PROCEDURE — 1036F TOBACCO NON-USER: CPT | Performed by: INTERNAL MEDICINE

## 2020-12-02 PROCEDURE — 3017F COLORECTAL CA SCREEN DOC REV: CPT | Performed by: INTERNAL MEDICINE

## 2020-12-02 PROCEDURE — G8417 CALC BMI ABV UP PARAM F/U: HCPCS | Performed by: INTERNAL MEDICINE

## 2020-12-02 PROCEDURE — 90471 IMMUNIZATION ADMIN: CPT | Performed by: INTERNAL MEDICINE

## 2020-12-02 RX ORDER — NITROGLYCERIN 0.4 MG/1
0.4 TABLET SUBLINGUAL EVERY 5 MIN PRN
Qty: 90 TABLET | Refills: 1 | Status: SHIPPED
Start: 2020-12-02 | End: 2021-06-11 | Stop reason: SDUPTHER

## 2020-12-02 RX ORDER — EZETIMIBE 10 MG/1
10 TABLET ORAL DAILY
Qty: 90 TABLET | Refills: 1 | Status: SHIPPED
Start: 2020-12-02 | End: 2021-08-25

## 2020-12-02 RX ORDER — CLONIDINE HYDROCHLORIDE 0.3 MG/1
0.3 TABLET ORAL NIGHTLY
Qty: 90 TABLET | Refills: 1 | Status: SHIPPED
Start: 2020-12-02 | End: 2021-06-01

## 2020-12-02 RX ORDER — AMLODIPINE BESYLATE 10 MG/1
10 TABLET ORAL DAILY
Qty: 90 TABLET | Refills: 1 | Status: SHIPPED
Start: 2020-12-02 | End: 2021-06-01

## 2020-12-02 RX ORDER — LOSARTAN POTASSIUM AND HYDROCHLOROTHIAZIDE 25; 100 MG/1; MG/1
1 TABLET ORAL DAILY
Qty: 90 TABLET | Refills: 1 | Status: SHIPPED
Start: 2020-12-02 | End: 2021-01-06 | Stop reason: SDUPTHER

## 2020-12-02 RX ORDER — ATORVASTATIN CALCIUM 80 MG/1
80 TABLET, FILM COATED ORAL NIGHTLY
Qty: 90 TABLET | Refills: 1 | Status: SHIPPED
Start: 2020-12-02 | End: 2021-06-07

## 2020-12-02 RX ORDER — OMEPRAZOLE 40 MG/1
40 CAPSULE, DELAYED RELEASE ORAL 2 TIMES DAILY
Qty: 180 CAPSULE | Refills: 1 | Status: SHIPPED
Start: 2020-12-02 | End: 2021-06-01

## 2020-12-02 RX ORDER — CITALOPRAM 40 MG/1
60 TABLET ORAL EVERY MORNING
Qty: 135 TABLET | Refills: 1 | Status: SHIPPED
Start: 2020-12-02 | End: 2021-07-02

## 2020-12-02 NOTE — PROGRESS NOTES
12/2/20    Marciano Lyon, a male of 76 y.o. came to the office     Marciano Lyon presents today for ER follow-up. He was doing a procedure and felt very flushed, cold and clammy. He was told that his blood pressure was low. He was seen in the ER November 20 for orthostasis. He was given 2 L of fluid. Blood work was completed which showed acute kidney injury. He was discharged home. He has been taking his blood pressures at home. He has been feeling well. He has been having some tingling and tighness in his toes. This has not changed. This is all of his toes. He describes it as a tightness and a tingling. He does not have any back pains.       Patient Active Problem List   Diagnosis    Coronary artery disease involving native coronary artery of native heart without angina pectoris    Hypertension    Pure hypercholesterolemia    Anemia    GERD (gastroesophageal reflux disease)    GARZA (dyspnea on exertion)    Left knee DJD    Moderate obesity    Near syncope    Anxiety attack    [facility]STEMI (non-ST elevated myocardial infarction) [facility] [address]    Hiatal hernia    Dizziness    Cholelithiasis    Biliary colic      Allergies   Allergen Reactions    Penicillins Hives     Current Outpatient Medications on File Prior to Visit   Medication Sig Dispense Refill    fluticasone (FLONASE) 50 MCG/ACT nasal spray 2 sprays by Each Nostril route daily 1 Bottle 0    cholestyramine (QUESTRAN) 4 g packet Take 1 packet by mouth 2 times daily 90 packet 3    aspirin 81 MG tablet Take 81 mg by mouth daily Last dose 10/19/19      ferrous sulfate 325 (65 Fe) MG tablet Take 325 mg by mouth daily (with breakfast) LD 7/15/19      Cholecalciferol (VITAMIN D3) 5000 UNITS TABS Take 1 tablet by mouth every morning       Ascorbic Acid (VITAMIN C) 500 MG tablet Take 500 mg by mouth every morning       fish oil-omega-3 fatty acids 1000 MG capsule Take 1 g by mouth every morning       ibuprofen (IBU) 800 MG tablet Take 1 tablet by mouth every 6 hours as needed for Pain 90 tablet 1     No current facility-administered medications on file prior to visit. Review of Systems  Constitutional:Negative for activity change, appetite change, chills, fatigue and fever. Respiratory: Negative for choking, chest tightness, shortness of breath and wheezing. Cardiovascular: Negative for chest pain, palpitations and leg swelling. Gastrointestinal: Negative for abdominal distention, constipation, diarrhea, nausea and vomiting. Musculoskeletal: Negative for arthralgias, back pain, gait problem and joint swelling. Neurological: Negative for dizziness, weakness,numbness and headaches. /88   Pulse 62   Temp 97.2 °F (36.2 °C)   Wt 217 lb (98.4 kg)   SpO2 94%   BMI 32.51 kg/m²      Physical Exam   Constitutional:  Oriented to person, place, and time. Appears well-developed and well-nourished. No acute distress. HENT: No sinus tenderness or lymphadenopathy  Head: Normocephalic and atraumatic. Eyes: Eyes exhibits no discharge. No scleral icterus present. Neck: No tracheal deviation present. No thyromegaly present. Cardiovascular: Normal rate, regular rhythm, normal heart sounds and intact distal pulses. Exam reveals no gallop nor friction rub. No murmur heard. Pulmonary: Effort normal and breath sounds normal. No respiratory distress. No wheezes or rales. Musculoskeletal:  No tenderness to palpation  Neurological:Alert and oriented to person, place, and time. Skin: No diaphoresis. Psychiatric: Normal mood and affect. Behavior is Normal.     ASSESSMENT AND PLAN:    Augusto Leon was seen today for follow-up from hospital, medication refill and other. Diagnoses and all orders for this visit:    Dizziness    Coronary artery disease involving native coronary artery of native heart without angina pectoris  -     atorvastatin (LIPITOR) 80 MG tablet;  Take 1 tablet by mouth nightly  -     metoprolol tartrate (LOPRESSOR) 25 MG tablet; Take 1 tablet by mouth 2 times daily    Essential hypertension  -     cloNIDine (CATAPRES) 0.3 MG tablet; Take 1 tablet by mouth nightly    Acute kidney injury ([address]    Other orders  -     citalopram (CELEXA) 40 MG tablet; Take 1.5 tablets by mouth every morning  -     ezetimibe (ZETIA) 10 MG tablet; Take 1 tablet by mouth daily  -     amLODIPine (NORVASC) 10 MG tablet; Take 1 tablet by mouth daily  -     losartan-hydroCHLOROthiazide (HYZAAR) 100-25 MG per tablet; Take 1 tablet by mouth daily  -     omeprazole (PRILOSEC) 40 MG delayed release capsule; Take 1 capsule by mouth 2 times daily  -     nitroGLYCERIN (NITROSTAT) 0.4 MG SL tablet; Place 1 tablet under the tongue every 5 minutes as needed for Chest pain If chest pain: put 1 NTG tab under tongue - sit down - wait 5 min - if no relief, call 911. May repeat dose 2 more times 5 min apart while waiting for EMS (total of 3 doses). If dizzy do not take any further doses. Recently in the ER for orthostasis    Creatinine elevated, likely dehydration    Advised to stay hydrated    Repeat BMP    Symptoms suggestive of neuropathy also    Perform blood work    Discussed role of EMG/medication will defer due to mild symptoms    Advised to follow-up with cardiology    We will consider decreasing clonidine if symptoms persist, asymptomatic since hospitalization    Please note that the above documentation was prepared using voice recognition software. Every attempt was made to ensure accuracy but there may be spelling, grammatical, and contextual errors. Electronically signed by Nika Johnson DO on 12/2/2020 at 8:27 AM        Return if symptoms worsen or fail to improve.     Nika Johnson DO

## 2020-12-03 LAB — VITAMIN B-12: 718 PG/ML (ref 211–946)

## 2020-12-06 LAB — HBA1C MFR BLD: 8.5 % (ref 4–5.6)

## 2020-12-07 ENCOUNTER — TELEPHONE (OUTPATIENT)
Dept: PRIMARY CARE CLINIC | Age: 65
End: 2020-12-07

## 2020-12-07 NOTE — TELEPHONE ENCOUNTER
added an A1C to patient's bw. Patient called in today with some fasting blood sugar readings done at home. 12/5-186, 12/6-181, 12/7-166.

## 2020-12-15 ENCOUNTER — TELEPHONE (OUTPATIENT)
Dept: PRIMARY CARE CLINIC | Age: 65
End: 2020-12-15

## 2021-01-06 ENCOUNTER — OFFICE VISIT (OUTPATIENT)
Dept: PRIMARY CARE CLINIC | Age: 66
End: 2021-01-06
Payer: COMMERCIAL

## 2021-01-06 VITALS
BODY MASS INDEX: 31.77 KG/M2 | WEIGHT: 212 LBS | TEMPERATURE: 97.2 F | SYSTOLIC BLOOD PRESSURE: 138 MMHG | OXYGEN SATURATION: 95 % | HEART RATE: 65 BPM | DIASTOLIC BLOOD PRESSURE: 80 MMHG

## 2021-01-06 DIAGNOSIS — I10 ESSENTIAL HYPERTENSION: Primary | ICD-10-CM

## 2021-01-06 DIAGNOSIS — I25.10 CORONARY ARTERY DISEASE INVOLVING NATIVE CORONARY ARTERY OF NATIVE HEART WITHOUT ANGINA PECTORIS: ICD-10-CM

## 2021-01-06 DIAGNOSIS — E11.9 TYPE 2 DIABETES MELLITUS WITHOUT COMPLICATION, UNSPECIFIED WHETHER LONG TERM INSULIN USE (HCC): ICD-10-CM

## 2021-01-06 DIAGNOSIS — G62.9 PERIPHERAL POLYNEUROPATHY: ICD-10-CM

## 2021-01-06 LAB
ALBUMIN SERPL-MCNC: 4.5 G/DL (ref 3.5–5.2)
ALP BLD-CCNC: 99 U/L (ref 40–129)
ALT SERPL-CCNC: 36 U/L (ref 0–40)
ANION GAP SERPL CALCULATED.3IONS-SCNC: 17 MMOL/L (ref 7–16)
AST SERPL-CCNC: 23 U/L (ref 0–39)
BILIRUB SERPL-MCNC: 1.2 MG/DL (ref 0–1.2)
BUN BLDV-MCNC: 26 MG/DL (ref 8–23)
CALCIUM SERPL-MCNC: 10.3 MG/DL (ref 8.6–10.2)
CHLORIDE BLD-SCNC: 98 MMOL/L (ref 98–107)
CO2: 25 MMOL/L (ref 22–29)
CREAT SERPL-MCNC: 1.2 MG/DL (ref 0.7–1.2)
GFR AFRICAN AMERICAN: >60
GFR NON-AFRICAN AMERICAN: >60 ML/MIN/1.73
GLUCOSE BLD-MCNC: 205 MG/DL (ref 74–99)
HCT VFR BLD CALC: 43.4 % (ref 37–54)
HEMOGLOBIN: 15.2 G/DL (ref 12.5–16.5)
MCH RBC QN AUTO: 29.9 PG (ref 26–35)
MCHC RBC AUTO-ENTMCNC: 35 % (ref 32–34.5)
MCV RBC AUTO: 85.4 FL (ref 80–99.9)
PDW BLD-RTO: 12.7 FL (ref 11.5–15)
PLATELET # BLD: 284 E9/L (ref 130–450)
PMV BLD AUTO: 11.4 FL (ref 7–12)
POTASSIUM SERPL-SCNC: 3.9 MMOL/L (ref 3.5–5)
RBC # BLD: 5.08 E12/L (ref 3.8–5.8)
SODIUM BLD-SCNC: 140 MMOL/L (ref 132–146)
TOTAL PROTEIN: 7.6 G/DL (ref 6.4–8.3)
WBC # BLD: 11.9 E9/L (ref 4.5–11.5)

## 2021-01-06 PROCEDURE — 99214 OFFICE O/P EST MOD 30 MIN: CPT | Performed by: INTERNAL MEDICINE

## 2021-01-06 RX ORDER — LOSARTAN POTASSIUM AND HYDROCHLOROTHIAZIDE 25; 100 MG/1; MG/1
2 TABLET ORAL DAILY
Qty: 180 TABLET | Refills: 1 | Status: SHIPPED
Start: 2021-01-06 | End: 2021-01-11 | Stop reason: DRUGHIGH

## 2021-01-06 NOTE — PROGRESS NOTES
1/6/21    Luma Niño, a male of 76 y.o. came to the office     Luma Niño presents today for 1 month follow-up. Last hemoglobin A1c was 8.5, he was started on Metformin therapy. His fasting glucose has been in the 170s. They have been improving to the 140s and 150s. He has been having some fatigue and sleep issues. He has been having some anxiety. He still has tingling in his toes. He is going to see Dr. Eula Metzger next Monday.       Patient Active Problem List   Diagnosis    Coronary artery disease involving native coronary artery of native heart without angina pectoris    Hypertension    Pure hypercholesterolemia    Anemia    GERD (gastroesophageal reflux disease)    GARZA (dyspnea on exertion)    Left knee DJD    Moderate obesity    Near syncope    Anxiety attack    [facility]    Hiatal hernia    Dizziness    Cholelithiasis    Biliary colic      Allergies   Allergen Reactions    Penicillins Hives     Current Outpatient Medications on File Prior to Visit   Medication Sig Dispense Refill    metFORMIN (GLUCOPHAGE) 500 MG tablet Take 1 tablet by mouth 2 times daily (with meals) 60 tablet 0    atorvastatin (LIPITOR) 80 MG tablet Take 1 tablet by mouth nightly 90 tablet 1    citalopram (CELEXA) 40 MG tablet Take 1.5 tablets by mouth every morning 135 tablet 1    cloNIDine (CATAPRES) 0.3 MG tablet Take 1 tablet by mouth nightly 90 tablet 1    ezetimibe (ZETIA) 10 MG tablet Take 1 tablet by mouth daily 90 tablet 1    metoprolol tartrate (LOPRESSOR) 25 MG tablet Take 1 tablet by mouth 2 times daily 180 tablet 1    amLODIPine (NORVASC) 10 MG tablet Take 1 tablet by mouth daily 90 tablet 1    omeprazole (PRILOSEC) 40 MG delayed release capsule Take 1 capsule by mouth 2 times daily 180 capsule 1    nitroGLYCERIN (NITROSTAT) 0.4 MG SL tablet Place 1 tablet under the tongue every 5 minutes as needed for Chest pain If chest pain: put 1 NTG tab under tongue - sit down - wait 5 min - if no relief, call 911. May repeat dose 2 more times 5 min apart while waiting for EMS (total of 3 doses). If dizzy do not take any further doses. 90 tablet 1    fluticasone (FLONASE) 50 MCG/ACT nasal spray 2 sprays by Each Nostril route daily 1 Bottle 0    cholestyramine (QUESTRAN) 4 g packet Take 1 packet by mouth 2 times daily 90 packet 3    aspirin 81 MG tablet Take 81 mg by mouth daily Last dose 10/19/19      ferrous sulfate 325 (65 Fe) MG tablet Take 325 mg by mouth daily (with breakfast) LD 7/15/19      Cholecalciferol (VITAMIN D3) 5000 UNITS TABS Take 1 tablet by mouth every morning       Ascorbic Acid (VITAMIN C) 500 MG tablet Take 500 mg by mouth every morning       fish oil-omega-3 fatty acids 1000 MG capsule Take 1 g by mouth every morning       ibuprofen (IBU) 800 MG tablet Take 1 tablet by mouth every 6 hours as needed for Pain 90 tablet 1     No current facility-administered medications on file prior to visit. Review of Systems  Constitutional:Negative for activity change, appetite change, chills, fatigue and fever. Respiratory: Negative for choking, chest tightness, shortness of breath and wheezing. Cardiovascular: Negative for chest pain, palpitations and leg swelling. Gastrointestinal: Negative for abdominal distention, constipation, diarrhea, nausea and vomiting. Musculoskeletal: Negative for arthralgias, back pain, gait problem and joint swelling. Neurological: Negative for dizziness, weakness,numbness and headaches. /80   Pulse 65   Temp 97.2 °F (36.2 °C)   Wt 212 lb (96.2 kg)   SpO2 95%   BMI 31.77 kg/m²      Physical Exam   Constitutional:  Oriented to person, place, and time. Appears well-developed and well-nourished. No acute distress. HENT: No sinus tenderness or lymphadenopathy  Head: Normocephalic and atraumatic. Eyes: Eyes exhibits no discharge. No scleral icterus present. Neck: No tracheal deviation present. No thyromegaly present. Cardiovascular: Normal rate, regular rhythm, normal heart sounds and intact distal pulses. Exam reveals no gallop nor friction rub. No murmur heard. Pulmonary: Effort normal and breath sounds normal. No respiratory distress. No wheezes or rales. Musculoskeletal:  No tenderness to palpation  Neurological:Alert and oriented to person, place, and time. Skin: No diaphoresis. Psychiatric: Normal mood and affect. Behavior is Normal.     ASSESSMENT AND PLAN:    Dayday Foley was seen today for diabetes. Diagnoses and all orders for this visit:    Essential hypertension  -     losartan-hydroCHLOROthiazide (HYZAAR) 100-25 MG per tablet; Take 2 tablets by mouth daily    Type 2 diabetes mellitus without complication, unspecified whether long term insulin use (HCC)  -     CBC; Future  -     Comprehensive Metabolic Panel; Future    Peripheral polyneuropathy    Coronary artery disease involving native coronary artery of native heart without angina pectoris        Having symptoms of fatigue and sleeplessness while on metformin therapy    I will obtain blood work to rule out any metabolic causes based off Metformin    If unremarkable, I will discontinue Metformin due to side effects consider starting Jardiance    We discussed again peripheral neuropathy, I will defer in light of his above active issues but he may benefit from EMG testing    He is going to follow-up with cardiology this Monday to discuss his blood pressure and orthostasis    Please note that the above documentation was prepared using voice recognition software. Every attempt was made to ensure accuracy but there may be spelling, grammatical, and contextual errors. Electronically signed by Juancho Torres DO on 1/6/2021 at 8:46 AM        Return if symptoms worsen or fail to improve.     Juancho Torres DO

## 2021-01-08 DIAGNOSIS — E11.9 TYPE 2 DIABETES MELLITUS WITHOUT COMPLICATION, UNSPECIFIED WHETHER LONG TERM INSULIN USE (HCC): Primary | ICD-10-CM

## 2021-01-08 RX ORDER — EMPAGLIFLOZIN 10 MG/1
1 TABLET, FILM COATED ORAL DAILY
Qty: 30 TABLET | Refills: 0 | Status: SHIPPED
Start: 2021-01-08 | End: 2021-02-02

## 2021-01-11 ENCOUNTER — OFFICE VISIT (OUTPATIENT)
Dept: CARDIOLOGY CLINIC | Age: 66
End: 2021-01-11
Payer: COMMERCIAL

## 2021-01-11 VITALS
WEIGHT: 210.2 LBS | HEIGHT: 69 IN | RESPIRATION RATE: 16 BRPM | HEART RATE: 57 BPM | SYSTOLIC BLOOD PRESSURE: 114 MMHG | DIASTOLIC BLOOD PRESSURE: 74 MMHG | BODY MASS INDEX: 31.13 KG/M2

## 2021-01-11 DIAGNOSIS — I10 ESSENTIAL HYPERTENSION: ICD-10-CM

## 2021-01-11 DIAGNOSIS — I25.10 CORONARY ARTERY DISEASE INVOLVING NATIVE CORONARY ARTERY OF NATIVE HEART WITHOUT ANGINA PECTORIS: Primary | ICD-10-CM

## 2021-01-11 PROCEDURE — 93000 ELECTROCARDIOGRAM COMPLETE: CPT | Performed by: INTERNAL MEDICINE

## 2021-01-11 PROCEDURE — 99214 OFFICE O/P EST MOD 30 MIN: CPT | Performed by: INTERNAL MEDICINE

## 2021-01-11 RX ORDER — LOSARTAN POTASSIUM AND HYDROCHLOROTHIAZIDE 25; 100 MG/1; MG/1
1 TABLET ORAL DAILY
Qty: 90 TABLET | Refills: 3 | Status: SHIPPED
Start: 2021-01-11 | End: 2021-06-14

## 2021-01-15 DIAGNOSIS — E11.9 TYPE 2 DIABETES MELLITUS WITHOUT COMPLICATION, UNSPECIFIED WHETHER LONG TERM INSULIN USE (HCC): ICD-10-CM

## 2021-02-02 DIAGNOSIS — E11.9 TYPE 2 DIABETES MELLITUS WITHOUT COMPLICATION, UNSPECIFIED WHETHER LONG TERM INSULIN USE (HCC): ICD-10-CM

## 2021-02-02 RX ORDER — EMPAGLIFLOZIN 10 MG/1
TABLET, FILM COATED ORAL
Qty: 30 TABLET | Refills: 2 | Status: SHIPPED
Start: 2021-02-02 | End: 2021-02-08 | Stop reason: SDUPTHER

## 2021-02-08 DIAGNOSIS — E11.9 TYPE 2 DIABETES MELLITUS WITHOUT COMPLICATION, UNSPECIFIED WHETHER LONG TERM INSULIN USE (HCC): ICD-10-CM

## 2021-02-08 RX ORDER — EMPAGLIFLOZIN 10 MG/1
TABLET, FILM COATED ORAL
Qty: 90 TABLET | Refills: 1 | Status: SHIPPED
Start: 2021-02-08 | End: 2021-10-12 | Stop reason: SDUPTHER

## 2021-05-29 DIAGNOSIS — I25.10 CORONARY ARTERY DISEASE INVOLVING NATIVE CORONARY ARTERY OF NATIVE HEART WITHOUT ANGINA PECTORIS: ICD-10-CM

## 2021-05-29 DIAGNOSIS — I10 ESSENTIAL HYPERTENSION: ICD-10-CM

## 2021-06-01 RX ORDER — AMLODIPINE BESYLATE 10 MG/1
TABLET ORAL
Qty: 90 TABLET | Refills: 1 | Status: SHIPPED
Start: 2021-06-01 | End: 2021-06-17 | Stop reason: SDUPTHER

## 2021-06-01 RX ORDER — OMEPRAZOLE 40 MG/1
CAPSULE, DELAYED RELEASE ORAL
Qty: 180 CAPSULE | Refills: 1 | Status: SHIPPED
Start: 2021-06-01 | End: 2022-07-06 | Stop reason: SDUPTHER

## 2021-06-01 RX ORDER — CLONIDINE HYDROCHLORIDE 0.3 MG/1
TABLET ORAL
Qty: 90 TABLET | Refills: 1 | Status: SHIPPED
Start: 2021-06-01 | End: 2021-12-18 | Stop reason: SDUPTHER

## 2021-06-05 DIAGNOSIS — I25.10 CORONARY ARTERY DISEASE INVOLVING NATIVE CORONARY ARTERY OF NATIVE HEART WITHOUT ANGINA PECTORIS: ICD-10-CM

## 2021-06-07 RX ORDER — ATORVASTATIN CALCIUM 80 MG/1
TABLET, FILM COATED ORAL
Qty: 90 TABLET | Refills: 1 | Status: SHIPPED
Start: 2021-06-07 | End: 2021-06-17 | Stop reason: SDUPTHER

## 2021-06-11 ENCOUNTER — OFFICE VISIT (OUTPATIENT)
Dept: PRIMARY CARE CLINIC | Age: 66
End: 2021-06-11
Payer: COMMERCIAL

## 2021-06-11 VITALS
HEIGHT: 68 IN | DIASTOLIC BLOOD PRESSURE: 72 MMHG | HEART RATE: 57 BPM | WEIGHT: 207 LBS | SYSTOLIC BLOOD PRESSURE: 118 MMHG | BODY MASS INDEX: 31.37 KG/M2 | OXYGEN SATURATION: 97 % | RESPIRATION RATE: 16 BRPM | TEMPERATURE: 97.8 F

## 2021-06-11 DIAGNOSIS — G62.9 PERIPHERAL POLYNEUROPATHY: ICD-10-CM

## 2021-06-11 DIAGNOSIS — I10 ESSENTIAL HYPERTENSION: ICD-10-CM

## 2021-06-11 DIAGNOSIS — I25.10 CORONARY ARTERY DISEASE INVOLVING NATIVE CORONARY ARTERY OF NATIVE HEART WITHOUT ANGINA PECTORIS: Primary | ICD-10-CM

## 2021-06-11 DIAGNOSIS — K21.9 GASTROESOPHAGEAL REFLUX DISEASE WITHOUT ESOPHAGITIS: ICD-10-CM

## 2021-06-11 DIAGNOSIS — E11.9 TYPE 2 DIABETES MELLITUS WITHOUT COMPLICATION, UNSPECIFIED WHETHER LONG TERM INSULIN USE (HCC): ICD-10-CM

## 2021-06-11 LAB
ALBUMIN SERPL-MCNC: 4.3 G/DL (ref 3.5–5.2)
ALP BLD-CCNC: 88 U/L (ref 40–129)
ALT SERPL-CCNC: 31 U/L (ref 0–40)
ANION GAP SERPL CALCULATED.3IONS-SCNC: 12 MMOL/L (ref 7–16)
AST SERPL-CCNC: 24 U/L (ref 0–39)
BILIRUB SERPL-MCNC: 0.9 MG/DL (ref 0–1.2)
BUN BLDV-MCNC: 21 MG/DL (ref 6–23)
CALCIUM SERPL-MCNC: 10.9 MG/DL (ref 8.6–10.2)
CHLORIDE BLD-SCNC: 98 MMOL/L (ref 98–107)
CHOLESTEROL, TOTAL: 168 MG/DL (ref 0–199)
CO2: 29 MMOL/L (ref 22–29)
CREAT SERPL-MCNC: 1 MG/DL (ref 0.7–1.2)
CREATININE URINE: 92 MG/DL (ref 40–278)
GFR AFRICAN AMERICAN: >60
GFR NON-AFRICAN AMERICAN: >60 ML/MIN/1.73
GLUCOSE BLD-MCNC: 204 MG/DL (ref 74–99)
HBA1C MFR BLD: 7.4 % (ref 4–5.6)
HCT VFR BLD CALC: 48.2 % (ref 37–54)
HDLC SERPL-MCNC: 34 MG/DL
HEMOGLOBIN: 15.9 G/DL (ref 12.5–16.5)
LDL CHOLESTEROL CALCULATED: 91 MG/DL (ref 0–99)
MCH RBC QN AUTO: 28.8 PG (ref 26–35)
MCHC RBC AUTO-ENTMCNC: 33 % (ref 32–34.5)
MCV RBC AUTO: 87.3 FL (ref 80–99.9)
MICROALBUMIN UR-MCNC: <12 MG/L
MICROALBUMIN/CREAT UR-RTO: ABNORMAL (ref 0–30)
PDW BLD-RTO: 13.3 FL (ref 11.5–15)
PLATELET # BLD: 273 E9/L (ref 130–450)
PMV BLD AUTO: 10.8 FL (ref 7–12)
POTASSIUM SERPL-SCNC: 3.5 MMOL/L (ref 3.5–5)
RBC # BLD: 5.52 E12/L (ref 3.8–5.8)
SODIUM BLD-SCNC: 139 MMOL/L (ref 132–146)
TOTAL PROTEIN: 7.1 G/DL (ref 6.4–8.3)
TRIGL SERPL-MCNC: 217 MG/DL (ref 0–149)
VLDLC SERPL CALC-MCNC: 43 MG/DL
WBC # BLD: 9 E9/L (ref 4.5–11.5)

## 2021-06-11 PROCEDURE — 99214 OFFICE O/P EST MOD 30 MIN: CPT | Performed by: INTERNAL MEDICINE

## 2021-06-11 RX ORDER — NITROGLYCERIN 0.4 MG/1
0.4 TABLET SUBLINGUAL EVERY 5 MIN PRN
Qty: 90 TABLET | Refills: 1 | Status: SHIPPED | OUTPATIENT
Start: 2021-06-11

## 2021-06-11 SDOH — ECONOMIC STABILITY: FOOD INSECURITY: WITHIN THE PAST 12 MONTHS, YOU WORRIED THAT YOUR FOOD WOULD RUN OUT BEFORE YOU GOT MONEY TO BUY MORE.: NEVER TRUE

## 2021-06-11 SDOH — ECONOMIC STABILITY: FOOD INSECURITY: WITHIN THE PAST 12 MONTHS, THE FOOD YOU BOUGHT JUST DIDN'T LAST AND YOU DIDN'T HAVE MONEY TO GET MORE.: NEVER TRUE

## 2021-06-11 ASSESSMENT — PATIENT HEALTH QUESTIONNAIRE - PHQ9
2. FEELING DOWN, DEPRESSED OR HOPELESS: 0
SUM OF ALL RESPONSES TO PHQ9 QUESTIONS 1 & 2: 0
SUM OF ALL RESPONSES TO PHQ QUESTIONS 1-9: 0
1. LITTLE INTEREST OR PLEASURE IN DOING THINGS: 0
SUM OF ALL RESPONSES TO PHQ QUESTIONS 1-9: 0
SUM OF ALL RESPONSES TO PHQ QUESTIONS 1-9: 0

## 2021-06-11 ASSESSMENT — SOCIAL DETERMINANTS OF HEALTH (SDOH): HOW HARD IS IT FOR YOU TO PAY FOR THE VERY BASICS LIKE FOOD, HOUSING, MEDICAL CARE, AND HEATING?: NOT HARD AT ALL

## 2021-06-11 NOTE — PROGRESS NOTES
6/11/21    Hardeep Ta, a male of 78 y.o. presents today for 6 Month Follow-Up, Hypertension, Diabetes, and Blood Work    At last appointment, we discussed his peripheral neuropathy and the possibility of performing EMG testing if his symptoms persisted. He has been following up with cardiology. His blood pressure medications were reduced. He was weaned off of clonidine. At last appointment he was switched from Metformin to Jardiance due to side effects. His last hemoglobin A1c was 8.5. His fasting glucose has been improved around 140. His toe numbness has improved as well. He denies chest pain shortness of breath palpitations or edema.      Patient Active Problem List   Diagnosis    Coronary artery disease involving native coronary artery of native heart without angina pectoris    Hypertension    Pure hypercholesterolemia    Anemia    GERD (gastroesophageal reflux disease)    GARZA (dyspnea on exertion)    Left knee DJD    Moderate obesity    Near syncope    Anxiety attack    [facility]STEM[facility]non-ST elevated [facility]) [facility] [address]    Hiatal hernia    Dizziness    Cholelithiasis    Biliary colic      Allergies   Allergen Reactions    Penicillins Hives     Current Outpatient Medications on File Prior to Visit   Medication Sig Dispense Refill    atorvastatin (LIPITOR) 80 MG tablet TAKE 1 TABLET BY MOUTH EVERY DAY AT NIGHT 90 tablet 1    cloNIDine (CATAPRES) 0.3 MG tablet TAKE 1 TABLET BY MOUTH EVERY DAY AT NIGHT (Patient taking differently: Take 0.15 mg by mouth daily ) 90 tablet 1    amLODIPine (NORVASC) 10 MG tablet TAKE 1 TABLET BY MOUTH EVERY DAY 90 tablet 1    metoprolol tartrate (LOPRESSOR) 25 MG tablet TAKE 1 TABLET BY MOUTH TWICE A  tablet 1    omeprazole (PRILOSEC) 40 MG delayed release capsule TAKE 1 CAPSULE BY MOUTH TWICE A  capsule 1    empagliflozin (JARDIANCE) 10 MG tablet TAKE 1 TABLET BY MOUTH EVERY DAY 90 tablet 1    losartan-hydroCHLOROthiazide Constitutional:  Oriented to person, place, and time. Appears well-developed and well-nourished. No acute distress. HENT: No sinus tenderness or lymphadenopathy  Head: Normocephalic and atraumatic. Eyes: Eyes exhibits no discharge. No scleral icterus present. Neck: No tracheal deviation present. No thyromegaly present. Cardiovascular: Normal rate, regular rhythm, normal heart sounds and intact distal pulses. Exam reveals no gallop nor friction rub. No murmur heard. Pulmonary: Effort normal and breath sounds normal. No respiratory distress. No wheezes or rales. Abdomen: No signs of rigidity rebound or organomegaly  Musculoskeletal:  No tenderness to palpation  Neurological:Alert and oriented to person, place, and time. Skin: No diaphoresis. Psychiatric: Normal mood and affect. Behavior is Normal.     ASSESSMENT AND PLAN:    Rodrigue Pereyra was seen today for 6 month follow-up, hypertension, diabetes and blood work. Diagnoses and all orders for this visit:    Coronary artery disease involving native coronary artery of native heart without angina pectoris    Type 2 diabetes mellitus without complication, unspecified whether long term insulin use (HCC)  -     CBC; Future  -     Comprehensive Metabolic Panel; Future  -     Hemoglobin A1C; Future  -     Lipid Panel; Future  -     Microalbumin / Creatinine Urine Ratio; Future    Essential hypertension    Peripheral polyneuropathy    Gastroesophageal reflux disease without esophagitis    Other orders  -     nitroGLYCERIN (NITROSTAT) 0.4 MG SL tablet; Place 1 tablet under the tongue every 5 minutes as needed for Chest pain If chest pain: put 1 NTG tab under tongue - sit down - wait 5 min - if no relief, call 911. May repeat dose 2 more times 5 min apart while waiting for EMS (total of 3 doses). If dizzy do not take any further doses.         He has been doing well physically he denies any dizziness or lightheadedness    I will refill his nitroglycerin but

## 2021-06-12 DIAGNOSIS — I10 ESSENTIAL HYPERTENSION: ICD-10-CM

## 2021-06-14 RX ORDER — LOSARTAN POTASSIUM AND HYDROCHLOROTHIAZIDE 25; 100 MG/1; MG/1
TABLET ORAL
Qty: 90 TABLET | Refills: 1 | Status: SHIPPED
Start: 2021-06-14 | End: 2021-12-15

## 2021-06-17 DIAGNOSIS — I25.10 CORONARY ARTERY DISEASE INVOLVING NATIVE CORONARY ARTERY OF NATIVE HEART WITHOUT ANGINA PECTORIS: ICD-10-CM

## 2021-06-17 RX ORDER — AMLODIPINE BESYLATE 10 MG/1
10 TABLET ORAL DAILY
Qty: 90 TABLET | Refills: 0 | Status: SHIPPED
Start: 2021-06-17 | End: 2022-01-27

## 2021-06-17 RX ORDER — ATORVASTATIN CALCIUM 80 MG/1
80 TABLET, FILM COATED ORAL DAILY
Qty: 90 TABLET | Refills: 0 | Status: SHIPPED
Start: 2021-06-17 | End: 2021-10-11 | Stop reason: SDUPTHER

## 2021-06-30 ENCOUNTER — OFFICE VISIT (OUTPATIENT)
Dept: SURGERY | Age: 66
End: 2021-06-30
Payer: COMMERCIAL

## 2021-06-30 ENCOUNTER — TELEPHONE (OUTPATIENT)
Dept: SURGERY | Age: 66
End: 2021-06-30

## 2021-06-30 VITALS
TEMPERATURE: 96.7 F | OXYGEN SATURATION: 98 % | RESPIRATION RATE: 18 BRPM | SYSTOLIC BLOOD PRESSURE: 122 MMHG | BODY MASS INDEX: 30.31 KG/M2 | HEART RATE: 51 BPM | HEIGHT: 68 IN | DIASTOLIC BLOOD PRESSURE: 77 MMHG | WEIGHT: 200 LBS

## 2021-06-30 DIAGNOSIS — R11.2 NAUSEA AND VOMITING, INTRACTABILITY OF VOMITING NOT SPECIFIED, UNSPECIFIED VOMITING TYPE: Primary | ICD-10-CM

## 2021-06-30 PROCEDURE — 99213 OFFICE O/P EST LOW 20 MIN: CPT | Performed by: SURGERY

## 2021-06-30 NOTE — PROGRESS NOTES
General Surgery History and Physical  [facility]    Patient's Name/Date of Birth: Marciano Lyon / 1955    Date: June 30, 2021     Surgeon: Stefanie Lisa M.D.    PCP: Renato Duong DO     Chief Complaint: emesis    HPI:   Marciano Lyon is a 78 y.o. male who presents for evaluation of spontaneous emesis over the last 2 weeks. Timing is intermittent, radiation to none, alleviated by none and started few weeks ago and severity is 4/10. Patient states that he has had several episodes of spontaneous emesis. He denies any prodrome to associated with it such as nausea abdominal pain fever chills. He states that when he vomits it see that what he previously ate or just frothy white sputum. Patient has a history of hiatal hernia repair in 2018 for which she has been doing very well from. He denies any reflux or heartburn. He does not feel that food getting stuck in his esophagus nor does he state that he has any identifiable aggravating or alleviating factors to produce emesis. He did have a change in his medications and was started on Jardiance the beginning of the year when he was diagnosed with diabetes. Patient Active Problem List   Diagnosis    Coronary artery disease involving native coronary artery of native heart without angina pectoris    Hypertension    Pure hypercholesterolemia    Anemia    GERD (gastroesophageal reflux disease)    GARZA (dyspnea on exertion)    Left knee DJD    Moderate obesity    Near syncope    Anxiety attack    [facility]non-ST elevated [facility] infarction) [facility] [address]    Hiatal hernia    Dizziness    Cholelithiasis    Biliary colic       Past Medical History:   Diagnosis Date    Anemia     Anxiety attack     controlled with citolpram    CAD (coronary artery disease) 16 YRS AGO    MI X 2.  WITH STENTS, FOLLOWS WITH MERLE DOLL,  LAST VISIT 5/2014, SEE EPIC BOTES    GERD (gastroesophageal reflux disease)     Hiatal hernia     Hypercholesterolemia 1994    Hyperlipidemia     Hypertension 6/12/14    B/P IS ELEVATED, PATIENT STATES HE NEEDS TO GET HIS LOPRESSOR REFILLED    Left knee DJD 6/10/2014    OSTEO    NSTEMI (non-ST elevated myocardial infarction) ([facility] [address] 05/28/2012    Pre-operative clearance     cardiac clearance done       Past Surgical History:   Procedure Laterality Date    ANKLE SURGERY  6 YRS AGO    right ORIF    CHOLECYSTECTOMY, LAPAROSCOPIC N/A 7/19/2019    CHOLECYSTECTOMY LAPAROSCOPIC ROBOTIC ASSISTED  XI performed by Torres Tovar MD at [address] [facility] COLONOSCOPY  07/20/2015    CORONARY ANGIOPLASTY WITH STENT PLACEMENT  5/29/2012    Dr. Claudio Rutherford, Stent Mid LAD    DIAGNOSTIC CARDIAC CATH LAB PROCEDURE  9/17/1998    [facility]. Cath/PTCA/stent of RCA; PTCA/stent of mid LAD with adjunctive ReoPro administration.  DIAGNOSTIC CARDIAC CATH LAB PROCEDURE  6/25/2002    [facility]. Kissing balloon angioplasty LAD>diag Perclose. Dr. Claudio Rutherford and Dr. Damian Howard.  DIAGNOSTIC CARDIAC CATH LAB PROCEDURE  4/13/2005    [facility]. Cath/stent (TAX\"US) to diagonal at [facility].  ENDOSCOPY, COLON, DIAGNOSTIC      FINGER TRIGGER RELEASE Right 11/20/2015    Release right long trigger finger. Rick Ordonez MD    HERNIA REPAIR Left YRS AGO    [facility]    HIATAL HERNIA REPAIR N/A 10/22/2019    LAPAROSCOPIC ROBOTIC XI ASSISTED PARAESOPHAGEAL HERNIA REPAIR WITH PARTIAL FUNDOPLICATION AND MYOFASCIAL FLAP, UPPER ENDOSCOPY performed by Iasbel Subramanian MD at [address] Right 08/01/2017    robotic assisted    JOINT REPLACEMENT Left 2014    knee    KNEE ARTHROPLASTY Left 06/16/2014    Left TKA.   Rick Ordonez MD    TONSILLECTOMY      UPPER GASTROINTESTINAL ENDOSCOPY      UPPER GASTROINTESTINAL ENDOSCOPY N/A 9/10/2019    EGD BIOPSY performed by Isabel Subramanian MD at [address] [place] [address]   Allergen Reactions    Penicillins Hives       The patient has a family history that is negative for severe cardiovascular or respiratory issues, negative for reaction to anesthesia. Time spent reviewing past medical, surgical, social and family history, vitals, nursing assessment and images. No changes from above documented history. Social History     Socioeconomic History    Marital status:      Spouse name: Not on file    Number of children: Not on file    Years of education: Not on file    Highest education level: Not on file   Occupational History    Not on file   Tobacco Use    Smoking status: Never Smoker    Smokeless tobacco: Never Used   Vaping Use    Vaping Use: Never used   Substance and Sexual Activity    Alcohol use: Yes     Comment: socially    Drug use: No    Sexual activity: Not on file   Other Topics Concern    Not on file   Social History Narrative    Not on file     Social Determinants of Health     Financial Resource Strain: Low Risk     Difficulty of Paying Living Expenses: Not hard at all   Food Insecurity: No Food Insecurity    Worried About [employer] [employer] in the Last Year: Never true    [address]  [address] in the Last Year: Never true   Transportation Needs:     Lack of Transportation (Medical):      Lack of Transportation (Non-Medical):    Physical Activity:     Days of Exercise per Week:     Minutes of Exercise per Session:    Stress:     Feeling of Stress :    Social Connections:     Frequency of Communication with Friends and Family:     Frequency of Social Gatherings with Friends and Family:     Attends Scientologist Services:     Active Member of Clubs or Organizations:     Attends Club or Organization Meetings:     Marital Status:    Intimate Partner Violence:     Fear of Current or Ex-Partner:     Emotionally Abused:     Physically Abused:     Sexually Abused:        I have reviewed relevant labs from this admission and interpretation is included in my assessment and plan    Review of Systems    A complete 10 system review was performed and are Hypertension    Pure hypercholesterolemia    Anemia    GERD (gastroesophageal reflux disease)    GARZA (dyspnea on exertion)    Left knee DJD    Moderate obesity    Near syncope    Anxiety attack    NSTEMI (non-ST elevated myocardial infarction) (HCC)    Hiatal hernia    Dizziness    Cholelithiasis    Biliary colic         Plan:  EGD to evaluate for recurrence or evidence of dysphagia  I suspect this is related to his diabetes and new medications and not likely a mechanical obstruction or surgical issue  Follow-up after EGD        Laura Nathan MD  8:50 AM  6/30/2021

## 2021-06-30 NOTE — TELEPHONE ENCOUNTER
Prior Authorization Form:      DEMOGRAPHICS:                     Patient Name:  Kecia Tang  Patient :  1955            Insurance:  Payor: Aisha Rouse / Plan: Aisha Rose [place] PPO / Product Type: *No Product type* /   Insurance ID Number:    Payor/Plan Subscr  Sex Relation Sub. Ins. ID Effective Group Num   1.  [address] 3/16/1957 Female Spouse [id number]                                    [id number]         DIAGNOSIS & PROCEDURE:                       Procedure/Operation: EGD possible biopsy           CPT Code: 20571    Diagnosis:  Nausea/Vomiting    ICD10 Code: R11.2    Location:  [address] [place] [address]    Surgeon:  Nguyen Maxwell INFORMATION:                          Date: 21    Time: TBD              Anesthesia:  MAC                                                       Status:  Outpatient        Special Comments:         Electronically signed by Temitope Mosher RN on 2021 at 11:27 AM

## 2021-07-02 RX ORDER — CITALOPRAM 40 MG/1
TABLET ORAL
Qty: 135 TABLET | Refills: 1 | Status: SHIPPED
Start: 2021-07-02 | End: 2021-10-20

## 2021-07-09 LAB — DIABETIC RETINOPATHY: NEGATIVE

## 2021-07-09 NOTE — PROGRESS NOTES
[address] [facility]                                                                                                                    PRE OP INSTRUCTIONS FOR  [place]        Date: 7/9/2021    Date of surgery: 7/12/2021   Arrival Time: Hospital will call you between 5pm and 7pm with your final arrival time for surgery    1. Do not eat or drink anything after 12 prior to surgery. This includes no water, chewing gum, mints or ice chips. 2. Take the following medications with a small sip of water on the morning of Surgery: metoprolol, clonidine     3. Diabetics may take evening dose of insulin but none after midnight. If you feel symptomatic or low blood sugar morning of surgery drink 1-2 ounces of apple juice only. 4. Aspirin, Ibuprofen, Advil, Naproxen, Vitamin E and other Anti-inflammatory products should be stopped  before surgery  as directed by your physician. Take Tylenol only unless instructed otherwise by your surgeon. 5. Check with your Doctor regarding stopping Plavix, Coumadin, Lovenox, Eliquis, Effient, or other blood thinners. 6. Do not smoke,use illicit drugs and do not drink any alcoholic beverages 24 hours prior to surgery. 7. You may brush your teeth the morning of surgery. DO NOT SWALLOW WATER    8. You MUST make arrangements for a responsible adult to take you home after your surgery. You will not be allowed to leave alone or drive yourself home. It is strongly suggested someone stay with you the first 24 hrs. Your surgery will be cancelled if you do not have a ride home. 9. PEDIATRIC PATIENTS ONLY:  A parent/legal guardian must accompany a child scheduled for surgery and plan to stay at the hospital until the child is discharged. Please do not bring other children with you.     10. Please wear simple, loose fitting clothing to the hospital.  Ronnell Caballero not bring valuables (money, credit cards, checkbooks, etc.) Do not wear any makeup (including no eye makeup) or nail polish on your fingers or toes. 11. DO NOT wear any jewelry or piercings on day of surgery. All body piercing jewelry must be removed. 12. Shower the night before surgery with ___Antibacterial soap /CARLOS A WIPES________    13. TOTAL JOINT REPLACEMENT/HYSTERECTOMY PATIENTS ONLY---Remember to bring Blood Bank bracelet to the hospital on the day of surgery. 14. If you have a Living Will and Durable Power of  for Healthcare, please bring in a copy. 15. If appropriate bring crutches, inspirex, WALKER, CANE etc... 12. Notify your Surgeon if you develop any illness between now and surgery time, cough, cold, fever, sore throat, nausea, vomiting, etc.  Please notify your surgeon if you experience dizziness, shortness of breath or blurred vision between now & the time of your surgery. 17. If you have ___dentures, they will be removed before going to the OR; we will provide you a container. If you wear ___contact lenses or ___glasses, they will be removed; please bring a case for them. 18. To provide excellent care visitors will be limited to 2 in the room at any given time. 19. Please bring picture ID and insurance card. 20. Sleep apnea patients need to bring CPAP AND SETTINGS to hospital on day of surgery. 21. During flu season no children under the age of 15 are permitted in the hospital for the safety of all patients. 22. Other                  Please call AMBULATORY CARE if you have any further questions.    [address] [facility]     [address]

## 2021-07-11 ENCOUNTER — ANESTHESIA EVENT (OUTPATIENT)
Dept: ENDOSCOPY | Age: 66
End: 2021-07-11
Payer: COMMERCIAL

## 2021-07-11 ASSESSMENT — LIFESTYLE VARIABLES: SMOKING_STATUS: 0

## 2021-07-11 ASSESSMENT — ENCOUNTER SYMPTOMS: SHORTNESS OF BREATH: 1

## 2021-07-12 ENCOUNTER — ANESTHESIA (OUTPATIENT)
Dept: ENDOSCOPY | Age: 66
End: 2021-07-12
Payer: COMMERCIAL

## 2021-07-12 ENCOUNTER — HOSPITAL ENCOUNTER (OUTPATIENT)
Age: 66
Setting detail: OUTPATIENT SURGERY
Discharge: HOME | End: 2021-07-12
Attending: SURGERY | Admitting: SURGERY
Payer: COMMERCIAL

## 2021-07-12 VITALS
HEART RATE: 54 BPM | HEIGHT: 68 IN | BODY MASS INDEX: 31.07 KG/M2 | WEIGHT: 205 LBS | RESPIRATION RATE: 20 BRPM | TEMPERATURE: 97.1 F | OXYGEN SATURATION: 97 % | DIASTOLIC BLOOD PRESSURE: 60 MMHG | SYSTOLIC BLOOD PRESSURE: 118 MMHG

## 2021-07-12 VITALS — SYSTOLIC BLOOD PRESSURE: 140 MMHG | OXYGEN SATURATION: 92 % | DIASTOLIC BLOOD PRESSURE: 78 MMHG

## 2021-07-12 LAB — METER GLUCOSE: 192 MG/DL (ref 74–99)

## 2021-07-12 PROCEDURE — 6360000002 HC RX W HCPCS: Performed by: NURSE ANESTHETIST, CERTIFIED REGISTERED

## 2021-07-12 PROCEDURE — 7100000011 HC PHASE II RECOVERY - ADDTL 15 MIN: Performed by: SURGERY

## 2021-07-12 PROCEDURE — 3609012400 HC EGD TRANSORAL BIOPSY SINGLE/MULTIPLE: Performed by: SURGERY

## 2021-07-12 PROCEDURE — 7100000010 HC PHASE II RECOVERY - FIRST 15 MIN: Performed by: SURGERY

## 2021-07-12 PROCEDURE — 2709999900 HC NON-CHARGEABLE SUPPLY: Performed by: SURGERY

## 2021-07-12 PROCEDURE — 82962 GLUCOSE BLOOD TEST: CPT

## 2021-07-12 PROCEDURE — 43239 EGD BIOPSY SINGLE/MULTIPLE: CPT | Performed by: SURGERY

## 2021-07-12 PROCEDURE — 88305 TISSUE EXAM BY PATHOLOGIST: CPT

## 2021-07-12 PROCEDURE — 3700000000 HC ANESTHESIA ATTENDED CARE: Performed by: SURGERY

## 2021-07-12 PROCEDURE — 2580000003 HC RX 258: Performed by: SURGERY

## 2021-07-12 RX ORDER — SODIUM CHLORIDE 0.9 % (FLUSH) 0.9 %
10 SYRINGE (ML) INJECTION PRN
Status: DISCONTINUED | OUTPATIENT
Start: 2021-07-12 | End: 2021-07-12 | Stop reason: HOSPADM

## 2021-07-12 RX ORDER — PROPOFOL 10 MG/ML
INJECTION, EMULSION INTRAVENOUS CONTINUOUS PRN
Status: DISCONTINUED | OUTPATIENT
Start: 2021-07-12 | End: 2021-07-12 | Stop reason: SDUPTHER

## 2021-07-12 RX ORDER — SODIUM CHLORIDE 0.9 % (FLUSH) 0.9 %
10 SYRINGE (ML) INJECTION EVERY 12 HOURS SCHEDULED
Status: DISCONTINUED | OUTPATIENT
Start: 2021-07-12 | End: 2021-07-12 | Stop reason: HOSPADM

## 2021-07-12 RX ORDER — SODIUM CHLORIDE 9 MG/ML
25 INJECTION, SOLUTION INTRAVENOUS PRN
Status: DISCONTINUED | OUTPATIENT
Start: 2021-07-12 | End: 2021-07-12 | Stop reason: HOSPADM

## 2021-07-12 RX ORDER — SODIUM CHLORIDE 9 MG/ML
INJECTION, SOLUTION INTRAVENOUS CONTINUOUS
Status: DISCONTINUED | OUTPATIENT
Start: 2021-07-12 | End: 2021-07-12 | Stop reason: HOSPADM

## 2021-07-12 RX ADMIN — PROPOFOL 100 MCG/KG/MIN: 10 INJECTION, EMULSION INTRAVENOUS at 14:47

## 2021-07-12 RX ADMIN — SODIUM CHLORIDE: 9 INJECTION, SOLUTION INTRAVENOUS at 14:16

## 2021-07-12 ASSESSMENT — PAIN SCALES - GENERAL
PAINLEVEL_OUTOF10: 0
PAINLEVEL_OUTOF10: 0

## 2021-07-12 ASSESSMENT — PAIN - FUNCTIONAL ASSESSMENT: PAIN_FUNCTIONAL_ASSESSMENT: 0-10

## 2021-07-12 NOTE — OP NOTE
[address] [place] [address] [facility]           Operative Report    DATE OF PROCEDURE: 7/12/2021    John Brody    SURGEON: Hunter Karimi MD.     ASSISTANT: none    PREOPERATIVE DIAGNOSES:  Vomiting    POSTOPERATIVE DIAGNOSES:   (1) small recurrent sliding Hiatal Hernia with intact fundoplication  (2) No Esophagitis  (3) No Gastritis  (4) Retained gastric contents consistent with gastroparesis    OPERATION: Vegafjuz-ymeqif-upuvqgnycuzx with antral biopsies    ANESTHESIA: LMAC    BLOOD LOSS: None    COMPLICATIONS: None. History and consent: This is a 78y.o. year old male who is having nausea, vomiting and is newly diagnosed with diabetes 5 months prior. I have discussed with the patient the indication, alternatives, and the possible risks and/or complications of upper endoscopy and the conscious sedation anesthesia. The patient and/or family understands and agrees to proceed. Monitoring and Safety:    The patient was placed on a cardiac monitor and vital signs, pulse oximetry and level of consciousness were continuously evaluated throughout the procedure. The patient was closely monitored until recovery from the medications was complete and the patient had returned to baseline status. Anesthesia was present at all times during the procedure. OPERATIONS: The patient was placed on the table and sedated while blood pressure, pulse and pulse oximetry were continuously monitored by the anesthesia team. A bite block was placed prior to sedation and the patient was placed in the left lateral decubitus position. A lubricated scope was easily passed into the upper esophagus which looked normal. The distal esophagus looked normal. The scope was passed into the stomach and retroflexed. The gastroesophageal junction was at 41cm. There was small sliding hiatal hernia recurrence with intact fundoplication. Part of the fundoplication was above the diaphragm.  The scope was passed down toward the pylorus. The antral mucosa looked abnormal: retained gastric contents with mild gastritis. Biopsy was taken to check for H. pylori. The scope was then passed through the pylorus into the duodenal bulb which looked normal, then around to the distal duodenum which looked normal, and the scope was then withdrawn. The patient tolerated the procedure well.        Diet: carb control    PLAN:  (1) Small frequent meals  (2) Suspect nausea, vomiting spontaneously more from his gastroparesis and doubt sliding hernia is contributing      Further Procedures:  None planned  Will discuss poss repair recurrent hernia in office    Physician Signature: Electronically signed by Dr. Leon Escobar

## 2021-07-12 NOTE — ANESTHESIA PRE PROCEDURE
Department of Anesthesiology  Preprocedure Note       Name:  Bertha Moise   Age:  78 y.o.  :  1955                                          MRN:  [id number]         Date:  2021      Surgeon: Nette Reese):  Kev Valladares MD    Procedure: EGD ESOPHAGOGASTRODUODENOSCOPY POSSIBLE BIOPSY (N/A )    Medications prior to admission:   Prior to Admission medications    Medication Sig Start Date End Date Taking? Authorizing Provider   citalopram (CELEXA) 40 MG tablet TAKE 1 AND 1/2 TABLETS BY MOUTH EVERY MORNING 21   Lucía Yip,    amLODIPine (NORVASC) 10 MG tablet Take 1 tablet by mouth daily 21   Coligeena Lian Economus, DO   atorvastatin (LIPITOR) 80 MG tablet Take 1 tablet by mouth daily 21   Koir Lian Economus, DO   metoprolol tartrate (LOPRESSOR) 25 MG tablet Take 1 tablet by mouth 2 times daily 21   Kori Almendarez, DO   losartan-hydroCHLOROthiazide (HYZAAR) 100-25 MG per tablet TAKE 1 TABLET BY MOUTH EVERY DAY 21   Lucía Yip DO   nitroGLYCERIN (NITROSTAT) 0.4 MG SL tablet Place 1 tablet under the tongue every 5 minutes as needed for Chest pain If chest pain: put 1 NTG tab under tongue - sit down - wait 5 min - if no relief, call 911. May repeat dose 2 more times 5 min apart while waiting for EMS (total of 3 doses). If dizzy do not take any further doses.  21   Lucía Yip DO   cloNIDine (CATAPRES) 0.3 MG tablet TAKE 1 TABLET BY MOUTH EVERY DAY AT NIGHT  Patient taking differently: Take 0.15 mg by mouth daily  21   Lucía Yip DO   omeprazole (PRILOSEC) 40 MG delayed release capsule TAKE 1 CAPSULE BY MOUTH TWICE A DAY  Patient taking differently: as needed  21   Lucía Yip DO   empagliflozin (JARDIANCE) 10 MG tablet TAKE 1 TABLET BY MOUTH EVERY DAY 21   Barbie Ng, DO   ezetimibe (ZETIA) 10 MG tablet Take 1 tablet by mouth daily 20   Lucía Yip DO   cholestyramine (QUESTRAN) 4 g packet Take 1 packet by mouth 2 times daily 6/4/20   Ming Craven MD   aspirin 81 MG tablet Take 81 mg by mouth daily Last dose 10/19/19    Historical Provider, MD   ferrous sulfate 325 (65 Fe) MG tablet Take 325 mg by mouth daily (with breakfast) LD 7/15/19    Historical Provider, MD   Cholecalciferol (VITAMIN D3) 5000 UNITS TABS Take 1 tablet by mouth every morning     Historical Provider, MD   Ascorbic Acid (VITAMIN C) 500 MG tablet Take 500 mg by mouth every morning     Historical Provider, MD   fish oil-omega-3 fatty acids 1000 MG capsule Take 1 g by mouth every morning     Historical Provider, MD       Current medications:    Current Outpatient Medications   Medication Sig Dispense Refill    citalopram (CELEXA) 40 MG tablet TAKE 1 AND 1/2 TABLETS BY MOUTH EVERY MORNING 135 tablet 1    amLODIPine (NORVASC) 10 MG tablet Take 1 tablet by mouth daily 90 tablet 0    atorvastatin (LIPITOR) 80 MG tablet Take 1 tablet by mouth daily 90 tablet 0    metoprolol tartrate (LOPRESSOR) 25 MG tablet Take 1 tablet by mouth 2 times daily 180 tablet 0    losartan-hydroCHLOROthiazide (HYZAAR) 100-25 MG per tablet TAKE 1 TABLET BY MOUTH EVERY DAY 90 tablet 1    nitroGLYCERIN (NITROSTAT) 0.4 MG SL tablet Place 1 tablet under the tongue every 5 minutes as needed for Chest pain If chest pain: put 1 NTG tab under tongue - sit down - wait 5 min - if no relief, call 911. May repeat dose 2 more times 5 min apart while waiting for EMS (total of 3 doses). If dizzy do not take any further doses.  90 tablet 1    cloNIDine (CATAPRES) 0.3 MG tablet TAKE 1 TABLET BY MOUTH EVERY DAY AT NIGHT (Patient taking differently: Take 0.15 mg by mouth daily ) 90 tablet 1    omeprazole (PRILOSEC) 40 MG delayed release capsule TAKE 1 CAPSULE BY MOUTH TWICE A DAY (Patient taking differently: as needed ) 180 capsule 1    empagliflozin (JARDIANCE) 10 MG tablet TAKE 1 TABLET BY MOUTH EVERY DAY 90 tablet 1    ezetimibe (ZETIA) 10 MG tablet Take 1 tablet by mouth daily 90 tablet 1    cholestyramine (QUESTRAN) 4 g packet Take 1 packet by mouth 2 times daily 90 packet 3    aspirin 81 MG tablet Take 81 mg by mouth daily Last dose 10/19/19      ferrous sulfate 325 (65 Fe) MG tablet Take 325 mg by mouth daily (with breakfast) LD 7/15/19      Cholecalciferol (VITAMIN D3) 5000 UNITS TABS Take 1 tablet by mouth every morning       Ascorbic Acid (VITAMIN C) 500 MG tablet Take 500 mg by mouth every morning       fish oil-omega-3 fatty acids 1000 MG capsule Take 1 g by mouth every morning        No current facility-administered medications for this visit. Allergies: Allergies   Allergen Reactions    Penicillins Hives       Problem List:    Patient Active Problem List   Diagnosis Code    Coronary artery disease involving native coronary artery of native heart without angina pectoris I25.10    Hypertension I10    Pure hypercholesterolemia E78.00    Anemia D64.9    GERD (gastroesophageal reflux disease) K21.9    GARZA (dyspnea on exertion) R06.00    Left knee DJD M17.12    Moderate obesity E66.8    Near syncope R55    Anxiety attack F41.0    NSTEMI (non-ST elevated myocardial infarction) ([facility] [address] I21.4    Hiatal hernia K44.9    Dizziness R42    Cholelithiasis P67.52    Biliary colic U16.52       Past Medical History:        Diagnosis Date    Anemia     Anxiety attack     controlled with citolpram    CAD (coronary artery disease) 16 YRS AGO    MI X 2.  WITH STENTS, FOLLOWS WITH MERLE DOLL,  LAST VISIT 5/2014, SEE EPIC TIKI    GERD (gastroesophageal reflux disease)     Hiatal hernia     Hypercholesterolemia 1994    Hyperlipidemia     Hypertension 6/12/14    B/P IS ELEVATED, PATIENT STATES HE NEEDS TO GET HIS LOPRESSOR REFILLED    Left knee DJD 6/10/2014    OSTEO    NSTEMI (non-ST elevated myocardial infarction) ([facility] [address] 05/28/2012    Pre-operative clearance     cardiac clearance done       Past Surgical History:        Procedure Laterality Date    ANKLE SURGERY  6 YRS AGO    right ORIF    CHOLECYSTECTOMY, LAPAROSCOPIC N/A 7/19/2019    CHOLECYSTECTOMY LAPAROSCOPIC ROBOTIC ASSISTED  XI performed by Torres Tovar MD at [facility] COLONOSCOPY  07/20/2015    CORONARY ANGIOPLASTY WITH STENT PLACEMENT  5/29/2012    Dr. Claudio Rutherford, Stent Mid LAD    DIAGNOSTIC CARDIAC CATH LAB PROCEDURE  9/17/1998    [facility]. Cath/PTCA/stent of RCA; PTCA/stent of mid LAD with adjunctive ReoPro administration.  DIAGNOSTIC CARDIAC CATH LAB PROCEDURE  6/25/2002    [facility]. Kissing balloon angioplasty LAD>diag Perclose. Dr. Claudio Rutherford and Dr. Damian Howard.  DIAGNOSTIC CARDIAC CATH LAB PROCEDURE  4/13/2005    [facility]. Cath/stent (TAX\"US) to diagonal at [facility].  ENDOSCOPY, COLON, DIAGNOSTIC      FINGER TRIGGER RELEASE Right 11/20/2015    Release right long trigger finger. Rick Ordonez MD    HERNIA REPAIR Left YRS AGO    [facility]    HIATAL HERNIA REPAIR N/A 10/22/2019    LAPAROSCOPIC ROBOTIC XI ASSISTED PARAESOPHAGEAL HERNIA REPAIR WITH PARTIAL FUNDOPLICATION AND MYOFASCIAL FLAP, UPPER ENDOSCOPY performed by Isabel Subramanian MD at [address] Right 08/01/2017    robotic assisted    JOINT REPLACEMENT Left 2014    knee    KNEE ARTHROPLASTY Left 06/16/2014    Left TKA. Rick Ordonez MD    TONSILLECTOMY      UPPER GASTROINTESTINAL ENDOSCOPY      UPPER GASTROINTESTINAL ENDOSCOPY N/A 9/10/2019    EGD BIOPSY performed by Isabel Subramanian MD at [address] History:    Social History     Tobacco Use    Smoking status: Never Smoker    Smokeless tobacco: Never Used   Substance Use Topics    Alcohol use: Yes     Comment: socially                                Counseling given: Not Answered      Vital Signs (Current): There were no vitals filed for this visit.                                            BP Readings from Last 3 Encounters:   06/30/21 122/77   06/11/21 118/72   01/11/21 114/74       NPO Status: BMI:   Wt Readings from Last 3 Encounters:   06/30/21 200 lb (90.7 kg)   06/11/21 207 lb (93.9 kg)   01/11/21 210 lb 3.2 oz (95.3 kg)     There is no height or weight on file to calculate BMI.    CBC:   Lab Results   Component Value Date    WBC 9.0 06/11/2021    RBC 5.52 06/11/2021    HGB 15.9 06/11/2021    HCT 48.2 06/11/2021    MCV 87.3 06/11/2021    RDW 13.3 06/11/2021     06/11/2021       CMP:   Lab Results   Component Value Date     06/11/2021    K 3.5 06/11/2021    K 3.6 10/23/2019    CL 98 06/11/2021    CO2 29 06/11/2021    BUN 21 06/11/2021    CREATININE 1.0 06/11/2021    GFRAA >60 06/11/2021    LABGLOM >60 06/11/2021    GLUCOSE 204 06/11/2021    GLUCOSE 103 05/30/2012    PROT 7.1 06/11/2021    CALCIUM 10.9 06/11/2021    BILITOT 0.9 06/11/2021    ALKPHOS 88 06/11/2021    AST 24 06/11/2021    ALT 31 06/11/2021       POC Tests: No results for input(s): POCGLU, POCNA, POCK, POCCL, POCBUN, POCHEMO, POCHCT in the last 72 hours.     Coags:   Lab Results   Component Value Date    PROTIME 11.3 03/04/2018    PROTIME 12.4 05/29/2012    INR 1.0 03/04/2018    APTT 24.7 05/27/2012       HCG (If Applicable): No results found for: PREGTESTUR, PREGSERUM, HCG, HCGQUANT     ABGs: No results found for: PHART, PO2ART, XWS0NKY, UBK2TDQ, BEART, W6ZLVIYH     Type & Screen (If Applicable):  No results found for: LABABO, [address]    Anesthesia Evaluation  Patient summary reviewed no history of anesthetic complications:   Airway: Mallampati: III  TM distance: <3 FB   Neck ROM: full  Mouth opening: < 3 FB Dental: normal exam         Pulmonary: breath sounds clear to auscultation  (+) shortness of breath:      (-) not a current smoker                           Cardiovascular:    (+) hypertension:, past MI:, CAD:, CABG/stent (cardiac stents):, hyperlipidemia    (-)  GARZA      Rhythm: regular  Rate: normal           Beta Blocker:  Dose within 24 Hrs Neuro/Psych:   (+) depression/anxiety    (-) neuromuscular disease           GI/Hepatic/Renal:   (+) hiatal hernia, GERD:,      (-) bowel prep and no morbid obesity       Endo/Other:    (+) : arthritis: OA., .                 Abdominal:   (+) obese,           Vascular: negative vascular ROS. Other Findings:             Anesthesia Plan      MAC     ASA 3       Induction: intravenous. Anesthetic plan and risks discussed with patient. Plan discussed with CRNA. DOS STAFF ADDENDUM:    Pt seen and examined, chart reviewed (including anesthesia, drug and allergy history). Anesthetic plan, risks, benefits, alternatives, and personnel involved discussed with patient. Patient verbalized an understanding and agrees to proceed. Plan discussed with care team members and agreed upon.     Steven Dye MD  Staff Anesthesiologist  2:51 PM    Steven Dye MD   7/11/2021

## 2021-07-12 NOTE — H&P
General Surgery History and Physical  [facility]    Patient's Name/Date of Birth: Carmen Santos / 1955    Date: July 12, 2021     Surgeon: Kimmie Mckinney M.D.    PCP: Marry Richards DO     Chief Complaint: emesis    HPI:   Carmen Santos is a 78 y.o. male who presents for evaluation of spontaneous emesis over the last 2 weeks. Timing is intermittent, radiation to none, alleviated by none and started few weeks ago and severity is 4/10. Patient states that he has had several episodes of spontaneous emesis. He denies any prodrome to associated with it such as nausea abdominal pain fever chills. He states that when he vomits it see that what he previously ate or just frothy white sputum. Patient has a history of hiatal hernia repair in 2018 for which she has been doing very well from. He denies any reflux or heartburn. He does not feel that food getting stuck in his esophagus nor does he state that he has any identifiable aggravating or alleviating factors to produce emesis. He did have a change in his medications and was started on Jardiance the beginning of the year when he was diagnosed with diabetes. Patient Active Problem List   Diagnosis    Coronary artery disease involving native coronary artery of native heart without angina pectoris    Hypertension    Pure hypercholesterolemia    Anemia    GERD (gastroesophageal reflux disease)    GARZA (dyspnea on exertion)    Left knee DJD    Moderate obesity    Near syncope    Anxiety attack    NSTEMI (non-ST elevated myocardial infarction) ([facility] [address]    Hiatal hernia    Dizziness    Cholelithiasis    Biliary colic       Past Medical History:   Diagnosis Date    Anemia     Anxiety attack     controlled with citolpram    CAD (coronary artery disease) 16 YRS AGO    MI X 2.  WITH STENTS, FOLLOWS WITH MERLE DOLL,  LAST VISIT 5/2014, SEE EPIC BOTES    GERD (gastroesophageal reflux disease)     Hiatal hernia     Hypercholesterolemia 1994    Hyperlipidemia     Hypertension 6/12/14    B/P IS ELEVATED, PATIENT STATES HE NEEDS TO GET HIS LOPRESSOR REFILLED    Left knee DJD 6/10/2014    OSTEO    NSTEMI (non-ST elevated myocardial infarction) ([facility] 05/28/2012    Pre-operative clearance     cardiac clearance done       Past Surgical History:   Procedure Laterality Date    ANKLE SURGERY  6 YRS AGO    right ORIF    CHOLECYSTECTOMY, LAPAROSCOPIC N/A 7/19/2019    CHOLECYSTECTOMY LAPAROSCOPIC ROBOTIC ASSISTED  XI performed by Kandy Yanecy MD at [facility] COLONOSCOPY  07/20/2015    CORONARY ANGIOPLASTY WITH STENT PLACEMENT  5/29/2012    Dr. Bebe Monreal, Stent Mid LAD    DIAGNOSTIC CARDIAC CATH LAB PROCEDURE  9/17/1998    [facility]. Cath/PTCA/stent of RCA; PTCA/stent of mid LAD with adjunctive ReoPro administration.  DIAGNOSTIC CARDIAC CATH LAB PROCEDURE  6/25/2002    [facility]. Kissing balloon angioplasty LAD>diag Perclose. Dr. Bebe Monreal and Dr. Froy Franco.  DIAGNOSTIC CARDIAC CATH LAB PROCEDURE  4/13/2005    [facility]. Cath/stent (TAX\"US) to diagonal at [facility].  ENDOSCOPY, COLON, DIAGNOSTIC      FINGER TRIGGER RELEASE Right 11/20/2015    Release right long trigger finger. Kim Adler MD    HERNIA REPAIR Left YRS AGO    [facility]    HIATAL HERNIA REPAIR N/A 10/22/2019    LAPAROSCOPIC ROBOTIC XI ASSISTED PARAESOPHAGEAL HERNIA REPAIR WITH PARTIAL FUNDOPLICATION AND MYOFASCIAL FLAP, UPPER ENDOSCOPY performed by Camelia Verdugo MD at [address] Right 08/01/2017    robotic assisted    JOINT REPLACEMENT Left 2014    knee    KNEE ARTHROPLASTY Left 06/16/2014    Left TKA.   Kim Adler MD    TONSILLECTOMY      UPPER GASTROINTESTINAL ENDOSCOPY      UPPER GASTROINTESTINAL ENDOSCOPY N/A 9/10/2019    EGD BIOPSY performed by Camelia Verdugo MD at [address] [place] [address]   Allergen Reactions    Penicillins Hives       The patient has a family history that is negative for severe cardiovascular or respiratory issues, negative for reaction to anesthesia. Time spent reviewing past medical, surgical, social and family history, vitals, nursing assessment and images. No changes from above documented history. Social History     Socioeconomic History    Marital status:      Spouse name: Not on file    Number of children: Not on file    Years of education: Not on file    Highest education level: Not on file   Occupational History    Not on file   Tobacco Use    Smoking status: Never Smoker    Smokeless tobacco: Never Used   Vaping Use    Vaping Use: Never used   Substance and Sexual Activity    Alcohol use: Yes     Comment: socially    Drug use: No    Sexual activity: Not on file   Other Topics Concern    Not on file   Social History Narrative    Not on file     Social Determinants of Health     Financial Resource Strain: Low Risk     Difficulty of Paying Living Expenses: Not hard at all   Food Insecurity: No Food Insecurity    Worried About [address] in the Last Year: Never true    [address] [employer] in the Last Year: Never true   Transportation Needs:     Lack of Transportation (Medical):      Lack of Transportation (Non-Medical):    Physical Activity:     Days of Exercise per Week:     Minutes of Exercise per Session:    Stress:     Feeling of Stress :    Social Connections:     Frequency of Communication with Friends and Family:     Frequency of Social Gatherings with Friends and Family:     Attends Mu-ism Services:     Active Member of Clubs or Organizations:     Attends Club or Organization Meetings:     Marital Status:    Intimate Partner Violence:     Fear of Current or Ex-Partner:     Emotionally Abused:     Physically Abused:     Sexually Abused:        I have reviewed relevant labs from this admission and interpretation is included in my assessment and plan    Review of Systems    A complete 10 system review was performed and are otherwise negative unless mentioned in the above HPI. Specific negatives are listed below but may not include all those reviewed.     General ROS: negative obtundation, AMS  ENT ROS: negative rhinorrhea, epistaxis  Allergy and Immunology ROS: negative itchy/watery eyes or nasal congestion  Hematological and Lymphatic ROS: negative spontaneous bleeding or bruising  Endocrine ROS: negative  lethargy, mood swings, palpitations or polydipsia/polyuria  Respiratory ROS: negative sputum changes, stridor, tachypnea or wheezing  Cardiovascular ROS: negative for - loss of consciousness, murmur or orthopnea  Gastrointestinal ROS: negative for - hematochezia or hematemesis  Genito-Urinary ROS: negative for -  genital discharge or hematuria  Musculoskeletal ROS: negative for - focal weakness, gangrene  Psych/Neuro ROS: negative for - visual or auditory hallucinations, suicidal ideation    Physical exam:   Ht 5' 8\" (1.727 m)   Wt 200 lb (90.7 kg)   BMI 30.41 kg/m²   General appearance:  NAD, appears stated age  Head: NCAT, PERRLA, EOMI, red conjunctiva  Neck: supple, no masses, trachea midline  Lungs: Equal chest rise bilateral, no retractions, no wheezing  Heart: Reg rate  Abdomen: soft, nontender incisions well-healed, nondistended  Skin; warm and dry, no cyanosis  Gu: no cva tenderness  Extremities: atraumatic, no focal motor deficits, no open wounds  Psych: No tremor, visual hallucinations      Radiology: I reviewed relevant abdominal imaging from this admission and that available in the EMR      Assessment:  Madhav Willingham is a 78 y.o. male with spontaneous emesis history of hiatal hernia, newly diagnosed diabetic 6 months ago  Patient Active Problem List   Diagnosis    Coronary artery disease involving native coronary artery of native heart without angina pectoris    Hypertension    Pure hypercholesterolemia    Anemia    GERD (gastroesophageal reflux disease)    GARZA (dyspnea on exertion)    Left knee DJD    Moderate obesity    Near syncope    Anxiety attack    NSTEMI (non-ST elevated myocardial infarction) ([address]    Hiatal hernia    Dizziness    Cholelithiasis    Biliary colic         Plan:  EGD to evaluate for recurrence or evidence of dysphagia  I suspect this is related to his diabetes and new medications and not likely a mechanical obstruction or surgical issue  Follow-up after EGD        Sana Villalpando MD  12:18 PM  7/12/2021

## 2021-07-12 NOTE — PROGRESS NOTES
7/12/21 1545 reviewed discharge instructions with pt and his wife anya.  Both verbalized understanding,signed in agreement and given copy. kmo rn

## 2021-07-13 NOTE — ANESTHESIA POSTPROCEDURE EVALUATION
Department of Anesthesiology  Postprocedure Note    Patient: Le Logan  MRN: [id number]  YOB: 1955  Date of evaluation: 7/12/2021  Time:  8:03 PM     Procedure Summary     Date: 07/12/21 Room / Location: [address] [place] [address] / [address] [place] [address]    Anesthesia Start: 7458 Anesthesia Stop: 1500    Procedure: EGD BIOPSY (N/A ) Diagnosis: (NAUSEA, VOMITING)    Surgeons: Miky Chu MD Responsible Provider: Tricia Reese MD    Anesthesia Type: MAC ASA Status: 3          Anesthesia Type: MAC    Wilman Phase I: Wilman Score: 10    Wilman Phase II: Wilman Score: 10    Last vitals: Reviewed and per EMR flowsheets.        Anesthesia Post Evaluation    Patient location during evaluation: PACU  Patient participation: complete - patient participated  Level of consciousness: awake and alert  Airway patency: patent  Nausea & Vomiting: no nausea and no vomiting  Complications: no  Cardiovascular status: hemodynamically stable  Respiratory status: acceptable  Hydration status: euvolemic

## 2021-07-26 ENCOUNTER — OFFICE VISIT (OUTPATIENT)
Dept: SURGERY | Age: 66
End: 2021-07-26
Payer: COMMERCIAL

## 2021-07-26 VITALS
SYSTOLIC BLOOD PRESSURE: 113 MMHG | HEART RATE: 56 BPM | OXYGEN SATURATION: 96 % | TEMPERATURE: 97 F | DIASTOLIC BLOOD PRESSURE: 76 MMHG | HEIGHT: 68 IN | BODY MASS INDEX: 30.77 KG/M2 | RESPIRATION RATE: 18 BRPM | WEIGHT: 203 LBS

## 2021-07-26 DIAGNOSIS — R11.2 NAUSEA AND VOMITING, INTRACTABILITY OF VOMITING NOT SPECIFIED, UNSPECIFIED VOMITING TYPE: ICD-10-CM

## 2021-07-26 DIAGNOSIS — K31.84 GASTROPARESIS: Primary | ICD-10-CM

## 2021-07-26 PROCEDURE — 99213 OFFICE O/P EST LOW 20 MIN: CPT | Performed by: SURGERY

## 2021-07-26 NOTE — PROGRESS NOTES
General Surgery History and Physical  [facility]    Patient's Name/Date of Birth: Madhav Willingham / 1955    Date: July 26, 2021     Surgeon: Arabella Jerry M.D.    PCP: Shauna Carrizales DO     Chief Complaint: emesis    HPI:   Madhav Willingham is a 78 y.o. male who presents for evaluation of spontaneous emesis over the last 2 weeks. Timing is intermittent, radiation to none, alleviated by none and started few weeks ago and severity is 4/10. Patient states that he has had several episodes of spontaneous emesis. He denies any prodrome to associated with it such as nausea abdominal pain fever chills. He states that when he vomits it see that what he previously ate or just frothy white sputum. Patient has a history of hiatal hernia repair in 2018 for which she has been doing very well from. He denies any reflux or heartburn. He does not feel that food getting stuck in his esophagus nor does he state that he has any identifiable aggravating or alleviating factors to produce emesis. He did have a change in his medications and was started on Jardiance the beginning of the year when he was diagnosed with diabetes. Follow-up after endoscopy. Patient denies any significant episodes of nausea or vomiting. I discussed the findings of his endoscopy which revealed retained gastric contents consistent with low gastric emptying and gastroparesis. His endoscopy did not reveal significant recurrent hernia.   His fundoplication remained intact and there was no evidence of esophagitis    POSTOPERATIVE DIAGNOSES:   (1) small recurrent sliding Hiatal Hernia with intact fundoplication  (2) No Esophagitis  (3) No Gastritis  (4) Retained gastric contents consistent with gastroparesis    Patient Active Problem List   Diagnosis    Coronary artery disease involving native coronary artery of native heart without angina pectoris    Hypertension    Pure hypercholesterolemia    Anemia    GERD (gastroesophageal reflux disease)    GAZRA (dyspnea on exertion)    Left knee DJD    Moderate obesity    Near syncope    Anxiety attack    NSTEMI (non-ST elevated myocardial infarction) (HCC)    Hiatal hernia    Dizziness    Cholelithiasis    Biliary colic       Past Medical History:   Diagnosis Date    Anemia     Anxiety attack     controlled with citolpram    CAD (coronary artery disease) 16 YRS AGO    MI X 2. WITH STENTS, FOLLOWS WITH MERLE DOLL,  LAST VISIT 5/2014, SEE EPIC BOTSHABNAM    GERD (gastroesophageal reflux disease)     Hiatal hernia     Hypercholesterolemia 1994    Hyperlipidemia     Hypertension 6/12/14    B/P IS ELEVATED, PATIENT STATES HE NEEDS TO GET HIS LOPRESSOR REFILLED    Left knee DJD 6/10/2014    OSTEO    NSTEMI (non-ST elevated myocardial infarction) ([facility] 05/28/2012    Pre-operative clearance     cardiac clearance done       Past Surgical History:   Procedure Laterality Date    ANKLE SURGERY  6 YRS AGO    right ORIF    CHOLECYSTECTOMY, LAPAROSCOPIC N/A 7/19/2019    CHOLECYSTECTOMY LAPAROSCOPIC ROBOTIC ASSISTED  XI performed by Gwen Valencia MD at [address] [place] [address] COLONOSCOPY  07/20/2015    CORONARY ANGIOPLASTY WITH STENT PLACEMENT  5/29/2012    Dr. Sharmin Marshall, Stent Mid LAD    DIAGNOSTIC CARDIAC CATH LAB PROCEDURE  9/17/1998    [address] Cath/PTCA/stent of RCA; PTCA/stent of mid LAD with adjunctive ReoPro administration.  DIAGNOSTIC CARDIAC CATH LAB PROCEDURE  6/25/2002    [facility]. Kissing balloon angioplasty LAD>diag Perclose. Dr. Sharmin Marshall and Dr. Fuad Lisa.  DIAGNOSTIC CARDIAC CATH LAB PROCEDURE  4/13/2005    [address] Cath/stent (TAX\"US) to diagonal at Heart Lab.  ENDOSCOPY, COLON, DIAGNOSTIC      FINGER TRIGGER RELEASE Right 11/20/2015    Release right long trigger finger.   Rajinder Goode MD    HERN[facility] REPAIR Left YRS AGO    [facility]    HIATAL HERNIA REPAIR N/A 10/22/2019    LAPAROSCOPIC ROBOTIC XI ASSISTED PARAESOPHAGEAL HERNIA REPAIR WITH PARTIAL FUNDOPLICATION AND MYOFASCIAL FLAP, UPPER ENDOSCOPY performed by Risa Madrigal MD at [address] Right 08/01/2017    robotic assisted    JOINT REPLACEMENT Left 2014    knee    KNEE ARTHROPLASTY Left 06/16/2014    Left TKA. Stevan Lazaro MD    TONSILLECTOMY      UPPER GASTROINTESTINAL ENDOSCOPY      UPPER GASTROINTESTINAL ENDOSCOPY N/A 9/10/2019    EGD BIOPSY performed by Risa [facility]rigal MD at [facility] N/A 7/12/2021    EGD BIOPSY performed by Risa Madrigal MD at  [address] [place] [address]   Allergen Reactions    Penicillins Hives       The patient has a family history that is negative for severe cardiovascular or respiratory issues, negative for reaction to anesthesia. Time spent reviewing past medical, surgical, social and family history, vitals, nursing assessment and images. No changes from above documented history. Social History     Socioeconomic History    Marital status:      Spouse name: Not on file    Number of children: Not on file    Years of education: Not on file    Highest education level: Not on file   Occupational History    Not on file   Tobacco Use    Smoking status: Never Smoker    Smokeless tobacco: Never Used   Vaping Use    Vaping Use: Never used   Substance and Sexual Activity    Alcohol use: Yes     Comment: socially    Drug use: No    Sexual activity: Not on file   Other Topics Concern    Not on file   Social History Narrative    Not on file     Social Determinants of Health     Financial Resource Strain: Low Risk     Difficulty of Paying Living Expenses: Not hard at all   Food Insecurity: No Food Insecurity    Worried About [address] in the Last Year: Never true    [address] in the Last Year: Never true   Transportation Needs:     Lack of Transportation (Medical):      Lack of Transportation (Non-Medical):    Physical Activity:     Days of Exercise per Week:     Minutes of Exercise per Session:    Stress:     Feeling of Stress :    Social Connections:     Frequency of Communication with Friends and Family:     Frequency of Social Gatherings with Friends and Family:     Attends Latter-[employer] Services:     Active Member of Clubs or Organizations:     Attends Club or Organization Meetings:     Marital Status:    Intimate Partner Violence:     Fear of Current or Ex-Partner:     Emotionally Abused:     Physically Abused:     Sexually Abused:        I have reviewed relevant labs from this admission and interpretation is included in my assessment and plan    Review of Systems    A complete 10 system review was performed and are otherwise negative unless mentioned in the above HPI. Specific negatives are listed below but may not include all those reviewed.     General ROS: negative obtundation, AMS  ENT ROS: negative rhinorrhea, epistaxis  Allergy and Immunology ROS: negative itchy/watery eyes or nasal congestion  Hematological and Lymphatic ROS: negative spontaneous bleeding or bruising  Endocrine ROS: negative  lethargy, mood swings, palpitations or polydipsia/polyuria  Respiratory ROS: negative sputum changes, stridor, tachypnea or wheezing  Cardiovascular ROS: negative for - loss of consciousness, murmur or orthopnea  Gastrointestinal ROS: negative for - hematochezia or hematemesis  Genito-Urinary ROS: negative for -  genital discharge or hematuria  Musculoskeletal ROS: negative for - focal weakness, gangrene  Psych/Neuro ROS: negative for - visual or auditory hallucinations, suicidal ideation    Physical exam:   /76 (Site: Left Upper Arm, Position: Sitting, Cuff Size: Large Adult)   Pulse 56   Temp 97 °F (36.1 °C) (Temporal)   Resp 18   Ht 5' 8\" (1.727 m)   Wt 203 lb (92.1 kg)   SpO2 96%   BMI 30.87 kg/m²   General appearance:  NAD, appears stated age  Head: NCAT, PERRLA, EOMI, red conjunctiva  Neck: supple, no masses, trachea midline  Lungs: Equal chest rise bilateral, no retractions, no wheezing  Heart: Reg rate  Abdomen: soft, nontender incisions well-healed, nondistended  Skin; warm and dry, no cyanosis  Gu: no cva tenderness  Extremities: atraumatic, no focal motor deficits, no open wounds  Psych: No tremor, visual hallucinations  \  POSTOPERATIVE DIAGNOSES:   (1) small recurrent sliding Hiatal Hernia with intact fundoplication  (2) No Esophagitis  (3) No Gastritis  (4) Retained gastric contents consistent with gastroparesis    Radiology: I reviewed relevant abdominal imaging from this admission and that available in the EMR      Assessment:  Le Logan is a 78 y.o. male with spontaneous emesis history of hiatal hernia, newly diagnosed diabetic 6 months ago gastroparesis  Patient Active Problem List   Diagnosis    Coronary artery disease involving native coronary artery of native heart without angina pectoris    Hypertension    Pure hypercholesterolemia    Anemia    GERD (gastroesophageal reflux disease)    GARZA (dyspnea on exertion)    Left knee DJD    Moderate obesity    Near syncope    Anxiety attack    NSTEMI (non-ST elevated myocardial infarction) ([address]    Hiatal hernia    Dizziness    Cholelithiasis    Biliary colic         Plan:  Discussed small frequent meals and treatment of gastroparesis is typically medical management.   Improved blood glucose control should improve his gastric function  Given the fact he has very infrequent episodes of vomiting and he has no nausea or pain do not recommend any surgical intervention  Follow-up with me as needed        Miky Chu MD  9:52 AM  7/26/2021

## 2021-08-25 RX ORDER — EZETIMIBE 10 MG/1
TABLET ORAL
Qty: 90 TABLET | Refills: 1 | Status: SHIPPED
Start: 2021-08-25 | End: 2022-01-27

## 2021-10-11 DIAGNOSIS — I25.10 CORONARY ARTERY DISEASE INVOLVING NATIVE CORONARY ARTERY OF NATIVE HEART WITHOUT ANGINA PECTORIS: ICD-10-CM

## 2021-10-11 DIAGNOSIS — E11.9 TYPE 2 DIABETES MELLITUS WITHOUT COMPLICATION, UNSPECIFIED WHETHER LONG TERM INSULIN USE (HCC): ICD-10-CM

## 2021-10-12 RX ORDER — EMPAGLIFLOZIN 10 MG/1
TABLET, FILM COATED ORAL
Qty: 90 TABLET | Refills: 1 | Status: SHIPPED
Start: 2021-10-12 | End: 2022-04-25

## 2021-10-12 RX ORDER — ATORVASTATIN CALCIUM 80 MG/1
80 TABLET, FILM COATED ORAL DAILY
Qty: 90 TABLET | Refills: 1 | Status: SHIPPED
Start: 2021-10-12 | End: 2021-12-14 | Stop reason: SDUPTHER

## 2021-10-18 ENCOUNTER — OFFICE VISIT (OUTPATIENT)
Dept: PRIMARY CARE CLINIC | Age: 66
End: 2021-10-18
Payer: COMMERCIAL

## 2021-10-18 VITALS
TEMPERATURE: 96.4 F | HEART RATE: 58 BPM | OXYGEN SATURATION: 97 % | SYSTOLIC BLOOD PRESSURE: 132 MMHG | DIASTOLIC BLOOD PRESSURE: 84 MMHG | WEIGHT: 207 LBS | BODY MASS INDEX: 31.47 KG/M2

## 2021-10-18 DIAGNOSIS — I10 ESSENTIAL HYPERTENSION: ICD-10-CM

## 2021-10-18 DIAGNOSIS — Z01.810 PREOP CARDIOVASCULAR EXAM: ICD-10-CM

## 2021-10-18 DIAGNOSIS — I25.10 CORONARY ARTERY DISEASE INVOLVING NATIVE CORONARY ARTERY OF NATIVE HEART WITHOUT ANGINA PECTORIS: ICD-10-CM

## 2021-10-18 DIAGNOSIS — E11.9 TYPE 2 DIABETES MELLITUS WITHOUT COMPLICATION, UNSPECIFIED WHETHER LONG TERM INSULIN USE (HCC): ICD-10-CM

## 2021-10-18 DIAGNOSIS — E11.9 TYPE 2 DIABETES MELLITUS WITHOUT COMPLICATION, UNSPECIFIED WHETHER LONG TERM INSULIN USE (HCC): Primary | ICD-10-CM

## 2021-10-18 LAB
ALBUMIN SERPL-MCNC: 4.1 G/DL (ref 3.5–5.2)
ALP BLD-CCNC: 104 U/L (ref 40–129)
ALT SERPL-CCNC: 28 U/L (ref 0–40)
ANION GAP SERPL CALCULATED.3IONS-SCNC: 16 MMOL/L (ref 7–16)
AST SERPL-CCNC: 19 U/L (ref 0–39)
BILIRUB SERPL-MCNC: 1.2 MG/DL (ref 0–1.2)
BUN BLDV-MCNC: 20 MG/DL (ref 6–23)
CALCIUM SERPL-MCNC: 10 MG/DL (ref 8.6–10.2)
CHLORIDE BLD-SCNC: 100 MMOL/L (ref 98–107)
CO2: 22 MMOL/L (ref 22–29)
CREAT SERPL-MCNC: 1 MG/DL (ref 0.7–1.2)
GFR AFRICAN AMERICAN: >60
GFR NON-AFRICAN AMERICAN: >60 ML/MIN/1.73
GLUCOSE BLD-MCNC: 169 MG/DL (ref 74–99)
HBA1C MFR BLD: 7.4 % (ref 4–5.6)
POTASSIUM SERPL-SCNC: 3.8 MMOL/L (ref 3.5–5)
SODIUM BLD-SCNC: 138 MMOL/L (ref 132–146)
TOTAL PROTEIN: 7.2 G/DL (ref 6.4–8.3)

## 2021-10-18 PROCEDURE — 3051F HG A1C>EQUAL 7.0%<8.0%: CPT | Performed by: INTERNAL MEDICINE

## 2021-10-18 PROCEDURE — 93000 ELECTROCARDIOGRAM COMPLETE: CPT | Performed by: INTERNAL MEDICINE

## 2021-10-18 PROCEDURE — 99213 OFFICE O/P EST LOW 20 MIN: CPT | Performed by: INTERNAL MEDICINE

## 2021-10-18 NOTE — PROGRESS NOTES
heard.  Pulmonary: Effort normal and breath sounds normal. No respiratory distress. No wheezes or rales. Abdomen: No signs of rigidity rebound or organomegaly  Musculoskeletal:  No tenderness to palpation  Neurological:Alert and oriented to person, place, and time. Skin: No diaphoresis. Psychiatric: Normal mood and affect. Behavior is Normal.     ASSESSMENT AND PLAN:    Rush Liu was seen today for pre-op exam.    Diagnoses and all orders for this visit:    Type 2 diabetes mellitus without complication, unspecified whether long term insulin use ([facility]  -     Basic Metabolic Panel; Future  -     Comprehensive Metabolic Panel; Future  -     Hemoglobin A1C; Future    Coronary artery disease involving native coronary artery of native heart without angina pectoris    Essential hypertension    Preop cardiovascular exam  -     EKG 12 Lead; Future  -     EKG 12 Lead        Assessment    1. Preoperative evaluation  2. Hypertension  3. CAD  4. Diabetes    Plan    1. I will perform EKG as well as blood work  2. I feel that he is a low cardiac risk for low risk surgery    Return if symptoms worsen or fail to improve.         Electronically signed by Domingo Deal DO on 10/18/2021 at 10:12 AM    Domingo Deal DO

## 2021-10-20 RX ORDER — CITALOPRAM 40 MG/1
TABLET ORAL
Qty: 135 TABLET | Refills: 1 | Status: SHIPPED
Start: 2021-10-20 | End: 2022-04-26

## 2021-11-09 NOTE — PROGRESS NOTES
[facility] PRE-ADMISSION TESTING INSTRUCTIONS    The Preadmission Testing patient is instructed accordingly using the following criteria (check applicable):    ARRIVAL INSTRUCTIONS:  [x] Parking the day of Surgery is located in the Main Entrance lot. Upon entering the door, make an immediate right to the surgery reception desk    [x] Bring photo ID and insurance card    [] Bring in a copy of Living will or Durable Power of  papers. [x] Please be sure to arrange for responsible adult to provide transportation to and from the hospital    [] Please arrange for responsible adult to be with you for the 24 hour period post procedure due to having anesthesia      GENERAL INSTRUCTIONS:    [x] Nothing by mouth after midnight, including gum, candy, mints or water    [x] You may brush your teeth, but do not swallow any water    [x] Take medications as instructed with 1-2 oz of water    [x] Stop herbal supplements and vitamins 5 days prior to procedure    [x] Follow preop dosing of blood thinners per physician instructions    [] Take 1/2 dose of evening insulin, but no insulin after midnight    [x] No oral diabetic medications after midnight    [x] If diabetic and have low blood sugar or feel symptomatic, take 1-2oz apple juice only    [] Bring inhalers day of surgery    [] Bring C-PAP/ Bi-Pap day of surgery    [] Bring urine specimen day of surgery    [x] Shower or bath with soap, lather and rinse well, AM of Surgery, no lotion, powders or creams to surgical site    [] Follow bowel prep as instructed per surgeon    [x] No tobacco products within 24 hours of surgery     [x] No alcohol or illegal drug use within 24 hours of surgery.     [x] Jewelry, body piercing's, eyeglasses, contact lenses and dentures are not permitted into surgery (bring cases)      [] Please do not wear any nail polish, make up or hair products on the day of surgery    [x] You can expect a call the business day prior to procedure to notify you if your arrival time changes    [x] If you receive a survey after surgery we would greatly appreciate your comments    [] Parent/guardian of a minor must accompany their child and remain on the premises  the entire time they are under our care     [] Pediatric patients may bring favorite toy, blanket or comfort item with them    [] A caregiver or family member must remain with the patient during their stay if they are mentally handicapped, have dementia, disoriented or unable to use a call light or would be a safety concern if left unattended    [x] Please notify surgeon if you develop any illness between now and time of surgery (cold, cough, sore throat, fever, nausea, vomiting) or any signs of infections  including skin, wounds, and dental.    []  The Outpatient Pharmacy is available to fill your prescription here on your day of surgery, ask your preop nurse for details    [] Other instructions    EDUCATIONAL MATERIALS PROVIDED:    [] PAT Preoperative Education Packet/Booklet     [] Medication List    [] Transfusion bracelet applied with instructions    [] Shower with soap, lather and rinse well, and use CHG wipes provided the evening before surgery as instructed    [] Incentive spirometer with instructions        Have you been tested for COVID  No           Have you been told you were positive for COVID No  Have you had any known exposure to someone that is positive for COVID No  Do you have a cough                   No              Do you have shortness of breath No                 Do you have a sore throat            No                Are you having chills                    No                Are you having muscle aches. No                    Please come to the hospital wearing a mask and have your significant other wear a mask as well. Both of you should check your temperature before leaving to come here,  if it is 100 or higher please call [phone] for instruction.

## 2021-11-10 PROBLEM — H25.813 COMBINED FORMS OF AGE-RELATED CATARACT OF BOTH EYES: Status: ACTIVE | Noted: 2021-11-10

## 2021-11-10 RX ORDER — ONDANSETRON 2 MG/ML
4 INJECTION INTRAMUSCULAR; INTRAVENOUS EVERY 6 HOURS PRN
Status: CANCELLED | OUTPATIENT
Start: 2021-11-10

## 2021-11-10 RX ORDER — ONDANSETRON 4 MG/1
4 TABLET, ORALLY DISINTEGRATING ORAL EVERY 8 HOURS PRN
Status: CANCELLED | OUTPATIENT
Start: 2021-11-10

## 2021-11-11 ENCOUNTER — ANESTHESIA EVENT (OUTPATIENT)
Dept: OPERATING ROOM | Age: 66
End: 2021-11-11
Payer: COMMERCIAL

## 2021-11-11 ENCOUNTER — HOSPITAL ENCOUNTER (OUTPATIENT)
Age: 66
Setting detail: OUTPATIENT SURGERY
Discharge: HOME | End: 2021-11-11
Attending: OPHTHALMOLOGY | Admitting: OPHTHALMOLOGY
Payer: COMMERCIAL

## 2021-11-11 ENCOUNTER — ANESTHESIA (OUTPATIENT)
Dept: OPERATING ROOM | Age: 66
End: 2021-11-11
Payer: COMMERCIAL

## 2021-11-11 VITALS
OXYGEN SATURATION: 94 % | RESPIRATION RATE: 16 BRPM | SYSTOLIC BLOOD PRESSURE: 121 MMHG | DIASTOLIC BLOOD PRESSURE: 69 MMHG

## 2021-11-11 VITALS
DIASTOLIC BLOOD PRESSURE: 67 MMHG | RESPIRATION RATE: 19 BRPM | TEMPERATURE: 98.7 F | WEIGHT: 200 LBS | HEIGHT: 68 IN | BODY MASS INDEX: 30.31 KG/M2 | HEART RATE: 56 BPM | SYSTOLIC BLOOD PRESSURE: 119 MMHG | OXYGEN SATURATION: 98 %

## 2021-11-11 LAB — METER GLUCOSE: 189 MG/DL (ref 74–99)

## 2021-11-11 PROCEDURE — 7100000010 HC PHASE II RECOVERY - FIRST 15 MIN: Performed by: OPHTHALMOLOGY

## 2021-11-11 PROCEDURE — 6370000000 HC RX 637 (ALT 250 FOR IP)

## 2021-11-11 PROCEDURE — 3600000002 HC SURGERY LEVEL 2 BASE: Performed by: OPHTHALMOLOGY

## 2021-11-11 PROCEDURE — 6360000002 HC RX W HCPCS: Performed by: NURSE ANESTHETIST, CERTIFIED REGISTERED

## 2021-11-11 PROCEDURE — 3700000000 HC ANESTHESIA ATTENDED CARE: Performed by: OPHTHALMOLOGY

## 2021-11-11 PROCEDURE — V2632 POST CHMBR INTRAOCULAR LENS: HCPCS | Performed by: OPHTHALMOLOGY

## 2021-11-11 PROCEDURE — 6360000002 HC RX W HCPCS: Performed by: OPHTHALMOLOGY

## 2021-11-11 PROCEDURE — 2500000003 HC RX 250 WO HCPCS: Performed by: NURSE ANESTHETIST, CERTIFIED REGISTERED

## 2021-11-11 PROCEDURE — 2709999900 HC NON-CHARGEABLE SUPPLY: Performed by: OPHTHALMOLOGY

## 2021-11-11 PROCEDURE — 2500000003 HC RX 250 WO HCPCS: Performed by: OPHTHALMOLOGY

## 2021-11-11 PROCEDURE — 82962 GLUCOSE BLOOD TEST: CPT

## 2021-11-11 PROCEDURE — 3700000001 HC ADD 15 MINUTES (ANESTHESIA): Performed by: OPHTHALMOLOGY

## 2021-11-11 PROCEDURE — 6370000000 HC RX 637 (ALT 250 FOR IP): Performed by: OPHTHALMOLOGY

## 2021-11-11 PROCEDURE — 2580000003 HC RX 258: Performed by: NURSE ANESTHETIST, CERTIFIED REGISTERED

## 2021-11-11 PROCEDURE — 3600000012 HC SURGERY LEVEL 2 ADDTL 15MIN: Performed by: OPHTHALMOLOGY

## 2021-11-11 PROCEDURE — 7100000011 HC PHASE II RECOVERY - ADDTL 15 MIN: Performed by: OPHTHALMOLOGY

## 2021-11-11 DEVICE — ACRYSOF(R) IQ ASPHERIC NATURAL IOL, SINGLE-PIECE ACRYLIC FOLDABLE PCL, UV WITH BLUE LIGHTFILTER, 13.0MM LENGTH, 6.0MM ANTERIORASYMMETRIC BICONVEX OPTIC, PLANAR HAPTICS.
Type: IMPLANTABLE DEVICE | Site: EYE | Status: FUNCTIONAL
Brand: ACRYSOF®

## 2021-11-11 RX ORDER — SODIUM CHLORIDE 9 MG/ML
INJECTION, SOLUTION INTRAVENOUS CONTINUOUS
Status: DISCONTINUED | OUTPATIENT
Start: 2021-11-11 | End: 2021-11-11 | Stop reason: HOSPADM

## 2021-11-11 RX ORDER — CIPROFLOXACIN HYDROCHLORIDE 3.5 MG/ML
SOLUTION/ DROPS TOPICAL PRN
Status: DISCONTINUED | OUTPATIENT
Start: 2021-11-11 | End: 2021-11-11 | Stop reason: ALTCHOICE

## 2021-11-11 RX ORDER — PREDNISOLONE ACETATE 10 MG/ML
SUSPENSION/ DROPS OPHTHALMIC PRN
Status: DISCONTINUED | OUTPATIENT
Start: 2021-11-11 | End: 2021-11-11 | Stop reason: ALTCHOICE

## 2021-11-11 RX ORDER — PROPOFOL 10 MG/ML
INJECTION, EMULSION INTRAVENOUS PRN
Status: DISCONTINUED | OUTPATIENT
Start: 2021-11-11 | End: 2021-11-11 | Stop reason: SDUPTHER

## 2021-11-11 RX ORDER — CYCLOPENTOLATE HYDROCHLORIDE 10 MG/ML
1 SOLUTION/ DROPS OPHTHALMIC
Status: COMPLETED | OUTPATIENT
Start: 2021-11-11 | End: 2021-11-11

## 2021-11-11 RX ORDER — TETRACAINE HYDROCHLORIDE 5 MG/ML
SOLUTION OPHTHALMIC PRN
Status: DISCONTINUED | OUTPATIENT
Start: 2021-11-11 | End: 2021-11-11 | Stop reason: ALTCHOICE

## 2021-11-11 RX ORDER — LIDOCAINE HYDROCHLORIDE 10 MG/ML
INJECTION, SOLUTION EPIDURAL; INFILTRATION; INTRACAUDAL; PERINEURAL PRN
Status: DISCONTINUED | OUTPATIENT
Start: 2021-11-11 | End: 2021-11-11 | Stop reason: SDUPTHER

## 2021-11-11 RX ORDER — TROPICAMIDE 10 MG/ML
1 SOLUTION/ DROPS OPHTHALMIC
Status: COMPLETED | OUTPATIENT
Start: 2021-11-11 | End: 2021-11-11

## 2021-11-11 RX ORDER — SODIUM CHLORIDE 9 MG/ML
INJECTION, SOLUTION INTRAVENOUS CONTINUOUS PRN
Status: DISCONTINUED | OUTPATIENT
Start: 2021-11-11 | End: 2021-11-11

## 2021-11-11 RX ORDER — PHENYLEPHRINE HCL 2.5 %
1 DROPS OPHTHALMIC (EYE)
Status: COMPLETED | OUTPATIENT
Start: 2021-11-11 | End: 2021-11-11

## 2021-11-11 RX ORDER — SODIUM CHLORIDE, SODIUM LACTATE, POTASSIUM CHLORIDE, CALCIUM CHLORIDE 600; 310; 30; 20 MG/100ML; MG/100ML; MG/100ML; MG/100ML
INJECTION, SOLUTION INTRAVENOUS CONTINUOUS PRN
Status: DISCONTINUED | OUTPATIENT
Start: 2021-11-11 | End: 2021-11-11 | Stop reason: SDUPTHER

## 2021-11-11 RX ORDER — KETOROLAC TROMETHAMINE 5 MG/ML
1 SOLUTION OPHTHALMIC
Status: COMPLETED | OUTPATIENT
Start: 2021-11-11 | End: 2021-11-11

## 2021-11-11 RX ORDER — CIPROFLOXACIN HYDROCHLORIDE 3.5 MG/ML
1 SOLUTION/ DROPS TOPICAL SEE ADMIN INSTRUCTIONS
Status: COMPLETED | OUTPATIENT
Start: 2021-11-11 | End: 2021-11-11

## 2021-11-11 RX ADMIN — CIPROFLOXACIN 1 DROP: 3 SOLUTION OPHTHALMIC at 07:50

## 2021-11-11 RX ADMIN — PHENYLEPHRINE HYDROCHLORIDE 1 DROP: 25 SOLUTION/ DROPS OPHTHALMIC at 07:51

## 2021-11-11 RX ADMIN — KETOROLAC TROMETHAMINE 1 DROP: 5 SOLUTION OPHTHALMIC at 08:30

## 2021-11-11 RX ADMIN — PHENYLEPHRINE HYDROCHLORIDE 1 DROP: 25 SOLUTION/ DROPS OPHTHALMIC at 08:05

## 2021-11-11 RX ADMIN — Medication 1 DROP: at 08:05

## 2021-11-11 RX ADMIN — KETOROLAC TROMETHAMINE 1 DROP: 5 SOLUTION OPHTHALMIC at 08:05

## 2021-11-11 RX ADMIN — Medication 1 DROP: at 08:30

## 2021-11-11 RX ADMIN — CIPROFLOXACIN 1 DROP: 3 SOLUTION OPHTHALMIC at 08:05

## 2021-11-11 RX ADMIN — KETOROLAC TROMETHAMINE 1 DROP: 5 SOLUTION OPHTHALMIC at 07:51

## 2021-11-11 RX ADMIN — PROPOFOL 100 MG: 10 INJECTION, EMULSION INTRAVENOUS at 08:59

## 2021-11-11 RX ADMIN — LIDOCAINE HYDROCHLORIDE 20 MG: 10 INJECTION, SOLUTION EPIDURAL; INFILTRATION; INTRACAUDAL; PERINEURAL at 08:59

## 2021-11-11 RX ADMIN — SODIUM CHLORIDE, POTASSIUM CHLORIDE, SODIUM LACTATE AND CALCIUM CHLORIDE: 600; 310; 30; 20 INJECTION, SOLUTION INTRAVENOUS at 08:52

## 2021-11-11 RX ADMIN — Medication 1 DROP: at 07:52

## 2021-11-11 RX ADMIN — CIPROFLOXACIN 1 DROP: 3 SOLUTION OPHTHALMIC at 08:17

## 2021-11-11 RX ADMIN — CIPROFLOXACIN 1 DROP: 3 SOLUTION OPHTHALMIC at 08:30

## 2021-11-11 RX ADMIN — PHENYLEPHRINE HYDROCHLORIDE 1 DROP: 25 SOLUTION/ DROPS OPHTHALMIC at 08:30

## 2021-11-11 RX ADMIN — Medication 1 DROP: at 07:51

## 2021-11-11 RX ADMIN — Medication 1 DROP: at 08:17

## 2021-11-11 RX ADMIN — PHENYLEPHRINE HYDROCHLORIDE 1 DROP: 25 SOLUTION/ DROPS OPHTHALMIC at 08:17

## 2021-11-11 RX ADMIN — KETOROLAC TROMETHAMINE 1 DROP: 5 SOLUTION OPHTHALMIC at 08:17

## 2021-11-11 ASSESSMENT — PAIN - FUNCTIONAL ASSESSMENT: PAIN_FUNCTIONAL_ASSESSMENT: 0-10

## 2021-11-11 ASSESSMENT — ENCOUNTER SYMPTOMS: SHORTNESS OF BREATH: 1

## 2021-11-11 ASSESSMENT — LIFESTYLE VARIABLES: SMOKING_STATUS: 0

## 2021-11-11 NOTE — ANESTHESIA POSTPROCEDURE EVALUATION
Department of Anesthesiology  Postprocedure Note    Patient: Darlene Escalante  MRN: [id number]  YOB: 1955  Date of evaluation: 11/11/2021  Time:  1:24 PM     Procedure Summary     Date: 11/11/21 Room / Location: [facility] OR 03 / [facility]    Anesthesia Start: 6640 Anesthesia Stop: 0865    Procedure: LEFT EYE CATARACT EXTRACTION IOL IMPLANT (Left Eye) Diagnosis: (LEFT EYE CATARACT)    Surgeons: Claudia Frank MD Responsible Provider: Cheryl Herrera MD    Anesthesia Type: MAC ASA Status: 3          Anesthesia Type: MAC    Wilman Phase I: Wilman Score: 10    Wilman Phase II: Wilman Score: 10    Last vitals: Reviewed and per EMR flowsheets. Anesthesia Post Evaluation    Patient location during evaluation: PACU  Patient participation: complete - patient participated  Level of consciousness: awake and alert  Airway patency: patent  Nausea & Vomiting: no nausea and no vomiting  Complications: no  Cardiovascular status: hemodynamically stable  Respiratory status: acceptable  Hydration status: euvolemic  Comments: Department of Anesthesiology  Post-Anesthesia Note    Name:  Darlene Escalante                                         Age:  78 y.o.   MRN:  [id number]     Last Vitals:  /67   Pulse 56   Temp 98.7 °F (37.1 °C) (Temporal)   Resp 19   Ht 5' 8\" (1.727 m)   Wt 200 lb (90.7 kg)   SpO2 98%   BMI 30.41 kg/m²   Patient Vitals in the past 4 hrs:  11/11/21 1020, BP:119/67, Pulse:56, Resp:19, SpO2:98 %  11/11/21 0949, BP:(!) 116/54, Pulse:52, SpO2:96 %    Level of Consciousness:  Awake    Respiratory:  Stable    Oxygen Saturation:  Stable    Cardiovascular:  Stable    Hydration:  Adequate    PONV:  Stable    Post-op Pain:  Adequate analgesia    Post-op Assessment:  No apparent anesthetic complications    Additional Follow-Up / Treatment / Comment:  None    Cheryl Herrera MD  November 11, 2021   1:24 PM

## 2021-11-11 NOTE — OP NOTE
Operative Note      Patient: Darlene Escalante  YOB: 1955  MRN: [id number]    Date of Procedure: 11/11/2021    Pre-Op Diagnosis: LEFT EYE CATARACT    Post-Op Diagnosis: Same       Procedure(s):  LEFT EYE CATARACT EXTRACTION IOL IMPLANT    Surgeon(s):  Claudia Frank MD    Assistant:   * No surgical staff found *    Anesthesia: Monitor Anesthesia Care    Estimated Blood Loss (mL): Minimal    Complications: None    Specimens:   * No specimens in log *    Implants:  Implant Name Type Inv. Item Serial No.  Lot No. LRB No. Used Action   LENS INTOCU 6.0 TO 30.0 DIOPT 118.7 A CONSTANT 0DEG ANG - [id number]  LENS INTOCU 6.0 TO 30.0 DIOPT 118.7 A CONSTANT 0DEG ANG [id number]-  Left 1 Implanted         Drains: * No LDAs found *    Findings: same    Detailed Description of Procedure: The eye was anesthetized with a mixture of 2% Lidocaine with Vitrase using a standard peribulbar injection. The globe was noted to be intact. A weight was then placed onto the eye for approximately 5 minutes. After an adequate level of anesthesia was obtained, the patient was prepped and draped in the usual sterile fashion. A lid speculum was then placed into the eye. Fritz scissors were then used to create a fornix-based conjunctival flap approximately 3 mm long. Residual Tenons were then cleared. A wet-field  cautery was then used to obtain hemostasis. A 64 degree [place] blade was then used to create a limbal groove of approximately 1.5 mm posterior to the surgical limbus and approximately 2.5 mm in length. A crescent blade was then used to dissect a scleral  flap into clear cornea. The chamber was then entered through the tunnel incision created by the crescent knife using a 2.4-mm keratome blade. Viscoat was then used to reconstitute the chamber. A side port incision was then created for a 2nd instrument.   A bent 27-gauge needle was then used to create the

## 2021-11-11 NOTE — ANESTHESIA PRE PROCEDURE
Department of Anesthesiology  Preprocedure Note       Name:  Charissa Farfan   Age:  78 y.o.  :  1955                                          MRN:  [id number]         Date:  2021      Surgeon: Ramona Clement):  Cherri Tolbert MD    Procedure: LEFT EYE CATARACT EXTRACTION IOL IMPLANT (Left Eye)    Medications prior to admission:   Prior to Admission medications    Medication Sig Start Date End Date Taking? Authorizing Provider   citalopram (CELEXA) 40 MG tablet TAKE 1 AND 1/2 TABLETS BY MOUTH EVERY MORNING 10/20/21   Lucilarory Yip, DO   atorvastatin (LIPITOR) 80 MG tablet Take 1 tablet by mouth daily 10/12/21   Lucila Generous Theodora, DO   empagliflozin (JARDIANCE) 10 MG tablet TAKE 1 TABLET BY MOUTH EVERY DAY 10/12/21   Lucila Brandi Yip, DO   ezetimibe (ZETIA) 10 MG tablet TAKE 1 TABLET BY MOUTH EVERY DAY 21   Lucila Brandi Yip, DO   amLODIPine (NORVASC) 10 MG tablet Take 1 tablet by mouth daily 21   Fer Almendarez, DO   metoprolol tartrate (LOPRESSOR) 25 MG tablet Take 1 tablet by mouth 2 times daily 21   Fer Rascon GreenerU, DO   losartan-hydroCHLOROthiazide (HYZAAR) 100-25 MG per tablet TAKE 1 TABLET BY MOUTH EVERY DAY 21   Lucila Yip, DO   nitroGLYCERIN (NITROSTAT) 0.4 MG SL tablet Place 1 tablet under the tongue every 5 minutes as needed for Chest pain If chest pain: put 1 NTG tab under tongue - sit down - wait 5 min - if no relief, call 911. May repeat dose 2 more times 5 min apart while waiting for EMS (total of 3 doses). If dizzy do not take any further doses. Patient taking differently: Place 0.4 mg under the tongue every 5 minutes as needed for Chest pain If chest pain: put 1 NTG tab under tongue - sit down - wait 5 min - if no relief, call 911. May repeat dose 2 more times 5 min apart while waiting for EMS (total of 3 doses). If dizzy do not take any further doses.   Has never had to use 21   Lucila Brandi Yip, DO   cloNIDine (CATAPRES) 0.3 MG tablet TAKE 1 TABLET BY MOUTH EVERY DAY AT NIGHT  Patient taking differently: Take 0.15 mg by mouth daily  6/1/21   Mook Yip DO   omeprazole (PRILOSEC) 40 MG delayed release capsule TAKE 1 CAPSULE BY MOUTH TWICE A DAY  Patient taking differently: as needed  6/1/21   Mook Yip DO   cholestyramine (QUESTRAN) 4 g packet Take 1 packet by mouth 2 times daily  Patient taking differently: Take 1 packet by mouth 2 times daily prn 6/4/20   Lord Kassy MD   aspirin 81 MG tablet Take 81 mg by mouth daily Last dose 10/19/19    Historical Provider, MD   ferrous sulfate 325 (65 Fe) MG tablet Take 325 mg by mouth daily (with breakfast)     Historical Provider, MD   Cholecalciferol (VITAMIN D3) 5000 UNITS TABS Take 1 tablet by mouth every morning     Historical Provider, MD   Ascorbic Acid (VITAMIN C) 500 MG tablet Take 500 mg by mouth every morning     Historical Provider, MD   fish oil-omega-3 fatty acids 1000 MG capsule Take 1 g by mouth every morning     Historical Provider, MD       Current medications:    No current outpatient medications on file. No current facility-administered medications for this visit. Allergies:     Allergies   Allergen Reactions    Penicillins Hives       Problem List:    Patient Active Problem List   Diagnosis Code    Coronary artery disease involving native coronary artery of native heart without angina pectoris I25.10    Hypertension I10    Pure hypercholesterolemia E78.00    Anemia D64.9    GERD (gastroesophageal reflux disease) K21.9    GARZA (dyspnea on exertion) R06.00    Left knee DJD M17.12    Moderate obesity E66.8    Near syncope R55    Anxiety attack F41.0    NSTEMI (non-ST elevated myocardial infarction) ([facility] [address] I21.4    Hiatal hernia K44.9    Dizziness R42    Cholelithiasis W38.85    Biliary colic N65.62    Combined forms of age-related cataract of both eyes H25.813       Past Medical History:        Diagnosis Date    Anemia     Anxiety attack     controlled with citolpram    CAD (coronary artery disease) 17 YRS AGO    MI X 2. Gloria Canavan Dr. Monnie Fanny yearly    Cataract, left eye     Diabetes mellitus ([facility] [address]     GERD (gastroesophageal reflux disease)     Hypercholesterolemia 1994    Hyperlipidemia     Hypertension 6/12/14    B/P IS ELEVATED, PATIENT STATES HE NEEDS TO GET HIS LOPRESSOR REFILLED    Left knee DJD 6/10/2014    OSTEO    NSTEMI (non-ST elevated myocardial infarction) ([facility] [address] 05/28/2012       Past Surgical History:        Procedure Laterality Date    ANKLE SURGERY  6 YRS AGO    right ORIF    CHOLECYSTECTOMY, LAPAROSCOPIC N/A 7/19/2019    CHOLECYSTECTOMY LAPAROSCOPIC ROBOTIC ASSISTED  XI performed by Veronica Pérez MD at [address] [place] [address] COLONOSCOPY  07/20/2015    CORONARY ANGIOPLASTY WITH STENT PLACEMENT  5/29/2012    Dr. Filipe Moya, Stent Mid LAD    DIAGNOSTIC CARDIAC CATH LAB PROCEDURE  9/17/1998    [facility]. Cath/PTCA/stent of RCA; PTCA/stent of mid LAD with adjunctive ReoPro administration.  DIAGNOSTIC CARDIAC CATH LAB PROCEDURE  6/25/2002    [facility]. Kissing balloon angioplasty LAD>diag Perclose. Dr. Filipe Moya and Dr. Nany Diana.  DIAGNOSTIC CARDIAC CATH LAB PROCEDURE  4/13/2005    [facility]. Cath/stent (TAX\"US) to diagonal at [facility].  ENDOSCOPY, COLON, DIAGNOSTIC      FINGER TRIGGER RELEASE Right 11/20/2015    Release right long trigger finger. Monserrat Miranda MD    HERNIA REPAIR Left YRS AGO    [facility]    HIATAL HERNIA REPAIR N/A 10/22/2019    LAPAROSCOPIC ROBOTIC XI ASSISTED PARAESOPHAGEAL HERNIA REPAIR WITH PARTIAL FUNDOPLICATION AND MYOFASCIAL FLAP, UPPER ENDOSCOPY performed by Kennedy Severin, MD at [facility] Right 08/01/2017    robotic assisted    JOINT REPLACEMENT Left 2014    knee    KNEE ARTHROPLASTY Left 06/16/2014    Left TKA.   Monserrat Miranda MD    TONSILLECTOMY      UPPER GASTROINTESTINAL ENDOSCOPY      UPPER GASTROINTESTINAL ENDOSCOPY N/A 9/10/2019    EGD BIOPSY performed by Juice Lowry MD at [facility] N/A 7/12/2021    EGD BIOPSY performed by Juice Lowry MD at [address] History:    Social History     Tobacco Use    Smoking status: Never Smoker    Smokeless tobacco: Never Used   Substance Use Topics    Alcohol use: Yes     Comment: socially                                Counseling given: Not Answered      Vital Signs (Current): There were no vitals filed for this visit. BP Readings from Last 3 Encounters:   10/18/21 132/84   07/26/21 113/76   07/12/21 (!) 140/78       NPO Status:                                                                                 BMI:   Wt Readings from Last 3 Encounters:   11/09/21 200 lb (90.7 kg)   10/18/21 207 lb (93.9 kg)   07/26/21 203 lb (92.1 kg)     There is no height or weight on file to calculate BMI.    CBC:   Lab Results   Component Value Date    WBC 9.0 06/11/2021    RBC 5.52 06/11/2021    HGB 15.9 06/11/2021    HCT 48.2 06/11/2021    MCV 87.3 06/11/2021    RDW 13.3 06/11/2021     06/11/2021       CMP:   Lab Results   Component Value Date     10/18/2021    K 3.8 10/18/2021    K 3.6 10/23/2019     10/18/2021    CO2 22 10/18/2021    BUN 20 10/18/2021    CREATININE 1.0 10/18/2021    GFRAA >60 10/18/2021    LABGLOM >60 10/18/2021    GLUCOSE 169 10/18/2021    GLUCOSE 103 05/30/2012    PROT 7.2 10/18/2021    CALCIUM 10.0 10/18/2021    BILITOT 1.2 10/18/2021    ALKPHOS 104 10/18/2021    AST 19 10/18/2021    ALT 28 10/18/2021       POC Tests: No results for input(s): POCGLU, POCNA, POCK, POCCL, POCBUN, POCHEMO, POCHCT in the last 72 hours.     Coags:   Lab Results   Component Value Date    PROTIME 11.3 03/04/2018    PROTIME 12.4 05/29/2012    INR 1.0 03/04/2018    APTT 24.7 05/27/2012       HCG (If Applicable): No results found for: PREGTESTUR, PREGSERUM, HCG, HCGQUANT     ABGs: No results found for: PHART, PO2ART, YVI8GJV, JRX3HBO, BEART, A4PHYBLP     Type & Screen (If Applicable):  No results found for: LABABO, [address]    Anesthesia Evaluation  Patient summary reviewed no history of anesthetic complications:   Airway: Mallampati: III  TM distance: <3 FB   Neck ROM: full  Mouth opening: < 3 FB Dental: normal exam         Pulmonary: breath sounds clear to auscultation  (+) shortness of breath:      (-) not a current smoker                           Cardiovascular:    (+) hypertension:, past MI: > 6 months, CAD:, CABG/stent (cardiac stents):, hyperlipidemia    (-)  GARZA      Rhythm: regular  Rate: normal           Beta Blocker:  Dose within 24 Hrs         Neuro/Psych:   (+) depression/anxiety    (-) neuromuscular disease           GI/Hepatic/Renal:   (+) GERD:,      (-) bowel prep and no morbid obesity       Endo/Other:    (+) DiabetesType II DM, , : arthritis: OA., .                  ROS comment: obese Abdominal:             Vascular: negative vascular ROS. Other Findings:               Anesthesia Plan      MAC     ASA 3       Induction: intravenous. Anesthetic plan and risks discussed with patient. Plan discussed with CRNA. DOS STAFF ADDENDUM:    Pt seen and examined, chart reviewed (including anesthesia, drug and allergy history). Anesthetic plan, risks, benefits, alternatives, and personnel involved discussed with patient. Patient verbalized an understanding and agrees to proceed. Plan discussed with care team members and agreed upon.     Federico Eng MD  Staff Anesthesiologist  7:07 AM    Federico Eng MD   11/11/2021

## 2021-12-01 ENCOUNTER — OFFICE VISIT (OUTPATIENT)
Dept: PRIMARY CARE CLINIC | Age: 66
End: 2021-12-01
Payer: COMMERCIAL

## 2021-12-01 VITALS
BODY MASS INDEX: 31.47 KG/M2 | HEART RATE: 60 BPM | TEMPERATURE: 97.2 F | OXYGEN SATURATION: 95 % | SYSTOLIC BLOOD PRESSURE: 132 MMHG | WEIGHT: 207 LBS | DIASTOLIC BLOOD PRESSURE: 80 MMHG

## 2021-12-01 DIAGNOSIS — E11.9 TYPE 2 DIABETES MELLITUS WITHOUT COMPLICATION, UNSPECIFIED WHETHER LONG TERM INSULIN USE (HCC): Primary | ICD-10-CM

## 2021-12-01 DIAGNOSIS — E66.9 OBESITY WITH BODY MASS INDEX GREATER THAN 30: ICD-10-CM

## 2021-12-01 DIAGNOSIS — I10 ESSENTIAL HYPERTENSION: ICD-10-CM

## 2021-12-01 DIAGNOSIS — I25.10 CORONARY ARTERY DISEASE INVOLVING NATIVE CORONARY ARTERY OF NATIVE HEART WITHOUT ANGINA PECTORIS: ICD-10-CM

## 2021-12-01 PROCEDURE — 3051F HG A1C>EQUAL 7.0%<8.0%: CPT | Performed by: INTERNAL MEDICINE

## 2021-12-01 PROCEDURE — 99214 OFFICE O/P EST MOD 30 MIN: CPT | Performed by: INTERNAL MEDICINE

## 2021-12-01 RX ORDER — SEMAGLUTIDE 1.34 MG/ML
INJECTION, SOLUTION SUBCUTANEOUS
Qty: 4 PEN | Refills: 1 | Status: SHIPPED
Start: 2021-12-01 | End: 2022-03-02 | Stop reason: SDUPTHER

## 2021-12-01 RX ORDER — SEMAGLUTIDE 1.34 MG/ML
0.25 INJECTION, SOLUTION SUBCUTANEOUS WEEKLY
Qty: 2 PEN | Refills: 2 | Status: SHIPPED
Start: 2021-12-01 | End: 2021-12-01

## 2021-12-01 NOTE — PROGRESS NOTES
12/1/21    Severino Mooney, a male of 78 y.o. presents today for Diabetes (check up) and Hypertension    At last appointment, he was seen for preoperative exam for cataract surgery. He did have surgery on November 11. He denies any issues or side effects with medications. He denies chest pain shortness of breath palpitations or edema. He denies any nausea vomiting or diarrhea. He denies any changes in bowel movements. He does not have any abdominal pain.      Patient Active Problem List   Diagnosis    Coronary artery disease involving native coronary artery of native heart without angina pectoris    Hypertension    Pure hypercholesterolemia    Anemia    GERD (gastroesophageal reflux disease)    GARZA (dyspnea on exertion)    Left knee DJD    Moderate obesity    Near syncope    Anxiety attack    NSTEMI (non-ST elevated myocardial infarction) ([facility] [address]    Hiatal hernia    Dizziness    Cholelithiasis    Biliary colic    Combined forms of age-related cataract of both eyes      Allergies   Allergen Reactions    Penicillins Hives     Current Outpatient Medications on File Prior to Visit   Medication Sig Dispense Refill    citalopram (CELEXA) 40 MG tablet TAKE 1 AND 1/2 TABLETS BY MOUTH EVERY MORNING 135 tablet 1    atorvastatin (LIPITOR) 80 MG tablet Take 1 tablet by mouth daily 90 tablet 1    empagliflozin (JARDIANCE) 10 MG tablet TAKE 1 TABLET BY MOUTH EVERY DAY 90 tablet 1    ezetimibe (ZETIA) 10 MG tablet TAKE 1 TABLET BY MOUTH EVERY DAY 90 tablet 1    amLODIPine (NORVASC) 10 MG tablet Take 1 tablet by mouth daily 90 tablet 0    metoprolol tartrate (LOPRESSOR) 25 MG tablet Take 1 tablet by mouth 2 times daily 180 tablet 0    losartan-hydroCHLOROthiazide (HYZAAR) 100-25 MG per tablet TAKE 1 TABLET BY MOUTH EVERY DAY 90 tablet 1    nitroGLYCERIN (NITROSTAT) 0.4 MG SL tablet Place 1 tablet under the tongue every 5 minutes as needed for Chest pain If chest pain: put 1 NTG tab under tongue - sit down - wait 5 min - if no relief, call 911. May repeat dose 2 more times 5 min apart while waiting for EMS (total of 3 doses). If dizzy do not take any further doses. (Patient taking differently: Place 0.4 mg under the tongue every 5 minutes as needed for Chest pain If chest pain: put 1 NTG tab under tongue - sit down - wait 5 min - if no relief, call 911. May repeat dose 2 more times 5 min apart while waiting for EMS (total of 3 doses). If dizzy do not take any further doses. Has never had to use) 90 tablet 1    cloNIDine (CATAPRES) 0.3 MG tablet TAKE 1 TABLET BY MOUTH EVERY DAY AT NIGHT (Patient taking differently: Take 0.15 mg by mouth daily ) 90 tablet 1    omeprazole (PRILOSEC) 40 MG delayed release capsule TAKE 1 CAPSULE BY MOUTH TWICE A DAY (Patient taking differently: as needed ) 180 capsule 1    cholestyramine (QUESTRAN) 4 g packet Take 1 packet by mouth 2 times daily (Patient taking differently: Take 1 packet by mouth 2 times daily prn) 90 packet 3    aspirin 81 MG tablet Take 81 mg by mouth daily Last dose 10/19/19      ferrous sulfate 325 (65 Fe) MG tablet Take 325 mg by mouth daily (with breakfast)       Cholecalciferol (VITAMIN D3) 5000 UNITS TABS Take 1 tablet by mouth every morning       Ascorbic Acid (VITAMIN C) 500 MG tablet Take 500 mg by mouth every morning       fish oil-omega-3 fatty acids 1000 MG capsule Take 1 g by mouth every morning        No current facility-administered medications on file prior to visit. Review of Systems  Constitutional:Negative for activity change, appetite change, chills, fatigue and fever. Respiratory: Negative for choking, chest tightness, shortness of breath and wheezing. Cardiovascular: Negative for chest pain, palpitations and leg swelling. Gastrointestinal: Negative for abdominal distention, constipation, diarrhea, nausea and vomiting. Musculoskeletal: Negative for arthralgias, back pain, gait problem and joint swelling.    Neurological: Negative for dizziness, weakness,numbness and headaches. /80   Pulse 60   Temp 97.2 °F (36.2 °C)   Wt 207 lb (93.9 kg)   SpO2 95%   BMI 31.47 kg/m²      Physical Exam   Constitutional:  Oriented to person, place, and time. Appears well-developed and well-nourished. No acute distress. HENT: No sinus tenderness or lymphadenopathy  Head: Normocephalic and atraumatic. Eyes: Eyes exhibits no discharge. No scleral icterus present. Neck: No tracheal deviation present. No thyromegaly present. Cardiovascular: Normal rate, regular rhythm, normal heart sounds and intact distal pulses. Exam reveals no gallop nor friction rub. No murmur heard. Pulmonary: Effort normal and breath sounds normal. No respiratory distress. No wheezes or rales. Abdomen: No signs of rigidity rebound or organomegaly  Musculoskeletal:  No tenderness to palpation  Neurological:Alert and oriented to person, place, and time. Skin: No diaphoresis. Psychiatric: Normal mood and affect. Behavior is Normal.     ASSESSMENT AND PLAN:    Betzaida Fagan was seen today for diabetes and hypertension. Diagnoses and all orders for this visit:    Type 2 diabetes mellitus without complication, unspecified whether long term insulin use (HCC)    Coronary artery disease involving native coronary artery of native heart without angina pectoris    Essential hypertension    Obesity with body mass index greater than 30    Other orders  -     Semaglutide,0.25 or 0.5MG/DOS, (OZEMPIC, 0.25 OR 0.5 MG/DOSE,) 2 MG/1.5ML SOPN; Inject 0.25 mg into the skin once a week        Assessment    1. Diabetes mellitus, last hemoglobin A1c 7.4  2. Status post cataract surgery  3. Essential hypertension  4. Obesity  5. Coronary artery disease    Plan    1. Start Ozempic and titrate as necessary  2. Doing well postoperatively from cataract surgery  3. Blood pressure well controlled  4. Discussed diet and exercise  5.  He denies any chest pain shortness of breath or palpitations      Repeat blood work in 3 months and assess tolerance to Ozempic      No follow-ups on file.         Electronically signed by Hannah Zelaya DO on 12/1/2021 at 9:31 AM    Hannah Zelaya DO

## 2021-12-13 DIAGNOSIS — I25.10 CORONARY ARTERY DISEASE INVOLVING NATIVE CORONARY ARTERY OF NATIVE HEART WITHOUT ANGINA PECTORIS: ICD-10-CM

## 2021-12-14 RX ORDER — ATORVASTATIN CALCIUM 80 MG/1
80 TABLET, FILM COATED ORAL DAILY
Qty: 90 TABLET | Refills: 1 | Status: SHIPPED
Start: 2021-12-14 | End: 2022-07-06 | Stop reason: SDUPTHER

## 2021-12-15 DIAGNOSIS — I10 ESSENTIAL HYPERTENSION: ICD-10-CM

## 2021-12-15 RX ORDER — LOSARTAN POTASSIUM AND HYDROCHLOROTHIAZIDE 25; 100 MG/1; MG/1
TABLET ORAL
Qty: 90 TABLET | Refills: 1 | Status: SHIPPED | OUTPATIENT
Start: 2021-12-15

## 2021-12-15 NOTE — ANESTHESIA POSTPROCEDURE EVALUATION
Department of Anesthesiology  Postprocedure Note    Patient: Jamie Anthony  MRN: [id number]  YOB: 1955  Date of evaluation: 9/10/2019  Time:  5:21 PM     Procedure Summary     Date:  09/10/19 Room / Location:  [facility] 01 / [address] [facility] ENDOSCOPY    Anesthesia Start:  1354 Anesthesia Stop:  0560    Procedure:  EGD BIOPSY (N/A ) Diagnosis:  (GERD)    Surgeon:  Ayo Beaver MD Responsible Provider:  Berta Romo DO    Anesthesia Type:  MAC ASA Status:  3          Anesthesia Type: MAC    Wilman Phase I: Wilman Score: 10    Wilman Phase II: Wilman Score: 10    Last vitals: Reviewed and per EMR flowsheets.        Anesthesia Post Evaluation    Patient location during evaluation: PACU  Patient participation: complete - patient participated  Level of consciousness: awake and alert  Airway patency: patent  Nausea & Vomiting: no nausea and no vomiting  Complications: no  Cardiovascular status: hemodynamically stable  Respiratory status: acceptable  Hydration status: euvolemic SW attempted to call patient's mother back to provide an update.  The call only rang twice and went to .

## 2021-12-18 DIAGNOSIS — I10 ESSENTIAL HYPERTENSION: ICD-10-CM

## 2021-12-18 RX ORDER — CLONIDINE HYDROCHLORIDE 0.3 MG/1
0.15 TABLET ORAL EVERY EVENING
Qty: 30 TABLET | Refills: 0 | Status: SHIPPED | OUTPATIENT
Start: 2021-12-18 | End: 2022-01-10

## 2022-01-09 DIAGNOSIS — I10 ESSENTIAL HYPERTENSION: ICD-10-CM

## 2022-01-10 RX ORDER — CLONIDINE HYDROCHLORIDE 0.1 MG/1
TABLET ORAL
Qty: 14 TABLET | Refills: 0 | Status: SHIPPED
Start: 2022-01-10 | End: 2022-09-13 | Stop reason: ALTCHOICE

## 2022-01-26 DIAGNOSIS — I25.10 CORONARY ARTERY DISEASE INVOLVING NATIVE CORONARY ARTERY OF NATIVE HEART WITHOUT ANGINA PECTORIS: ICD-10-CM

## 2022-01-27 RX ORDER — AMLODIPINE BESYLATE 10 MG/1
TABLET ORAL
Qty: 90 TABLET | Refills: 1 | Status: SHIPPED
Start: 2022-01-27 | End: 2022-07-06 | Stop reason: SDUPTHER

## 2022-01-27 RX ORDER — EZETIMIBE 10 MG/1
TABLET ORAL
Qty: 90 TABLET | Refills: 1 | Status: SHIPPED
Start: 2022-01-27 | End: 2022-03-02 | Stop reason: SDUPTHER

## 2022-03-02 ENCOUNTER — OFFICE VISIT (OUTPATIENT)
Dept: PRIMARY CARE CLINIC | Age: 67
End: 2022-03-02
Payer: COMMERCIAL

## 2022-03-02 ENCOUNTER — TELEPHONE (OUTPATIENT)
Dept: PRIMARY CARE CLINIC | Age: 67
End: 2022-03-02

## 2022-03-02 VITALS
HEART RATE: 108 BPM | DIASTOLIC BLOOD PRESSURE: 98 MMHG | HEIGHT: 68 IN | TEMPERATURE: 97.6 F | BODY MASS INDEX: 30.16 KG/M2 | SYSTOLIC BLOOD PRESSURE: 142 MMHG | OXYGEN SATURATION: 100 % | WEIGHT: 199 LBS

## 2022-03-02 DIAGNOSIS — E11.9 TYPE 2 DIABETES MELLITUS WITHOUT COMPLICATION, WITH LONG-TERM CURRENT USE OF INSULIN (HCC): ICD-10-CM

## 2022-03-02 DIAGNOSIS — Z79.4 TYPE 2 DIABETES MELLITUS WITHOUT COMPLICATION, WITH LONG-TERM CURRENT USE OF INSULIN (HCC): ICD-10-CM

## 2022-03-02 DIAGNOSIS — J01.10 ACUTE FRONTAL SINUSITIS, RECURRENCE NOT SPECIFIED: ICD-10-CM

## 2022-03-02 DIAGNOSIS — G72.0 CHOLESTEROL-LOWERING AGENT MYOPATHY: Primary | ICD-10-CM

## 2022-03-02 DIAGNOSIS — T46.6X5A CHOLESTEROL-LOWERING AGENT MYOPATHY: Primary | ICD-10-CM

## 2022-03-02 LAB
ALBUMIN SERPL-MCNC: 4.1 G/DL (ref 3.5–5.2)
ALP BLD-CCNC: 101 U/L (ref 40–129)
ALT SERPL-CCNC: 39 U/L (ref 0–40)
ANION GAP SERPL CALCULATED.3IONS-SCNC: 14 MMOL/L (ref 7–16)
AST SERPL-CCNC: 29 U/L (ref 0–39)
BILIRUB SERPL-MCNC: 1 MG/DL (ref 0–1.2)
BUN BLDV-MCNC: 24 MG/DL (ref 6–23)
CALCIUM SERPL-MCNC: 10 MG/DL (ref 8.6–10.2)
CHLORIDE BLD-SCNC: 97 MMOL/L (ref 98–107)
CHOLESTEROL, TOTAL: 156 MG/DL (ref 0–199)
CO2: 28 MMOL/L (ref 22–29)
CREAT SERPL-MCNC: 1 MG/DL (ref 0.7–1.2)
GFR AFRICAN AMERICAN: >60
GFR NON-AFRICAN AMERICAN: >60 ML/MIN/1.73
GLUCOSE BLD-MCNC: 153 MG/DL (ref 74–99)
HBA1C MFR BLD: 6.3 % (ref 4–5.6)
HCT VFR BLD CALC: 49.5 % (ref 37–54)
HDLC SERPL-MCNC: 36 MG/DL
HEMOGLOBIN: 16.9 G/DL (ref 12.5–16.5)
LDL CHOLESTEROL CALCULATED: 79 MG/DL (ref 0–99)
MCH RBC QN AUTO: 30.4 PG (ref 26–35)
MCHC RBC AUTO-ENTMCNC: 34.1 % (ref 32–34.5)
MCV RBC AUTO: 89 FL (ref 80–99.9)
PDW BLD-RTO: 13.2 FL (ref 11.5–15)
PLATELET # BLD: 277 E9/L (ref 130–450)
PMV BLD AUTO: 10.4 FL (ref 7–12)
POTASSIUM SERPL-SCNC: 3.7 MMOL/L (ref 3.5–5)
RBC # BLD: 5.56 E12/L (ref 3.8–5.8)
SODIUM BLD-SCNC: 139 MMOL/L (ref 132–146)
TOTAL PROTEIN: 7.2 G/DL (ref 6.4–8.3)
TRIGL SERPL-MCNC: 207 MG/DL (ref 0–149)
VLDLC SERPL CALC-MCNC: 41 MG/DL
WBC # BLD: 13.5 E9/L (ref 4.5–11.5)

## 2022-03-02 PROCEDURE — 99213 OFFICE O/P EST LOW 20 MIN: CPT | Performed by: INTERNAL MEDICINE

## 2022-03-02 RX ORDER — AZITHROMYCIN 250 MG/1
250 TABLET, FILM COATED ORAL SEE ADMIN INSTRUCTIONS
Qty: 6 TABLET | Refills: 0 | Status: SHIPPED | OUTPATIENT
Start: 2022-03-02 | End: 2022-03-07

## 2022-03-02 RX ORDER — SEMAGLUTIDE 1.34 MG/ML
INJECTION, SOLUTION SUBCUTANEOUS
Qty: 4 PEN | Refills: 1 | Status: SHIPPED
Start: 2022-03-02 | End: 2022-03-11

## 2022-03-02 RX ORDER — EZETIMIBE 10 MG/1
10 TABLET ORAL DAILY
Qty: 90 TABLET | Refills: 1 | Status: SHIPPED
Start: 2022-03-02 | End: 2022-07-06 | Stop reason: SDUPTHER

## 2022-03-02 RX ORDER — EXENATIDE 2 MG/.65ML
2 INJECTION, SUSPENSION, EXTENDED RELEASE SUBCUTANEOUS WEEKLY
Qty: 4 PEN | Refills: 3 | Status: SHIPPED
Start: 2022-03-02 | End: 2022-07-06 | Stop reason: ALTCHOICE

## 2022-03-02 RX ORDER — BROMPHENIRAMINE MALEATE, PSEUDOEPHEDRINE HYDROCHLORIDE, AND DEXTROMETHORPHAN HYDROBROMIDE 2; 30; 10 MG/5ML; MG/5ML; MG/5ML
5 SYRUP ORAL 4 TIMES DAILY PRN
Qty: 250 ML | Refills: 0 | Status: SHIPPED
Start: 2022-03-02 | End: 2022-07-06 | Stop reason: ALTCHOICE

## 2022-03-02 NOTE — PROGRESS NOTES
3/2/22    Polo Manzano, a male of 78 y.o. presents today for Diabetes, Discuss Labs, and Chest Congestion (head, nasal, cough with dark yellow discharge for 2 weeks )    At last appointment,   he was seen for preop exam.  He was started on Ozempic for uncontrolled diabetes. He has been having headache and nasal congestion. It has been going on for weeks. BP (!) 142/98   Pulse 108   Temp 97.6 °F (36.4 °C) (Temporal)   Ht 5' 8\" (1.727 m)   Wt 199 lb (90.3 kg)   SpO2 100%   BMI 30.26 kg/m²     Review of Systems  Constitutional:Negative for activity change, appetite change, chills, fatigue and fever. Respiratory: Negative for choking, chest tightness, shortness of breath and wheezing. Cardiovascular: Negative for chest pain, palpitations and leg swelling. Gastrointestinal: Negative for abdominal distention, constipation, diarrhea, nausea and vomiting. Musculoskeletal: Negative for arthralgias, back pain, gait problem and joint swelling. Neurological: Negative for dizziness, weakness,numbness and headaches.      Patient Active Problem List    Diagnosis Date Noted    Combined forms of age-related cataract of both eyes 01/73/4650    Biliary colic     Cholelithiasis 07/17/2019    Near syncope 03/04/2018    Anxiety attack     Hiatal hernia     Dizziness     Moderate obesity 07/12/2016    Left knee DJD 06/10/2014    GARZA (dyspnea on exertion)     NSTEMI (non-ST elevated myocardial infarction) ([facility] [address] 05/28/2012    Coronary artery disease involving native coronary artery of native heart without angina pectoris     Hypertension     Pure hypercholesterolemia     Anemia     GERD (gastroesophageal reflux disease)      Allergies   Allergen Reactions    Penicillins Hives     Current Outpatient Medications on File Prior to Visit   Medication Sig Dispense Refill    amLODIPine (NORVASC) 10 MG tablet TAKE 1 TABLET BY MOUTH EVERY DAY 90 tablet 1    metoprolol tartrate (LOPRESSOR) 25 MG tablet TAKE 1 TABLET BY MOUTH TWICE A  tablet 1    losartan-hydroCHLOROthiazide (HYZAAR) 100-25 MG per tablet TAKE 1 TABLET BY MOUTH EVERY DAY 90 tablet 1    atorvastatin (LIPITOR) 80 MG tablet Take 1 tablet by mouth daily 90 tablet 1    citalopram (CELEXA) 40 MG tablet TAKE 1 AND 1/2 TABLETS BY MOUTH EVERY MORNING 135 tablet 1    empagliflozin (JARDIANCE) 10 MG tablet TAKE 1 TABLET BY MOUTH EVERY DAY 90 tablet 1    nitroGLYCERIN (NITROSTAT) 0.4 MG SL tablet Place 1 tablet under the tongue every 5 minutes as needed for Chest pain If chest pain: put 1 NTG tab under tongue - sit down - wait 5 min - if no relief, call 911. May repeat dose 2 more times 5 min apart while waiting for EMS (total of 3 doses). If dizzy do not take any further doses. (Patient taking differently: Place 0.4 mg under the tongue every 5 minutes as needed for Chest pain If chest pain: put 1 NTG tab under tongue - sit down - wait 5 min - if no relief, call 911. May repeat dose 2 more times 5 min apart while waiting for EMS (total of 3 doses). If dizzy do not take any further doses.   Has never had to use) 90 tablet 1    omeprazole (PRILOSEC) 40 MG delayed release capsule TAKE 1 CAPSULE BY MOUTH TWICE A DAY (Patient taking differently: as needed ) 180 capsule 1    cholestyramine (QUESTRAN) 4 g packet Take 1 packet by mouth 2 times daily (Patient taking differently: Take 1 packet by mouth 2 times daily prn) 90 packet 3    aspirin 81 MG tablet Take 81 mg by mouth daily Last dose 10/19/19      ferrous sulfate 325 (65 Fe) MG tablet Take 325 mg by mouth daily (with breakfast)       Cholecalciferol (VITAMIN D3) 5000 UNITS TABS Take 1 tablet by mouth every morning       Ascorbic Acid (VITAMIN C) 500 MG tablet Take 500 mg by mouth every morning       fish oil-omega-3 fatty acids 1000 MG capsule Take 1 g by mouth every morning       cloNIDine (CATAPRES) 0.1 MG tablet Take 0.1 mg daily for 14 days and then discontinue (Patient not taking: Reported on 3/2/2022) 14 tablet 0     No current facility-administered medications on file prior to visit. Physical Exam   Constitutional:  Oriented to person, place, and time. Appears well-developed and well-nourished. No acute distress. HENT: No sinus tenderness or lymphadenopathy  Head: Normocephalic and atraumatic. Eyes: Eyes exhibits no discharge. No scleral icterus present. Neck: No tracheal deviation present. No thyromegaly present. Cardiovascular: Normal rate, regular rhythm, normal heart sounds and intact distal pulses. Exam reveals no gallop nor friction rub. No murmur heard. Pulmonary: Effort normal and breath sounds normal. No respiratory distress. No wheezes or rales. Abdomen: No signs of rigidity rebound or organomegaly  Musculoskeletal:  No tenderness to palpation  Neurological:Alert and oriented to person, place, and time. Skin: No diaphoresis. Psychiatric: Normal mood and affect. Behavior is Normal.     ASSESSMENT AND PLAN:    Assessment  Diagnoses and all orders for this visit:  Cholesterol-lowering agent myopathy  -     ezetimibe (ZETIA) 10 MG tablet; Take 1 tablet by mouth daily  Type 2 diabetes mellitus without complication, with long-term current use of insulin (HCC)  -     Semaglutide,0.25 or 0.5MG/DOS, (OZEMPIC, 0.25 OR 0.5 MG/DOSE,) 2 MG/1.5ML SOPN; Inject 0.25 mg once a week for 4 weeks. Then increase to 0.5 mg once a week thereafter  -     Hemoglobin A1C; Future  -     CBC; Future  -     Comprehensive Metabolic Panel; Future  -     Lipid Panel; Future  Acute frontal sinusitis, recurrence not specified  -     azithromycin (ZITHROMAX) 250 MG tablet; Take 1 tablet by mouth See Admin Instructions for 5 days 500mg on day 1 followed by 250mg on days 2 - 5  Other orders  -     brompheniramine-pseudoephedrine-DM 2-30-10 MG/5ML syrup; Take 5 mLs by mouth 4 times daily as needed for Cough Ok to substitute nearest mL bottle      Plan    1.  Start azithromycin and Bromfed, defer steroids due to diabetes  2. Continue Ozempic  3. Continue Zetia  4. Repeat blood work including hemoglobin A1c since initiating Ozempic  5. Has lost approximately 10 lbs. intentionally    Return in about 4 months (around 7/2/2022).     Electronically signed by Roslyn Li DO on 3/2/2022 at 9:51 AM    Roslyn Li DO

## 2022-03-07 ENCOUNTER — TELEPHONE (OUTPATIENT)
Dept: PRIMARY CARE CLINIC | Age: 67
End: 2022-03-07

## 2022-03-11 DIAGNOSIS — Z79.4 TYPE 2 DIABETES MELLITUS WITHOUT COMPLICATION, WITH LONG-TERM CURRENT USE OF INSULIN (HCC): ICD-10-CM

## 2022-03-11 DIAGNOSIS — E11.9 TYPE 2 DIABETES MELLITUS WITHOUT COMPLICATION, WITH LONG-TERM CURRENT USE OF INSULIN (HCC): ICD-10-CM

## 2022-03-11 RX ORDER — SEMAGLUTIDE 1.34 MG/ML
INJECTION, SOLUTION SUBCUTANEOUS
Qty: 1.5 ML | Refills: 2 | Status: SHIPPED
Start: 2022-03-11 | End: 2022-06-27

## 2022-03-14 RX ORDER — LOSARTAN POTASSIUM 100 MG/1
100 TABLET ORAL DAILY
Qty: 90 TABLET | Refills: 1 | Status: SHIPPED
Start: 2022-03-14 | End: 2022-06-28

## 2022-03-14 RX ORDER — HYDROCHLOROTHIAZIDE 25 MG/1
25 TABLET ORAL EVERY MORNING
Qty: 90 TABLET | Refills: 1 | Status: SHIPPED
Start: 2022-03-14 | End: 2022-07-06 | Stop reason: ALTCHOICE

## 2022-03-15 ENCOUNTER — TELEPHONE (OUTPATIENT)
Dept: PRIMARY CARE CLINIC | Age: 67
End: 2022-03-15

## 2022-03-15 NOTE — TELEPHONE ENCOUNTER
Called pharmacy left voice message in regards to losartan-hctz being backordered and asked if they had them separately to go ahead and fill them separately. Also left a voice message with the patient stating the same thing.

## 2022-04-25 DIAGNOSIS — E11.9 TYPE 2 DIABETES MELLITUS WITHOUT COMPLICATION, UNSPECIFIED WHETHER LONG TERM INSULIN USE (HCC): ICD-10-CM

## 2022-04-25 RX ORDER — EMPAGLIFLOZIN 10 MG/1
TABLET, FILM COATED ORAL
Qty: 90 TABLET | Refills: 1 | Status: SHIPPED
Start: 2022-04-25 | End: 2022-07-06 | Stop reason: SDUPTHER

## 2022-04-26 RX ORDER — CITALOPRAM 40 MG/1
TABLET ORAL
Qty: 135 TABLET | Refills: 1 | Status: SHIPPED
Start: 2022-04-26 | End: 2022-10-10

## 2022-06-25 SDOH — HEALTH STABILITY: PHYSICAL HEALTH: ON AVERAGE, HOW MANY DAYS PER WEEK DO YOU ENGAGE IN MODERATE TO STRENUOUS EXERCISE (LIKE A BRISK WALK)?: 0 DAYS

## 2022-06-25 ASSESSMENT — SOCIAL DETERMINANTS OF HEALTH (SDOH)
WITHIN THE LAST YEAR, HAVE YOU BEEN KICKED, HIT, SLAPPED, OR OTHERWISE PHYSICALLY HURT BY YOUR PARTNER OR EX-PARTNER?: NO
WITHIN THE LAST YEAR, HAVE YOU BEEN AFRAID OF YOUR PARTNER OR EX-PARTNER?: NO
WITHIN THE LAST YEAR, HAVE YOU BEEN HUMILIATED OR EMOTIONALLY ABUSED IN OTHER WAYS BY YOUR PARTNER OR EX-PARTNER?: NO
WITHIN THE LAST YEAR, HAVE TO BEEN RAPED OR FORCED TO HAVE ANY KIND OF SEXUAL ACTIVITY BY YOUR PARTNER OR EX-PARTNER?: NO

## 2022-06-27 DIAGNOSIS — Z79.4 TYPE 2 DIABETES MELLITUS WITHOUT COMPLICATION, WITH LONG-TERM CURRENT USE OF INSULIN (HCC): ICD-10-CM

## 2022-06-27 DIAGNOSIS — E11.9 TYPE 2 DIABETES MELLITUS WITHOUT COMPLICATION, WITH LONG-TERM CURRENT USE OF INSULIN (HCC): ICD-10-CM

## 2022-06-27 RX ORDER — SEMAGLUTIDE 1.34 MG/ML
INJECTION, SOLUTION SUBCUTANEOUS
Qty: 1 PEN | Refills: 2 | Status: SHIPPED | OUTPATIENT
Start: 2022-06-27

## 2022-06-28 ENCOUNTER — OFFICE VISIT (OUTPATIENT)
Dept: ORTHOPEDIC SURGERY | Age: 67
End: 2022-06-28
Payer: MEDICARE

## 2022-06-28 VITALS — BODY MASS INDEX: 29.55 KG/M2 | WEIGHT: 195 LBS | HEIGHT: 68 IN

## 2022-06-28 DIAGNOSIS — M25.512 LEFT SHOULDER PAIN, UNSPECIFIED CHRONICITY: Primary | ICD-10-CM

## 2022-06-28 PROCEDURE — 20610 DRAIN/INJ JOINT/BURSA W/O US: CPT | Performed by: ORTHOPAEDIC SURGERY

## 2022-06-28 PROCEDURE — 1036F TOBACCO NON-USER: CPT | Performed by: ORTHOPAEDIC SURGERY

## 2022-06-28 PROCEDURE — G8427 DOCREV CUR MEDS BY ELIG CLIN: HCPCS | Performed by: ORTHOPAEDIC SURGERY

## 2022-06-28 PROCEDURE — 99203 OFFICE O/P NEW LOW 30 MIN: CPT | Performed by: ORTHOPAEDIC SURGERY

## 2022-06-28 PROCEDURE — G8417 CALC BMI ABV UP PARAM F/U: HCPCS | Performed by: ORTHOPAEDIC SURGERY

## 2022-06-28 PROCEDURE — 3017F COLORECTAL CA SCREEN DOC REV: CPT | Performed by: ORTHOPAEDIC SURGERY

## 2022-06-28 PROCEDURE — 1123F ACP DISCUSS/DSCN MKR DOCD: CPT | Performed by: ORTHOPAEDIC SURGERY

## 2022-06-28 RX ORDER — TRIAMCINOLONE ACETONIDE 40 MG/ML
40 INJECTION, SUSPENSION INTRA-ARTICULAR; INTRAMUSCULAR ONCE
Status: COMPLETED | OUTPATIENT
Start: 2022-06-28 | End: 2022-06-28

## 2022-06-28 RX ORDER — BUPIVACAINE HYDROCHLORIDE 2.5 MG/ML
2 INJECTION, SOLUTION INFILTRATION; PERINEURAL ONCE
Status: COMPLETED | OUTPATIENT
Start: 2022-06-28 | End: 2022-06-28

## 2022-06-28 RX ADMIN — BUPIVACAINE HYDROCHLORIDE 5 MG: 2.5 INJECTION, SOLUTION INFILTRATION; PERINEURAL at 11:00

## 2022-06-28 RX ADMIN — TRIAMCINOLONE ACETONIDE 40 MG: 40 INJECTION, SUSPENSION INTRA-ARTICULAR; INTRAMUSCULAR at 11:01

## 2022-06-28 NOTE — PROGRESS NOTES
Chief Complaint:   Chief Complaint   Patient presents with    Shoulder Pain     Left shoulder pain x 2 months. Usually hurts after strenuous activity. No specific injury. No X-rays. HPI     Bertram Hawley is a 75 y.o. male, who presents with approximate 2-month history of diffuse mostly subdeltoid to anterior left shoulder pain, no injury although perhaps some onset after relative overuse lifting. He has rested and taken some anti-inflammatories with partial relief but pain persists and interferes with daily activities especially those involving lifting or overhead. Denies numbness tingling in the hand no other joint complaints. Status post left total knee arthroplasty 8 years ago with ongoing excellent result. Otherwise in good health no systemic symptom. Allergies; medications; past medical, surgical, family, and social history; and problem list have been reviewed today and updated as indicated in this encounter - see below following Ortho specifics. Musculoskeletal: Healthy-appearing middle-age male, lower extremity exam unremarkable, upper extremity exam shows full range of motion of both shoulders with intact cuff function, some pain with empty can, negative Speed and Yergason, positive impingement signs overhead. Some degree of scapular dyskinesia with forward elevation. Radiologic Studies: AP lateral x-rays left shoulder today show normal glenohumeral and subacromial spaces without spur formation or periarticular calcification. Upper lung field appears clear. ASSESSMENT/PLAN:    Mercedes Zamora was seen today for shoulder pain.     Diagnoses and all orders for this visit:    Left shoulder pain, unspecified chronicity  -     XR SHOULDER LEFT (MIN 2 VIEWS)  -     UT ARTHROCENTESIS ASPIR&/INJ MAJOR JT/BURSA W/O US    Other orders  -     triamcinolone acetonide (KENALOG-40) injection 40 mg  -     bupivacaine (MARCAINE) 0.25 % injection 5 mg       Impression is impingement syndrome with possible degenerative cuff tear left shoulder. Diagnosis and treatment options were reviewed, we talked about therapy injections and the indications if unsuccessful for advanced imaging and surgery. Questions were asked and answered and understood. At the patient's request I performed injection of Kenalog and Marcaine into left shoulder subacromial space through posterior approach no difficulty or complication tolerated well. If he does not experience significant relief he will contact us for referral to physical therapy and follow-up thereafter pending those results for possible advanced imaging left shoulder. Return if symptoms worsen or fail to improve. Chris Fry MD    6/28/2022  2:16 PM      Patient Active Problem List   Diagnosis    Coronary artery disease involving native coronary artery of native heart without angina pectoris    Hypertension    Pure hypercholesterolemia    Anemia    GERD (gastroesophageal reflux disease)    GARZA (dyspnea on exertion)    Left knee DJD    Moderate obesity    Near syncope    Anxiety attack    NSTEMI (non-ST elevated myocardial infarction) ([address]    Hiatal hernia    Dizziness    Cholelithiasis    Biliary colic    Combined forms of age-related cataract of both eyes       Past Medical History:   Diagnosis Date    Anemia     Anxiety attack     controlled with citolpram    CAD (coronary artery disease) 16 YRS AGO    MI X 2.  WITH STENTS, FOLLOWS WITH Dr. Kar Peraza yearly    Cataract, left eye     Diabetes mellitus ([address]     GERD (gastroesophageal reflux disease)     Hypercholesterolemia 1994    Hyperlipidemia     Hypertension 6/12/14    B/P IS ELEVATED, PATIENT STATES HE NEEDS TO GET HIS LOPRESSOR REFILLED    Left knee DJD 6/10/2014    OSTEO    NSTEMI (non-ST elevated myocardial infarction) ([address] 05/28/2012       Past Surgical History:   Procedure Laterality Date    ANKLE SURGERY  6 YRS AGO    right ORIF    CHOLECYSTECTOMY, LAPAROSCOPIC N/A  JARDIANCE 10 MG tablet TAKE 1 TABLET BY MOUTH EVERY DAY 90 tablet 1    hydroCHLOROthiazide (HYDRODIURIL) 25 MG tablet Take 1 tablet by mouth every morning 90 tablet 1    ezetimibe (ZETIA) 10 MG tablet Take 1 tablet by mouth daily 90 tablet 1    amLODIPine (NORVASC) 10 MG tablet TAKE 1 TABLET BY MOUTH EVERY DAY 90 tablet 1    metoprolol tartrate (LOPRESSOR) 25 MG tablet TAKE 1 TABLET BY MOUTH TWICE A  tablet 1    losartan-hydroCHLOROthiazide (HYZAAR) 100-25 MG per tablet TAKE 1 TABLET BY MOUTH EVERY DAY 90 tablet 1    atorvastatin (LIPITOR) 80 MG tablet Take 1 tablet by mouth daily 90 tablet 1    nitroGLYCERIN (NITROSTAT) 0.4 MG SL tablet Place 1 tablet under the tongue every 5 minutes as needed for Chest pain If chest pain: put 1 NTG tab under tongue - sit down - wait 5 min - if no relief, call 911. May repeat dose 2 more times 5 min apart while waiting for EMS (total of 3 doses). If dizzy do not take any further doses. (Patient taking differently: Place 0.4 mg under the tongue every 5 minutes as needed for Chest pain If chest pain: put 1 NTG tab under tongue - sit down - wait 5 min - if no relief, call 911. May repeat dose 2 more times 5 min apart while waiting for EMS (total of 3 doses). If dizzy do not take any further doses.   Has never had to use) 90 tablet 1    omeprazole (PRILOSEC) 40 MG delayed release capsule TAKE 1 CAPSULE BY MOUTH TWICE A DAY (Patient taking differently: as needed ) 180 capsule 1    aspirin 81 MG tablet Take 81 mg by mouth daily Last dose 10/19/19      ferrous sulfate 325 (65 Fe) MG tablet Take 325 mg by mouth daily (with breakfast)       Cholecalciferol (VITAMIN D3) 5000 UNITS TABS Take 1 tablet by mouth every morning       Ascorbic Acid (VITAMIN C) 500 MG tablet Take 500 mg by mouth every morning       fish oil-omega-3 fatty acids 1000 MG capsule Take 1 g by mouth every morning       brompheniramine-pseudoephedrine-DM 2-30-10 MG/5ML syrup Take 5 mLs by mouth 4 times daily as needed for Cough Ok to substitute nearest mL bottle 250 mL 0    Exenatide (BYDUREON) 2 MG PEN Inject 1 pen into the skin once a week 4 pen 3    cloNIDine (CATAPRES) 0.1 MG tablet Take 0.1 mg daily for 14 days and then discontinue (Patient not taking: Reported on 3/2/2022) 14 tablet 0    cholestyramine (QUESTRAN) 4 g packet Take 1 packet by mouth 2 times daily (Patient taking differently: Take 1 packet by mouth 2 times daily prn) 90 packet 3     No current facility-administered medications for this visit. Allergies   Allergen Reactions    Penicillins Hives       Social History     Socioeconomic History    Marital status:      Spouse name: None    Number of children: None    Years of education: None    Highest education level: None   Occupational History    None   Tobacco Use    Smoking status: Never Smoker    Smokeless tobacco: Never Used   Vaping Use    Vaping Use: Never used   Substance and Sexual Activity    Alcohol use: Yes     Comment: socially    Drug use: No    Sexual activity: None   Other Topics Concern    None   Social History Narrative    None     Social Determinants of Health     Financial Resource Strain:     Difficulty of Paying Living Expenses: Not on file   Food Insecurity:     Worried About Running Out of Food in the Last Year: Not on file    [place] of Food in the Last Year: Not on file   Transportation Needs:     Lack of Transportation (Medical): Not on file    Lack of Transportation (Non-Medical):  Not on file   Physical Activity: Unknown    Days of Exercise per Week: 0 days    Minutes of Exercise per Session: Not on file   Stress:     Feeling of Stress : Not on file   Social Connections:     Frequency of Communication with Friends and Family: Not on file    Frequency of Social Gatherings with Friends and Family: Not on file    Attends Spiritism Services: Not on file    Active Member of Clubs or Organizations: Not on file    Attends [employer] or Organization Meetings: Not on file    Marital Status: Not on file   Intimate Partner Violence: Not At Risk    Fear of Current or Ex-Partner: No    Emotionally Abused: No    Physically Abused: No    Sexually Abused: No   Housing Stability:     Unable to Pay for Housing in the Last Year: Not on file    Number of Places Lived in the Last Year: Not on file    Unstable Housing in the Last Year: Not on file       Family History   Problem Relation Age of Onset    Hypertension Mother [de-identified]         from renal failure    Diabetes Mother     Coronary Art Dis Mother     Osteoporosis Mother     Osteoarthritis Father 80    High Cholesterol Brother     High Cholesterol Brother          Review of Systems  As follows except as previously noted in HPI:  Constitutional: Negative for chills, diaphoresis, fatigue, fever and unexpected weight change. Respiratory: Negative for cough, shortness of breath and wheezing. Cardiovascular: Negative for chest pain and palpitations. Neurological: Negative for dizziness, syncope, cephalgia. GI / : negative  Musculoskeletal: see HPI       Objective:   Physical Exam   Constitutional: Oriented to person, place, and time. and appears well-developed and well-nourished. :   Head: Normocephalic and atraumatic. Eyes: EOM are normal.   Neck: Neck supple. Cardiovascular: Normal rate and regular rhythm. Pulmonary/Chest: Effort normal. No stridor. No respiratory distress, no wheezes. Abdominal:  No abnormal distension. Neurological: Alert and oriented to person, place, and time. Skin: Skin is warm and dry. Psychiatric: Normal mood and affect.  Behavior is normal. Thought content normal.

## 2022-07-06 ENCOUNTER — OFFICE VISIT (OUTPATIENT)
Dept: PRIMARY CARE CLINIC | Age: 67
End: 2022-07-06
Payer: MEDICARE

## 2022-07-06 VITALS
TEMPERATURE: 97.2 F | HEART RATE: 59 BPM | OXYGEN SATURATION: 99 % | DIASTOLIC BLOOD PRESSURE: 80 MMHG | WEIGHT: 204 LBS | SYSTOLIC BLOOD PRESSURE: 130 MMHG | BODY MASS INDEX: 31.02 KG/M2

## 2022-07-06 DIAGNOSIS — E11.9 TYPE 2 DIABETES MELLITUS WITHOUT COMPLICATION, UNSPECIFIED WHETHER LONG TERM INSULIN USE (HCC): ICD-10-CM

## 2022-07-06 DIAGNOSIS — T46.6X5A CHOLESTEROL-LOWERING AGENT MYOPATHY: ICD-10-CM

## 2022-07-06 DIAGNOSIS — I10 ESSENTIAL HYPERTENSION: ICD-10-CM

## 2022-07-06 DIAGNOSIS — G72.0 CHOLESTEROL-LOWERING AGENT MYOPATHY: ICD-10-CM

## 2022-07-06 DIAGNOSIS — I25.10 CORONARY ARTERY DISEASE INVOLVING NATIVE CORONARY ARTERY OF NATIVE HEART WITHOUT ANGINA PECTORIS: ICD-10-CM

## 2022-07-06 DIAGNOSIS — Z12.5 PROSTATE CANCER SCREENING: Primary | ICD-10-CM

## 2022-07-06 DIAGNOSIS — Z12.5 PROSTATE CANCER SCREENING: ICD-10-CM

## 2022-07-06 LAB
ALBUMIN SERPL-MCNC: 4.3 G/DL (ref 3.5–5.2)
ALP BLD-CCNC: 96 U/L (ref 40–129)
ALT SERPL-CCNC: 22 U/L (ref 0–40)
ANION GAP SERPL CALCULATED.3IONS-SCNC: 15 MMOL/L (ref 7–16)
AST SERPL-CCNC: 15 U/L (ref 0–39)
BILIRUB SERPL-MCNC: 1.1 MG/DL (ref 0–1.2)
BUN BLDV-MCNC: 24 MG/DL (ref 6–23)
CALCIUM SERPL-MCNC: 9.6 MG/DL (ref 8.6–10.2)
CHLORIDE BLD-SCNC: 105 MMOL/L (ref 98–107)
CHOLESTEROL, TOTAL: 137 MG/DL (ref 0–199)
CO2: 22 MMOL/L (ref 22–29)
CREAT SERPL-MCNC: 1 MG/DL (ref 0.7–1.2)
GFR AFRICAN AMERICAN: >60
GFR NON-AFRICAN AMERICAN: >60 ML/MIN/1.73
GLUCOSE BLD-MCNC: 147 MG/DL (ref 74–99)
HBA1C MFR BLD: 6.2 % (ref 4–5.6)
HCT VFR BLD CALC: 49.3 % (ref 37–54)
HDLC SERPL-MCNC: 41 MG/DL
HEMOGLOBIN: 16.4 G/DL (ref 12.5–16.5)
LDL CHOLESTEROL CALCULATED: 74 MG/DL (ref 0–99)
MCH RBC QN AUTO: 30 PG (ref 26–35)
MCHC RBC AUTO-ENTMCNC: 33.3 % (ref 32–34.5)
MCV RBC AUTO: 90.1 FL (ref 80–99.9)
PDW BLD-RTO: 13.1 FL (ref 11.5–15)
PLATELET # BLD: 234 E9/L (ref 130–450)
PMV BLD AUTO: 10.7 FL (ref 7–12)
POTASSIUM SERPL-SCNC: 3.7 MMOL/L (ref 3.5–5)
PROSTATE SPECIFIC ANTIGEN: 3.3 NG/ML (ref 0–4)
RBC # BLD: 5.47 E12/L (ref 3.8–5.8)
SODIUM BLD-SCNC: 142 MMOL/L (ref 132–146)
TOTAL PROTEIN: 6.9 G/DL (ref 6.4–8.3)
TRIGL SERPL-MCNC: 110 MG/DL (ref 0–149)
VLDLC SERPL CALC-MCNC: 22 MG/DL
WBC # BLD: 10.9 E9/L (ref 4.5–11.5)

## 2022-07-06 PROCEDURE — 1036F TOBACCO NON-USER: CPT | Performed by: INTERNAL MEDICINE

## 2022-07-06 PROCEDURE — 1123F ACP DISCUSS/DSCN MKR DOCD: CPT | Performed by: INTERNAL MEDICINE

## 2022-07-06 PROCEDURE — G8427 DOCREV CUR MEDS BY ELIG CLIN: HCPCS | Performed by: INTERNAL MEDICINE

## 2022-07-06 PROCEDURE — 2022F DILAT RTA XM EVC RTNOPTHY: CPT | Performed by: INTERNAL MEDICINE

## 2022-07-06 PROCEDURE — 3017F COLORECTAL CA SCREEN DOC REV: CPT | Performed by: INTERNAL MEDICINE

## 2022-07-06 PROCEDURE — G8417 CALC BMI ABV UP PARAM F/U: HCPCS | Performed by: INTERNAL MEDICINE

## 2022-07-06 PROCEDURE — 99214 OFFICE O/P EST MOD 30 MIN: CPT | Performed by: INTERNAL MEDICINE

## 2022-07-06 PROCEDURE — 3044F HG A1C LEVEL LT 7.0%: CPT | Performed by: INTERNAL MEDICINE

## 2022-07-06 RX ORDER — EZETIMIBE 10 MG/1
10 TABLET ORAL DAILY
Qty: 90 TABLET | Refills: 1 | Status: SHIPPED
Start: 2022-07-06 | End: 2022-10-10 | Stop reason: SDUPTHER

## 2022-07-06 RX ORDER — ATORVASTATIN CALCIUM 80 MG/1
80 TABLET, FILM COATED ORAL DAILY
Qty: 90 TABLET | Refills: 1 | Status: SHIPPED | OUTPATIENT
Start: 2022-07-06

## 2022-07-06 RX ORDER — OMEPRAZOLE 40 MG/1
CAPSULE, DELAYED RELEASE ORAL
Qty: 180 CAPSULE | Refills: 1 | Status: SHIPPED | OUTPATIENT
Start: 2022-07-06

## 2022-07-06 RX ORDER — AMLODIPINE BESYLATE 10 MG/1
TABLET ORAL
Qty: 90 TABLET | Refills: 1 | Status: SHIPPED | OUTPATIENT
Start: 2022-07-06

## 2022-07-06 SDOH — ECONOMIC STABILITY: FOOD INSECURITY: WITHIN THE PAST 12 MONTHS, THE FOOD YOU BOUGHT JUST DIDN'T LAST AND YOU DIDN'T HAVE MONEY TO GET MORE.: NEVER TRUE

## 2022-07-06 SDOH — ECONOMIC STABILITY: FOOD INSECURITY: WITHIN THE PAST 12 MONTHS, YOU WORRIED THAT YOUR FOOD WOULD RUN OUT BEFORE YOU GOT MONEY TO BUY MORE.: NEVER TRUE

## 2022-07-06 ASSESSMENT — PATIENT HEALTH QUESTIONNAIRE - PHQ9
SUM OF ALL RESPONSES TO PHQ QUESTIONS 1-9: 0
SUM OF ALL RESPONSES TO PHQ QUESTIONS 1-9: 0
SUM OF ALL RESPONSES TO PHQ9 QUESTIONS 1 & 2: 0
2. FEELING DOWN, DEPRESSED OR HOPELESS: 0
SUM OF ALL RESPONSES TO PHQ QUESTIONS 1-9: 0
SUM OF ALL RESPONSES TO PHQ QUESTIONS 1-9: 0
1. LITTLE INTEREST OR PLEASURE IN DOING THINGS: 0

## 2022-07-06 ASSESSMENT — SOCIAL DETERMINANTS OF HEALTH (SDOH): HOW HARD IS IT FOR YOU TO PAY FOR THE VERY BASICS LIKE FOOD, HOUSING, MEDICAL CARE, AND HEATING?: NOT HARD AT ALL

## 2022-07-06 NOTE — PROGRESS NOTES
7/6/22    Rossy Adams, a male of 75 y.o. presents today for Diabetes (check up)    At last appointment,   he was treated with azithromycin and Bromfed due to URI symptoms. He is advised to continue Ozempic and Zetia. At that time he was losing weight intentionally. He did follow with orthopedics for shoulder impingement syndrome. This was treated with intra-articular injection. He denies any issues or side effects with medications. he feels well overall today and denies any physical complaints. He denies chest pain shortness of breath palpitations or edema. /80   Pulse 59   Temp 97.2 °F (36.2 °C)   Wt 204 lb (92.5 kg)   SpO2 99%   BMI 31.02 kg/m²     Review of Systems  Constitutional:Negative for activity change, appetite change, chills, fatigue and fever. Respiratory: Negative for choking, chest tightness, shortness of breath and wheezing. Cardiovascular: Negative for chest pain, palpitations and leg swelling. Gastrointestinal: Negative for abdominal distention, constipation, diarrhea, nausea and vomiting. Musculoskeletal: Negative for arthralgias, back pain, gait problem and joint swelling. Neurological: Negative for dizziness, weakness,numbness and headaches.      Patient Active Problem List    Diagnosis Date Noted    Combined forms of age-related cataract of both eyes 64/87/1294    Biliary colic     Cholelithiasis 07/17/2019    Near syncope 03/04/2018    Anxiety attack     Hiatal hernia     Dizziness     Moderate obesity 07/12/2016    Left knee DJD 06/10/2014    GARZA (dyspnea on exertion)     NSTEMI (non-ST elevated myocardial infarction) ([facility] [address] 05/28/2012    Coronary artery disease involving native coronary artery of native heart without angina pectoris     Hypertension     Pure hypercholesterolemia     Anemia     GERD (gastroesophageal reflux disease)      Allergies   Allergen Reactions    Penicillins Hives     Current Outpatient Medications on File Prior to Visit   Medication Sig Dispense Refill    Semaglutide,0.25 or 0.5MG/DOS, (OZEMPIC, 0.25 OR 0.5 MG/DOSE,) 2 MG/1.5ML SOPN INJECT 0.25MG INTO THE SKIN ONE TIME PER WEEK 1 pen 2    citalopram (CELEXA) 40 MG tablet TAKE 1 AND 1/2 TABLETS BY MOUTH EVERY MORNING 135 tablet 1    losartan-hydroCHLOROthiazide (HYZAAR) 100-25 MG per tablet TAKE 1 TABLET BY MOUTH EVERY DAY 90 tablet 1    nitroGLYCERIN (NITROSTAT) 0.4 MG SL tablet Place 1 tablet under the tongue every 5 minutes as needed for Chest pain If chest pain: put 1 NTG tab under tongue - sit down - wait 5 min - if no relief, call 911. May repeat dose 2 more times 5 min apart while waiting for EMS (total of 3 doses). If dizzy do not take any further doses. (Patient taking differently: Place 0.4 mg under the tongue every 5 minutes as needed for Chest pain If chest pain: put 1 NTG tab under tongue - sit down - wait 5 min - if no relief, call 911. May repeat dose 2 more times 5 min apart while waiting for EMS (total of 3 doses). If dizzy do not take any further doses. Has never had to use) 90 tablet 1    aspirin 81 MG tablet Take 81 mg by mouth daily Last dose 10/19/19      ferrous sulfate 325 (65 Fe) MG tablet Take 325 mg by mouth daily (with breakfast)       Cholecalciferol (VITAMIN D3) 5000 UNITS TABS Take 1 tablet by mouth every morning       Ascorbic Acid (VITAMIN C) 500 MG tablet Take 500 mg by mouth every morning       fish oil-omega-3 fatty acids 1000 MG capsule Take 1 g by mouth every morning       cloNIDine (CATAPRES) 0.1 MG tablet Take 0.1 mg daily for 14 days and then discontinue (Patient not taking: Reported on 3/2/2022) 14 tablet 0     No current facility-administered medications on file prior to visit. Physical Exam   Constitutional:  Oriented to person, place, and time. Appears well-developed and well-nourished. No acute distress. HENT: No sinus tenderness or lymphadenopathy  Head: Normocephalic and atraumatic.    Eyes: Eyes exhibits no discharge. No scleral icterus present. Neck: No tracheal deviation present. No thyromegaly present. Cardiovascular: Normal rate, regular rhythm, normal heart sounds and intact distal pulses. Exam reveals no gallop nor friction rub. No murmur heard. Pulmonary: Effort normal and breath sounds normal. No respiratory distress. No wheezes or rales. Abdomen: No signs of rigidity rebound or organomegaly  Musculoskeletal:  No tenderness to palpation  Neurological:Alert and oriented to person, place, and time. Skin: No diaphoresis. Psychiatric: Normal mood and affect. Behavior is Normal.     ASSESSMENT AND PLAN:    Assessment  Diagnoses and all orders for this visit:  Prostate cancer screening  -     PSA Screening; Future  Coronary artery disease involving native coronary artery of native heart without angina pectoris  -     metoprolol tartrate (LOPRESSOR) 25 MG tablet; TAKE 1 TABLET BY MOUTH TWICE A DAY  -     atorvastatin (LIPITOR) 80 MG tablet; Take 1 tablet by mouth daily  Essential hypertension  Type 2 diabetes mellitus without complication, unspecified whether long term insulin use (HCC)  -     empagliflozin (JARDIANCE) 10 MG tablet; One daily  -     CBC; Future  -     Comprehensive Metabolic Panel; Future  -     Hemoglobin A1C; Future  -     Lipid Panel; Future  -     Microalbumin / Creatinine Urine Ratio; Future  Cholesterol-lowering agent myopathy  -     ezetimibe (ZETIA) 10 MG tablet; Take 1 tablet by mouth daily  Other orders  -     omeprazole (PRILOSEC) 40 MG delayed release capsule; TAKE 1 CAPSULE BY MOUTH TWICE A DAY  -     amLODIPine (NORVASC) 10 MG tablet; TAKE 1 TABLET BY MOUTH EVERY DAY      Plan     1. Chronic medical issues/chronic medication stable  2. Obtain routine blood work  3.  We will follow-up with general surgery for colonoscopy     Return in about 6 months (around 1/6/2023) for Medicare Wellness exam.    Electronically signed by London Benavides DO on 7/6/2022 at 8:25

## 2022-07-07 ENCOUNTER — TELEPHONE (OUTPATIENT)
Dept: PRIMARY CARE CLINIC | Age: 67
End: 2022-07-07

## 2022-07-07 NOTE — TELEPHONE ENCOUNTER
LM informing patient that we need to recollect his urine specimen. He is able to just come to office for specimen collection, does not need appt.

## 2022-07-21 ENCOUNTER — OFFICE VISIT (OUTPATIENT)
Dept: SURGERY | Age: 67
End: 2022-07-21

## 2022-07-21 VITALS
DIASTOLIC BLOOD PRESSURE: 87 MMHG | BODY MASS INDEX: 32.24 KG/M2 | SYSTOLIC BLOOD PRESSURE: 138 MMHG | RESPIRATION RATE: 18 BRPM | WEIGHT: 200.6 LBS | TEMPERATURE: 97.3 F | HEIGHT: 66 IN | OXYGEN SATURATION: 98 % | HEART RATE: 56 BPM

## 2022-07-21 DIAGNOSIS — Z12.11 ENCOUNTER FOR SCREENING COLONOSCOPY: Primary | ICD-10-CM

## 2022-07-21 PROCEDURE — 99024 POSTOP FOLLOW-UP VISIT: CPT | Performed by: SURGERY

## 2022-07-21 RX ORDER — SODIUM CHLORIDE 9 MG/ML
INJECTION, SOLUTION INTRAVENOUS CONTINUOUS
Status: CANCELLED | OUTPATIENT
Start: 2022-07-21

## 2022-07-21 NOTE — PROGRESS NOTES
General Surgery History and Physical    Patient's Name/Date of Birth: Corinne Seat / 1955    Date: 7/21/2022    PCP: Sylvania Seip, DO    Referring Physician:   No ref. provider found  N/A    CHIEF COMPLAINT:    Chief Complaint   Patient presents with    Other     7 year colonoscopy recall         HISTORY OF PRESENT ILLNESS:    Corinne Seat is an 75 y.o. male who presents for a colonoscopy. The patient has some issues when he eats garlic with his stomach but says this is relieved with Prilosec. No nausea, vomiting, diarrhea, constipation. No changes in stool caliber. No bloody or black stools. No abdominal pain. No unintentional weight loss. No family history of colon cancer. The patient has a known history of: no known risk factors. The patient has had a colonoscopy before which was many years ago and showed diverticulosis. Past Medical History:   Past Medical History:   Diagnosis Date    Anemia     Anxiety attack     controlled with citolpram    CAD (coronary artery disease) 16 YRS AGO    MI X 2. 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 Dae Villarreal, Stent Mid LAD    DIAGNOSTIC CARDIAC CATH LAB PROCEDURE  9/17/1998    [facility].  Cath/PTCA/stent of RCA; PTCA/stent of mid LAD with adjunctive ReoPro administration. DIAGNOSTIC CARDIAC CATH LAB PROCEDURE  6/25/2002    [facility]. Kissing balloon angioplasty LAD>diag Perclose. Dr. Brenden Lancaster and Dr. Wu Sample. DIAGNOSTIC CARDIAC CATH LAB PROCEDURE  4/13/2005    [address] Cath/stent (TAX\"US) to [facility] at [facility]. ENDOSCOPY, COLON, DIAGNOSTIC      FINGER TRIGGER RELEASE Right 11/20/2015    Release right long trigger finger. Wayne Cox MD    HERNIA REPAIR Left YRS AGO    [facility]    HIATAL HERNIA REPAIR N/A 10/22/2019    LAPAROSCOPIC ROBOTIC XI ASSISTED PARAESOPHAGEAL HERNIA REPAIR WITH PARTIAL FUNDOPLICATION AND MYOFASCIAL FLAP, UPPER ENDOSCOPY performed by Ama Chinchilla MD at [facility] Right 08/01/2017    robotic assisted    INTRACAPSULAR CATARACT EXTRACTION Left 11/11/2021    LEFT EYE CATARACT EXTRACTION IOL IMPLANT performed by Claudia Frank MD at [address] Left 2014    knee    KNEE ARTHROPLASTY Left 06/16/2014    Left TKA.   Wayne Cox MD    TONSILLECTOMY      UPPER GASTROINTESTINAL ENDOSCOPY      UPPER GASTROINTESTINAL ENDOSCOPY N/A 9/10/2019    EGD BIOPSY performed by Ama Chinchilla MD at [address] [place] [address] N/A 7/12/2021    EGD BIOPSY performed by Ama Chinchilla MD at [address]: Penicillins     Medications:   Current Outpatient Medications   Medication Sig Dispense Refill    omeprazole (PRILOSEC) 40 MG delayed release capsule TAKE 1 CAPSULE BY MOUTH TWICE A  capsule 1    metoprolol tartrate (LOPRESSOR) 25 MG tablet TAKE 1 TABLET BY MOUTH TWICE A  tablet 1    empagliflozin (JARDIANCE) 10 MG tablet One daily 90 tablet 1    ezetimibe (ZETIA) 10 MG tablet Take 1 tablet by mouth daily 90 tablet 1    atorvastatin (LIPITOR) 80 MG tablet Take 1 tablet by mouth daily 90 tablet 1    amLODIPine (NORVASC) 10 MG tablet TAKE 1 TABLET BY MOUTH EVERY DAY 90 tablet 1    Semaglutide,0.25 or 0.5MG/DOS, (OZEMPIC, 0.25 OR 0.5 MG/DOSE,) 2 MG/1.5ML SOPN INJECT 0.25MG INTO THE SKIN ONE TIME PER WEEK 1 pen 2    citalopram (CELEXA) 40 MG tablet TAKE 1 AND 1/2 TABLETS BY MOUTH EVERY MORNING 135 tablet 1    losartan-hydroCHLOROthiazide (HYZAAR) 100-25 MG per tablet TAKE 1 TABLET BY MOUTH EVERY DAY 90 tablet 1    nitroGLYCERIN (NITROSTAT) 0.4 MG SL tablet Place 1 tablet under the tongue every 5 minutes as needed for Chest pain If chest pain: put 1 NTG tab under tongue - sit down - wait 5 min - if no relief, call 911. May repeat dose 2 more times 5 min apart while waiting for EMS (total of 3 doses). If dizzy do not take any further doses. (Patient taking differently: Place 0.4 mg under the tongue every 5 minutes as needed for Chest pain If chest pain: put 1 NTG tab under tongue - sit down - wait 5 min - if no relief, call 911. May repeat dose 2 more times 5 min apart while waiting for EMS (total of 3 doses). If dizzy do not take any further doses. Has never had to use) 90 tablet 1    aspirin 81 MG tablet Take 81 mg by mouth daily Last dose 10/19/19      ferrous sulfate 325 (65 Fe) MG tablet Take 325 mg by mouth daily (with breakfast)       Cholecalciferol (VITAMIN D3) 5000 UNITS TABS Take 1 tablet by mouth every morning       Ascorbic Acid (VITAMIN C) 500 MG tablet Take 500 mg by mouth every morning       fish oil-omega-3 fatty acids 1000 MG capsule Take 1 g by mouth every morning       cloNIDine (CATAPRES) 0.1 MG tablet Take 0.1 mg daily for 14 days and then discontinue (Patient not taking: Reported on 3/2/2022) 14 tablet 0     No current facility-administered medications for this visit.          Social History:   Social History     Tobacco Use    Smoking status: Never    Smokeless tobacco: Never   Substance Use Topics    Alcohol use: Yes     Comment: socially        Family History:   Family History   Problem Relation Age of Onset    Hypertension Mother [de-identified]         from renal failure    Diabetes Mother     Coronary Art Dis Mother Osteoporosis Mother     Osteoarthritis Father 80    High Cholesterol Brother     High Cholesterol Brother        REVIEW OF SYSTEMS:    Constitutional: negative  Eyes: negative  Ears, nose, mouth, throat, and face: negative  Respiratory: negative  Cardiovascular: negative  Gastrointestinal: as in HPI  Genitourinary:negative  Integument/breast: negative  Hematologic/lymphatic: negative  Musculoskeletal:negative  Neurological: negative  Allergic/Immunologic: negative    PHYSICAL EXAM   /87   Pulse 56   Temp 97.3 °F (36.3 °C) (Temporal)   Resp 18   Ht 5' 6\" (1.676 m)   Wt 200 lb 9.6 oz (91 kg)   SpO2 98%   BMI 32.38 kg/m²     General appearance: alert, cooperative and in no acute distress. Eyes: Grossly normal   Lungs: normal work of breathing  Heart: regular rate  Abdomen:  soft, non-tender, non-distended  Skin: No skin abnormalities  Neurologic: Alert and oriented x 3. Grossly normal  Musculoskeletal: No edema. ASSESSMENT AND PLAN:     Ellen Reagan is an 75 y.o. male who presents for a colonoscopy for screening     I will set the patient up for a colonoscopy, possible biopsy, possible polypectomy. I explained the risks including but not limited to bleeding, perforation leading to possible surgery, or infection. The benefits, alternatives, and potential complications associated with the above procedure to be performed and transfusions when applicable with the patient/responsible person prior to the procedure. Physician Signature: Electronically signed by Lito Mckeon MD, General Surgery    Send copy of H&P to PCP, Jayna Armando DO and referring physician, No ref.  provider found

## 2022-07-21 NOTE — PATIENT INSTRUCTIONS
Salina Spann MD, FACS    Preoperative Instructions    Please read the following information very carefully. It contains information that is necessary to best prepare you for your upcoming procedure. Make arrangements for a  to take you to and from your procedure. YOU MUST HAVE SOMEONE DRIVE YOU HOME - this cannot be a taxi or public transportation. You will not be administered anesthesia without someone to go home and be at home with you that day. Nothing to eat or drink after midnight the night before your procedure. Follow your bowel prep instructions if you have them for this procedure. 3 days prior to your procedure: Stop taking blood thinners like Coumadin or Plavix or Xarelto. 5 days prior to your procedure: Stop taking Aspirin or Aspirin containing products. If you cannot stop any of these medications prior to your procedure, please contact our office. Medications morning of procedure: Only heart, breathing, blood pressure, and seizure medications are permitted on the morning of your procedure. These medications can be taken with a sip of water. IF YOU ARE UNABLE TO KEEP THE ABOVE SCHEDULED PROCEDURE, YOU MUST NOTIFY DR. HARRIS'S OFFICE [phone]. NOT THE FACILITY. NO CHEWING GUM OR CHEWING TOBACCO AFTER MIDNIGHT ON DAY OF PROCEDURE.    YOU MUST HAVE TRANSPORTATION TO AND FROM THE FACILITY. What is a colonoscopy? A colonoscopy is a test that lets a doctor look inside your colon. The doctor uses a thin, lighted tube called a colonoscope to look for problems. These include small growths called polyps, cancer, or bleeding. During the test, the doctor can take samples of tissue that can be checked for cancer or other problems. This is called a biopsy. The doctor can also take out polyps. Before the test, you will need to stop eating solid foods. You also will drink a liquid or take a tablet that cleans out your colon.  This helps your doctor be able to see inside your colon during the test.  Follow-up care is a key part of your treatment and safety. Be sure to make and go to all appointments, and call your doctor if you are having problems. It's also a good idea to know your test results and keep a list of the medicines you take. What happens before the procedure? Procedures can be stressful. This information will help you understand what you can expect. And it will help you safely prepare for your procedure. Preparing for the procedure  Understand exactly what procedure is planned, along with the risks, benefits, and other options. Tell your doctors ALL the medicines, vitamins, supplements, and herbal remedies you take. Some of these can increase the risk of bleeding or interact with anesthesia. If you take blood thinners, such as warfarin (Coumadin), clopidogrel (Plavix), or aspirin, be sure to talk to your doctor. He or she will tell you if you should stop taking these medicines before your procedure. Make sure that you understand exactly what your doctor wants you to do. Your doctor will tell you which medicines to take or stop before your procedure. You may need to stop taking certain medicines a week or more before the procedure. So talk to your doctor as soon as you can. If you have an advance directive, let your doctor know. It may include a living will and a durable power of  for health care. Bring a copy to the hospital. If you don't have one, you may want to prepare one. It lets your doctor and loved ones know your health care wishes. Doctors advise that everyone prepare these papers before any type of surgery or procedure. Before the procedure  Follow your doctor's directions about when to stop eating solid foods and drink only clear liquids. You can drink water, clear juices, clear broths, flavored ice pops, and gelatin (such as Jell-O). Do not eat or drink anything red or purple.  This includes grape juice and understand how to prepare for your procedure. You are having trouble with the bowel prep. You become ill before the procedure (such as fever, flu, or a cold). You need to reschedule or have changed your mind about having the procedure. Where can you learn more? Go to [email or web address] and sign in to your [employer] account. Enter C315 in the Film[employer] box to learn more about Colonoscopy: Before Your Procedure.     If you do not have an account, please click on the Sign Up Now link. © 9376-6870 Healthwise, Incorporated. Care instructions adapted under license by [facility] ([facility]). This care instruction is for use with your licensed healthcare professional. If you have questions about a medical condition or this instruction, always ask your healthcare professional. [address] any warranty or liability for your use of this information.   Content Version: 13.9.121135; Current as of: November 20, 2015

## 2022-07-22 ENCOUNTER — TELEPHONE (OUTPATIENT)
Dept: SURGERY | Age: 67
End: 2022-07-22

## 2022-07-22 NOTE — TELEPHONE ENCOUNTER
Prior Authorization Form:      DEMOGRAPHICS:                     Patient Name:  Marya Shi  Patient :  1955            Insurance:  Payor: MEDICARE / Plan: MEDICARE PART A AND B / Product Type: *No Product type* /   Insurance ID Number:    Payer/Plan Subscr  Sex Relation Sub. Ins. ID Effective Group Num   1. [facility]IC[facility] - [facility] 1955 [facility] Self [id number] 22                                    [address] [address]   2.  [address] 1955 Male Self [id number]                                    [address]         DIAGNOSIS & PROCEDURE:                       Procedure/Operation: COLONOSCOPY           CPT Code: 68321    Diagnosis:  SCREENING    ICD10 Code: Z12.11    Location:  [facility]    Surgeon:  Pillo Adames INFORMATION:                          Date: 2022    Time: 11:00              Anesthesia:  MAC/TIVA                                                       Status:  Outpatient        Special Comments:         Electronically signed by Fernanda Borjas MA on 2022 at 10:33 AM

## 2022-09-13 NOTE — PROGRESS NOTES
5 Ps addressed throughout shift during rounds. 1910- Verbal shift change report given to Mamta Bass RN (oncoming nurse) by Andrés Rahman (offgoing nurse). Report included the following information SBAR, Kardex, Recent Results and Alarm Parameters . Tiana PRE-ADMISSION TESTING INSTRUCTIONS    The Preadmission Testing patient is instructed accordingly using the following criteria (check applicable):    ARRIVAL INSTRUCTIONS:  [x] Parking the day of Surgery is located in the Main Entrance lot. Upon entering the door, make an immediate right to the surgery reception desk    [x] Bring photo ID and insurance card    [x] Bring in a copy of Living will or Durable Power of  papers. [x] Please be sure to arrange for responsible adult to provide transportation to and from the hospital    [x] Please arrange for responsible adult to be with you for the 24 hour period post procedure due to having anesthesia    [x] If you awake am of surgery not feeling well or have temperature >100 please call [phone]    GENERAL INSTRUCTIONS:    [x] Nothing by mouth after midnight, including gum, candy, mints or water    [x] You may brush your teeth, but do not swallow any water    [x] Take medications as instructed with 1-2 oz of water    [x] Stop herbal supplements and vitamins 5 days prior to procedure    [x] Follow preop dosing of blood thinners per physician instructions    [] Take 1/2 dose of evening insulin, but no insulin after midnight    [x] No oral diabetic medications after midnight    [x] If diabetic and have low blood sugar or feel symptomatic, take 1-2oz apple juice only    [] Bring inhalers day of surgery    [] Bring C-PAP/ Bi-Pap day of surgery    [] Bring urine specimen day of surgery    [x] Shower or bath with soap, lather and rinse well, AM of Surgery, no lotion, powders or creams to surgical site    [] Follow bowel prep as instructed per surgeon    [x] No tobacco products within 24 hours of surgery     [x] No alcohol or illegal drug use within 24 hours of surgery.     [x] Jewelry, body piercing's, eyeglasses, contact lenses and dentures are not permitted into surgery (bring cases)      [x] Please do not wear any nail polish, make up or hair products on the day of surgery    [x] You can expect a call the business day prior to procedure to notify you if your arrival time changes    [x] If you receive a survey after surgery we would greatly appreciate your comments    [] Parent/guardian of a minor must accompany their child and remain on the premises  the entire time they are under our care     [] Pediatric patients may bring favorite toy, blanket or comfort item with them    [] A caregiver or family member must remain with the patient during their stay if they are mentally handicapped, have dementia, disoriented or unable to use a call light or would be a safety concern if left unattended    [x] Please notify surgeon if you develop any illness between now and time of surgery (cold, cough, sore throat, fever, nausea, vomiting) or any signs of infections  including skin, wounds, and dental.    [x]  The Outpatient Pharmacy is available to fill your prescription here on your day of surgery, ask your preop nurse for details    [] Other instructions    EDUCATIONAL MATERIALS PROVIDED:    [] PAT Preoperative Education Packet/Booklet     [] Medication List    [] Transfusion bracelet applied with instructions    [] Shower with soap, lather and rinse well, and use CHG wipes provided the evening before surgery as instructed    [] Incentive spirometer with instructions

## 2022-09-16 ENCOUNTER — ANESTHESIA (OUTPATIENT)
Dept: ENDOSCOPY | Age: 67
End: 2022-09-16
Payer: MEDICARE

## 2022-09-16 ENCOUNTER — HOSPITAL ENCOUNTER (OUTPATIENT)
Age: 67
Setting detail: OUTPATIENT SURGERY
Discharge: HOME | End: 2022-09-16
Attending: SURGERY | Admitting: SURGERY
Payer: MEDICARE

## 2022-09-16 ENCOUNTER — ANESTHESIA EVENT (OUTPATIENT)
Dept: ENDOSCOPY | Age: 67
End: 2022-09-16
Payer: MEDICARE

## 2022-09-16 VITALS
WEIGHT: 200 LBS | HEART RATE: 70 BPM | TEMPERATURE: 97.3 F | BODY MASS INDEX: 32.14 KG/M2 | DIASTOLIC BLOOD PRESSURE: 80 MMHG | HEIGHT: 66 IN | RESPIRATION RATE: 20 BRPM | SYSTOLIC BLOOD PRESSURE: 142 MMHG | OXYGEN SATURATION: 96 %

## 2022-09-16 LAB — METER GLUCOSE: 117 MG/DL (ref 74–99)

## 2022-09-16 PROCEDURE — 3700000000 HC ANESTHESIA ATTENDED CARE: Performed by: SURGERY

## 2022-09-16 PROCEDURE — 3700000001 HC ADD 15 MINUTES (ANESTHESIA): Performed by: SURGERY

## 2022-09-16 PROCEDURE — 82962 GLUCOSE BLOOD TEST: CPT

## 2022-09-16 PROCEDURE — 7100000010 HC PHASE II RECOVERY - FIRST 15 MIN: Performed by: SURGERY

## 2022-09-16 PROCEDURE — 3609027000 HC COLONOSCOPY: Performed by: SURGERY

## 2022-09-16 PROCEDURE — G0121 COLON CA SCRN NOT HI RSK IND: HCPCS | Performed by: SURGERY

## 2022-09-16 PROCEDURE — 2580000003 HC RX 258: Performed by: NURSE ANESTHETIST, CERTIFIED REGISTERED

## 2022-09-16 PROCEDURE — 7100000011 HC PHASE II RECOVERY - ADDTL 15 MIN: Performed by: SURGERY

## 2022-09-16 PROCEDURE — 2709999900 HC NON-CHARGEABLE SUPPLY: Performed by: SURGERY

## 2022-09-16 PROCEDURE — 6360000002 HC RX W HCPCS: Performed by: NURSE ANESTHETIST, CERTIFIED REGISTERED

## 2022-09-16 RX ORDER — SODIUM CHLORIDE 9 MG/ML
INJECTION, SOLUTION INTRAVENOUS CONTINUOUS PRN
Status: DISCONTINUED | OUTPATIENT
Start: 2022-09-16 | End: 2022-09-16 | Stop reason: SDUPTHER

## 2022-09-16 RX ORDER — PROPOFOL 10 MG/ML
INJECTION, EMULSION INTRAVENOUS PRN
Status: DISCONTINUED | OUTPATIENT
Start: 2022-09-16 | End: 2022-09-16 | Stop reason: SDUPTHER

## 2022-09-16 RX ORDER — SODIUM CHLORIDE 9 MG/ML
INJECTION, SOLUTION INTRAVENOUS CONTINUOUS
Status: DISCONTINUED | OUTPATIENT
Start: 2022-09-16 | End: 2022-09-16 | Stop reason: HOSPADM

## 2022-09-16 RX ADMIN — SODIUM CHLORIDE: 9 INJECTION, SOLUTION INTRAVENOUS at 11:00

## 2022-09-16 RX ADMIN — PROPOFOL 200 MG: 10 INJECTION, EMULSION INTRAVENOUS at 11:05

## 2022-09-16 ASSESSMENT — ENCOUNTER SYMPTOMS: SHORTNESS OF BREATH: 1

## 2022-09-16 ASSESSMENT — PAIN DESCRIPTION - LOCATION
LOCATION: ABDOMEN

## 2022-09-16 ASSESSMENT — LIFESTYLE VARIABLES: SMOKING_STATUS: 0

## 2022-09-16 ASSESSMENT — PAIN DESCRIPTION - PAIN TYPE
TYPE: ACUTE PAIN

## 2022-09-16 ASSESSMENT — PAIN SCALES - GENERAL
PAINLEVEL_OUTOF10: 0

## 2022-09-16 NOTE — DISCHARGE INSTRUCTIONS
[facility] Colonoscopy PROCEDURE DISCHARGE INSTRUCTIONS  You may be drowsy or lightheaded after receiving sedation or anesthesia. A responsible person should be with you for the next 24 hours. Please follow the instructions checked below:    DIET INSTRUCTIONS:  [x]Start with light diet and progress to your normal diet as you feel like eating. If you experience nausea or repeated episodes of vomiting which persist beyond 12-24 hours, notify your doctor. []Other     ACTIVITY INSTRUCTIONS:  [x]Rest today. Increase activity as tolerated    []No heavy lifting or strenuous activity     [x]No driving for today  []Other      MEDICATION INSTRUCTIONS:    []Prescriptions sent with you. Use as directed. When taking pain medications, you may experience dizziness or drowsiness. Do not drink alcohol or drive when taking these medications. [x]Continue preop medications                               Post-procedure Care   If any tissue was removed: It will be sent to a lab to be examined. It may take 1-2 weeks for results. The doctor will usually give an initial report after the scope is removed. Other tests may be recommended. A small amount of bleeding may occur during the first few days after the procedure. When you return home after the procedure, be sure to follow your doctor's instructions, which may include:   Resume medicines as instructed by your doctor. Resume normal diet, unless directed otherwise by your doctor. The sedative will make you drowsy. Avoid driving, operating machinery, or making important decisions for the rest of the day. Rest for the remainder of the day. After arriving home, contact your doctor if any of the following occurs:   Bleeding from your rectum, notify your doctor if you pass a teaspoonful of blood or more.    Black, tarry stools   Severe abdominal pain   Hard, swollen abdomen   Signs of infection, including fever or chills   Inability to pass gas or stool   Coughing, shortness of breath, chest pain, severe nausea or vomiting     In case of an emergency, CALL 911 . FOLLOW-UP CARE:  [x]Call the office at [phone] for follow-up appointment as needed    Repeat colonoscopy in 7-10 years.

## 2022-09-16 NOTE — OP NOTE
Operative Note: Colonoscopy    Javed Ortega     DATE OF PROCEDURE: 9/16/2022  SURGEON: Dr. Deborah Mantilla MD, M.D. PREOPERATIVE DIAGNOSES:    Screening    POSTOPERATIVE DIAGNOSES:  Moderate sigmoid diverticulosis    SPECIMENS:  * No specimens in log *     OPERATION:   Colonoscopy to the cecum      ANESTHESIA: LMAC    COMPLICATIONS: None. BLOOD LOSS: Minimal    Procedure Note:    CONSENT AND INDICATIONS:  This is a 75y.o. year old male who is having the above. I have discussed with the patient and/or the patient representative the indication, alternatives, and the possible risks and/or complications of the planned procedure and the anesthesia methods. The patient and/or patient representative appear to understand and agree to proceed. OPERATIONS: Bowel prep was done yesterday until the bowels were clear. The patient was placed on the table and sedated by anesthesia. A rectal exam was performed and no mass was felt. A lubricated scope was passed into the rectum which looked normal.  The scope was passed all the way around through the sigmoid, descending, transverse and ascending colon to the cecum. The bowel prep was clear. There was moderate sigmoid diverticulosis. The cecum was identified by the appendiceal orifice, ileocecal valve, and light reflex in the RLQ. The scope was then slowly withdrawn, each area was examined again on the way out. The scope was retroflexed in the rectum and it was normal . The patient tolerated the procedure well. PLAN:     Follow up colonoscopy in 10 years. Physician Signature: Electronically signed by Dr. Deborah Mantilla MD M.D. FACS    Send copy of H&P to PCP, Belinda Davalos DO and referring physician, No ref.  provider found

## 2022-09-16 NOTE — ANESTHESIA POSTPROCEDURE EVALUATION
Department of Anesthesiology  Postprocedure Note    Patient: Gretchen Lopez  MRN: [id number]  YOB: 1955  Date of evaluation: 9/16/2022      Procedure Summary     Date: 09/16/22 Room / Location: [facility] 03 / [facility]    Anesthesia Start: 1100 Anesthesia Stop: 1121    Procedure: COLORECTAL CANCER SCREENING, NOT HIGH RISK Diagnosis:       Screening for malignant neoplasm of colon      (Screening for malignant neoplasm of colon [Z12.11])    Surgeons: Deborah Wyatt MD Responsible Provider: Marc Florez MD    Anesthesia Type: MAC ASA Status: 3          Anesthesia Type: No value filed.     Wilman Phase I: Wilman Score: 10    Wilman Phase II:        Anesthesia Post Evaluation    Patient location during evaluation: PACU  Patient participation: complete - patient participated  Level of consciousness: awake  Airway patency: patent  Nausea & Vomiting: no nausea and no vomiting  Complications: no  Cardiovascular status: hemodynamically stable  Respiratory status: acceptable  Hydration status: euvolemic

## 2022-09-16 NOTE — PROGRESS NOTES
PT RECEIVED IN PACU 2 FROM ENDO REPORT RECEIVED ASSESSMENT AND VS OBTAINED FAMILY AT BEDSIDE AND UPDATED , PT ENCOURAGE TO PASS FLATUS , DENIES ANY PAIN   1140 PT PASSING FLATUS DR KENNEDY QUIÑONEZ HERE AND UPDATED PT AND FAMILY ON PROCEDURE AND FOLLOW UP AT THIS TIME  1155 DISCHARGE INSTRUCTIONS GIVEN TO PT AND FAMILY AT THIS TIME BOTH VERBALIZED UNDERSTANDING OF INSTRUCTIONS PAMPHLETS GIVEN

## 2022-09-16 NOTE — H&P
ASSISTED  XI performed by Starr Whitten MD at [place] [address]        COLONOSCOPY   07/20/2015    CORONARY ANGIOPLASTY WITH STENT PLACEMENT   5/29/2012     Dr. Luan Contreras, Stent Mid LAD    DIAGNOSTIC CARDIAC CATH LAB PROCEDURE   9/17/1998     [facility]. Cath/PTCA/stent of RCA; PTCA/stent of mid LAD with adjunctive ReoPro administration. DIAGNOSTIC CARDIAC CATH LAB PROCEDURE   6/25/2002     [facility]. Kissing balloon angioplasty LAD>diag Perclose. Dr. Luan Contreras and Dr. Bettie Clement. DIAGNOSTIC CARDIAC CATH LAB PROCEDURE   4/13/2005     [facility]. Cath/stent (TAX\"US) to diagonal at [facility]. ENDOSCOPY, COLON, DIAGNOSTIC        FINGER TRIGGER RELEASE Right 11/20/2015     Release right long trigger finger. Taras Goldmann, MD    HERNIA REPAIR Left YRS AGO     [facility]    HIATAL HERNIA REPAIR N/A 10/22/2019     LAPAROSCOPIC ROBOTIC XI ASSISTED PARAESOPHAGEAL HERNIA REPAIR WITH PARTIAL FUNDOPLICATION AND MYOFASCIAL FLAP, UPPER ENDOSCOPY performed by Suleiman Mckinney MD at [place] Right 08/01/2017     robotic assisted    INTRACAPSULAR CATARACT EXTRACTION Left 11/11/2021     LEFT EYE CATARACT EXTRACTION IOL IMPLANT performed by Melody Huang MD at [address] [place] [address] Left 2014     knee    KNEE ARTHROPLASTY Left 06/16/2014     Left TKA.   Taras Goldmann, MD    TONSILLECTOMY        UPPER GASTROINTESTINAL ENDOSCOPY        UPPER GASTROINTESTINAL ENDOSCOPY N/A 9/10/2019     EGD BIOPSY performed by Suleiman Mckinney MD at [address] [place] [address] N/A 7/12/2021     EGD BIOPSY performed by Suleiman Mckinney MD at [facility]: Penicillins      Medications:   Current Facility-Administered Medications          Current Outpatient Medications   Medication Sig Dispense Refill    omeprazole (PRILOSEC) 40 MG delayed release capsule TAKE 1 CAPSULE BY MOUTH TWICE A  capsule 1    metoprolol tartrate (LOPRESSOR) 25 MG tablet TAKE 1 TABLET BY MOUTH TWICE A  tablet 1    empagliflozin (JARDIANCE) 10 MG tablet One daily 90 tablet 1    ezetimibe (ZETIA) 10 MG tablet Take 1 tablet by mouth daily 90 tablet 1    atorvastatin (LIPITOR) 80 MG tablet Take 1 tablet by mouth daily 90 tablet 1    amLODIPine (NORVASC) 10 MG tablet TAKE 1 TABLET BY MOUTH EVERY DAY 90 tablet 1    Semaglutide,0.25 or 0.5MG/DOS, (OZEMPIC, 0.25 OR 0.5 MG/DOSE,) 2 MG/1.5ML SOPN INJECT 0.25MG INTO THE SKIN ONE TIME PER WEEK 1 pen 2    citalopram (CELEXA) 40 MG tablet TAKE 1 AND 1/2 TABLETS BY MOUTH EVERY MORNING 135 tablet 1    losartan-hydroCHLOROthiazide (HYZAAR) 100-25 MG per tablet TAKE 1 TABLET BY MOUTH EVERY DAY 90 tablet 1    nitroGLYCERIN (NITROSTAT) 0.4 MG SL tablet Place 1 tablet under the tongue every 5 minutes as needed for Chest pain If chest pain: put 1 NTG tab under tongue - sit down - wait 5 min - if no relief, call 911. May repeat dose 2 more times 5 min apart while waiting for EMS (total of 3 doses). If dizzy do not take any further doses. (Patient taking differently: Place 0.4 mg under the tongue every 5 minutes as needed for Chest pain If chest pain: put 1 NTG tab under tongue - sit down - wait 5 min - if no relief, call 911. May repeat dose 2 more times 5 min apart while waiting for EMS (total of 3 doses). If dizzy do not take any further doses.   Has never had to use) 90 tablet 1    aspirin 81 MG tablet Take 81 mg by mouth daily Last dose 10/19/19        ferrous sulfate 325 (65 Fe) MG tablet Take 325 mg by mouth daily (with breakfast)         Cholecalciferol (VITAMIN D3) 5000 UNITS TABS Take 1 tablet by mouth every morning         Ascorbic Acid (VITAMIN C) 500 MG tablet Take 500 mg by mouth every morning         fish oil-omega-3 fatty acids 1000 MG capsule Take 1 g by mouth every morning         cloNIDine (CATAPRES) 0.1 MG tablet Take 0.1 mg daily for 14 days and then discontinue (Patient not taking: Reported on 3/2/2022) 14 tablet 0      No current facility-administered medications for this visit. Social History:   Social History            Tobacco Use    Smoking status: Never    Smokeless tobacco: Never   Substance Use Topics    Alcohol use: Yes       Comment: socially         Family History:   Family History         Family History   Problem Relation Age of Onset    Hypertension Mother [de-identified]          from renal failure    Diabetes Mother      Coronary Art Dis Mother      Osteoporosis Mother      Osteoarthritis Father 80    High Cholesterol Brother      High Cholesterol Brother              REVIEW OF SYSTEMS:    Constitutional: negative  Eyes: negative  Ears, nose, mouth, throat, and face: negative  Respiratory: negative  Cardiovascular: negative  Gastrointestinal: as in HPI  Genitourinary:negative  Integument/breast: negative  Hematologic/lymphatic: negative  Musculoskeletal:negative  Neurological: negative  Allergic/Immunologic: negative     PHYSICAL EXAM   /87   Pulse 56   Temp 97.3 °F (36.3 °C) (Temporal)   Resp 18   Ht 5' 6\" (1.676 m)   Wt 200 lb 9.6 oz (91 kg)   SpO2 98%   BMI 32.38 kg/m²      General appearance: alert, cooperative and in no acute distress. Eyes: Grossly normal   Lungs: normal work of breathing  Heart: regular rate  Abdomen:  soft, non-tender, non-distended  Skin: No skin abnormalities  Neurologic: Alert and oriented x 3. Grossly normal  Musculoskeletal: No edema. ASSESSMENT AND PLAN:     Marika Soriano is an 75 y.o. male who presents for a colonoscopy for screening     I will set the patient up for a colonoscopy, possible biopsy, possible polypectomy. I explained the risks including but not limited to bleeding, perforation leading to possible surgery, or infection. The benefits, alternatives, and potential complications associated with the above procedure to be performed and transfusions when applicable with the patient/responsible person prior to the procedure.          Physician Signature: Electronically signed by Lucas Chavez MD, General Surgery     Send copy of H&P to PCP, Bello Rivera DO and referring physician, No ref.  provider found

## 2022-09-16 NOTE — ANESTHESIA PRE PROCEDURE
Department of Anesthesiology  Preprocedure Note       Name:  Amos Edouard   Age:  75 y.o.  :  1955                                          MRN:  [id number]         Date:  2022      Surgeon: Mohamud Lewis):  Brant Holcomb MD    Procedure: COLORECTAL CANCER SCREENING, NOT HIGH RISK    Medications prior to admission:   Prior to Admission medications    Medication Sig Start Date End Date Taking? Authorizing Provider   omeprazole (PRILOSEC) 40 MG delayed release capsule TAKE 1 CAPSULE BY MOUTH TWICE A DAY  Patient taking differently: Take 40 mg by mouth every morning 22   Brett Yip,    metoprolol tartrate (LOPRESSOR) 25 MG tablet TAKE 1 TABLET BY MOUTH TWICE A DAY 22   rBett Yip, DO   empagliflozin (JARDIANCE) 10 MG tablet One daily 22   Brett Yip, DO   ezetimibe (ZETIA) 10 MG tablet Take 1 tablet by mouth daily 22   Brett Yip, DO   atorvastatin (LIPITOR) 80 MG tablet Take 1 tablet by mouth daily 22   Brett Yip, DO   amLODIPine (NORVASC) 10 MG tablet TAKE 1 TABLET BY MOUTH EVERY DAY  Patient taking differently: Take 10 mg by mouth every morning TAKE 1 TABLET BY MOUTH EVERY DAY 22   Brett Yip DO   Semaglutide,0.25 or 0.5MG/DOS, (OZEMPIC, 0.25 OR 0.5 MG/DOSE,) 2 MG/1.5ML SOPN INJECT 0.25MG INTO THE SKIN ONE TIME PER WEEK  Patient taking differently: INJECT 0.25MG INTO THE SKIN ONE TIME PER WEEK on SUNDAYs 6/27/22   Brett Yip DO   citalopram (CELEXA) 40 MG tablet TAKE 1 AND 1/2 TABLETS BY MOUTH EVERY MORNING 22   Brett Yip,    losartan-hydroCHLOROthiazide (HYZAAR) 100-25 MG per tablet TAKE 1 TABLET BY MOUTH EVERY DAY 12/15/21   Brett Yip DO   nitroGLYCERIN (NITROSTAT) 0.4 MG SL tablet Place 1 tablet under the tongue every 5 minutes as needed for Chest pain If chest pain: put 1 NTG tab under tongue - sit down - wait 5 min - if no relief, call 911.   May repeat dose 2 more times 5 min apart while waiting for EMS (total of 3 doses). If dizzy do not take any further doses. Patient taking differently: Place 0.4 mg under the tongue every 5 minutes as needed for Chest pain If chest pain: put 1 NTG tab under tongue - sit down - wait 5 min - if no relief, call 911. May repeat dose 2 more times 5 min apart while waiting for EMS (total of 3 doses). If dizzy do not take any further doses. Has never had to use 6/11/21   Cassi Yip,    aspirin 81 MG tablet Take 81 mg by mouth daily Last dose 10/19/19    Historical Provider, MD   ferrous sulfate 325 (65 Fe) MG tablet Take 325 mg by mouth daily (with breakfast)     Historical Provider, MD   Cholecalciferol (VITAMIN D3) 5000 UNITS TABS Take 1 tablet by mouth every morning     Historical Provider, MD   Ascorbic Acid (VITAMIN C) 500 MG tablet Take 500 mg by mouth every morning     Historical Provider, MD   fish oil-omega-3 fatty acids 1000 MG capsule Take 1 g by mouth every morning     Historical Provider, MD       Current medications:    No current facility-administered medications for this visit. No current outpatient medications on file. Facility-Administered Medications Ordered in Other Visits   Medication Dose Route Frequency Provider Last Rate Last Admin    0.9 % sodium chloride infusion   IntraVENous Continuous Selvin Lenz MD           Allergies:     Allergies   Allergen Reactions    Penicillins Hives       Problem List:    Patient Active Problem List   Diagnosis Code    Coronary artery disease involving native coronary artery of native heart without angina pectoris I25.10    Hypertension I10    Pure hypercholesterolemia E78.00    Anemia D64.9    GERD (gastroesophageal reflux disease) K21.9    GARZA (dyspnea on exertion) R06.00    Left knee DJD M17.12    Moderate obesity E66.8    Near syncope R55    Anxiety attack F41.0    NSTEMI (non-ST elevated myocardial infarction) ([facility] [address] I21.4    Hiatal hernia K44.9    Dizziness R42  Cholelithiasis T56.17    Biliary colic W85.20    Combined forms of age-related cataract of both eyes H25.813       Past Medical History:        Diagnosis Date    Anemia     Anxiety attack     controlled with citolpram    CAD (coronary artery disease) 16 YRS AGO    MI X 2. Adelina Johns yearly    Cataract, left eye     Colon cancer screening 09/2022    Diabetes mellitus [facility] [address]     GERD (gastroesophageal reflux disease)     Hypercholesterolemia 1994    Hyperlipidemia     Hypertension 06/12/2014    B/P IS ELEVATED, PATIENT STATES HE NEEDS TO GET HIS LOPRESSOR REFILLED    Left knee DJD 06/10/2014    OSTEO    NSTEMI (non-ST elevated myocardial infarction) ([facility] [address] 05/28/2012       Past Surgical History:        Procedure Laterality Date    ANKLE SURGERY  6 YRS AGO    right ORIF    CHOLECYSTECTOMY, LAPAROSCOPIC N/A 7/19/2019    CHOLECYSTECTOMY LAPAROSCOPIC ROBOTIC ASSISTED  XI performed by Lesley Gil MD at [place] [address] COLONOSCOPY  07/20/2015    CORONARY ANGIOPLASTY WITH STENT PLACEMENT  5/29/2012    Dr. Benji Cannon, Stent Mid LAD    DIAGNOSTIC CARDIAC CATH LAB PROCEDURE  9/17/1998    [address]. Cath/PTCA/stent of RCA; PTCA/stent of mid LAD with adjunctive ReoPro administration.  DIAGNOSTIC CARDIAC CATH LAB PROCEDURE  6/25/2002    [facility]. Kissing balloon angioplasty LAD>diag Perclose. Dr. Benji Cannon and Dr. Joy Sam.  DIAGNOSTIC CARDIAC CATH LAB PROCEDURE  4/13/2005    [address] Cath/stent (TAX\"US) to diagonal at [facility].  ENDOSCOPY, COLON, DIAGNOSTIC      FINGER TRIGGER RELEASE Right 11/20/2015    Release right long trigger finger.   Madhuri Calzada MD    HERNIA REPAIR Left YRS AGO    [facility]    HIATAL HERNIA REPAIR N/A 10/22/2019    LAPAROSCOPIC ROBOTIC XI ASSISTED PARAESOPHAGEAL HERNIA REPAIR WITH PARTIAL FUNDOPLICATION AND MYOFASCIAL FLAP, UPPER ENDOSCOPY performed by Zelda Verdugo MD at [address] Right 08/01/2017    robotic assisted    INTRACAPSULAR CATARACT EXTRACTION Left 11/11/2021    LEFT EYE CATARACT EXTRACTION IOL IMPLANT performed by Jimmie Stacy MD at [address] [facility] Left 2014    knee    KNEE ARTHROPLASTY Left 06/16/2014    Left TKA. Cece Pinto MD    TONSILLECTOMY      UPPER GASTROINTESTINAL ENDOSCOPY      UPPER GASTROINTESTINAL ENDOSCOPY N/A 9/10/2019    EGD BIOPSY performed by Kae Collins MD at [place] [address] N/A 7/12/2021    EGD BIOPSY performed by Kae Collins MD at [address] [place] [address] History:    Social History     Tobacco Use    Smoking status: Never    Smokeless tobacco: Never   Substance Use Topics    Alcohol use: Yes     Comment: socially                                Counseling given: Not Answered      Vital Signs (Current): There were no vitals filed for this visit.                                            BP Readings from Last 3 Encounters:   07/21/22 138/87   07/06/22 130/80   03/02/22 (!) 142/98       NPO Status:                                                                                 BMI:   Wt Readings from Last 3 Encounters:   09/13/22 200 lb (90.7 kg)   07/21/22 200 lb 9.6 oz (91 kg)   07/06/22 204 lb (92.5 kg)     There is no height or weight on file to calculate BMI.    CBC:   Lab Results   Component Value Date/Time    WBC 10.9 07/06/2022 12:00 PM    RBC 5.47 07/06/2022 12:00 PM    HGB 16.4 07/06/2022 12:00 PM    HCT 49.3 07/06/2022 12:00 PM    MCV 90.1 07/06/2022 12:00 PM    RDW 13.1 07/06/2022 12:00 PM     07/06/2022 12:00 PM       CMP:   Lab Results   Component Value Date/Time     07/06/2022 12:00 PM    K 3.7 07/06/2022 12:00 PM    K 3.6 10/23/2019 04:08 AM     07/06/2022 12:00 PM    CO2 22 07/06/2022 12:00 PM    BUN 24 07/06/2022 12:00 PM    CREATININE 1.0 07/06/2022 12:00 PM    GFRAA >60 07/06/2022 12:00 PM    LABGLOM >60 07/06/2022 12:00 PM    GLUCOSE 147 07/06/2022 12:00 PM GLUCOSE 103 05/30/2012 04:20 AM    PROT 6.9 07/06/2022 12:00 PM    CALCIUM 9.6 07/06/2022 12:00 PM    BILITOT 1.1 07/06/2022 12:00 PM    ALKPHOS 96 07/06/2022 12:00 PM    AST 15 07/06/2022 12:00 PM    ALT 22 07/06/2022 12:00 PM       POC Tests: No results for input(s): POCGLU, POCNA, POCK, POCCL, POCBUN, POCHEMO, POCHCT in the last 72 hours. Coags:   Lab Results   Component Value Date/Time    PROTIME 11.3 03/04/2018 12:34 AM    PROTIME 12.4 05/29/2012 03:45 AM    INR 1.0 03/04/2018 12:34 AM    APTT 24.7 05/27/2012 12:05 AM       HCG (If Applicable): No results found for: PREGTESTUR, PREGSERUM, HCG, HCGQUANT     ABGs: No results found for: PHART, PO2ART, RCQ5VXS, ERY8ABY, BEART, S3MSZWAI     Type & Screen (If Applicable):  No results found for: LABABO, [address]    Anesthesia Evaluation  Patient summary reviewed no history of anesthetic complications:   Airway: Mallampati: III  TM distance: <3 FB   Neck ROM: full  Mouth opening: < 3 FB   Dental: normal exam         Pulmonary: breath sounds clear to auscultation  (+) shortness of breath:      (-) not a current smoker                           Cardiovascular:    (+) hypertension:, past MI:, CAD:, CABG/stent (cardiac stents):, hyperlipidemia    (-)  GARZA      Rhythm: regular  Rate: normal           Beta Blocker:  Dose within 24 Hrs         Neuro/Psych:   (+) depression/anxiety    (-) neuromuscular disease           GI/Hepatic/Renal:   (+) hiatal hernia, GERD:,      (-) bowel prep and no morbid obesity       Endo/Other:    (+) : arthritis: OA., .                 Abdominal:   (+) obese,           Vascular: negative vascular ROS. Other Findings:             Anesthesia Plan      MAC     ASA 3       Induction: intravenous. Anesthetic plan and risks discussed with patient. Plan discussed with CRNA. DOS STAFF ADDENDUM:    Pt seen and examined, chart reviewed (including anesthesia, drug and allergy history).        Anesthetic plan, risks, benefits, alternatives, and personnel involved discussed with patient. Patient verbalized an understanding and agrees to proceed. Plan discussed with care team members and agreed upon. Marc Florez MD  Staff Anesthesiologist  10:13 AM    Marc Florez MD   9/16/2022      Chart reviewed . Patient assessed before induction. I agree with the above note.   Tavo , APRN - CRNA

## 2022-09-19 RX ORDER — HYDROCHLOROTHIAZIDE 25 MG/1
TABLET ORAL
Qty: 90 TABLET | Refills: 1 | OUTPATIENT
Start: 2022-09-19

## 2022-09-19 RX ORDER — LOSARTAN POTASSIUM 100 MG/1
TABLET ORAL
Qty: 90 TABLET | Refills: 1 | OUTPATIENT
Start: 2022-09-19

## 2022-10-10 ENCOUNTER — HOSPITAL ENCOUNTER (EMERGENCY)
Age: 67
Discharge: HOME | End: 2022-10-10
Payer: MEDICARE

## 2022-10-10 VITALS
BODY MASS INDEX: 29.86 KG/M2 | OXYGEN SATURATION: 98 % | HEART RATE: 66 BPM | WEIGHT: 197 LBS | HEIGHT: 68 IN | SYSTOLIC BLOOD PRESSURE: 137 MMHG | DIASTOLIC BLOOD PRESSURE: 88 MMHG | TEMPERATURE: 98.5 F | RESPIRATION RATE: 18 BRPM

## 2022-10-10 DIAGNOSIS — J01.00 ACUTE NON-RECURRENT MAXILLARY SINUSITIS: Primary | ICD-10-CM

## 2022-10-10 DIAGNOSIS — T46.6X5A CHOLESTEROL-LOWERING AGENT MYOPATHY: ICD-10-CM

## 2022-10-10 DIAGNOSIS — G72.0 CHOLESTEROL-LOWERING AGENT MYOPATHY: ICD-10-CM

## 2022-10-10 PROCEDURE — 99211 OFF/OP EST MAY X REQ PHY/QHP: CPT

## 2022-10-10 RX ORDER — HYDROCHLOROTHIAZIDE 25 MG/1
TABLET ORAL
Qty: 90 TABLET | Refills: 1 | Status: SHIPPED | OUTPATIENT
Start: 2022-10-10

## 2022-10-10 RX ORDER — LOSARTAN POTASSIUM 100 MG/1
TABLET ORAL
Qty: 90 TABLET | Refills: 1 | Status: SHIPPED | OUTPATIENT
Start: 2022-10-10

## 2022-10-10 RX ORDER — FLUTICASONE PROPIONATE 50 MCG
1 SPRAY, SUSPENSION (ML) NASAL DAILY
Qty: 16 G | Refills: 0 | Status: SHIPPED | OUTPATIENT
Start: 2022-10-10

## 2022-10-10 RX ORDER — CITALOPRAM 40 MG/1
TABLET ORAL
Qty: 135 TABLET | Refills: 1 | Status: SHIPPED | OUTPATIENT
Start: 2022-10-10

## 2022-10-10 RX ORDER — CLARITHROMYCIN 500 MG/1
500 TABLET, COATED ORAL 2 TIMES DAILY
Qty: 20 TABLET | Refills: 0 | Status: SHIPPED | OUTPATIENT
Start: 2022-10-10 | End: 2022-10-20

## 2022-10-10 RX ORDER — EZETIMIBE 10 MG/1
10 TABLET ORAL DAILY
Qty: 90 TABLET | Refills: 1 | Status: SHIPPED | OUTPATIENT
Start: 2022-10-10

## 2022-10-10 ASSESSMENT — PAIN - FUNCTIONAL ASSESSMENT: PAIN_FUNCTIONAL_ASSESSMENT: 0-10

## 2022-10-10 ASSESSMENT — PAIN SCALES - GENERAL: PAINLEVEL_OUTOF10: 0

## 2022-10-11 NOTE — ED PROVIDER NOTES
Department of Emergency Medicine   [address] [place] [address]  Provider Note  Admit Date/RoomTime: 10/10/2022  7:30 PM  Room:   NAME: August Rivas  : 1955  MRN: [id number]     Chief Complaint:  Nasal Congestion (Ear ache  sore throat   cough)    History of Present Illness       August Rivas is a 75 y.o. old male who has a past medical history of:   Past Medical History:   Diagnosis Date    Anemia     Anxiety attack     controlled with citolpram    CAD (coronary artery disease) 16 YRS AGO    MI X 2. Kelli Grant yearly    Cataract, left eye     Colon cancer screening 2022    Diabetes mellitus ([facility] [address]     GERD (gastroesophageal reflux disease)     Hypercholesterolemia     Hyperlipidemia     Hypertension 2014    B/P IS ELEVATED, PATIENT STATES HE NEEDS TO GET HIS LOPRESSOR REFILLED    Left knee DJD 06/10/2014    OSTEO    NSTEMI (non-ST elevated myocardial infarction) ([facility] [address] 2012   presents to the urgent care center by private vehicle, for nasal congestion, facial pain, foul breath, sinus pressure, and post nasal drip, which began 1 week(s) prior to arrival.  Since onset the symptoms have been persistent and moderate in severity. The symptoms are associated with productive nasal sputum. There has been no history of additional Sx's. He has prior history of nothing contributory to todays presentation in the past.  Immunization status: up to date. The patient has received at least one dose of a 2-dose vaccine against COVID-19    ROS    Pertinent positives and negatives are stated within HPI, all other systems reviewed and are negative.     Past Surgical History:   Procedure Laterality Date    ANKLE SURGERY  6 YRS AGO    right ORIF    CHOLECYSTECTOMY, LAPAROSCOPIC N/A 2019    CHOLECYSTECTOMY LAPAROSCOPIC ROBOTIC ASSISTED  XI performed by Dre Omlos MD at [address] [place] [address]      COLONOSCOPY  2015    COLONOSCOPY N/A 2022 COLORECTAL CANCER SCREENING, NOT HIGH RISK performed by Aida Haynes MD at [address]  5/29/2012    Dr. Shira Mae, Stent Mid LAD    DIAGNOSTIC CARDIAC CATH LAB PROCEDURE  9/17/1998    [facility]. Cath/PTCA/stent of RCA; PTCA/stent of mid LAD with adjunctive ReoPro administration. DIAGNOSTIC CARDIAC CATH LAB PROCEDURE  6/25/2002    [facility]. Kissing balloon angioplasty LAD>diag Perclose. Dr. Shira Mae and Dr. Nathaniel Robert. DIAGNOSTIC CARDIAC CATH LAB PROCEDURE  4/13/2005    [facility]. Cath/stent (TAX\"US) to diagonal at [facility]. ENDOSCOPY, COLON, DIAGNOSTIC      FINGER TRIGGER RELEASE Right 11/20/2015    Release right long trigger finger. Philip Ram MD    HERNIA REPAIR Left YRS AGO    [facility]    HIATAL HERNIA REPAIR N/A 10/22/2019    LAPAROSCOPIC ROBOTIC XI ASSISTED PARAESOPHAGEAL HERNIA REPAIR WITH PARTIAL FUNDOPLICATION AND MYOFASCIAL FLAP, UPPER ENDOSCOPY performed by Criselda Srivastava MD at [address] [facility] Right 08/01/2017    robotic assisted    INTRACAPSULAR CATARACT EXTRACTION Left 11/11/2021    LEFT EYE CATARACT EXTRACTION IOL IMPLANT performed by Carter Goldman MD at [address] Left 2014    knee    KNEE ARTHROPLASTY Left 06/16/2014    Left TKA. Philip Ram MD    TONSILLECTOMY      UPPER GASTROINTESTINAL ENDOSCOPY      UPPER GASTROINTESTINAL ENDOSCOPY N/A 9/10/2019    EGD BIOPSY performed by Criselda Srivastava MD at [facility] 7/12/2021    EGD BIOPSY performed by Criselda Srivastava MD at [address] [address] History:  reports that he has never smoked. He has never used smokeless tobacco. He reports current alcohol use. He reports that he does not use drugs.   Family History: family history includes Coronary Art Dis in his mother; Diabetes in his mother; High Cholesterol in his brother and brother; Hypertension (age of onset: [de-identified]) in his mother; Osteoarthritis (age of non-recurrent maxillary sinusitis      Plan   Discharge to home and advised to contact Tanvir Tovar [place] [id number] [phone]    Schedule an appointment as soon as possible for a visit    Patient condition is good    New Medications     New Prescriptions    CLARITHROMYCIN (BIAXIN) 500 MG TABLET    Take 1 tablet by mouth 2 times daily for 10 days    FLUTICASONE (FLONASE) 50 MCG/ACT NASAL SPRAY    1 spray by Nasal route daily     Electronically signed by LUIS FELIPE Rose   DD: 10/10/22  **This report was transcribed using voice recognition software. Every effort was made to ensure accuracy; however, inadvertent computerized transcription errors may be present.   END OF ED PROVIDER NOTE         Maged Lundy  10/10/22 2018

## 2022-10-27 ENCOUNTER — OFFICE VISIT (OUTPATIENT)
Dept: ORTHOPEDIC SURGERY | Age: 67
End: 2022-10-27
Payer: MEDICARE

## 2022-10-27 DIAGNOSIS — M75.102 TEAR OF LEFT ROTATOR CUFF, UNSPECIFIED TEAR EXTENT, UNSPECIFIED WHETHER TRAUMATIC: Primary | ICD-10-CM

## 2022-10-27 PROCEDURE — 1123F ACP DISCUSS/DSCN MKR DOCD: CPT | Performed by: STUDENT IN AN ORGANIZED HEALTH CARE EDUCATION/TRAINING PROGRAM

## 2022-10-27 PROCEDURE — G8427 DOCREV CUR MEDS BY ELIG CLIN: HCPCS | Performed by: STUDENT IN AN ORGANIZED HEALTH CARE EDUCATION/TRAINING PROGRAM

## 2022-10-27 PROCEDURE — 3017F COLORECTAL CA SCREEN DOC REV: CPT | Performed by: STUDENT IN AN ORGANIZED HEALTH CARE EDUCATION/TRAINING PROGRAM

## 2022-10-27 PROCEDURE — 1036F TOBACCO NON-USER: CPT | Performed by: STUDENT IN AN ORGANIZED HEALTH CARE EDUCATION/TRAINING PROGRAM

## 2022-10-27 PROCEDURE — 99214 OFFICE O/P EST MOD 30 MIN: CPT | Performed by: STUDENT IN AN ORGANIZED HEALTH CARE EDUCATION/TRAINING PROGRAM

## 2022-10-27 PROCEDURE — G8417 CALC BMI ABV UP PARAM F/U: HCPCS | Performed by: STUDENT IN AN ORGANIZED HEALTH CARE EDUCATION/TRAINING PROGRAM

## 2022-10-27 PROCEDURE — G8484 FLU IMMUNIZE NO ADMIN: HCPCS | Performed by: STUDENT IN AN ORGANIZED HEALTH CARE EDUCATION/TRAINING PROGRAM

## 2022-10-27 NOTE — PROGRESS NOTES
New Shoulder Patient Visit     Referring Provider:   No referring provider defined for this encounter. CHIEF COMPLAINT:   Chief Complaint   Patient presents with    Shoulder Pain     Left shoulder pain, previously seen Dr. Deedee Bhakta and had injection. HPI:      Kasia Kimbrough is a 75y.o. year old male who is right-hand dominant. He has had left shoulder pain for about 6 months. He was seen by Dr. Kolby Freed in the past and had a shoulder injection and supervised home exercise program.  The injection only worked for about 2 weeks. He states pain is worse with overhead activity and at night. Is better with rest.  He has failed conservative treatment at this point. He is wanting to figure out what else we can do for the shoulder. Denies fever and chills. Denies numbness tingling. No recent injuries. PAST MEDICAL HISTORY  Past Medical History:   Diagnosis Date    Anemia     Anxiety attack     controlled with citolpram    CAD (coronary artery disease) 16 YRS AGO    MI X 2.  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 Shilpi Rios, Stent Mid LAD    DIAGNOSTIC CARDIAC CATH LAB PROCEDURE  1998    [address] Cath/PTCA/stent of RCA; PTCA/stent of mid LAD with adjunctive ReoPro administration. DIAGNOSTIC CARDIAC CATH LAB PROCEDURE  2002    [facility]. Kissing balloon angioplasty LAD>diag Perclose. Dr. Surya Rangel and Dr. Roro Doyle. DIAGNOSTIC CARDIAC CATH LAB PROCEDURE  2005    [address] Cath/stent (TAX\"US) to [facility] at [facility]. ENDOSCOPY, COLON, DIAGNOSTIC      FINGER TRIGGER RELEASE Right 2015    Release right long trigger finger. Carlos Trent MD    HERNIA REPAIR Left YRS AGO    [facility]    HIATAL HERNIA REPAIR N/A 10/22/2019    LAPAROSCOPIC ROBOTIC XI ASSISTED PARAESOPHAGEAL HERNIA REPAIR WITH PARTIAL FUNDOPLICATION AND MYOFASCIAL FLAP, UPPER ENDOSCOPY performed by Lavera Baumgarten, MD at [address] [facility] Right 2017    robotic assisted    INTRACAPSULAR CATARACT EXTRACTION Left 2021    LEFT EYE CATARACT EXTRACTION IOL IMPLANT performed by Shubham Beaulieu MD at [facility] Left     knee    KNEE ARTHROPLASTY Left 2014    Left TKA.   Carlos Trent MD    TONSILLECTOMY      UPPER GASTROINTESTINAL ENDOSCOPY      UPPER GASTROINTESTINAL ENDOSCOPY N/A 9/10/2019    EGD BIOPSY performed by Lavera Baumgarten, MD at [place] [address] N/A 2021    EGD BIOPSY performed by Lavera Baumgarten, MD at [address] [place] [address] History   Problem Relation Age of Onset    Hypertension Mother [de-identified]         from renal failure    Diabetes Mother     Coronary Art Dis Mother     Osteoporosis Mother     Osteoarthritis Father 80    High Cholesterol Brother     High Cholesterol Brother        SOCIAL HISTORY  Social History     Occupational History    Not on file   Tobacco Use    Smoking status: Never    Smokeless tobacco: Never   Vaping Use    Vaping Use: Never used   Substance and Sexual Activity    Alcohol use: Yes     Comment: socially Drug use: No    Sexual activity: Not on file       CURRENT MEDICATIONS     Current Outpatient Medications:     citalopram (CELEXA) 40 MG tablet, TAKE 1 AND 1/2 TABLETS BY MOUTH EVERY MORNING, Disp: 135 tablet, Rfl: 1    ezetimibe (ZETIA) 10 MG tablet, Take 1 tablet by mouth daily, Disp: 90 tablet, Rfl: 1    fluticasone (FLONASE) 50 MCG/ACT nasal spray, 1 spray by Nasal route daily, Disp: 16 g, Rfl: 0    omeprazole (PRILOSEC) 40 MG delayed release capsule, TAKE 1 CAPSULE BY MOUTH TWICE A DAY, Disp: 180 capsule, Rfl: 1    metoprolol tartrate (LOPRESSOR) 25 MG tablet, TAKE 1 TABLET BY MOUTH TWICE A DAY, Disp: 180 tablet, Rfl: 1    empagliflozin (JARDIANCE) 10 MG tablet, One daily, Disp: 90 tablet, Rfl: 1    atorvastatin (LIPITOR) 80 MG tablet, Take 1 tablet by mouth daily, Disp: 90 tablet, Rfl: 1    amLODIPine (NORVASC) 10 MG tablet, TAKE 1 TABLET BY MOUTH EVERY DAY, Disp: 90 tablet, Rfl: 1    Semaglutide,0.25 or 0.5MG/DOS, (OZEMPIC, 0.25 OR 0.5 MG/DOSE,) 2 MG/1.5ML SOPN, INJECT 0.25MG INTO THE SKIN ONE TIME PER WEEK, Disp: 1 pen, Rfl: 2    losartan-hydroCHLOROthiazide (HYZAAR) 100-25 MG per tablet, TAKE 1 TABLET BY MOUTH EVERY DAY, Disp: 90 tablet, Rfl: 1    nitroGLYCERIN (NITROSTAT) 0.4 MG SL tablet, Place 1 tablet under the tongue every 5 minutes as needed for Chest pain If chest pain: put 1 NTG tab under tongue - sit down - wait 5 min - if no relief, call 911. May repeat dose 2 more times 5 min apart while waiting for EMS (total of 3 doses). If dizzy do not take any further doses. , Disp: 90 tablet, Rfl: 1    aspirin 81 MG tablet, Take 81 mg by mouth daily Last dose 10/19/19, Disp: , Rfl:     ferrous sulfate 325 (65 Fe) MG tablet, Take 325 mg by mouth daily (with breakfast) , Disp: , Rfl:     Cholecalciferol (VITAMIN D3) 5000 UNITS TABS, Take 1 tablet by mouth every morning , Disp: , Rfl:     Ascorbic Acid (VITAMIN C) 500 MG tablet, Take 500 mg by mouth every morning , Disp: , Rfl:     fish oil-omega-3 fatty acids 1000 MG capsule, Take 1 g by mouth every morning , Disp: , Rfl:     losartan (COZAAR) 100 MG tablet, TAKE 1 TABLET BY MOUTH EVERY DAY (Patient not taking: Reported on 10/27/2022), Disp: 90 tablet, Rfl: 1    hydroCHLOROthiazide (HYDRODIURIL) 25 MG tablet, TAKE 1 TABLET BY MOUTH EVERY DAY IN THE MORNING, Disp: 90 tablet, Rfl: 1    ALLERGIES  Allergies   Allergen Reactions    Penicillins Hives       Controlled Substances Monitoring:        REVIEW OF SYSTEMS:     Constitutional:  Negative for weight loss, fevers, chills, fatigue  Cardiovascular: Negative for chest pain, palpitations  Pulmonary: Negative for shortness of breath, labored breathing, cough  GI: negative for abdominal pain, nausea, vomitting   MSK: per HPI  Skin: negative for rash, open wounds    All other systems reviewed and are negative           PHYSICAL EXAM     There were no vitals filed for this visit. Height:    Weight: [unfilled]  BMI:  There is no height or weight on file to calculate BMI. General: The patient is alert and oriented x 3, appears to be stated age and in no distress. HEENT: head is normocephalic, atraumatic. EOMI. Neck: supple, trachea midline, no thyromegaly   Cardiovascular: peripheral pulses palpable. Normal Capillary refill   Respiratory: breathing unlabored, chest expansion symmetric   Skin: no rash, no open wounds, no erythema  Psych: normal affect; mood stable  Neurologic: gait normal, sensation grossly intact in extremities  MSK:    Cervical Spine: There is no tenderness to palpation along the cervical spine. Range of motion is normal.  Spurling's is negative    Shoulder Exam:   Left shoulder: Near full range of motion in forward flexion abduction external and internal rotation. He has 4-5 strength with resisted abduction and forward flexion. He is 5 out of 5 strength with resisted internal and external rotation. Negative belly press. Negative drop arm. Negative empty can.   Positive impingement test. Tender to palpation over his AC joint. His elbow range of motion is full and unrestricted. His hand is warm and well-perfused    IMAGING:     XRAY: 2 views of the left shoulder were reviewed from his previous visit demonstrate mild AC joint arthritis without any evidence of fracture or dislocation. Well-maintained glenohumeral joint    Radiographic findings reviewed with patient    ASSESSMENT   Left shoulder rotator cuff tendinitis with partial-thickness tearing      PLAN  -At this point he has failed all conservative treatment. We are getting an MRI of his shoulder as he was going on there. We did discuss arthroscopic surgery depending on the findings of the MRI. After his MRI is complete he can come back in the office we will discuss the results, the treatment plan.   All of his questions answered in detail          Delonte Santillan DO  Orthopaedic Surgery   10/27/22   8:56 AM WHIT

## 2022-12-05 ENCOUNTER — HOSPITAL ENCOUNTER (OUTPATIENT)
Dept: MRI IMAGING | Age: 67
Discharge: HOME | End: 2022-12-07
Payer: MEDICARE

## 2022-12-05 DIAGNOSIS — M75.102 TEAR OF LEFT ROTATOR CUFF, UNSPECIFIED TEAR EXTENT, UNSPECIFIED WHETHER TRAUMATIC: ICD-10-CM

## 2022-12-05 PROCEDURE — 73221 MRI JOINT UPR EXTREM W/O DYE: CPT

## 2022-12-06 ENCOUNTER — TELEPHONE (OUTPATIENT)
Dept: ORTHOPEDIC SURGERY | Age: 67
End: 2022-12-06

## 2022-12-06 NOTE — TELEPHONE ENCOUNTER
LM on patient's VM to call back to schedule appt in 1-2 weeks to review results of MRI Left shoulder.

## 2022-12-06 NOTE — TELEPHONE ENCOUNTER
----- Message from Cassidy iVllarreal DO sent at 12/6/2022  3:34 PM EST -----  Regarding:  Follow-up in 1 to 2 weeks to discuss results    ----- Message -----  From: Galo Hamilton Incoming Radiant Results From [facility]  Sent: 12/6/2022   3:02 PM EST  To: Cassidy Villarreal DO

## 2022-12-08 ENCOUNTER — OFFICE VISIT (OUTPATIENT)
Dept: ORTHOPEDIC SURGERY | Age: 67
End: 2022-12-08

## 2022-12-08 VITALS — WEIGHT: 200 LBS | BODY MASS INDEX: 30.31 KG/M2 | HEIGHT: 68 IN

## 2022-12-08 DIAGNOSIS — G89.29 HIP PAIN, CHRONIC, RIGHT: ICD-10-CM

## 2022-12-08 DIAGNOSIS — M75.102 TEAR OF LEFT ROTATOR CUFF, UNSPECIFIED TEAR EXTENT, UNSPECIFIED WHETHER TRAUMATIC: Primary | ICD-10-CM

## 2022-12-08 DIAGNOSIS — M25.512 LEFT SHOULDER PAIN, UNSPECIFIED CHRONICITY: ICD-10-CM

## 2022-12-08 DIAGNOSIS — M25.551 HIP PAIN, CHRONIC, RIGHT: ICD-10-CM

## 2022-12-08 NOTE — PROGRESS NOTES
Referring Provider:   No referring provider defined for this encounter. CHIEF COMPLAINT: No chief complaint on file. HPI:      Lan Chau is a 75y.o. year old male with a long history of left shoulder pain. He has failed conservative treatment including multiple rounds of injections and physical therapy. MRI was completed recently which demonstrates cuff tear arthropathy. He is here today to talk about the results and surgical treatment. He has had no issues since his last visit. Pain is still bothering him. Specifically with overhead motion. Pain is sharp and nonradiating. Denies fever and chills. Denies numbness tingling. He is also complaining about right hip pain specifically around his ASIS that gets worse with activity and better with rest.    PAST MEDICAL HISTORY  Past Medical History:   Diagnosis Date    Anemia     Anxiety attack     controlled with citolpram    CAD (coronary artery disease) 16 YRS AGO    MI X 2.  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 Pamela Ceballos, Stent Mid LAD    DIAGNOSTIC CARDIAC CATH LAB PROCEDURE  1998    [address] Cath/PTCA/stent of RCA; PTCA/stent of mid LAD with adjunctive ReoPro administration. DIAGNOSTIC CARDIAC CATH LAB PROCEDURE  2002    [facility]. Kissing balloon angioplasty LAD>diag Perclose. Dr. Travis Saucedo and Dr. Evelio Fernandez. DIAGNOSTIC CARDIAC CATH LAB PROCEDURE  2005    [address] Cath/stent (TAX\"US) to diagonal at [facility]. ENDOSCOPY, COLON, DIAGNOSTIC      FINGER TRIGGER RELEASE Right 2015    Release right long trigger finger. Ketan Zavaleta MD    HERNIA REPAIR Left YRS AGO    [facility]    HIATAL HERNIA REPAIR N/A 10/22/2019    LAPAROSCOPIC ROBOTIC XI ASSISTED PARAESOPHAGEAL HERNIA REPAIR WITH PARTIAL FUNDOPLICATION AND MYOFASCIAL FLAP, UPPER ENDOSCOPY performed by Isamar Tamayo MD at [address] [facility] Right 2017    robotic assisted    INTRACAPSULAR CATARACT EXTRACTION Left 2021    LEFT EYE CATARACT EXTRACTION IOL IMPLANT performed by Andrew Trujillo MD at [address] [facility] Left     knee    KNEE ARTHROPLASTY Left 2014    Left TKA.   Ketan Zavaleta MD    TONSILLECTOMY      UPPER GASTROINTESTINAL ENDOSCOPY      UPPER GASTROINTESTINAL ENDOSCOPY N/A 9/10/2019    EGD BIOPSY performed by Isamar Tamayo MD at [address] [place] [address],[facility] N/A 2021    EGD BIOPSY performed by Isamar Tamayo MD at [address] [facility] History   Problem Relation Age of Onset    Hypertension Mother [de-identified]         from renal failure    Diabetes Mother     Coronary Art Dis Mother     Osteoporosis Mother     Osteoarthritis Father 80    High Cholesterol Brother     High Cholesterol Brother        SOCIAL HISTORY  Social History     Occupational History    Not on file   Tobacco Use    Smoking status: Never    Smokeless tobacco: Never   Vaping Use    Vaping Use: Never used   Substance and Sexual Activity    Alcohol use: Yes     Comment: socially    Drug use: No    Sexual activity: Not on file CURRENT MEDICATIONS     Current Outpatient Medications:     citalopram (CELEXA) 40 MG tablet, TAKE 1 AND 1/2 TABLETS BY MOUTH EVERY MORNING, Disp: 135 tablet, Rfl: 1    losartan (COZAAR) 100 MG tablet, TAKE 1 TABLET BY MOUTH EVERY DAY (Patient not taking: Reported on 10/27/2022), Disp: 90 tablet, Rfl: 1    hydroCHLOROthiazide (HYDRODIURIL) 25 MG tablet, TAKE 1 TABLET BY MOUTH EVERY DAY IN THE MORNING, Disp: 90 tablet, Rfl: 1    ezetimibe (ZETIA) 10 MG tablet, Take 1 tablet by mouth daily, Disp: 90 tablet, Rfl: 1    fluticasone (FLONASE) 50 MCG/ACT nasal spray, 1 spray by Nasal route daily, Disp: 16 g, Rfl: 0    omeprazole (PRILOSEC) 40 MG delayed release capsule, TAKE 1 CAPSULE BY MOUTH TWICE A DAY, Disp: 180 capsule, Rfl: 1    metoprolol tartrate (LOPRESSOR) 25 MG tablet, TAKE 1 TABLET BY MOUTH TWICE A DAY, Disp: 180 tablet, Rfl: 1    empagliflozin (JARDIANCE) 10 MG tablet, One daily, Disp: 90 tablet, Rfl: 1    atorvastatin (LIPITOR) 80 MG tablet, Take 1 tablet by mouth daily, Disp: 90 tablet, Rfl: 1    amLODIPine (NORVASC) 10 MG tablet, TAKE 1 TABLET BY MOUTH EVERY DAY, Disp: 90 tablet, Rfl: 1    Semaglutide,0.25 or 0.5MG/DOS, (OZEMPIC, 0.25 OR 0.5 MG/DOSE,) 2 MG/1.5ML SOPN, INJECT 0.25MG INTO THE SKIN ONE TIME PER WEEK, Disp: 1 pen, Rfl: 2    losartan-hydroCHLOROthiazide (HYZAAR) 100-25 MG per tablet, TAKE 1 TABLET BY MOUTH EVERY DAY, Disp: 90 tablet, Rfl: 1    nitroGLYCERIN (NITROSTAT) 0.4 MG SL tablet, Place 1 tablet under the tongue every 5 minutes as needed for Chest pain If chest pain: put 1 NTG tab under tongue - sit down - wait 5 min - if no relief, call 911. May repeat dose 2 more times 5 min apart while waiting for EMS (total of 3 doses). If dizzy do not take any further doses. , Disp: 90 tablet, Rfl: 1    aspirin 81 MG tablet, Take 81 mg by mouth daily Last dose 10/19/19, Disp: , Rfl:     ferrous sulfate 325 (65 Fe) MG tablet, Take 325 mg by mouth daily (with breakfast) , Disp: , Rfl: Cholecalciferol (VITAMIN D3) 5000 UNITS TABS, Take 1 tablet by mouth every morning , Disp: , Rfl:     Ascorbic Acid (VITAMIN C) 500 MG tablet, Take 500 mg by mouth every morning , Disp: , Rfl:     fish oil-omega-3 fatty acids 1000 MG capsule, Take 1 g by mouth every morning , Disp: , Rfl:     ALLERGIES  Allergies   Allergen Reactions    Penicillins Hives       Controlled Substances Monitoring:        REVIEW OF SYSTEMS:     Constitutional:  Negative for weight loss, fevers, chills, fatigue  Cardiovascular: Negative for chest pain, palpitations  Pulmonary: Negative for shortness of breath, labored breathing, cough  GI: negative for abdominal pain, nausea, vomitting   MSK: per HPI  Skin: negative for rash, open wounds    All other systems reviewed and are negative           PHYSICAL EXAM     There were no vitals filed for this visit. Height:    Weight: [unfilled]  BMI:  There is no height or weight on file to calculate BMI. General: The patient is alert and oriented x 3, appears to be stated age and in no distress. HEENT: head is normocephalic, atraumatic. EOMI. Neck: supple, trachea midline, no thyromegaly   Cardiovascular: peripheral pulses palpable. Normal Capillary refill   Respiratory: breathing unlabored, chest expansion symmetric   Skin: no rash, no open wounds, no erythema  Psych: normal affect; mood stable  Neurologic: gait normal, sensation grossly intact in extremities  MSK:    Cervical Spine: There is no tenderness to palpation along the cervical spine. Range of motion is normal.  Spurling's is negative    Shoulder Exam:   Left shoulder: Near full range of motion in forward flexion abduction external and internal rotation. He has 4-5 strength with resisted abduction and forward flexion. He is 5 out of 5 strength with resisted internal and external rotation. Negative belly press. Negative drop arm. Negative empty can. Positive impingement test.  Tender to palpation over his AC joint. His elbow range of motion is full and unrestricted. His hand is warm and well-perfused    Right hip: Tender palpation over his ASIS. He has full range of motion without any groin pain. Negative straight leg raise. 5/5 strength with hip flexion extension abduction abduction. Skin is intact circumferentially. There are no masses or lesions    IMAGING:     XRAY: MRI reviewed. He is full-thickness tearing of the supra and infraspinatus tendon with retraction. He also has superior migration of his humeral head and glenohumeral degenerative changes consistent with cuff tear arthropathy    Radiographic findings reviewed with patient    ASSESSMENT   Left shoulder cuff tear arthropathy  Right hip ASIS insertional tendinitis    PLAN  -Treatment of his shoulder arthritis discussed in detail. At this point his surgical option is a reverse shoulder arthroplasty. He is not at that point yet and is wishing to proceed with conservative treatment. We will start him on our shoulder exercise program for strengthening. I recommended anti-inflammatory supplements like turmeric or tart cherry juice. For his hip I am recommending stretching and strengthening exercises as well. We will see how this works. If he is no better in 1 month he can come back and we can talk more about his hip problem. He is happy with this treatment plan and wishes to proceed.           Samantha Coleman, DO  Orthopaedic Surgery   12/8/22   8:04 AM EST

## 2022-12-08 NOTE — PATIENT INSTRUCTIONS
Rotator Cuff: Exercises  Introduction  Here are some examples of exercises for you to try. The exercises may be suggested for a condition or for rehabilitation. Start each exercise slowly. Ease off the exercises if you start to have pain. You will be told when to start these exercises and which ones will work bestfor you. How to do the exercises  Pendulum swing  If you have pain in your back, do not do this exercise. Hold on to a table or the back of a chair with your good arm. Then bend forward a little and let your sore arm hang straight down. This exercise does not use the arm muscles. Rather, use your legs and your hips to create movement that makes your arm swing freely. Use the movement from your hips and legs to guide the slightly swinging arm back and forth like a pendulum (or elephant trunk). Then guide it in circles that start small (about the size of a dinner plate). Make the circles a bit larger each day, as your pain allows. Do this exercise for 5 minutes, 5 to 7 times each day. As you have less pain, try bending over a little farther to do this exercise. This will increase the amount of movement at your shoulder. Posterior stretching exercise  Hold the elbow of your injured arm with your other hand. Use your hand to pull your injured arm gently up and across your body. You will feel a gentle stretch across the back of your injured shoulder. Hold for at least 15 to 30 seconds. Then slowly lower your arm. Repeat 2 to 4 times. Up-the-back stretch  Your doctor or physical therapist may want you to wait to do this stretch until you have regained most of your range of motion and strength. You can do this stretch in different ways. Hold any of these stretches for at least 15 to 30seconds. Repeat them 2 to 4 times. Light stretch: Put your hand in your back pocket. Let it rest there to stretch your shoulder. Moderate stretch:  With your other hand, hold your injured arm (palm outward) behind your back by the wrist. Pull your arm up gently to stretch your shoulder. Advanced stretch: Put a towel over your other shoulder. Put the hand of your injured arm behind your back. Now hold the back end of the towel. With the other hand, hold the front end of the towel in front of your body. Pull gently on the front end of the towel. This will bring your hand farther up your back to stretch your shoulder. Overhead stretch  Standing about an arm's length away, grasp onto a solid surface. You could use a countertop, a doorknob, or the back of a sturdy chair. With your knees slightly bent, bend forward with your arms straight. Lower your upper body, and let your shoulders stretch. As your shoulders are able to stretch farther, you may need to take a step or two backward. Hold for at least 15 to 30 seconds. Then stand up and relax. If you had stepped back during your stretch, step forward so you can keep your hands on the solid surface. Repeat 2 to 4 times. Shoulder flexion (lying down)  To make a wand for this exercise, use a piece of PVC pipe or a broom handlewith the broom removed. Make the wand about a foot wider than your shoulders. Lie on your back, holding a wand with both hands. Your palms should face down as you hold the wand. Keeping your elbows straight, slowly raise your arms over your head. Raise them until you feel a stretch in your shoulders, upper back, and chest.  Hold for 15 to 30 seconds. Repeat 2 to 4 times. Shoulder rotation (lying down)  To make a wand for this exercise, use a piece of PVC pipe or a broom handlewith the broom removed. Make the wand about a foot wider than your shoulders. Lie on your back. Hold a wand with both hands with your elbows bent and palms up. Keep your elbows close to your body, and move the wand across your body toward the sore arm. Hold for 8 to 12 seconds. Repeat 2 to 4 times.   Wall climbing (to the side)  Avoid any movement that is straight to your side, and be careful not to archyour back. Your arm should stay about 30 degrees to the front of your side. Stand with your side to a wall so that your fingers can just touch it at an angle about 30 degrees toward the front of your body. Walk the fingers of your injured arm up the wall as high as pain permits. Try not to shrug your shoulder up toward your ear as you move your arm up. Hold that position for a count of at least 15 to 20. Walk your fingers back down to the starting position. Repeat at least 2 to 4 times. Try to reach higher each time. Wall climbing (to the front)  During this stretching exercise, be careful not to arch your back. Face a wall, and stand so your fingers can just touch it. Keeping your shoulder down, walk the fingers of your injured arm up the wall as high as pain permits. (Don't shrug your shoulder up toward your ear.)  Hold your arm in that position for at least 15 to 30 seconds. Slowly walk your fingers back down to where you started. Repeat at least 2 to 4 times. Try to reach higher each time. Shoulder blade squeeze  Stand with your arms at your sides, and squeeze your shoulder blades together. Do not raise your shoulders up as you squeeze. Hold 6 seconds. Repeat 8 to 12 times. Scapular exercise: Arm reach  Lie flat on your back. This exercise is a very slight motion that starts with your arms raised (elbows straight, arms straight). From this position, reach higher toward the malathi or ceiling. Keep your elbows straight. All motion should be from your shoulder blade only. Relax your arms back to where you started. Repeat 8 to 12 times. Arm raise to the side  During this strengthening exercise, your arm should stay about 30 degrees tothe front of your side. Slowly raise your injured arm to the side, with your thumb facing up. Raise your arm 60 degrees at the most (shoulder level is 90 degrees). Hold the position for 3 to 5 seconds. Then lower your arm back to your side.  If you need to, bring your \"good\" arm across your body and place it under the elbow as you lower your injured arm. Use your good arm to keep your injured arm from dropping down too fast.  Repeat 8 to 12 times. When you first start out, don't hold any extra weight in your hand. As you get stronger, you may use a 1-pound to 2-pound dumbbell or a small can of food. Shoulder flexor and extensor exercise  These are isometric exercises. That means you contract your muscles withoutactually moving. Push forward (flex): Stand facing a wall or doorjamb, about 6 inches or less back. Hold your injured arm against your body. Make a closed fist with your thumb on top. Then gently push your hand forward into the wall with about 25% to 50% of your strength. Don't let your body move backward as you push. Hold for about 6 seconds. Relax for a few seconds. Repeat 8 to 12 times. Push backward (extend): Stand with your back flat against a wall. Your upper arm should be against the wall, with your elbow bent 90 degrees (your hand straight ahead). Push your elbow gently back against the wall with about 25% to 50% of your strength. Don't let your body move forward as you push. Hold for about 6 seconds. Relax for a few seconds. Repeat 8 to 12 times. Scapular exercise: Wall push-ups  This exercise is best done with your fingers somewhat turned out, rather thanstraight up and down. Stand facing a wall, about 12 inches to 18 inches away. Place your hands on the wall at shoulder height. Slowly bend your elbows and bring your face to the wall. Keep your back and hips straight. Push back to where you started. Repeat 8 to 12 times. When you can do this exercise against a wall comfortably, you can try it against a counter. You can then slowly progress to the end of a couch, then to a sturdy chair, and finally to the floor.   Scapular exercise: Retraction  For this exercise, you will need elastic exercise material, such as surgicaltubing or Thera-Band. Put the band around a solid object at about waist level. (A bedpost will work well.) Each hand should hold an end of the band. With your elbows at your sides and bent to 90 degrees, pull the band back. Your shoulder blades should move toward each other. Then move your arms back where you started. Repeat 8 to 12 times. If you have good range of motion in your shoulders, try this exercise with your arms lifted out to the sides. Keep your elbows at a 90-degree angle. Raise the elastic band up to about shoulder level. Pull the band back to move your shoulder blades toward each other. Then move your arms back where you started. Internal rotator strengthening exercise  Start by tying a piece of elastic exercise material to a doorknob. You can use surgical tubing or Thera-Band. Stand or sit with your shoulder relaxed and your elbow bent 90 degrees. Your upper arm should rest comfortably against your side. Squeeze a rolled towel between your elbow and your body for comfort. This will help keep your arm at your side. Hold one end of the elastic band in the hand of the painful arm. Slowly rotate your forearm toward your body until it touches your belly. Slowly move it back to where you started. Keep your elbow and upper arm firmly tucked against the towel roll or at your side. Repeat 8 to 12 times. External rotator strengthening exercise  Start by tying a piece of elastic exercise material to a doorknob. You can use surgical tubing or Thera-Band. (You may also hold one end of the band in each hand.)  Stand or sit with your shoulder relaxed and your elbow bent 90 degrees. Your upper arm should rest comfortably against your side. Squeeze a rolled towel between your elbow and your body for comfort. This will help keep your arm at your side. Hold one end of the elastic band with the hand of the painful arm. Start with your forearm across your belly. Slowly rotate the forearm out away from your body.  Keep your elbow and upper arm tucked against the towel roll or the side of your body until you begin to feel tightness in your shoulder. Slowly move your arm back to where you started. Repeat 8 to 12 times. Follow-up care is a key part of your treatment and safety. Be sure to make and go to all appointments, and call your doctor if you are having problems. It's also a good idea to know your test results and keep alist of the medicines you take. Where can you learn more? Go to https://Aeponapesarbjit.Wevod. org and sign in to your [employer] account. Enter Daina Viera in the [facility] box to learn more about \"Rotator Cuff: Exercises. \"     If you do not have an account, please click on the \"Sign Up Now\" link. Current as of: March 9, 2022               Content Version: 13.3  © 2006-2022 [employer]. Care instructions adapted under license by [facility] ([facility]). If you have questions about a medical condition or this instruction, always ask your healthcare professional. [address] any warranty or liability for your use of this information. Hip Flexor Strain: Rehab Exercises  Introduction  Here are some examples of exercises for you to try. The exercises may be suggested for a condition or for rehabilitation. Start each exercise slowly. Ease off the exercises if you start to have pain. You will be told when to start these exercises and which ones will work best for you. How to do the exercises  Pelvic tilt with marching  Lie on your back with your knees bent and your feet flat on the floor. Tighten your belly muscles and buttocks, and press your lower back to the floor. Keeping your knees bent, lift and then lower one leg up off the floor, and then lift and lower your other leg like you are marching. Each time you lift your leg, hold that position for about 6 seconds before lowering your leg. Repeat 8 to 12 times.   Scissors  Lie on your back with your knees bent at a 90-degree angle and your feet off the floor. Tighten your belly muscles and buttocks, and press your lower back to the floor. Slowly straighten one leg, and hold that position for about 6 seconds. Your leg should be about 12 inches off the floor. Bring that leg back to the starting position, and then straighten your other leg. Hold that position for about 6 seconds, and then switch legs again. Repeat 8 to 12 times. Hamstring stretch (lying down)  Lie flat on your back with your legs straight. If you feel discomfort in your back, place a small towel roll under your lower back. Holding the back of your affected leg for support, lift your leg straight up and toward your body until you feel a stretch at the back of your thigh. Hold the stretch for at least 30 seconds. Repeat 2 to 4 times. Quadricep and hip flexor stretch (lying on side)  Lie on your side with your good leg flat on the floor and your hand supporting your head. Bend your top leg, and reach behind you to grab the front of that foot or ankle with your other hand. Stretch your leg back by pulling your foot toward your buttock. You will feel the stretch in the front of your thigh. If this causes stress on your knee, do not do this stretch. Hold the stretch for at least 15 to 30 seconds. Repeat 2 to 4 times. Hip flexor stretch (kneeling)  Kneel on your affected leg and bend your good leg out in front of you, with that foot flat on the floor. If you feel discomfort in the front of your knee, place a towel under your knee. Keeping your back straight, slowly push your hips forward until you feel a stretch in the upper thigh of your back leg and hip. Hold the stretch for at least 15 to 30 seconds. Repeat 2 to 4 times. Hip flexor stretch (edge of table)  Lie flat on your back on a table or flat bench, with your knees and lower legs hanging off the edge of the table.   Grab your good leg at the knee, and pull that knee back toward your chest. Relax your affected leg and let it hang down toward the floor until you feel a stretch in the upper thigh of your affected leg and hip. Hold the stretch for at least 15 to 30 seconds. Repeat 2 to 4 times. Follow-up care is a key part of your treatment and safety. Be sure to make and go to all appointments, and call your doctor if you are having problems. It's also a good idea to know your test results and keep a list of the medicines you take. Current as of: March 9, 2022               Content Version: 13.4  © 2006-2022 [employer]. Care instructions adapted under license by [facility] [place] ([facility]). If you have questions about a medical condition or this instruction, always ask your healthcare professional. [address] any warranty or liability for your use of this information.

## 2023-01-04 RX ORDER — OMEPRAZOLE 40 MG/1
CAPSULE, DELAYED RELEASE ORAL
Qty: 180 CAPSULE | Refills: 1 | Status: SHIPPED | OUTPATIENT
Start: 2023-01-04

## 2023-01-09 RX ORDER — CITALOPRAM 40 MG/1
TABLET ORAL
Qty: 135 TABLET | Refills: 1 | Status: SHIPPED | OUTPATIENT
Start: 2023-01-09

## 2023-01-11 ENCOUNTER — OFFICE VISIT (OUTPATIENT)
Dept: PRIMARY CARE CLINIC | Age: 68
End: 2023-01-11

## 2023-01-11 VITALS
DIASTOLIC BLOOD PRESSURE: 86 MMHG | WEIGHT: 205 LBS | OXYGEN SATURATION: 97 % | HEART RATE: 65 BPM | BODY MASS INDEX: 31.17 KG/M2 | SYSTOLIC BLOOD PRESSURE: 132 MMHG | TEMPERATURE: 97.6 F

## 2023-01-11 DIAGNOSIS — E11.9 TYPE 2 DIABETES MELLITUS WITHOUT COMPLICATION, UNSPECIFIED WHETHER LONG TERM INSULIN USE (HCC): ICD-10-CM

## 2023-01-11 DIAGNOSIS — Z00.00 INITIAL MEDICARE ANNUAL WELLNESS VISIT: Primary | ICD-10-CM

## 2023-01-11 DIAGNOSIS — I20.9 ANGINA PECTORIS, UNSPECIFIED (HCC): ICD-10-CM

## 2023-01-11 DIAGNOSIS — I25.119 ATHEROSCLEROSIS OF NATIVE CORONARY ARTERY OF NATIVE HEART WITH ANGINA PECTORIS (HCC): ICD-10-CM

## 2023-01-11 LAB
ALBUMIN SERPL-MCNC: 4.5 G/DL (ref 3.5–5.2)
ALP BLD-CCNC: 123 U/L (ref 40–129)
ALT SERPL-CCNC: 32 U/L (ref 0–40)
ANION GAP SERPL CALCULATED.3IONS-SCNC: 13 MMOL/L (ref 7–16)
AST SERPL-CCNC: 26 U/L (ref 0–39)
BILIRUB SERPL-MCNC: 1.5 MG/DL (ref 0–1.2)
BUN BLDV-MCNC: 20 MG/DL (ref 6–23)
CALCIUM SERPL-MCNC: 10.2 MG/DL (ref 8.6–10.2)
CHLORIDE BLD-SCNC: 103 MMOL/L (ref 98–107)
CHOLESTEROL, TOTAL: 139 MG/DL (ref 0–199)
CO2: 25 MMOL/L (ref 22–29)
CREAT SERPL-MCNC: 1 MG/DL (ref 0.7–1.2)
GFR SERPL CREATININE-BSD FRML MDRD: >60 ML/MIN/1.73
GLUCOSE BLD-MCNC: 162 MG/DL (ref 74–99)
HDLC SERPL-MCNC: 36 MG/DL
LDL CHOLESTEROL CALCULATED: 73 MG/DL (ref 0–99)
POTASSIUM SERPL-SCNC: 3.7 MMOL/L (ref 3.5–5)
REASON FOR REJECTION: NORMAL
REJECTED TEST: NORMAL
SODIUM BLD-SCNC: 141 MMOL/L (ref 132–146)
TOTAL PROTEIN: 7.2 G/DL (ref 6.4–8.3)
TRIGL SERPL-MCNC: 152 MG/DL (ref 0–149)
VLDLC SERPL CALC-MCNC: 30 MG/DL

## 2023-01-11 RX ORDER — METHYLPREDNISOLONE 4 MG/1
TABLET ORAL
Qty: 1 KIT | Refills: 0 | Status: SHIPPED | OUTPATIENT
Start: 2023-01-11

## 2023-01-11 RX ORDER — NITROGLYCERIN 0.4 MG/1
0.4 TABLET SUBLINGUAL EVERY 5 MIN PRN
Qty: 90 TABLET | Refills: 1 | Status: SHIPPED | OUTPATIENT
Start: 2023-01-11

## 2023-01-11 ASSESSMENT — PATIENT HEALTH QUESTIONNAIRE - PHQ9
1. LITTLE INTEREST OR PLEASURE IN DOING THINGS: 0
SUM OF ALL RESPONSES TO PHQ QUESTIONS 1-9: 0
2. FEELING DOWN, DEPRESSED OR HOPELESS: 0
SUM OF ALL RESPONSES TO PHQ9 QUESTIONS 1 & 2: 0

## 2023-01-11 ASSESSMENT — LIFESTYLE VARIABLES
HOW OFTEN DO YOU HAVE A DRINK CONTAINING ALCOHOL: MONTHLY OR LESS
HOW MANY STANDARD DRINKS CONTAINING ALCOHOL DO YOU HAVE ON A TYPICAL DAY: 1 OR 2

## 2023-01-11 NOTE — PATIENT INSTRUCTIONS
Learning About Vision Tests  What are vision tests? The four most common vision tests are visual acuity tests, refraction, visual field tests, and color vision tests. Visual acuity (sharpness) tests  These tests are used: To see if you need glasses or contact lenses. To monitor an eye problem. To check an eye injury. Visual acuity tests are done as part of routine exams. You may also have this test when you get your 's license or apply for some types of jobs. Visual field tests  These tests are used: To check for vision loss in any area of your range of vision. To screen for certain eye diseases. To look for nerve damage after a stroke, head injury, or other problem that could reduce blood flow to the brain. Refraction and color tests  A refraction test is done to find the right prescription for glasses and contact lenses. A color vision test is done to check for color blindness. Color vision is often tested as part of a routine exam. You may also have this test when you apply for a job where recognizing different colors is important, such as , electronics, or the [employer]. How are vision tests done? Visual acuity test   You cover one eye at a time. You read aloud from a wall chart across the room. You read aloud from a small card that you hold in your hand. Refraction   You look into a special device. The device puts lenses of different strengths in front of each eye to see how strong your glasses or contact lenses need to be. Visual field tests   Your doctor may have you look through special machines. Or your doctor may simply have you stare straight ahead while they move a finger into and out of your field of vision. Color vision test   You look at pieces of printed test patterns in various colors. You say what number or symbol you see. Your doctor may have you trace the number or symbol using a pointer. How do these tests feel?   There is very little chance of having a problem from this test. If dilating drops are used for a vision test, they may make the eyes sting and cause a medicine taste in the mouth. Follow-up care is a key part of your treatment and safety. Be sure to make and go to all appointments, and call your doctor if you are having problems. It's also a good idea to know your test results and keep a list of the medicines you take. Where can you learn more? Go to http://www.mendenhall.com/ and enter G551 to learn more about \"Learning About Vision Tests. \"  Current as of: October 12, 2022               Content Version: 13.5  © 0946-7477 [employer]. Care instructions adapted under license by [facility] ([facility]). If you have questions about a medical condition or this instruction, always ask your healthcare professional. [address] any warranty or liability for your use of this information. Advance Directives: Care Instructions  Overview  An advance directive is a legal way to state your wishes at the end of your life. It tells your family and your doctor what to do if you can't say what you want. There are two main types of advance directives. You can change them any time your wishes change. Living will. This form tells your family and your doctor your wishes about life support and other treatment. The form is also called a declaration. Medical power of . This form lets you name a person to make treatment decisions for you when you can't speak for yourself. This person is called a health care agent (health care proxy, health care surrogate). The form is also called a durable power of  for health care. If you do not have an advance directive, decisions about your medical care may be made by a family member, or by a doctor or a  who doesn't know you. It may help to think of an advance directive as a gift to the people who care for you.  If you have one, they won't have to make tough decisions by themselves. For more information, including forms for your state, see the [address] website (www.caringinfo.org/planning/advance-directives/). Follow-up care is a key part of your treatment and safety. Be sure to make and go to all appointments, and call your doctor if you are having problems. It's also a good idea to know your test results and keep a list of the medicines you take. What should you include in an advance directive? Many states have a unique advance directive form. (It may ask you to address specific issues.) Or you might use a universal form that's approved by many states. If your form doesn't tell you what to address, it may be hard to know what to include in your advance directive. Use the questions below to help you get started. Who do you want to make decisions about your medical care if you are not able to? What life-support measures do you want if you have a serious illness that gets worse over time or can't be cured? What are you most afraid of that might happen? (Maybe you're afraid of having pain, losing your independence, or being kept alive by machines.)  Where would you prefer to die? (Your home? A hospital? A nursing home?)  Do you want to donate your organs when you die? Do you want certain Rastafari practices performed before you die? When should you call for help? Be sure to contact your doctor if you have any questions. Where can you learn more? Go to http://www.mendenhall.com/ and enter R264 to learn more about \"Advance Directives: Care Instructions. \"  Current as of: June 16, 2022               Content Version: 13.5  © 4818-7415 Healthwise, Inc[employer]. Care instructions adapted under license by [facility] ([facility]). If you have questions about a medical condition or this instruction, always ask your healthcare professional. [address] any warranty or liability for your use of this information.       Personalized Preventive Plan for Mirtha Agustin - 1/11/2023  Medicare offers a range of preventive health benefits. Some of the tests and screenings are paid in full while other may be subject to a deductible, co-insurance, and/or copay. Some of these benefits include a comprehensive review of your medical history including lifestyle, illnesses that may run in your family, and various assessments and screenings as appropriate. After reviewing your medical record and screening and assessments performed today your provider may have ordered immunizations, labs, imaging, and/or referrals for you. A list of these orders (if applicable) as well as your Preventive Care list are included within your After Visit Summary for your review. Other Preventive Recommendations:    A preventive eye exam performed by an eye specialist is recommended every 1-2 years to screen for glaucoma; cataracts, macular degeneration, and other eye disorders. A preventive dental visit is recommended every 6 months. Try to get at least 150 minutes of exercise per week or 10,000 steps per day on a pedometer . Order or download the FREE \"Exercise & Physical Activity: Your Everyday Guide\" from [employer]. Call 5-826.938.6895 or search [employer] online. You need 4627-0847 mg of calcium and 6541-5529 IU of vitamin D per day. It is possible to meet your calcium requirement with diet alone, but a vitamin D supplement is usually necessary to meet this goal.  When exposed to the sun, use a sunscreen that protects against both UVA and UVB radiation with an SPF of 30 or greater. Reapply every 2 to 3 hours or after sweating, drying off with a towel, or swimming. Always wear a seat belt when traveling in a car. Always wear a helmet when riding a bicycle or motorcycle.

## 2023-01-11 NOTE — PROGRESS NOTES
He has been following with orthopedics for his shoulder pain. Plan  Start medrol for shoulder pain  Following with ortho  Obtain routine blood work  Advised to follow with optometry     Medicare Annual Wellness Visit    Andrew Howard is here for Medicare AWV    Assessment & Plan   Angina pectoris, unspecified  Atherosclerosis of native coronary artery of native heart with angina pectoris ([facility] [address]    Recommendations for Preventive Services Due: see orders and patient instructions/AVS.  Recommended screening schedule for the next 5-10 years is provided to the patient in written form: see Patient Instructions/AVS.     No follow-ups on file. Subjective       Patient's complete Health Risk Assessment and screening values have been reviewed and are found in Flowsheets. The following problems were reviewed today and where indicated follow up appointments were made and/or referrals ordered. Positive Risk Factor Screenings with Interventions:                 Weight and Activity:  Physical Activity: Sufficiently Active    Days of Exercise per Week: 3 days    Minutes of Exercise per Session: 60 min     On average, how many days per week do you engage in moderate to strenuous exercise (like a brisk walk)?: 3 days  Have you lost any weight without trying in the past 3 months?: No       Obesity Interventions:  See AVS for additional education material  See A/P for plan and any pertinent orders            Vision Screen:  Do you have difficulty driving, watching TV, or doing any of your daily activities because of your eyesight?: No  Have you had an eye exam within the past year?: (!) No  No results found. Interventions:   See AVS for additional education material  See A/P for any pertinent orders                      Objective   Vitals:    01/11/23 0755   BP: 132/86   Pulse: 65   Temp: 97.6 °F (36.4 °C)   SpO2: 97%   Weight: 205 lb (93 kg)      Body mass index is 31.17 kg/m².              Allergies   Allergen Reactions    Penicillins Hives     Prior to Visit Medications    Medication Sig Taking? Authorizing Provider   nitroGLYCERIN (NITROSTAT) 0.4 MG SL tablet Place 1 tablet under the tongue every 5 minutes as needed for Chest pain If chest pain: put 1 NTG tab under tongue - sit down - wait 5 min - if no relief, call 911. May repeat dose 2 more times 5 min apart while waiting for EMS (total of 3 doses). If dizzy do not take any further doses.  Yes Ernestina Yip DO   losartan (COZAAR) 100 MG tablet TAKE 1 TABLET BY MOUTH EVERY DAY Yes Ernestina Yip DO   citalopram (CELEXA) 40 MG tablet TAKE 1 AND 1/2 TABLETS BY MOUTH EVERY MORNING  Ernestina Yip DO   omeprazole (PRILOSEC) 40 MG delayed release capsule TAKE 1 CAPSULE BY MOUTH TWICE A DAY  Francisco Yip DO   ezetimibe (ZETIA) 10 MG tablet Take 1 tablet by mouth daily  Ernestina Yip DO   fluticasone (FLONASE) 50 MCG/ACT nasal spray 1 spray by Nasal route daily  LUIS FELIPE Espinosa   metoprolol tartrate (LOPRESSOR) 25 MG tablet TAKE 1 TABLET BY MOUTH TWICE A DAY  Francisco Yip DO   empagliflozin (JARDIANCE) 10 MG tablet One daily  Francisco Yip DO   atorvastatin (LIPITOR) 80 MG tablet Take 1 tablet by mouth daily  Ernestina Yip DO   amLODIPine (NORVASC) 10 MG tablet TAKE 1 TABLET BY MOUTH EVERY DAY  Francisco Yip DO   Semaglutide,0.25 or 0.5MG/DOS, (OZEMPIC, 0.25 OR 0.5 MG/DOSE,) 2 MG/1.5ML SOPN INJECT 0.25MG INTO THE SKIN ONE TIME PER WEEK  Ernestina Yip DO   losartan-hydroCHLOROthiazide (HYZAAR) 100-25 MG per tablet TAKE 1 TABLET BY MOUTH EVERY DAY  Ernestina Yip DO   aspirin 81 MG tablet Take 81 mg by mouth daily Last dose 10/19/19  Historical Provider, MD   ferrous sulfate 325 (65 Fe) MG tablet Take 325 mg by mouth daily (with breakfast)   Historical Provider, MD   Cholecalciferol (VITAMIN D3) 5000 UNITS TABS Take 1 tablet by mouth every morning   Historical Provider, MD   Ascorbic Acid (VITAMIN C) 500 MG tablet Take 500 mg by mouth every morning   Historical Provider, MD   fish oil-omega-3 fatty acids 1000 MG capsule Take 1 g by mouth every morning   Historical Provider, MD [place] (Including outside providers/suppliers regularly involved in providing care):   Patient Care Team:  Vianney Johns DO as PCP - General (Internal Medicine)  Vianney Johns DO as PCP - [facility] Empaneled Provider     Reviewed and updated this visit:  Tobacco  Allergies  Meds  Med Hx  Surg Hx  Soc Hx  Fam Hx

## 2023-01-15 DIAGNOSIS — I25.10 CORONARY ARTERY DISEASE INVOLVING NATIVE CORONARY ARTERY OF NATIVE HEART WITHOUT ANGINA PECTORIS: ICD-10-CM

## 2023-01-15 DIAGNOSIS — E11.9 TYPE 2 DIABETES MELLITUS WITHOUT COMPLICATION, UNSPECIFIED WHETHER LONG TERM INSULIN USE (HCC): ICD-10-CM

## 2023-01-16 RX ORDER — AMLODIPINE BESYLATE 10 MG/1
TABLET ORAL
Qty: 90 TABLET | Refills: 1 | Status: SHIPPED | OUTPATIENT
Start: 2023-01-16

## 2023-01-19 ENCOUNTER — OFFICE VISIT (OUTPATIENT)
Dept: ORTHOPEDIC SURGERY | Age: 68
End: 2023-01-19

## 2023-01-19 VITALS — WEIGHT: 200 LBS | HEIGHT: 68 IN | BODY MASS INDEX: 30.31 KG/M2

## 2023-01-19 DIAGNOSIS — M75.102 TEAR OF LEFT ROTATOR CUFF, UNSPECIFIED TEAR EXTENT, UNSPECIFIED WHETHER TRAUMATIC: Primary | ICD-10-CM

## 2023-01-19 RX ORDER — TRIAMCINOLONE ACETONIDE 40 MG/ML
40 INJECTION, SUSPENSION INTRA-ARTICULAR; INTRAMUSCULAR ONCE
Status: COMPLETED | OUTPATIENT
Start: 2023-01-19 | End: 2023-01-19

## 2023-01-19 RX ORDER — BUPIVACAINE HYDROCHLORIDE 2.5 MG/ML
2 INJECTION, SOLUTION INFILTRATION; PERINEURAL ONCE
Status: COMPLETED | OUTPATIENT
Start: 2023-01-19 | End: 2023-01-19

## 2023-01-19 RX ADMIN — TRIAMCINOLONE ACETONIDE 40 MG: 40 INJECTION, SUSPENSION INTRA-ARTICULAR; INTRAMUSCULAR at 12:00

## 2023-01-19 RX ADMIN — BUPIVACAINE HYDROCHLORIDE 5 MG: 2.5 INJECTION, SOLUTION INFILTRATION; PERINEURAL at 12:00

## 2023-01-19 NOTE — PROGRESS NOTES
Referring Provider:   No referring provider defined for this encounter. CHIEF COMPLAINT:   Chief Complaint   Patient presents with    Shoulder Pain     FU Left shoulder pain, requesting repeat injection. Last injection 12/ HPI:      Bertha Moise is a 75y.o. year old male with a long history of left shoulder pain. He has failed conservative treatment including multiple rounds of injections and physical therapy. At his last visit we talked about shoulder arthroplasty. We are going to continue to attempt injections until he is ready for surgery. He is happy with this plan. He has no changes since his last visit. PAST MEDICAL HISTORY  Past Medical History:   Diagnosis Date    Anemia     Anxiety attack     controlled with citolpram    CAD (coronary artery disease) 16 YRS AGO    MI X 2. Oliva Earl yearly    Cataract, left eye     Colon cancer screening 09/2022    Diabetes mellitus ([facility] [address]     GERD (gastroesophageal reflux disease)     Hypercholesterolemia 1994    Hyperlipidemia     Hypertension 06/12/2014    B/P IS ELEVATED, PATIENT STATES HE NEEDS TO GET HIS LOPRESSOR REFILLED    Left knee DJD 06/10/2014    OSTEO    NSTEMI (non-ST elevated myocardial infarction) ([facility] [address] 05/28/2012       PAST SURGICAL HISTORY  Past Surgical History:   Procedure Laterality Date    ANKLE SURGERY  6 YRS AGO    right ORIF    CHOLECYSTECTOMY, LAPAROSCOPIC N/A 7/19/2019    CHOLECYSTECTOMY LAPAROSCOPIC ROBOTIC ASSISTED  XI performed by Kika Ceron MD at [address] [place] [address]  07/20/2015    COLONOSCOPY N/A 9/16/2022    COLORECTAL CANCER SCREENING, NOT HIGH RISK performed by Kika Ceron MD at [address] [facility]  5/29/2012    Dr. Paloma Fernandez, Stent Mid LAD    DIAGNOSTIC CARDIAC CATH LAB PROCEDURE  9/17/1998    [facility]. Cath/PTCA/stent of RCA; PTCA/stent of mid LAD with adjunctive ReoPro administration.     DIAGNOSTIC CARDIAC CATH LAB PROCEDURE  2002    [facility]. Kissing balloon angioplasty LAD>diag Perclose. Dr. Krishan Burns and Dr. Meri Cates. DIAGNOSTIC CARDIAC CATH LAB PROCEDURE  2005    [address] Cath/stent (TAX\"US) to diagonal at [facility]. ENDOSCOPY, COLON, DIAGNOSTIC      FINGER TRIGGER RELEASE Right 2015    Release right long trigger finger. Barbie Adam MD    HERNIA REPAIR Left YRS AGO    [facility]    HIATAL HERNIA REPAIR N/A 10/22/2019    LAPAROSCOPIC ROBOTIC XI ASSISTED PARAESOPHAGEAL HERNIA REPAIR WITH PARTIAL FUNDOPLICATION AND MYOFASCIAL FLAP, UPPER ENDOSCOPY performed by Kimmie Mckinney MD at [address] [facility] Right 2017    robotic assisted    INTRACAPSULAR CATARACT EXTRACTION Left 2021    LEFT EYE CATARACT EXTRACTION IOL IMPLANT performed by Magali Joseph MD at [address] [facility] Left     knee    KNEE ARTHROPLASTY Left 2014    Left TKA.   Barbie Adam MD    TONSILLECTOMY      UPPER GASTROINTESTINAL ENDOSCOPY      UPPER GASTROINTESTINAL ENDOSCOPY N/A 9/10/2019    EGD BIOPSY performed by Kimmie Mckinney MD at [place] [address] N/A 2021    EGD BIOPSY performed by Kimmie Mckinney MD at [facility] History   Problem Relation Age of Onset    Hypertension Mother [de-identified]         from renal failure    Diabetes Mother     Coronary Art Dis Mother     Osteoporosis Mother     Osteoarthritis Father 80    High Cholesterol Brother     High Cholesterol Brother        SOCIAL HISTORY  Social History     Occupational History    Not on file   Tobacco Use    Smoking status: Never    Smokeless tobacco: Never   Vaping Use    Vaping Use: Never used   Substance and Sexual Activity    Alcohol use: Yes     Comment: socially    Drug use: No    Sexual activity: Not on file       CURRENT MEDICATIONS     Current Outpatient Medications:     metoprolol tartrate (LOPRESSOR) 25 MG tablet, TAKE 1 TABLET BY MOUTH TWICE A DAY, Disp: 180 tablet, Rfl: 1    amLODIPine (NORVASC) 10 MG tablet, TAKE 1 TABLET BY MOUTH EVERY DAY, Disp: 90 tablet, Rfl: 1    empagliflozin (JARDIANCE) 10 MG tablet, TAKE 1 TABLET BY MOUTH EVERY DAY, Disp: 90 tablet, Rfl: 1    nitroGLYCERIN (NITROSTAT) 0.4 MG SL tablet, Place 1 tablet under the tongue every 5 minutes as needed for Chest pain If chest pain: put 1 NTG tab under tongue - sit down - wait 5 min - if no relief, call 911.  May repeat dose 2 more times 5 min apart while waiting for EMS (total of 3 doses).  If dizzy do not take any further doses., Disp: 90 tablet, Rfl: 1    methylPREDNISolone (MEDROL DOSEPACK) 4 MG tablet, Take by mouth., Disp: 1 kit, Rfl: 0    citalopram (CELEXA) 40 MG tablet, TAKE 1 AND 1/2 TABLETS BY MOUTH EVERY MORNING, Disp: 135 tablet, Rfl: 1    omeprazole (PRILOSEC) 40 MG delayed release capsule, TAKE 1 CAPSULE BY MOUTH TWICE A DAY, Disp: 180 capsule, Rfl: 1    losartan (COZAAR) 100 MG tablet, TAKE 1 TABLET BY MOUTH EVERY DAY, Disp: 90 tablet, Rfl: 1    ezetimibe (ZETIA) 10 MG tablet, Take 1 tablet by mouth daily, Disp: 90 tablet, Rfl: 1    fluticasone (FLONASE) 50 MCG/ACT nasal spray, 1 spray by Nasal route daily, Disp: 16 g, Rfl: 0    atorvastatin (LIPITOR) 80 MG tablet, Take 1 tablet by mouth daily, Disp: 90 tablet, Rfl: 1    Semaglutide,0.25 or 0.5MG/DOS, (OZEMPIC, 0.25 OR 0.5 MG/DOSE,) 2 MG/1.5ML SOPN, INJECT 0.25MG INTO THE SKIN ONE TIME PER WEEK, Disp: 1 pen, Rfl: 2    losartan-hydroCHLOROthiazide (HYZAAR) 100-25 MG per tablet, TAKE 1 TABLET BY MOUTH EVERY DAY, Disp: 90 tablet, Rfl: 1    aspirin 81 MG tablet, Take 81 mg by mouth daily Last dose 10/19/19, Disp: , Rfl:     ferrous sulfate 325 (65 Fe) MG tablet, Take 325 mg by mouth daily (with breakfast) , Disp: , Rfl:     Cholecalciferol (VITAMIN D3) 5000 UNITS TABS, Take 1 tablet by mouth every morning , Disp: , Rfl:     Ascorbic Acid (VITAMIN C) 500 MG tablet, Take 500 mg by mouth every morning , Disp: , Rfl:     fish  oil-omega-3 fatty acids 1000 MG capsule, Take 1 g by mouth every morning , Disp: , Rfl:     ALLERGIES  Allergies   Allergen Reactions    Penicillins Hives       Controlled Substances Monitoring:        REVIEW OF SYSTEMS:     Constitutional:  Negative for weight loss, fevers, chills, fatigue  Cardiovascular: Negative for chest pain, palpitations  Pulmonary: Negative for shortness of breath, labored breathing, cough  GI: negative for abdominal pain, nausea, vomitting   MSK: per HPI  Skin: negative for rash, open wounds    All other systems reviewed and are negative           PHYSICAL EXAM     There were no vitals filed for this visit. Height:    Weight: [unfilled]  BMI:  There is no height or weight on file to calculate BMI. General: The patient is alert and oriented x 3, appears to be stated age and in no distress. HEENT: head is normocephalic, atraumatic. EOMI. Neck: supple, trachea midline, no thyromegaly   Cardiovascular: peripheral pulses palpable. Normal Capillary refill   Respiratory: breathing unlabored, chest expansion symmetric   Skin: no rash, no open wounds, no erythema  Psych: normal affect; mood stable  Neurologic: gait normal, sensation grossly intact in extremities  MSK:    Cervical Spine: There is no tenderness to palpation along the cervical spine. Range of motion is normal.  Spurling's is negative    Shoulder Exam:   Left shoulder: Near full range of motion in forward flexion abduction external and internal rotation. He has 4-5 strength with resisted abduction and forward flexion. He is 5 out of 5 strength with resisted internal and external rotation. Negative belly press. Negative drop arm. Negative empty can. Positive impingement test.  Tender to palpation over his AC joint. His elbow range of motion is full and unrestricted. His hand is warm and well-perfused        IMAGING:     XRAY: MRI reviewed.   He is full-thickness tearing of the supra and infraspinatus tendon with retraction. He also has superior migration of his humeral head and glenohumeral degenerative changes consistent with cuff tear arthropathy. These were again independently reviewed and discussed with the patient    Radiographic findings reviewed with patient    ASSESSMENT   Left shoulder cuff tear arthropathy    PLAN  -Treatment of his shoulder arthritis discussed in detail. At this point his surgical option is a reverse shoulder arthroplasty. He is not at that point yet and is wishing to proceed with conservative treatment. We will inject his shoulder today. He can follow-up in 3 months for another injection. He is happy with this treatment plan. Procedure Note:  Left Shoulder steroid injection     The Left shoulder was identified as the injection site. The risk and benefits of a cortisone injection were explained and the patient consented to the injection. Under sterile conditions, the subacromial space was injected from posterior approach with a mixture of 40 mg of Kenalog, 2 cc  0.25% Marcaine without complication. A sterile bandage was applied.          Cate Begum DO   Orthopaedic Surgery   1/19/23  11:25 AM

## 2023-01-26 ENCOUNTER — TELEPHONE (OUTPATIENT)
Dept: PRIMARY CARE CLINIC | Age: 68
End: 2023-01-26

## 2023-01-26 DIAGNOSIS — Z00.00 ANNUAL PHYSICAL EXAM: Primary | ICD-10-CM

## 2023-01-26 NOTE — TELEPHONE ENCOUNTER
----- Message from Liya Llamas LPN sent at 9/13/2310  2:28 PM EST -----  Subject: Referral Request    Reason for referral request? 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   ---------------------------------------------------------------------------  --------------  [id number] [employer]    [phone]; OK to leave message on voicemail  ---------------------------------------------------------------------------  --------------

## 2023-01-28 ENCOUNTER — HOSPITAL ENCOUNTER (OUTPATIENT)
Age: 68
Discharge: HOME | End: 2023-01-28
Payer: MEDICARE

## 2023-01-28 DIAGNOSIS — Z00.00 ANNUAL PHYSICAL EXAM: ICD-10-CM

## 2023-01-28 PROCEDURE — 86706 HEP B SURFACE ANTIBODY: CPT

## 2023-01-28 PROCEDURE — 86735 MUMPS ANTIBODY: CPT

## 2023-01-28 PROCEDURE — 86787 VARICELLA-ZOSTER ANTIBODY: CPT

## 2023-01-28 PROCEDURE — 36415 COLL VENOUS BLD VENIPUNCTURE: CPT

## 2023-01-28 PROCEDURE — 86762 RUBELLA ANTIBODY: CPT

## 2023-01-28 PROCEDURE — 86765 RUBEOLA ANTIBODY: CPT

## 2023-01-31 ENCOUNTER — APPOINTMENT (OUTPATIENT)
Dept: CT IMAGING | Age: 68
End: 2023-01-31
Payer: MEDICARE

## 2023-01-31 ENCOUNTER — HOSPITAL ENCOUNTER (EMERGENCY)
Age: 68
Discharge: HOME | End: 2023-01-31
Attending: STUDENT IN AN ORGANIZED HEALTH CARE EDUCATION/TRAINING PROGRAM
Payer: MEDICARE

## 2023-01-31 VITALS
HEART RATE: 64 BPM | OXYGEN SATURATION: 97 % | RESPIRATION RATE: 16 BRPM | DIASTOLIC BLOOD PRESSURE: 84 MMHG | TEMPERATURE: 97.7 F | SYSTOLIC BLOOD PRESSURE: 162 MMHG

## 2023-01-31 DIAGNOSIS — R31.9 HEMATURIA, UNSPECIFIED TYPE: Primary | ICD-10-CM

## 2023-01-31 DIAGNOSIS — N13.4 HYDROURETER: ICD-10-CM

## 2023-01-31 LAB
ALBUMIN SERPL-MCNC: 4.6 G/DL (ref 3.5–5.2)
ALP BLD-CCNC: 119 U/L (ref 40–129)
ALT SERPL-CCNC: 25 U/L (ref 0–40)
ANION GAP SERPL CALCULATED.3IONS-SCNC: 13 MMOL/L (ref 7–16)
AST SERPL-CCNC: 23 U/L (ref 0–39)
BACTERIA: ABNORMAL /HPF
BASOPHILS ABSOLUTE: 0.04 E9/L (ref 0–0.2)
BASOPHILS RELATIVE PERCENT: 0.4 % (ref 0–2)
BILIRUB SERPL-MCNC: 1.7 MG/DL (ref 0–1.2)
BILIRUBIN URINE: NEGATIVE
BLOOD, URINE: ABNORMAL
BUN BLDV-MCNC: 20 MG/DL (ref 6–23)
CALCIUM SERPL-MCNC: 9.7 MG/DL (ref 8.6–10.2)
CHLORIDE BLD-SCNC: 102 MMOL/L (ref 98–107)
CLARITY: CLEAR
CO2: 25 MMOL/L (ref 22–29)
COLOR: YELLOW
CREAT SERPL-MCNC: 1 MG/DL (ref 0.7–1.2)
EOSINOPHILS ABSOLUTE: 0.24 E9/L (ref 0.05–0.5)
EOSINOPHILS RELATIVE PERCENT: 2.3 % (ref 0–6)
GFR SERPL CREATININE-BSD FRML MDRD: >60 ML/MIN/1.73
GLUCOSE BLD-MCNC: 138 MG/DL (ref 74–99)
GLUCOSE URINE: >=1000 MG/DL
HCT VFR BLD CALC: 46.9 % (ref 37–54)
HEMOGLOBIN: 16.4 G/DL (ref 12.5–16.5)
IMMATURE GRANULOCYTES #: 0.04 E9/L
IMMATURE GRANULOCYTES %: 0.4 % (ref 0–5)
KETONES, URINE: NEGATIVE MG/DL
LACTIC ACID: 1.1 MMOL/L (ref 0.5–2.2)
LEUKOCYTE ESTERASE, URINE: NEGATIVE
LYMPHOCYTES ABSOLUTE: 2.26 E9/L (ref 1.5–4)
LYMPHOCYTES RELATIVE PERCENT: 21.3 % (ref 20–42)
MCH RBC QN AUTO: 29.8 PG (ref 26–35)
MCHC RBC AUTO-ENTMCNC: 35 % (ref 32–34.5)
MCV RBC AUTO: 85.3 FL (ref 80–99.9)
MONOCYTES ABSOLUTE: 0.74 E9/L (ref 0.1–0.95)
MONOCYTES RELATIVE PERCENT: 7 % (ref 2–12)
NEUTROPHILS ABSOLUTE: 7.29 E9/L (ref 1.8–7.3)
NEUTROPHILS RELATIVE PERCENT: 68.6 % (ref 43–80)
NITRITE, URINE: NEGATIVE
PDW BLD-RTO: 13.2 FL (ref 11.5–15)
PH UA: 5 (ref 5–9)
PLATELET # BLD: 216 E9/L (ref 130–450)
PMV BLD AUTO: 10.4 FL (ref 7–12)
POTASSIUM REFLEX MAGNESIUM: 4.4 MMOL/L (ref 3.5–5)
PROTEIN UA: NEGATIVE MG/DL
RBC # BLD: 5.5 E12/L (ref 3.8–5.8)
RBC UA: ABNORMAL /HPF (ref 0–2)
SODIUM BLD-SCNC: 140 MMOL/L (ref 132–146)
SPECIFIC GRAVITY UA: <=1.005 (ref 1–1.03)
TOTAL CK: 82 U/L (ref 20–200)
TOTAL PROTEIN: 7.2 G/DL (ref 6.4–8.3)
UROBILINOGEN, URINE: 0.2 E.U./DL
WBC # BLD: 10.6 E9/L (ref 4.5–11.5)
WBC UA: ABNORMAL /HPF (ref 0–5)

## 2023-01-31 PROCEDURE — 80053 COMPREHEN METABOLIC PANEL: CPT

## 2023-01-31 PROCEDURE — 36415 COLL VENOUS BLD VENIPUNCTURE: CPT

## 2023-01-31 PROCEDURE — 74176 CT ABD & PELVIS W/O CONTRAST: CPT

## 2023-01-31 PROCEDURE — 81001 URINALYSIS AUTO W/SCOPE: CPT

## 2023-01-31 PROCEDURE — 87088 URINE BACTERIA CULTURE: CPT

## 2023-01-31 PROCEDURE — 85025 COMPLETE CBC W/AUTO DIFF WBC: CPT

## 2023-01-31 PROCEDURE — 83605 ASSAY OF LACTIC ACID: CPT

## 2023-01-31 PROCEDURE — 82550 ASSAY OF CK (CPK): CPT

## 2023-01-31 PROCEDURE — 99284 EMERGENCY DEPT VISIT MOD MDM: CPT

## 2023-01-31 ASSESSMENT — PAIN - FUNCTIONAL ASSESSMENT
PAIN_FUNCTIONAL_ASSESSMENT: NONE - DENIES PAIN
PAIN_FUNCTIONAL_ASSESSMENT: NONE - DENIES PAIN

## 2023-02-01 ASSESSMENT — ENCOUNTER SYMPTOMS
NAUSEA: 0
ABDOMINAL PAIN: 0
ABDOMINAL DISTENTION: 0
COUGH: 0
SHORTNESS OF BREATH: 0
DIARRHEA: 0
CHEST TIGHTNESS: 0
PHOTOPHOBIA: 0
VOMITING: 0
BACK PAIN: 0

## 2023-02-01 NOTE — ED PROVIDER NOTES
Colt Gentile is a 67-year-old male with past medical history of CAD, GERD, cholelithiasis, hiatal hernia presented to emergency department with concern for hematuria. Patient noticed today that he has had an episode of gross hematuria. Patient symptoms started to improve with each urination. Patient symptoms have almost resolved the time arrival to emergency department patient did have some suprapubic abdominal pain that resolved. Patient denies nausea, vomiting, chest pain, shortness of breath patient is not on anticoagulation patient denies trauma    The history is provided by the patient and medical records. Review of Systems   Constitutional:  Negative for chills, diaphoresis, fatigue and fever. Eyes:  Negative for photophobia and visual disturbance. Respiratory:  Negative for cough, chest tightness and shortness of breath. Cardiovascular:  Negative for chest pain, palpitations and leg swelling. Gastrointestinal:  Negative for abdominal distention, abdominal pain, diarrhea, nausea and vomiting. Genitourinary:  Positive for hematuria. Negative for dysuria. Musculoskeletal:  Negative for back pain, neck pain and neck stiffness. Skin:  Negative for pallor and rash. Neurological:  Negative for headaches. Psychiatric/Behavioral:  Negative for confusion. Physical Exam  Vitals and nursing note reviewed. Constitutional:       General: He is not in acute distress. Appearance: Normal appearance. He is not ill-appearing. HENT:      Head: Normocephalic and atraumatic. Eyes:      General: No scleral icterus. Conjunctiva/sclera: Conjunctivae normal.      Pupils: Pupils are equal, round, and reactive to light. Cardiovascular:      Rate and Rhythm: Normal rate and regular rhythm. Pulmonary:      Effort: Pulmonary effort is normal.      Breath sounds: Normal breath sounds. Abdominal:      General: Bowel sounds are normal. There is no distension.       Palpations: Abdomen is soft.      Tenderness: There is no abdominal tenderness. There is no guarding or rebound. Musculoskeletal:      Cervical back: Normal range of motion and neck supple. No rigidity. No muscular tenderness. Right lower leg: No edema. Left lower leg: No edema. Skin:     General: Skin is warm and dry. Capillary Refill: Capillary refill takes less than 2 seconds. Coloration: Skin is not pale. Findings: No erythema or rash. Neurological:      Mental Status: He is alert and oriented to person, place, and time. Psychiatric:         Mood and Affect: Mood normal.        Procedures     MDM  Number of Diagnoses or Management Options  Hematuria, unspecified type  Hydroureter  Diagnosis management comments: Araceli Vizcaino is a 54-year-old male presented to emergency department with concern for hematuria. Patient's gross hematuria appeared to be resolved while in emergency department patient not have any findings concerning for obstruction at this time. CT scan was significant for hydroureter.   Patient not have an obstructing kidney stone patient was urinating normally patient did have a large prostate  Patient was afebrile nontoxic in appearance with normal vital signs patient was comfortable repeat evaluation  UA was unremarkable for urinary tract infection  Patient was given referral for urology  Patient is not on anticoagulation and no longer having the gross hematuria patient will be discharged home with supportive care advised return to ED if he has any worsening of symptoms he is agreeable to plan and well-appearing  History provided by patient patient's wife at bedside  Patient does have comorbidities including CAD, hyperlipidemia, GERD   Differential diagnosis includes but is not limited to, hematuria, abdominal mass, obstructing kidney stone, cystitis, urinary tract infection              --------------------------------------------- PAST HISTORY ---------------------------------------------  Past Medical History:  has a past medical history of Anemia, Anxiety attack, CAD (coronary artery disease), Cataract, left eye, Colon cancer screening, Diabetes mellitus ([facility] GERD (gastroesophageal reflux disease), Hypercholesterolemia, Hyperlipidemia, Hypertension, Left knee DJD, and NSTEMI (non-ST elevated myocardial infarction) ([facility] Past Surgical History:  has a past surgical history that includes Ankle surgery (6 YRS AGO); Colonoscopy; Upper gastrointestinal endoscopy; Coronary angioplasty with stent (5/29/2012); Diagnostic Cardiac Cath Lab Procedure (9/17/1998); Diagnostic Cardiac Cath Lab Procedure (6/25/2002); Diagnostic Cardiac Cath Lab Procedure (4/13/2005); hernia repair (Left, YRS AGO); Knee Arthroplasty (Left, 06/16/2014); Colonoscopy (07/20/2015); Inguinal hernia repair (Right, 08/01/2017); Finger trigger release (Right, 11/20/2015); Cholecystectomy, laparoscopic (N/A, 7/19/2019); Endoscopy, colon, diagnostic; Tonsillectomy; Upper gastrointestinal endoscopy (N/A, 9/10/2019); joint replacement (Left, 2014); hiatal hernia repair (N/A, 10/22/2019); Upper gastrointestinal endoscopy (N/A, 7/12/2021); Intracapsular cataract extraction (Left, 11/11/2021); and Colonoscopy (N/A, 9/16/2022). Social History:  reports that he has never smoked. He has never used smokeless tobacco. He reports current alcohol use. He reports that he does not use drugs. Family History: family history includes Coronary Art Dis in his mother; Diabetes in his mother; High Cholesterol in his brother and brother; Hypertension (age of onset: [de-identified]) in his mother; Osteoarthritis (age of onset: 80) in his father; Osteoporosis in his mother. The patients home medications have been reviewed.     Allergies: Penicillins    -------------------------------------------------- RESULTS -------------------------------------------------  Labs:  Results for orders placed or performed during the hospital encounter of 01/31/23   Urinalysis with Microscopic   Result Value Ref Range    Color, UA Yellow Straw/Yellow    Clarity, UA Clear Clear    Glucose, Ur >=1000 (A) Negative mg/dL    Bilirubin Urine Negative Negative    Ketones, Urine Negative Negative mg/dL    Specific Gravity, UA <=1.005 1.005 - 1.030    Blood, Urine LARGE (A) Negative    pH, UA 5.0 5.0 - 9.0    Protein, UA Negative Negative mg/dL    Urobilinogen, Urine 0.2 <2.0 E.U./dL    Nitrite, Urine Negative Negative    Leukocyte Esterase, Urine Negative Negative    WBC, UA NONE 0 - 5 /HPF    RBC, UA 1-3 0 - 2 /HPF    Bacteria, UA NONE SEEN None Seen /HPF   Lactic Acid   Result Value Ref Range    Lactic Acid 1.1 0.5 - 2.2 mmol/L   CBC with Auto Differential   Result Value Ref Range    WBC 10.6 4.5 - 11.5 E9/L    RBC 5.50 3.80 - 5.80 E12/L    Hemoglobin 16.4 12.5 - 16.5 g/dL    Hematocrit 46.9 37.0 - 54.0 %    MCV 85.3 80.0 - 99.9 fL    MCH 29.8 26.0 - 35.0 pg    MCHC 35.0 (H) 32.0 - 34.5 %    RDW 13.2 11.5 - 15.0 fL    Platelets 351 713 - 821 E9/L    MPV 10.4 7.0 - 12.0 fL    Neutrophils % 68.6 43.0 - 80.0 %    Immature Granulocytes % 0.4 0.0 - 5.0 %    Lymphocytes % 21.3 20.0 - 42.0 %    Monocytes % 7.0 2.0 - 12.0 %    Eosinophils % 2.3 0.0 - 6.0 %    Basophils % 0.4 0.0 - 2.0 %    Neutrophils Absolute 7.29 1.80 - 7.30 E9/L    Immature Granulocytes # 0.04 E9/L    Lymphocytes Absolute 2.26 1.50 - 4.00 E9/L    Monocytes Absolute 0.74 0.10 - 0.95 E9/L    Eosinophils Absolute 0.24 0.05 - 0.50 E9/L    Basophils Absolute 0.04 0.00 - 0.20 E9/L   Comprehensive Metabolic Panel w/ Reflex to MG   Result Value Ref Range    Sodium 140 132 - 146 mmol/L    Potassium reflex Magnesium 4.4 3.5 - 5.0 mmol/L    Chloride 102 98 - 107 mmol/L    CO2 25 22 - 29 mmol/L    Anion Gap 13 7 - 16 mmol/L    Glucose 138 (H) 74 - 99 mg/dL    BUN 20 6 - 23 mg/dL    Creatinine 1.0 0.7 - 1.2 mg/dL    Est, Glom Filt Rate >60 >=60 mL/min/1.73    Calcium 9.7 8.6 - 10.2 mg/dL    Total Protein 7.2 6.4 - 8.3 g/dL    Albumin 4.6 3.5 - 5.2 g/dL    Total Bilirubin 1.7 (H) 0.0 - 1.2 mg/dL    Alkaline Phosphatase 119 40 - 129 U/L    ALT 25 0 - 40 U/L    AST 23 0 - 39 U/L   CK   Result Value Ref Range    Total CK 82 20 - 200 U/L       Radiology:  CT ABDOMEN PELVIS WO CONTRAST Additional Contrast? None   Final Result   1. Fusiform dilation of the right mid ureter to approximately 13 mm. The   etiology is unclear. Consider retrograde pyelogram.   2. No urolithiasis or hydronephrosis. 3. Enlarged prostate. 4. Mild sigmoid diverticulosis without diverticulitis. 5. Small to moderate sized hiatal hernia.             ------------------------- NURSING NOTES AND VITALS REVIEWED ---------------------------  Date / Time Roomed:  1/31/2023  6:31 PM  ED Bed Assignment:  15/15    The nursing notes within the ED encounter and vital signs as below have been reviewed. BP (!) 162/84   Pulse 64   Temp 97.7 °F (36.5 °C)   Resp 16   SpO2 97%   Oxygen Saturation Interpretation: Normal      ------------------------------------------ PROGRESS NOTES ------------------------------------------  5:12 AM EST  I have spoken with the patient and discussed todays results, in addition to providing specific details for the plan of care and counseling regarding the diagnosis and prognosis. Their questions are answered at this time and they are agreeable with the plan. I discussed at length with them reasons for immediate return here for re evaluation. They will followup with their primary care physician by calling their office tomorrow. --------------------------------- ADDITIONAL PROVIDER NOTES ---------------------------------  At this time the patient is without objective evidence of an acute process requiring hospitalization or inpatient management. They have remained hemodynamically stable throughout their entire ED visit and are stable for discharge with outpatient follow-up.      The plan has been discussed in detail and they are aware of the specific conditions for emergent return, as well as the importance of follow-up. Discharge Medication List as of 1/31/2023  9:23 PM          Diagnosis:  1. Hematuria, unspecified type    2. Hydroureter        Disposition:  Patient's disposition: Discharge to home  Patient's condition is stable.              Nayely Busch MD  02/01/23 0920

## 2023-02-03 LAB — URINE CULTURE, ROUTINE: NORMAL

## 2023-02-20 ENCOUNTER — HOSPITAL ENCOUNTER (OUTPATIENT)
Dept: CT IMAGING | Age: 68
Discharge: HOME | End: 2023-02-22
Payer: MEDICARE

## 2023-02-20 DIAGNOSIS — R31.0 GROSS HEMATURIA: ICD-10-CM

## 2023-02-20 PROCEDURE — 76376 3D RENDER W/INTRP POSTPROCES: CPT

## 2023-02-20 PROCEDURE — 74178 CT ABD&PLV WO CNTR FLWD CNTR: CPT | Performed by: NURSE PRACTITIONER

## 2023-02-20 PROCEDURE — 6360000004 HC RX CONTRAST MEDICATION: Performed by: RADIOLOGY

## 2023-02-20 RX ADMIN — IOPAMIDOL 110 ML: 755 INJECTION, SOLUTION INTRAVENOUS at 08:17

## 2023-02-23 ENCOUNTER — HOSPITAL ENCOUNTER (EMERGENCY)
Age: 68
Discharge: HOME | End: 2023-02-24
Payer: MEDICARE

## 2023-02-23 VITALS
WEIGHT: 200 LBS | BODY MASS INDEX: 30.41 KG/M2 | TEMPERATURE: 98.2 F | SYSTOLIC BLOOD PRESSURE: 177 MMHG | DIASTOLIC BLOOD PRESSURE: 90 MMHG | OXYGEN SATURATION: 97 % | RESPIRATION RATE: 16 BRPM | HEART RATE: 72 BPM

## 2023-02-23 DIAGNOSIS — I25.10 CORONARY ARTERY DISEASE INVOLVING NATIVE CORONARY ARTERY OF NATIVE HEART WITHOUT ANGINA PECTORIS: ICD-10-CM

## 2023-02-23 DIAGNOSIS — R10.9 FLANK PAIN: Primary | ICD-10-CM

## 2023-02-23 LAB
ALBUMIN SERPL-MCNC: 4.1 G/DL (ref 3.5–5.2)
ALP BLD-CCNC: 131 U/L (ref 40–129)
ALT SERPL-CCNC: 36 U/L (ref 0–40)
ANION GAP SERPL CALCULATED.3IONS-SCNC: 12 MMOL/L (ref 7–16)
AST SERPL-CCNC: 21 U/L (ref 0–39)
BACTERIA: ABNORMAL /HPF
BASOPHILS ABSOLUTE: 0.07 E9/L (ref 0–0.2)
BASOPHILS RELATIVE PERCENT: 0.7 % (ref 0–2)
BILIRUB SERPL-MCNC: 1.6 MG/DL (ref 0–1.2)
BILIRUBIN URINE: NEGATIVE
BLOOD, URINE: ABNORMAL
BUN BLDV-MCNC: 20 MG/DL (ref 6–23)
CALCIUM SERPL-MCNC: 9.1 MG/DL (ref 8.6–10.2)
CHLORIDE BLD-SCNC: 105 MMOL/L (ref 98–107)
CLARITY: CLEAR
CO2: 22 MMOL/L (ref 22–29)
COLOR: YELLOW
CREAT SERPL-MCNC: 0.9 MG/DL (ref 0.7–1.2)
EOSINOPHILS ABSOLUTE: 0.25 E9/L (ref 0.05–0.5)
EOSINOPHILS RELATIVE PERCENT: 2.4 % (ref 0–6)
GFR SERPL CREATININE-BSD FRML MDRD: >60 ML/MIN/1.73
GLUCOSE BLD-MCNC: 198 MG/DL (ref 74–99)
GLUCOSE URINE: >=1000 MG/DL
HCT VFR BLD CALC: 47.3 % (ref 37–54)
HEMOGLOBIN: 16.4 G/DL (ref 12.5–16.5)
IMMATURE GRANULOCYTES #: 0.04 E9/L
IMMATURE GRANULOCYTES %: 0.4 % (ref 0–5)
KETONES, URINE: NEGATIVE MG/DL
LEUKOCYTE ESTERASE, URINE: NEGATIVE
LYMPHOCYTES ABSOLUTE: 2.3 E9/L (ref 1.5–4)
LYMPHOCYTES RELATIVE PERCENT: 22.5 % (ref 20–42)
MAGNESIUM: 2 MG/DL (ref 1.6–2.6)
MCH RBC QN AUTO: 29.9 PG (ref 26–35)
MCHC RBC AUTO-ENTMCNC: 34.7 % (ref 32–34.5)
MCV RBC AUTO: 86.3 FL (ref 80–99.9)
MONOCYTES ABSOLUTE: 0.78 E9/L (ref 0.1–0.95)
MONOCYTES RELATIVE PERCENT: 7.6 % (ref 2–12)
NEUTROPHILS ABSOLUTE: 6.77 E9/L (ref 1.8–7.3)
NEUTROPHILS RELATIVE PERCENT: 66.4 % (ref 43–80)
NITRITE, URINE: NEGATIVE
PDW BLD-RTO: 13.2 FL (ref 11.5–15)
PH UA: 6 (ref 5–9)
PLATELET # BLD: 227 E9/L (ref 130–450)
PMV BLD AUTO: 10.3 FL (ref 7–12)
POTASSIUM REFLEX MAGNESIUM: 3.5 MMOL/L (ref 3.5–5)
PROTEIN UA: NEGATIVE MG/DL
RBC # BLD: 5.48 E12/L (ref 3.8–5.8)
RBC UA: ABNORMAL /HPF (ref 0–2)
SODIUM BLD-SCNC: 139 MMOL/L (ref 132–146)
SPECIFIC GRAVITY UA: 1.02 (ref 1–1.03)
TOTAL PROTEIN: 6.9 G/DL (ref 6.4–8.3)
UROBILINOGEN, URINE: 0.2 E.U./DL
WBC # BLD: 10.2 E9/L (ref 4.5–11.5)
WBC UA: ABNORMAL /HPF (ref 0–5)

## 2023-02-23 PROCEDURE — 83735 ASSAY OF MAGNESIUM: CPT

## 2023-02-23 PROCEDURE — 85025 COMPLETE CBC W/AUTO DIFF WBC: CPT

## 2023-02-23 PROCEDURE — 99283 EMERGENCY DEPT VISIT LOW MDM: CPT

## 2023-02-23 PROCEDURE — 6370000000 HC RX 637 (ALT 250 FOR IP): Performed by: NURSE PRACTITIONER

## 2023-02-23 PROCEDURE — 80053 COMPREHEN METABOLIC PANEL: CPT

## 2023-02-23 PROCEDURE — 81001 URINALYSIS AUTO W/SCOPE: CPT

## 2023-02-23 RX ORDER — HYDROCODONE BITARTRATE AND ACETAMINOPHEN 5; 325 MG/1; MG/1
1 TABLET ORAL ONCE
Status: COMPLETED | OUTPATIENT
Start: 2023-02-23 | End: 2023-02-23

## 2023-02-23 RX ADMIN — HYDROCODONE BITARTRATE AND ACETAMINOPHEN 1 TABLET: 5; 325 TABLET ORAL at 20:58

## 2023-02-23 ASSESSMENT — PAIN DESCRIPTION - LOCATION: LOCATION: FLANK

## 2023-02-23 ASSESSMENT — PAIN SCALES - GENERAL: PAINLEVEL_OUTOF10: 8

## 2023-02-23 ASSESSMENT — PAIN - FUNCTIONAL ASSESSMENT: PAIN_FUNCTIONAL_ASSESSMENT: 0-10

## 2023-02-24 RX ORDER — ATORVASTATIN CALCIUM 80 MG/1
TABLET, FILM COATED ORAL
Qty: 90 TABLET | Refills: 1 | Status: SHIPPED | OUTPATIENT
Start: 2023-02-24

## 2023-02-24 NOTE — ED TRIAGE NOTES
Department of Emergency Medicine  FIRST PROVIDER TRIAGE NOTE             Independent MLP           2/23/23  8:33 PM EST    Date of Encounter: 2/23/23   MRN: [id number]      HPI: Arin Palma is a 75 y.o. 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 Today pain was intolerable which is what initiated the visit to the emergency department       ROS: Negative for fever or urinary complaints. PE: Gen Appearance/Constitutional: alert  HEENT: NC/NT. PERRLA,  Airway patent. Initial Plan of Care: All treatment areas with department are currently occupied. Plan to order/Initiate the following while awaiting opening in ED: labs.   Initiate Treatment-Testing, Proceed toTreatment Area When Bed Available for ED Attending/MLP to Continue Care    Electronically signed by MICHELLE Beltran CNP   DD: 2/23/23

## 2023-02-24 NOTE — ED PROVIDER NOTES
Independent JULIOCESAR Visit. [facility]  ED  Encounter Note  Admit Date/RoomTime: 2023 11:32 PM  ED Room: CHAIR01/  NAME: Darcy Corbin  : 1955  MRN: [id number]  PCP: Adalid Yip DO    CHIEF COMPLAINT     Flank Pain (Right sided flank pain x 10 days. Able to urinate. Had CT, showed malformation of ureter. Supposed to have cystoscopy . )    HISTORY OF PRESENT ILLNESS        Darcy Corbin is a 75 y.o. male who presents to the ED right flank pain for 10 days. Following with urology. Had a CT urogram done demonstrating mild formation of the ureter and concern for carcinoma. Scheduled for outpatient cystoscopy. Currently not taking any pain medication at home. Today the pain became more severe. Able to urinate without any burning or katherine blood. No fevers or chills  REVIEW OF SYSTEMS     Pertinent positives and negatives are stated within HPI, all other systems reviewed and are negative. Past Medical History:  has a past medical history of Anemia, Anxiety attack, CAD (coronary artery disease), Cataract, left eye, Colon cancer screening, Diabetes mellitus ([address] GERD (gastroesophageal reflux disease), Hypercholesterolemia, Hyperlipidemia, Hypertension, Left knee DJD, and NSTEMI (non-ST elevated myocardial infarction) ([address] Surgical History:  has a past surgical history that includes Ankle surgery (6 YRS AGO); Colonoscopy; Upper gastrointestinal endoscopy; Coronary angioplasty with stent (2012); Diagnostic Cardiac Cath Lab Procedure (1998); Diagnostic Cardiac Cath Lab Procedure (2002); Diagnostic Cardiac Cath Lab Procedure (2005); hernia repair (Left, YRS AGO); Knee Arthroplasty (Left, 2014); Colonoscopy (2015); Inguinal hernia repair (Right, 2017); Finger trigger release (Right, 2015); Cholecystectomy, laparoscopic (N/A, 2019);  Endoscopy, colon, diagnostic; Tonsillectomy; Upper gastrointestinal endoscopy (N/A, 9/10/2019); joint replacement (Left, 2014); hiatal hernia repair (N/A, 10/22/2019); Upper gastrointestinal endoscopy (N/A, 7/12/2021); Intracapsular cataract extraction (Left, 11/11/2021); and Colonoscopy (N/A, 9/16/2022). Social History:  reports that he has never smoked. He has never used smokeless tobacco. He reports current alcohol use. He reports that he does not use drugs. Family History: family history includes Coronary Art Dis in his mother; Diabetes in his mother; High Cholesterol in his brother and brother; Hypertension (age of onset: [de-identified]) in his mother; Osteoarthritis (age of onset: 80) in his father; Osteoporosis in his mother.      Allergies: Penicillins  CURRENT MEDICATIONS       Previous Medications    AMLODIPINE (NORVASC) 10 MG TABLET    TAKE 1 TABLET BY MOUTH EVERY DAY    ASCORBIC ACID (VITAMIN C) 500 MG TABLET    Take 500 mg by mouth every morning     ASPIRIN 81 MG TABLET    Take 81 mg by mouth daily Last dose 10/19/19    ATORVASTATIN (LIPITOR) 80 MG TABLET    Take 1 tablet by mouth daily    CHOLECALCIFEROL (VITAMIN D3) 5000 UNITS TABS    Take 1 tablet by mouth every morning     CITALOPRAM (CELEXA) 40 MG TABLET    TAKE 1 AND 1/2 TABLETS BY MOUTH EVERY MORNING    EMPAGLIFLOZIN (JARDIANCE) 10 MG TABLET    TAKE 1 TABLET BY MOUTH EVERY DAY    EZETIMIBE (ZETIA) 10 MG TABLET    Take 1 tablet by mouth daily    FERROUS SULFATE 325 (65 FE) MG TABLET    Take 325 mg by mouth daily (with breakfast)     FISH OIL-OMEGA-3 FATTY ACIDS 1000 MG CAPSULE    Take 1 g by mouth every morning     FLUTICASONE (FLONASE) 50 MCG/ACT NASAL SPRAY    1 spray by Nasal route daily    LOSARTAN (COZAAR) 100 MG TABLET    TAKE 1 TABLET BY MOUTH EVERY DAY    LOSARTAN-HYDROCHLOROTHIAZIDE (HYZAAR) 100-25 MG PER TABLET    TAKE 1 TABLET BY MOUTH EVERY DAY    METOPROLOL TARTRATE (LOPRESSOR) 25 MG TABLET    TAKE 1 TABLET BY MOUTH TWICE A DAY    NITROGLYCERIN (NITROSTAT) 0.4 MG SL TABLET    Place 1 tablet under the tongue every 5 minutes as needed for Chest pain If chest pain: put 1 NTG tab under tongue - sit down - wait 5 min - if no relief, call 911. May repeat dose 2 more times 5 min apart while waiting for EMS (total of 3 doses). If dizzy do not take any further doses. OMEPRAZOLE (PRILOSEC) 40 MG DELAYED RELEASE CAPSULE    TAKE 1 CAPSULE BY MOUTH TWICE A DAY    SEMAGLUTIDE,0.25 OR 0.5MG/DOS, (OZEMPIC, 0.25 OR 0.5 MG/DOSE,) 2 MG/1.5ML SOPN    INJECT 0.25MG INTO THE SKIN ONE TIME PER WEEK       SCREENINGS     [place] Coma Scale  Eye Opening: Spontaneous  Best Verbal Response: Oriented  Best Motor Response: Obeys commands  [place] Coma Scale Score: 15         CIWA Assessment  BP: (!) 177/90  Heart Rate: 72       PHYSICAL EXAM   Oxygen Saturation Interpretation: Normal on room air analysis. ED Triage Vitals   BP Temp Temp src Heart Rate Resp SpO2 Height Weight   02/23/23 2033 02/23/23 2033 -- 02/23/23 2003 02/23/23 2033 02/23/23 2003 -- 02/23/23 2034   (!) 177/90 98.2 °F (36.8 °C)  72 16 97 %  200 lb (90.7 kg)         Physical Exam  Constitutional/General: Alert and oriented x3, well appearing, non toxic  HEENT:  NC/NT. PERRLA,  Airway patent. Neck: Supple, full ROM, non tender to palpation in the midline, no stridor, no crepitus, no meningeal signs  Respiratory: Lungs clear to auscultation bilaterally,  CV:  Regular rate. Regular rhythm. Chest: No chest wall tenderness  GI:  Abdomen Soft, Non tender, Non distended. +BS. Right flank pain   musculoskeletal: Moves all extremities x 4. Integument: skin warm and dry. No rashes.    Lymphatic: no lymphadenopathy noted  Neurologic: GCS 15, no focal deficits, symmetric strength 5/5 in the upper and lower extremities bilaterally, steady gait  Psychiatric: Normal Affect    DIAGNOSTIC RESULTS   (All laboratory and radiology results have been personally reviewed by myself)  Labs:  Results for orders placed or performed during the hospital encounter of 02/23/23   CBC with Auto Differential   Result Value Ref Range    WBC 10.2 4.5 - 11.5 E9/L    RBC 5.48 3.80 - 5.80 E12/L    Hemoglobin 16.4 12.5 - 16.5 g/dL    Hematocrit 47.3 37.0 - 54.0 %    MCV 86.3 80.0 - 99.9 fL    MCH 29.9 26.0 - 35.0 pg    MCHC 34.7 (H) 32.0 - 34.5 %    RDW 13.2 11.5 - 15.0 fL    Platelets 078 867 - 420 E9/L    MPV 10.3 7.0 - 12.0 fL    Neutrophils % 66.4 43.0 - 80.0 %    Immature Granulocytes % 0.4 0.0 - 5.0 %    Lymphocytes % 22.5 20.0 - 42.0 %    Monocytes % 7.6 2.0 - 12.0 %    Eosinophils % 2.4 0.0 - 6.0 %    Basophils % 0.7 0.0 - 2.0 %    Neutrophils Absolute 6.77 1.80 - 7.30 E9/L    Immature Granulocytes # 0.04 E9/L    Lymphocytes Absolute 2.30 1.50 - 4.00 E9/L    Monocytes Absolute 0.78 0.10 - 0.95 E9/L    Eosinophils Absolute 0.25 0.05 - 0.50 E9/L    Basophils Absolute 0.07 0.00 - 0.20 E9/L   Comprehensive Metabolic Panel w/ Reflex to MG   Result Value Ref Range    Sodium 139 132 - 146 mmol/L    Potassium reflex Magnesium 3.5 3.5 - 5.0 mmol/L    Chloride 105 98 - 107 mmol/L    CO2 22 22 - 29 mmol/L    Anion Gap 12 7 - 16 mmol/L    Glucose 198 (H) 74 - 99 mg/dL    BUN 20 6 - 23 mg/dL    Creatinine 0.9 0.7 - 1.2 mg/dL    Est, Glom Filt Rate >60 >=60 mL/min/1.73    Calcium 9.1 8.6 - 10.2 mg/dL    Total Protein 6.9 6.4 - 8.3 g/dL    Albumin 4.1 3.5 - 5.2 g/dL    Total Bilirubin 1.6 (H) 0.0 - 1.2 mg/dL    Alkaline Phosphatase 131 (H) 40 - 129 U/L    ALT 36 0 - 40 U/L    AST 21 0 - 39 U/L   Urinalysis with Microscopic   Result Value Ref Range    Color, UA Yellow Straw/Yellow    Clarity, UA Clear Clear    Glucose, Ur >=1000 (A) Negative mg/dL    Bilirubin Urine Negative Negative    Ketones, Urine Negative Negative mg/dL    Specific Gravity, UA 1.020 1.005 - 1.030    Blood, Urine LARGE (A) Negative    pH, UA 6.0 5.0 - 9.0    Protein, UA Negative Negative mg/dL    Urobilinogen, Urine 0.2 <2.0 E.U./dL    Nitrite, Urine Negative Negative    Leukocyte Esterase, Urine Negative Negative    WBC, UA 0-1 0 - 5 /HPF    RBC, UA 10-20 (A) 0 - 2 /HPF    Bacteria, UA FEW (A) None Seen /HPF   Magnesium   Result Value Ref Range    Magnesium 2.0 1.6 - 2.6 mg/dL     Imaging: All Radiology results interpreted by Radiologist unless otherwise noted. No orders to display       ED COURSE   Vitals:    Vitals:    02/23/23 2003 02/23/23 2033 02/23/23 2034   BP:  (!) 177/90    Pulse: 72     Resp:  16    Temp:  98.2 °F (36.8 °C)    SpO2: 97%     Weight:   200 lb (90.7 kg)       Patient was given the following medications:  Medications   HYDROcodone-acetaminophen (NORCO) 5-325 MG per tablet 1 tablet (1 tablet Oral Given 2/23/23 2058)          PROCEDURES       REASSESSMENT   2/23/23       Time:   Patients condition . CONSULTS:  None  DIFFERENTIAL DX_MDM   MDM:   Social Determinants : None    Records Reviewed : None_ n/a per encounter visit    CC/HPI Summary, DDx, ED Course, and Reassessment: Patient presents with Flank Pain (Right sided flank pain x 10 days. Able to urinate. Had CT, showed malformation of ureter. Supposed to have cystoscopy March 25th. )  Chronic findings with patient following with urologist.  Needs pain control. Laboratory studies were obtained not demonstrating any leukocytosis or renal dysfunction. Urine without evidence of infection. He was medicated here with Norco and had minimal relief. He does feel like he is well enough to go home and will call his urologist tomorrow    Plan of Care/Counseling:  MICHELLE Hoyos CNP reviewed today's visit with the patient in addition to providing specific details for the plan of care and counseling regarding the diagnosis and prognosis. Questions are answered at this time and are agreeable with the plan. ASSESSMENT     1. Flank pain        DISPOSITION   Discharged home.   Patient condition is good    NEW MEDICATIONS     New Prescriptions    No medications on file     Electronically signed by MICHELLE Hoyos CNP   DD: 2/23/23  **This report was transcribed using voice recognition software. Every effort was made to ensure accuracy; however, inadvertent computerized transcription errors may be present.   END OF ED PROVIDER NOTE      MICHELLE Roman - DOMENICA  02/24/23 0001

## 2023-02-24 NOTE — PROGRESS NOTES
[facility] PRE-ADMISSION TESTING INSTRUCTIONS    The Preadmission Testing patient is instructed accordingly using the following criteria (check applicable):    ARRIVAL INSTRUCTIONS:  [x] Parking the day of Surgery is located in the Main Entrance lot. Upon entering the door, make an immediate right to the surgery reception desk    [x] Bring photo ID and insurance card    [x] Bring in a copy of Living will or Durable Power of  papers. [x] Please be sure to arrange for responsible adult to provide transportation to and from the hospital    [x] Please arrange for responsible adult to be with you for the 24 hour period post procedure due to having anesthesia    [x] If you awake am of surgery not feeling well or have temperature >100 please call [phone]    GENERAL INSTRUCTIONS:    [x] Nothing by mouth after midnight, including gum, candy, mints or water    [x] You may brush your teeth, but do not swallow any water    [x] Take medications as instructed with 1-2 oz of water    [x] Stop herbal supplements and vitamins 5 days prior to procedure    [x] Follow preop dosing of blood thinners per physician instructions    [] Take 1/2 dose of evening insulin, but no insulin after midnight    [x] No oral diabetic medications after midnight    [x] If diabetic and have low blood sugar or feel symptomatic, take 1-2oz apple juice only    [] Bring inhalers day of surgery    [] Bring C-PAP/ Bi-Pap day of surgery    [] Bring urine specimen day of surgery    [x] Shower or bath with soap, lather and rinse well, AM of Surgery, no lotion, powders or creams to surgical site    [] Follow bowel prep as instructed per surgeon    [x] No tobacco products within 24 hours of surgery     [x] No alcohol or illegal drug use within 24 hours of surgery.     [x] Jewelry, body piercing's, eyeglasses, contact lenses and dentures are not permitted into surgery (bring cases)      [] Please do not wear any nail polish, make up or hair products on the day of surgery    [x] You can expect a call the business day prior to procedure to notify you if your arrival time changes    [x] If you receive a survey after surgery we would greatly appreciate your comments    [] Parent/guardian of a minor must accompany their child and remain on the premises  the entire time they are under our care     [] Pediatric patients may bring favorite toy, blanket or comfort item with them    [] A caregiver or family member must remain with the patient during their stay if they are mentally handicapped, have dementia, disoriented or unable to use a call light or would be a safety concern if left unattended    [x] Please notify surgeon if you develop any illness between now and time of surgery (cold, cough, sore throat, fever, nausea, vomiting) or any signs of infections  including skin, wounds, and dental.    [x]  The Outpatient Pharmacy is available to fill your prescription here on your day of surgery, ask your preop nurse for details    [] Other instructions    EDUCATIONAL MATERIALS PROVIDED:    [] PAT Preoperative Education Packet/Booklet     [] Medication List    [] Transfusion bracelet applied with instructions    [] Shower with soap, lather and rinse well, and use CHG wipes provided the evening before surgery as instructed    [] Incentive spirometer with instructions

## 2023-02-26 ENCOUNTER — PREP FOR PROCEDURE (OUTPATIENT)
Dept: UROLOGY | Age: 68
End: 2023-02-26

## 2023-02-26 RX ORDER — SODIUM CHLORIDE 0.9 % (FLUSH) 0.9 %
5-40 SYRINGE (ML) INJECTION EVERY 12 HOURS SCHEDULED
Status: CANCELLED | OUTPATIENT
Start: 2023-02-26

## 2023-02-26 RX ORDER — SODIUM CHLORIDE 0.9 % (FLUSH) 0.9 %
5-40 SYRINGE (ML) INJECTION PRN
Status: CANCELLED | OUTPATIENT
Start: 2023-02-26

## 2023-02-26 RX ORDER — SODIUM CHLORIDE 9 MG/ML
INJECTION, SOLUTION INTRAVENOUS PRN
Status: CANCELLED | OUTPATIENT
Start: 2023-02-26

## 2023-02-26 RX ORDER — SODIUM CHLORIDE 9 MG/ML
INJECTION, SOLUTION INTRAVENOUS CONTINUOUS
Status: CANCELLED | OUTPATIENT
Start: 2023-02-26

## 2023-02-27 ENCOUNTER — HOSPITAL ENCOUNTER (OUTPATIENT)
Age: 68
Setting detail: OBSERVATION
Discharge: HOME | End: 2023-03-01
Attending: STUDENT IN AN ORGANIZED HEALTH CARE EDUCATION/TRAINING PROGRAM | Admitting: STUDENT IN AN ORGANIZED HEALTH CARE EDUCATION/TRAINING PROGRAM
Payer: MEDICARE

## 2023-02-27 ENCOUNTER — ANESTHESIA (OUTPATIENT)
Dept: OPERATING ROOM | Age: 68
End: 2023-02-27
Payer: MEDICARE

## 2023-02-27 ENCOUNTER — APPOINTMENT (OUTPATIENT)
Dept: GENERAL RADIOLOGY | Age: 68
End: 2023-02-27
Attending: STUDENT IN AN ORGANIZED HEALTH CARE EDUCATION/TRAINING PROGRAM
Payer: MEDICARE

## 2023-02-27 ENCOUNTER — HOSPITAL ENCOUNTER (OUTPATIENT)
Dept: GENERAL RADIOLOGY | Age: 68
Setting detail: OUTPATIENT SURGERY
Discharge: HOME | End: 2023-03-01
Attending: STUDENT IN AN ORGANIZED HEALTH CARE EDUCATION/TRAINING PROGRAM
Payer: MEDICARE

## 2023-02-27 ENCOUNTER — ANESTHESIA EVENT (OUTPATIENT)
Dept: OPERATING ROOM | Age: 68
End: 2023-02-27
Payer: MEDICARE

## 2023-02-27 DIAGNOSIS — N13.4: ICD-10-CM

## 2023-02-27 DIAGNOSIS — R31.0 GROSS HEMATURIA: ICD-10-CM

## 2023-02-27 DIAGNOSIS — R52 PAIN: ICD-10-CM

## 2023-02-27 PROBLEM — N28.89 URETERAL MASS: Status: ACTIVE | Noted: 2023-02-27

## 2023-02-27 LAB — METER GLUCOSE: 152 MG/DL (ref 74–99)

## 2023-02-27 PROCEDURE — 7100000000 HC PACU RECOVERY - FIRST 15 MIN: Performed by: STUDENT IN AN ORGANIZED HEALTH CARE EDUCATION/TRAINING PROGRAM

## 2023-02-27 PROCEDURE — 6370000000 HC RX 637 (ALT 250 FOR IP): Performed by: STUDENT IN AN ORGANIZED HEALTH CARE EDUCATION/TRAINING PROGRAM

## 2023-02-27 PROCEDURE — 6360000004 HC RX CONTRAST MEDICATION: Performed by: STUDENT IN AN ORGANIZED HEALTH CARE EDUCATION/TRAINING PROGRAM

## 2023-02-27 PROCEDURE — 2580000003 HC RX 258: Performed by: NURSE ANESTHETIST, CERTIFIED REGISTERED

## 2023-02-27 PROCEDURE — 6360000002 HC RX W HCPCS: Performed by: NURSE PRACTITIONER

## 2023-02-27 PROCEDURE — 7100000001 HC PACU RECOVERY - ADDTL 15 MIN: Performed by: STUDENT IN AN ORGANIZED HEALTH CARE EDUCATION/TRAINING PROGRAM

## 2023-02-27 PROCEDURE — 7100000010 HC PHASE II RECOVERY - FIRST 15 MIN: Performed by: STUDENT IN AN ORGANIZED HEALTH CARE EDUCATION/TRAINING PROGRAM

## 2023-02-27 PROCEDURE — 6370000000 HC RX 637 (ALT 250 FOR IP): Performed by: ANESTHESIOLOGY

## 2023-02-27 PROCEDURE — 2500000003 HC RX 250 WO HCPCS: Performed by: NURSE ANESTHETIST, CERTIFIED REGISTERED

## 2023-02-27 PROCEDURE — C1758 CATHETER, URETERAL: HCPCS | Performed by: STUDENT IN AN ORGANIZED HEALTH CARE EDUCATION/TRAINING PROGRAM

## 2023-02-27 PROCEDURE — C2617 STENT, NON-COR, TEM W/O DEL: HCPCS | Performed by: STUDENT IN AN ORGANIZED HEALTH CARE EDUCATION/TRAINING PROGRAM

## 2023-02-27 PROCEDURE — 2580000003 HC RX 258: Performed by: NURSE PRACTITIONER

## 2023-02-27 PROCEDURE — 6360000002 HC RX W HCPCS: Performed by: ANESTHESIOLOGY

## 2023-02-27 PROCEDURE — 7100000011 HC PHASE II RECOVERY - ADDTL 15 MIN: Performed by: STUDENT IN AN ORGANIZED HEALTH CARE EDUCATION/TRAINING PROGRAM

## 2023-02-27 PROCEDURE — 71045 X-RAY EXAM CHEST 1 VIEW: CPT

## 2023-02-27 PROCEDURE — 74420 UROGRAPHY RTRGR +-KUB: CPT

## 2023-02-27 PROCEDURE — 3600000013 HC SURGERY LEVEL 3 ADDTL 15MIN: Performed by: STUDENT IN AN ORGANIZED HEALTH CARE EDUCATION/TRAINING PROGRAM

## 2023-02-27 PROCEDURE — 6360000002 HC RX W HCPCS: Performed by: NURSE ANESTHETIST, CERTIFIED REGISTERED

## 2023-02-27 PROCEDURE — 82962 GLUCOSE BLOOD TEST: CPT

## 2023-02-27 PROCEDURE — 88305 TISSUE EXAM BY PATHOLOGIST: CPT

## 2023-02-27 PROCEDURE — 3700000000 HC ANESTHESIA ATTENDED CARE: Performed by: STUDENT IN AN ORGANIZED HEALTH CARE EDUCATION/TRAINING PROGRAM

## 2023-02-27 PROCEDURE — 2709999900 HC NON-CHARGEABLE SUPPLY: Performed by: STUDENT IN AN ORGANIZED HEALTH CARE EDUCATION/TRAINING PROGRAM

## 2023-02-27 PROCEDURE — 2580000003 HC RX 258: Performed by: STUDENT IN AN ORGANIZED HEALTH CARE EDUCATION/TRAINING PROGRAM

## 2023-02-27 PROCEDURE — C1894 INTRO/SHEATH, NON-LASER: HCPCS | Performed by: STUDENT IN AN ORGANIZED HEALTH CARE EDUCATION/TRAINING PROGRAM

## 2023-02-27 PROCEDURE — G0378 HOSPITAL OBSERVATION PER HR: HCPCS

## 2023-02-27 PROCEDURE — C1769 GUIDE WIRE: HCPCS | Performed by: STUDENT IN AN ORGANIZED HEALTH CARE EDUCATION/TRAINING PROGRAM

## 2023-02-27 PROCEDURE — 3600000003 HC SURGERY LEVEL 3 BASE: Performed by: STUDENT IN AN ORGANIZED HEALTH CARE EDUCATION/TRAINING PROGRAM

## 2023-02-27 PROCEDURE — 6370000000 HC RX 637 (ALT 250 FOR IP): Performed by: UROLOGY

## 2023-02-27 PROCEDURE — 3700000001 HC ADD 15 MINUTES (ANESTHESIA): Performed by: STUDENT IN AN ORGANIZED HEALTH CARE EDUCATION/TRAINING PROGRAM

## 2023-02-27 DEVICE — URETERAL STENT
Type: IMPLANTABLE DEVICE | Site: URETER | Status: FUNCTIONAL
Brand: POLARIS™ ULTRA

## 2023-02-27 RX ORDER — SODIUM CHLORIDE 9 MG/ML
INJECTION, SOLUTION INTRAVENOUS PRN
Status: DISCONTINUED | OUTPATIENT
Start: 2023-02-27 | End: 2023-02-27 | Stop reason: HOSPADM

## 2023-02-27 RX ORDER — SODIUM CHLORIDE 9 MG/ML
INJECTION, SOLUTION INTRAVENOUS CONTINUOUS PRN
Status: DISCONTINUED | OUTPATIENT
Start: 2023-02-27 | End: 2023-02-27 | Stop reason: SDUPTHER

## 2023-02-27 RX ORDER — TAMSULOSIN HYDROCHLORIDE 0.4 MG/1
0.4 CAPSULE ORAL DAILY
Qty: 30 CAPSULE | Refills: 0 | Status: SHIPPED | OUTPATIENT
Start: 2023-02-27

## 2023-02-27 RX ORDER — OXYCODONE HYDROCHLORIDE AND ACETAMINOPHEN 5; 325 MG/1; MG/1
1 TABLET ORAL EVERY 6 HOURS PRN
Status: DISCONTINUED | OUTPATIENT
Start: 2023-02-27 | End: 2023-03-01 | Stop reason: HOSPADM

## 2023-02-27 RX ORDER — MEPERIDINE HYDROCHLORIDE 25 MG/ML
12.5 INJECTION INTRAMUSCULAR; INTRAVENOUS; SUBCUTANEOUS ONCE
Status: DISCONTINUED | OUTPATIENT
Start: 2023-02-27 | End: 2023-02-27 | Stop reason: HOSPADM

## 2023-02-27 RX ORDER — FENTANYL CITRATE 50 UG/ML
25 INJECTION, SOLUTION INTRAMUSCULAR; INTRAVENOUS EVERY 5 MIN PRN
Status: DISCONTINUED | OUTPATIENT
Start: 2023-02-27 | End: 2023-02-27 | Stop reason: HOSPADM

## 2023-02-27 RX ORDER — PHENAZOPYRIDINE HYDROCHLORIDE 100 MG/1
100 TABLET, FILM COATED ORAL EVERY 6 HOURS PRN
Status: DISCONTINUED | OUTPATIENT
Start: 2023-02-27 | End: 2023-03-01 | Stop reason: HOSPADM

## 2023-02-27 RX ORDER — OXYCODONE HYDROCHLORIDE AND ACETAMINOPHEN 5; 325 MG/1; MG/1
1 TABLET ORAL EVERY 6 HOURS PRN
Qty: 12 TABLET | Refills: 0 | Status: SHIPPED | OUTPATIENT
Start: 2023-02-27 | End: 2023-03-02

## 2023-02-27 RX ORDER — PANTOPRAZOLE SODIUM 40 MG/1
40 TABLET, DELAYED RELEASE ORAL
Status: DISCONTINUED | OUTPATIENT
Start: 2023-02-28 | End: 2023-03-01 | Stop reason: HOSPADM

## 2023-02-27 RX ORDER — DIPHENHYDRAMINE HYDROCHLORIDE 50 MG/ML
12.5 INJECTION INTRAMUSCULAR; INTRAVENOUS
Status: DISCONTINUED | OUTPATIENT
Start: 2023-02-27 | End: 2023-02-27 | Stop reason: HOSPADM

## 2023-02-27 RX ORDER — LIDOCAINE HYDROCHLORIDE 20 MG/ML
INJECTION, SOLUTION INFILTRATION; PERINEURAL PRN
Status: DISCONTINUED | OUTPATIENT
Start: 2023-02-27 | End: 2023-02-27 | Stop reason: SDUPTHER

## 2023-02-27 RX ORDER — TAMSULOSIN HYDROCHLORIDE 0.4 MG/1
0.4 CAPSULE ORAL DAILY
Status: DISCONTINUED | OUTPATIENT
Start: 2023-02-27 | End: 2023-03-01 | Stop reason: HOSPADM

## 2023-02-27 RX ORDER — SODIUM CHLORIDE 0.9 % (FLUSH) 0.9 %
5-40 SYRINGE (ML) INJECTION EVERY 12 HOURS SCHEDULED
Status: DISCONTINUED | OUTPATIENT
Start: 2023-02-27 | End: 2023-03-01 | Stop reason: HOSPADM

## 2023-02-27 RX ORDER — ATORVASTATIN CALCIUM 40 MG/1
80 TABLET, FILM COATED ORAL NIGHTLY
Status: DISCONTINUED | OUTPATIENT
Start: 2023-02-27 | End: 2023-03-01 | Stop reason: HOSPADM

## 2023-02-27 RX ORDER — SODIUM CHLORIDE 0.9 % (FLUSH) 0.9 %
5-40 SYRINGE (ML) INJECTION EVERY 12 HOURS SCHEDULED
Status: DISCONTINUED | OUTPATIENT
Start: 2023-02-27 | End: 2023-02-27 | Stop reason: HOSPADM

## 2023-02-27 RX ORDER — PROCHLORPERAZINE EDISYLATE 5 MG/ML
5 INJECTION INTRAMUSCULAR; INTRAVENOUS
Status: COMPLETED | OUTPATIENT
Start: 2023-02-27 | End: 2023-02-27

## 2023-02-27 RX ORDER — LOSARTAN POTASSIUM 50 MG/1
100 TABLET ORAL DAILY
Status: DISCONTINUED | OUTPATIENT
Start: 2023-02-28 | End: 2023-03-01 | Stop reason: HOSPADM

## 2023-02-27 RX ORDER — FENTANYL CITRATE 50 UG/ML
INJECTION, SOLUTION INTRAMUSCULAR; INTRAVENOUS PRN
Status: DISCONTINUED | OUTPATIENT
Start: 2023-02-27 | End: 2023-02-27 | Stop reason: SDUPTHER

## 2023-02-27 RX ORDER — MIDAZOLAM HYDROCHLORIDE 1 MG/ML
INJECTION INTRAMUSCULAR; INTRAVENOUS PRN
Status: DISCONTINUED | OUTPATIENT
Start: 2023-02-27 | End: 2023-02-27 | Stop reason: SDUPTHER

## 2023-02-27 RX ORDER — SODIUM CHLORIDE 0.9 % (FLUSH) 0.9 %
5-40 SYRINGE (ML) INJECTION PRN
Status: DISCONTINUED | OUTPATIENT
Start: 2023-02-27 | End: 2023-03-01 | Stop reason: HOSPADM

## 2023-02-27 RX ORDER — POLYETHYLENE GLYCOL 3350 17 G/17G
17 POWDER, FOR SOLUTION ORAL DAILY PRN
Status: DISCONTINUED | OUTPATIENT
Start: 2023-02-27 | End: 2023-03-01 | Stop reason: HOSPADM

## 2023-02-27 RX ORDER — ACETAMINOPHEN 325 MG/1
650 TABLET ORAL EVERY 6 HOURS
Status: DISCONTINUED | OUTPATIENT
Start: 2023-02-27 | End: 2023-03-01 | Stop reason: HOSPADM

## 2023-02-27 RX ORDER — PROPOFOL 10 MG/ML
INJECTION, EMULSION INTRAVENOUS CONTINUOUS PRN
Status: DISCONTINUED | OUTPATIENT
Start: 2023-02-27 | End: 2023-02-27 | Stop reason: SDUPTHER

## 2023-02-27 RX ORDER — ONDANSETRON 2 MG/ML
4 INJECTION INTRAMUSCULAR; INTRAVENOUS EVERY 6 HOURS PRN
Status: DISCONTINUED | OUTPATIENT
Start: 2023-02-27 | End: 2023-03-01 | Stop reason: HOSPADM

## 2023-02-27 RX ORDER — LABETALOL HYDROCHLORIDE 5 MG/ML
5 INJECTION, SOLUTION INTRAVENOUS
Status: DISCONTINUED | OUTPATIENT
Start: 2023-02-27 | End: 2023-02-27 | Stop reason: HOSPADM

## 2023-02-27 RX ORDER — ONDANSETRON 4 MG/1
4 TABLET, ORALLY DISINTEGRATING ORAL EVERY 8 HOURS PRN
Status: DISCONTINUED | OUTPATIENT
Start: 2023-02-27 | End: 2023-03-01 | Stop reason: HOSPADM

## 2023-02-27 RX ORDER — OXYCODONE HYDROCHLORIDE AND ACETAMINOPHEN 5; 325 MG/1; MG/1
1 TABLET ORAL ONCE
Status: COMPLETED | OUTPATIENT
Start: 2023-02-27 | End: 2023-02-27

## 2023-02-27 RX ORDER — SODIUM CHLORIDE 9 MG/ML
INJECTION, SOLUTION INTRAVENOUS CONTINUOUS
Status: DISCONTINUED | OUTPATIENT
Start: 2023-02-27 | End: 2023-02-27

## 2023-02-27 RX ORDER — DEXAMETHASONE SODIUM PHOSPHATE 4 MG/ML
INJECTION, SOLUTION INTRA-ARTICULAR; INTRALESIONAL; INTRAMUSCULAR; INTRAVENOUS; SOFT TISSUE PRN
Status: DISCONTINUED | OUTPATIENT
Start: 2023-02-27 | End: 2023-02-27 | Stop reason: SDUPTHER

## 2023-02-27 RX ORDER — PROPOFOL 10 MG/ML
INJECTION, EMULSION INTRAVENOUS PRN
Status: DISCONTINUED | OUTPATIENT
Start: 2023-02-27 | End: 2023-02-27 | Stop reason: SDUPTHER

## 2023-02-27 RX ORDER — AMLODIPINE BESYLATE 10 MG/1
10 TABLET ORAL DAILY
Status: DISCONTINUED | OUTPATIENT
Start: 2023-02-28 | End: 2023-03-01 | Stop reason: HOSPADM

## 2023-02-27 RX ORDER — LEVOFLOXACIN 5 MG/ML
500 INJECTION, SOLUTION INTRAVENOUS
Status: COMPLETED | OUTPATIENT
Start: 2023-02-27 | End: 2023-02-27

## 2023-02-27 RX ORDER — SODIUM CHLORIDE 9 MG/ML
INJECTION, SOLUTION INTRAVENOUS CONTINUOUS
Status: DISCONTINUED | OUTPATIENT
Start: 2023-02-27 | End: 2023-02-28

## 2023-02-27 RX ORDER — SODIUM CHLORIDE 0.9 % (FLUSH) 0.9 %
5-40 SYRINGE (ML) INJECTION PRN
Status: DISCONTINUED | OUTPATIENT
Start: 2023-02-27 | End: 2023-02-27 | Stop reason: HOSPADM

## 2023-02-27 RX ORDER — EZETIMIBE 10 MG/1
10 TABLET ORAL DAILY
Status: DISCONTINUED | OUTPATIENT
Start: 2023-02-27 | End: 2023-03-01 | Stop reason: HOSPADM

## 2023-02-27 RX ORDER — SODIUM CHLORIDE 9 MG/ML
INJECTION, SOLUTION INTRAVENOUS PRN
Status: DISCONTINUED | OUTPATIENT
Start: 2023-02-27 | End: 2023-03-01 | Stop reason: HOSPADM

## 2023-02-27 RX ORDER — FLUTICASONE PROPIONATE 50 MCG
1 SPRAY, SUSPENSION (ML) NASAL DAILY
Status: DISCONTINUED | OUTPATIENT
Start: 2023-02-28 | End: 2023-03-01 | Stop reason: HOSPADM

## 2023-02-27 RX ORDER — HYDRALAZINE HYDROCHLORIDE 20 MG/ML
5 INJECTION INTRAMUSCULAR; INTRAVENOUS
Status: DISCONTINUED | OUTPATIENT
Start: 2023-02-27 | End: 2023-02-27 | Stop reason: HOSPADM

## 2023-02-27 RX ORDER — ONDANSETRON 2 MG/ML
INJECTION INTRAMUSCULAR; INTRAVENOUS PRN
Status: DISCONTINUED | OUTPATIENT
Start: 2023-02-27 | End: 2023-02-27 | Stop reason: SDUPTHER

## 2023-02-27 RX ADMIN — PHENAZOPYRIDINE 100 MG: 100 TABLET ORAL at 17:42

## 2023-02-27 RX ADMIN — OXYCODONE AND ACETAMINOPHEN 1 TABLET: 5; 325 TABLET ORAL at 13:58

## 2023-02-27 RX ADMIN — MIDAZOLAM 1 MG: 1 INJECTION INTRAMUSCULAR; INTRAVENOUS at 11:39

## 2023-02-27 RX ADMIN — ONDANSETRON 4 MG: 2 INJECTION INTRAMUSCULAR; INTRAVENOUS at 11:40

## 2023-02-27 RX ADMIN — FENTANYL CITRATE 50 MCG: 50 INJECTION, SOLUTION INTRAMUSCULAR; INTRAVENOUS at 11:30

## 2023-02-27 RX ADMIN — PROCHLORPERAZINE EDISYLATE 5 MG: 5 INJECTION INTRAMUSCULAR; INTRAVENOUS at 16:53

## 2023-02-27 RX ADMIN — LIDOCAINE HYDROCHLORIDE 40 MG: 20 INJECTION, SOLUTION INFILTRATION; PERINEURAL at 11:30

## 2023-02-27 RX ADMIN — TAMSULOSIN HYDROCHLORIDE 0.4 MG: 0.4 CAPSULE ORAL at 20:24

## 2023-02-27 RX ADMIN — Medication 10 ML: at 20:24

## 2023-02-27 RX ADMIN — ATORVASTATIN CALCIUM 80 MG: 40 TABLET, FILM COATED ORAL at 20:25

## 2023-02-27 RX ADMIN — ACETAMINOPHEN 650 MG: 325 TABLET ORAL at 17:43

## 2023-02-27 RX ADMIN — PROPOFOL 150 MCG/KG/MIN: 10 INJECTION, EMULSION INTRAVENOUS at 11:30

## 2023-02-27 RX ADMIN — OXYCODONE AND ACETAMINOPHEN 1 TABLET: 5; 325 TABLET ORAL at 19:18

## 2023-02-27 RX ADMIN — METOPROLOL TARTRATE 25 MG: 25 TABLET, FILM COATED ORAL at 20:24

## 2023-02-27 RX ADMIN — SODIUM CHLORIDE: 9 INJECTION, SOLUTION INTRAVENOUS at 10:26

## 2023-02-27 RX ADMIN — ONDANSETRON 4 MG: 4 TABLET, ORALLY DISINTEGRATING ORAL at 15:48

## 2023-02-27 RX ADMIN — SODIUM CHLORIDE: 9 INJECTION, SOLUTION INTRAVENOUS at 16:56

## 2023-02-27 RX ADMIN — EZETIMIBE 10 MG: 10 TABLET ORAL at 20:24

## 2023-02-27 RX ADMIN — PROPOFOL 50 MG: 10 INJECTION, EMULSION INTRAVENOUS at 11:30

## 2023-02-27 RX ADMIN — SODIUM CHLORIDE: 9 INJECTION, SOLUTION INTRAVENOUS at 17:48

## 2023-02-27 RX ADMIN — FENTANYL CITRATE 50 MCG: 50 INJECTION, SOLUTION INTRAMUSCULAR; INTRAVENOUS at 11:39

## 2023-02-27 RX ADMIN — MIDAZOLAM 1 MG: 1 INJECTION INTRAMUSCULAR; INTRAVENOUS at 11:23

## 2023-02-27 RX ADMIN — DEXAMETHASONE SODIUM PHOSPHATE 8 MG: 4 INJECTION, SOLUTION INTRAMUSCULAR; INTRAVENOUS at 11:40

## 2023-02-27 RX ADMIN — LEVOFLOXACIN 500 MG: 5 INJECTION, SOLUTION INTRAVENOUS at 10:53

## 2023-02-27 ASSESSMENT — PAIN DESCRIPTION - PAIN TYPE
TYPE: SURGICAL PAIN
TYPE: SURGICAL PAIN

## 2023-02-27 ASSESSMENT — PAIN DESCRIPTION - LOCATION
LOCATION: ABDOMEN

## 2023-02-27 ASSESSMENT — PAIN - FUNCTIONAL ASSESSMENT: PAIN_FUNCTIONAL_ASSESSMENT: 0-10

## 2023-02-27 ASSESSMENT — ENCOUNTER SYMPTOMS
DYSPNEA ACTIVITY LEVEL: NO INTERVAL CHANGE
SHORTNESS OF BREATH: 1

## 2023-02-27 ASSESSMENT — PAIN SCALES - GENERAL
PAINLEVEL_OUTOF10: 3
PAINLEVEL_OUTOF10: 6
PAINLEVEL_OUTOF10: 0
PAINLEVEL_OUTOF10: 0
PAINLEVEL_OUTOF10: 9

## 2023-02-27 ASSESSMENT — PAIN DESCRIPTION - ORIENTATION
ORIENTATION: ANTERIOR;MID;LOWER
ORIENTATION: ANTERIOR;MID

## 2023-02-27 ASSESSMENT — PAIN DESCRIPTION - FREQUENCY: FREQUENCY: INTERMITTENT

## 2023-02-27 ASSESSMENT — PAIN DESCRIPTION - DESCRIPTORS
DESCRIPTORS: CRAMPING
DESCRIPTORS: CRAMPING

## 2023-02-27 ASSESSMENT — LIFESTYLE VARIABLES: SMOKING_STATUS: 0

## 2023-02-27 NOTE — OP NOTE
Operative Note      Patient: Scott Quintero  YOB: 1955  MRN: [id number]    Date of Procedure: 2/27/2023    Pre-Op Diagnosis: Gross hematuria, Right ureteral mass    Post-Op Diagnosis: Same       Procedure(s):  CYSTOSCOPY RETROGRADE PYELOGRAM RIGHT URETEROSCPY,  right ureteral mass biopsy and excision of right ureteral mass, RIGHT URETERAL STENT INSERTION    Surgeon(s):  Sharon Lozano MD    Assistant:   * No surgical staff found *    Anesthesia: Monitor Anesthesia Care    Estimated Blood Loss (mL): Minimal    Complications: None    Specimens:   ID Type Source Tests Collected by Time Destination   B : RIGHT URETERAL MASS Tissue Tissue SURGICAL PATHOLOGY Sharon Lozano MD 2/27/2023 1155        Implants:  Implant Name Type Inv. Item Serial No.  Lot No. LRB No. Used Action   STENT URET 6FR L26CM PERCFLX HYDR+ DBL PGTL THRD 2 - [id number]  STENT URET 6FR L26CM PERCFLX HYDR+ DBL PGTL THRD 2  [facility] UROLOG[facility]- [id number] Right 1 Implanted         Drains:   Ureteral Drain/Stent 02/27/23 Right Ureter (Active)       Urinary Catheter 02/27/23 2 Way (Active)       Findings: Right ureteral mass, Very large intravesical middle lobe of the prostate    Detailed Description of Procedure:     Patient is a 68-year-old male who presented with gross hematuria on CAT scan urogram but did show that he had a possible filling defect in the right mid ureter. It was discussed with the patient the need for ureteroscopy and possible biopsy all risk benefits and alternatives were discussed with the patient informed consent was obtained and signed    Operation:    Patient was wheeled back into the operative suite. Upon entering the operative suite patient identified using the Embeda number. The patient was transferred the operative table and placed in supine position. Anesthesia was delivered without any complication a surgical timeout was taken all the room agreement.   25 [place] cystoscope was inserted urethra atraumatically into the urinary bladder is a very large middle lobe extending up into the bladder lumen. We did identify left and right ureteral orifice. A left retrograde pyelogram the left side did not reveal any filling defects no hydronephrosis no hydroureter this was a normal left retrograde pyelogram on the right side a retrograde pyelogram did reveal a mid ureteral filling defect. With a goblet sign. At that time a sensor wire was then placed into the right ureteral orifice and seemed to terminate in the right renal pelvis a secondary wire was then placed an [facility]. An 6 [place] by 15 [place] by 28 cm ureteral access sheath was placed over the sensor wire will maintain the Glidewire for safety. We then used a flexible ureteroscope we are able to go up to the level of the mass. This mass was encountered in the mid ureter. And did have a papillary yet solid appearance. A [place] stone basket was used to basket extract all pieces of the mass. There was no other mass seen within the ureter. Due to some bleeding it was difficult to see it more proximally into the kidney and for risk of seeding I did not want to take the ureteroscope at that time up into the right kidney. At that time the ureteroscope as well as the ureteral access sheath were removed the cystoscope was back over the wire and a 6 [place]ela by 26 double-J stent was placed over the wire and seen have good curl within the renal pelvis and urinary bladder patient's bladder was drained and 18 [place] Jung catheter was placed for pink urine. Patient was then awoke from anesthesia and taken to the PACU. Plan:     Jung out tomorrow  Ureteral biopsy and mass excision sent for pathologic diagnosis  Patient does have a stent and we will decide the fate of this due to what his pathology shows in a week and a half    Electronically signed by Khadijah Weiner MD on 2/27/2023 at 12:13 PM

## 2023-02-27 NOTE — H&P
Franklin Cage is a 75 y.o. male with gross hematuria and a right ureteral filling defect. No new changes. Still with hematuria. Please see paper h and p for full details. Past Medical History:   Diagnosis Date    Anemia     Anxiety attack     controlled with citolpram    CAD (coronary artery disease) 16 YRS AGO    MI X 2. Lincoln Levin yearly    Diabetes mellitus ([facility] [address]     GERD (gastroesophageal reflux disease)     Hypercholesterolemia 1994    Hyperlipidemia     Hypertension 06/12/2014    B/P IS ELEVATED, PATIENT STATES HE NEEDS TO GET HIS LOPRESSOR REFILLED    NSTEMI (non-ST elevated myocardial infarction) ([facility] [address] 05/28/2012       Past Surgical History:   Procedure Laterality Date    ANKLE SURGERY  6 YRS AGO    right ORIF    CHOLECYSTECTOMY, LAPAROSCOPIC N/A 7/19/2019    CHOLECYSTECTOMY LAPAROSCOPIC ROBOTIC ASSISTED  XI performed by Omar Rosa MD at [address] [place] [address]      COLONOSCOPY  07/20/2015    COLONOSCOPY N/A 9/16/2022    COLORECTAL CANCER SCREENING, NOT HIGH RISK performed by Omar Rosa MD at [facility]  5/29/2012    Dr. Saundra Dyer, Stent Mid LAD    DIAGNOSTIC CARDIAC CATH LAB PROCEDURE  9/17/1998    [facility]. Cath/PTCA/stent of RCA; PTCA/stent of mid LAD with adjunctive ReoPro administration. DIAGNOSTIC CARDIAC CATH LAB PROCEDURE  6/25/2002    [facility]. Kissing balloon angioplasty LAD>diag Perclose. Dr. Saundra Dyer and Dr. Dahlia Hunt. DIAGNOSTIC CARDIAC CATH LAB PROCEDURE  4/13/2005    [facility]. Cath/stent (TAX\"US) to diagonal at [facility]. ENDOSCOPY, COLON, DIAGNOSTIC      FINGER TRIGGER RELEASE Right 11/20/2015    Release right long trigger finger.   Vincent Tan MD    HERNIA REPAIR Left YRS AGO    [facility]    HIATAL HERNIA REPAIR N/A 10/22/2019    LAPAROSCOPIC ROBOTIC XI ASSISTED PARAESOPHAGEAL HERNIA REPAIR WITH PARTIAL FUNDOPLICATION AND MYOFASCIAL FLAP, UPPER ENDOSCOPY performed by Dariana Hoang MD at [facility] [address] 2017    robotic assisted    INTRACAPSULAR CATARACT EXTRACTION Left 2021    LEFT EYE CATARACT EXTRACTION IOL IMPLANT performed by Anita Alberto MD at [address] Left     knee    KNEE ARTHROPLASTY Left 2014    Left TKA. Abhishek Levine MD    TONSILLECTOMY      UPPER GASTROINTESTINAL ENDOSCOPY      UPPER GASTROINTESTINAL ENDOSCOPY N/A 9/10/2019    EGD BIOPSY performed by Tonia Sifuentes MD at [facility] N/A 2021    EGD BIOPSY performed by Tonia Sifuentes MD at [address] [place] [address] History   Problem Relation Age of Onset    Hypertension Mother [de-identified]         from renal failure    Diabetes Mother     Coronary Art Dis Mother     Osteoporosis Mother     Osteoarthritis Father 80    High Cholesterol Brother     High Cholesterol Brother        Prior to Admission medications    Medication Sig Start Date End Date Taking? Authorizing Provider   atorvastatin (LIPITOR) 80 MG tablet TAKE 1 TABLET BY MOUTH EVERY DAY 23   [facility] Danelle Yip, DO   metoprolol tartrate (LOPRESSOR) 25 MG tablet TAKE 1 TABLET BY MOUTH TWICE A DAY 23   [facility] Danelle Yip, DO   amLODIPine (NORVASC) 10 MG tablet TAKE 1 TABLET BY MOUTH EVERY DAY 23   [facility]   empagliflozin (JARDIANCE) 10 MG tablet TAKE 1 TABLET BY MOUTH EVERY DAY 23   [facility] Theodora, DO   nitroGLYCERIN (NITROSTAT) 0.4 MG SL tablet Place 1 tablet under the tongue every 5 minutes as needed for Chest pain If chest pain: put 1 NTG tab under tongue - sit down - wait 5 min - if no relief, call 911. May repeat dose 2 more times 5 min apart while waiting for EMS (total of 3 doses). If dizzy do not take any further doses.  23   Isaac Yip, DO   citalopram (CELEXA) 40 MG tablet TAKE 1 AND 1/2 TABLETS BY MOUTH EVERY MORNING 23   [facility] Danelle Yip, DO   omeprazole (PRILOSEC) 40 MG delayed release capsule TAKE 1 CAPSULE BY MOUTH TWICE A DAY  Patient taking differently: Take 40 mg by mouth daily 1/4/23   Kari Slkolby Yip, DO   losartan (COZAAR) 100 MG tablet TAKE 1 TABLET BY MOUTH EVERY DAY 10/10/22   Render Dayo, DO   ezetimibe (ZETIA) 10 MG tablet Take 1 tablet by mouth daily 10/10/22   Kari Slkolby Yip, DO   fluticasone [facility]) 50 MCG/ACT nasal spray 1 spray by Nasal route daily 10/10/22   LUIS FELIPE Marie   Semaglutide,0.25 or 0.5MG/DOS, (OZEMPIC, 0.25 OR 0.5 MG/DOSE,) 2 MG/1.5ML SOPN INJECT 0.25MG INTO THE SKIN ONE TIME PER WEEK 6/27/22   Kari Calin iYp DO   aspirin 81 MG tablet Take 81 mg by mouth daily    Historical Provider, MD   ferrous sulfate 325 (65 Fe) MG tablet Take 325 mg by mouth daily (with breakfast)     Historical Provider, MD   Cholecalciferol (VITAMIN D3) 5000 UNITS TABS Take 1 tablet by mouth every morning     Historical Provider, MD   Ascorbic Acid (VITAMIN C) 500 MG tablet Take 500 mg by mouth every morning     Historical Provider, MD   fish oil-omega-3 fatty acids 1000 MG capsule Take 1 g by mouth every morning     Historical Provider, MD        Allergies: Penicillins    Social History     Tobacco Use    Smoking status: Never    Smokeless tobacco: Never   Substance Use Topics    Alcohol use: Yes     Comment: socially        Review of Systems:  Respiratory: negative for cough and hemoptysis  Cardiovascular: negative for chest pain and dyspnea  Gastrointestinal: negative for abdominal pain, diarrhea, nausea and vomiting  Genitourinary: as per HPI, otherwise negative. Derm: negative for rash and skin lesion(s)  Neurological: negative for seizures and tremors  Endocrine: negative for diabetic symptoms including polydipsia and polyuria  All other systems negative    Physical Exam:  Vitals:    02/27/23 1022   BP: (!) 163/86   Pulse: 62   Resp: 18   Temp: 98.6 °F (37 °C)   SpO2: 96%      Skin:  Warm and dry.   No rash or bruises  HEENT:  PERRLA, EOMI  Neck:  No JVD, No thyromegaly  Cardiac:  RRR  Lungs:  No audible wheezes, symmetric respirations, non-labored  Abdomen: Soft, non-tender, non-distended  Extremities:  No clubbing, edema or cyanosis  Neurological:  Moves all extremities, normal DTR    Lab Results   Component Value Date    WBC 10.2 02/23/2023    HGB 16.4 02/23/2023    HCT 47.3 02/23/2023    MCV 86.3 02/23/2023     02/23/2023       Lab Results   Component Value Date    CREATININE 0.9 02/23/2023       Lab Results   Component Value Date    PSA 3.30 07/06/2022    PSA 2.90 04/07/2019    PSA 3.36 09/13/2018       No results for input(s): LABURIN in the last 72 hours. No results for input(s): BC in the last 72 hours. No results for input(s): Peck Ambar in the last 72 hours. Assessment and Plan:  1. CYstoscopy, retrograde pyelogram, right ureteroscopy, possible right ureteral biopsy, right stent.      Homar Louis MD

## 2023-02-27 NOTE — DISCHARGE INSTRUCTIONS
Remove your antunez tomorrow. Cystoscopy Discharge Instructions    You may experience : Burning sensation when you void     A feeling of a need to go to the bathroom frequently     Your urine may be blood tinged    These symptoms should be relieved within a few hours as you increase the amounts of fluids you drink and the number of times that you empty your bladder. We recommended that you drink plenty of fluid a few days after surgery. No strenuous activities until you talk to your doctor. You may feel light headed up to 24 hours after anesthesia. You should not do the following for the next 24 hours. Drive a car, operate machinery or power tools     Drink any alcoholic drinks (not even beer or wine)     Make any important decisions, i.e., signing important papers. It is recommended that you begin with clear liquids and/or light foods. If you  Are not nauseated, progress to your normal diet. If you are unable to urinate you should call your surgeon.

## 2023-02-27 NOTE — PROGRESS NOTES
Dr Rodrigue Yarbrough at bedside to evaluate pt for admission. SPO2 87% on room air.  Improved to 90's with IS

## 2023-02-27 NOTE — ANESTHESIA PRE PROCEDURE
Department of Anesthesiology  Preprocedure Note       Name:  Andree Oreilly   Age:  75 y.o.  :  1955                                          MRN:  [id number]         Date:  2023      Surgeon: Kar Olvera):  Carlos Norton MD    Procedure: Procedure(s):  CYSTOSCOPY RETROGRADE PYELOGRAM RIGHT URETEROSCPY    Medications prior to admission:   Prior to Admission medications    Medication Sig Start Date End Date Taking? Authorizing Provider   atorvastatin (LIPITOR) 80 MG tablet TAKE 1 TABLET BY MOUTH EVERY DAY 23   Rupinderbharti Yip, DO   metoprolol tartrate (LOPRESSOR) 25 MG tablet TAKE 1 TABLET BY MOUTH TWICE A DAY 23   Rupinder Matt Yip, DO   amLODIPine (NORVASC) 10 MG tablet TAKE 1 TABLET BY MOUTH EVERY DAY 23   Rupinder Matt Yip, DO   empagliflozin (JARDIANCE) 10 MG tablet TAKE 1 TABLET BY MOUTH EVERY DAY 23   Rupinder Matt Yip, DO   nitroGLYCERIN (NITROSTAT) 0.4 MG SL tablet Place 1 tablet under the tongue every 5 minutes as needed for Chest pain If chest pain: put 1 NTG tab under tongue - sit down - wait 5 min - if no relief, call 911. May repeat dose 2 more times 5 min apart while waiting for EMS (total of 3 doses). If dizzy do not take any further doses.  23   Rupinder Yip, DO   citalopram (CELEXA) 40 MG tablet TAKE 1 AND 1/2 TABLETS BY MOUTH EVERY MORNING 23   Rupinderbharti Yip, DO   omeprazole (PRILOSEC) 40 MG delayed release capsule TAKE 1 CAPSULE BY MOUTH TWICE A DAY  Patient taking differently: Take 40 mg by mouth daily 23   Rupinder Yip, DO   losartan (COZAAR) 100 MG tablet TAKE 1 TABLET BY MOUTH EVERY DAY 10/10/22   Leverne Hodgkins, DO   ezetimibe (ZETIA) 10 MG tablet Take 1 tablet by mouth daily 10/10/22   Rupinder Yip, DO   fluticasone (FLONASE) 50 MCG/ACT nasal spray 1 spray by Nasal route daily 10/10/22   Isabella Foot, PA   Semaglutide,0.25 or 0.5MG/DOS, (OZEMPIC, 0.25 OR 0.5 MG/DOSE,) 2 MG/1.5ML SOPN INJECT 0.25MG INTO THE SKIN ONE TIME PER WEEK 6/27/22   Nanci Zayas DO   aspirin 81 MG tablet Take 81 mg by mouth daily    Historical Provider, MD   ferrous sulfate 325 (65 Fe) MG tablet Take 325 mg by mouth daily (with breakfast)     Historical Provider, MD   Cholecalciferol (VITAMIN D3) 5000 UNITS TABS Take 1 tablet by mouth every morning     Historical Provider, MD   Ascorbic Acid (VITAMIN C) 500 MG tablet Take 500 mg by mouth every morning     Historical Provider, MD   fish oil-omega-3 fatty acids 1000 MG capsule Take 1 g by mouth every morning     Historical Provider, MD       Current medications:    Current Facility-Administered Medications   Medication Dose Route Frequency Provider Last Rate Last Admin    0.9 % sodium chloride infusion   IntraVENous Continuous MICHELLE Liu - CNP        sodium chloride flush 0.9 % injection 5-40 mL  5-40 mL IntraVENous 2 times per day MICHELLE Liu - CNP        sodium chloride flush 0.9 % injection 5-40 mL  5-40 mL IntraVENous PRN Janet Singh APRN - CNP        0.9 % sodium chloride infusion   IntraVENous PRN MICHELLE Liu - CNP        levoFLOXacin (LEVAQUIN) 500 MG/100ML infusion 500 mg  500 mg IntraVENous On Call to [address], APRN - CNP           Allergies:     Allergies   Allergen Reactions    Penicillins Hives       Problem List:    Patient Active Problem List   Diagnosis Code    Coronary artery disease involving native coronary artery of native heart without angina pectoris I25.10    Hypertension I10    Pure hypercholesterolemia E78.00    Anemia D64.9    GERD (gastroesophageal reflux disease) K21.9    GARZA (dyspnea on exertion) R06.09    Left knee DJD M17.12    Moderate obesity E66.8    Near syncope R55    Anxiety attack F41.0    NSTEMI (non-ST elevated myocardial infarction) (HCC) I21.4    Hiatal hernia K44.9    Dizziness R42    Cholelithiasis A74.80    Biliary colic G03.63    Combined forms of age-related cataract of both eyes H25.813    Angina pectoris, unspecified I20.9    Atherosclerotic heart disease of native coronary artery with unspecified angina pectoris I25.119       Past Medical History:        Diagnosis Date    Anemia     Anxiety attack     controlled with citolpram    CAD (coronary artery disease) 16 YRS AGO    MI X 2. [place] Mask Dr. Mark Maria yearly    Diabetes mellitus ([facility] [address] [facility]     GERD (gastroesophageal reflux disease)     Hypercholesterolemia 1994    Hyperlipidemia     Hypertension 06/12/2014    B/P IS ELEVATED, PATIENT STATES HE NEEDS TO GET HIS LOPRESSOR REFILLED    NSTEMI (non-ST elevated myocardial infarction) ([facility] [address] 05/28/2012       Past Surgical History:        Procedure Laterality Date    ANKLE SURGERY  6 YRS AGO    right ORIF    CHOLECYSTECTOMY, LAPAROSCOPIC N/A 7/19/2019    CHOLECYSTECTOMY LAPAROSCOPIC ROBOTIC ASSISTED  XI performed by Shala Muñiz MD at [facility] COLONOSCOPY  07/20/2015    COLONOSCOPY N/A 9/16/2022    COLORECTAL CANCER SCREENING, NOT HIGH RISK performed by Shala Muñiz MD at [facility]  5/29/2012    Dr. Gretta Huang, Stent Mid LAD    DIAGNOSTIC CARDIAC CATH LAB PROCEDURE  9/17/1998    [address] Cath/PTCA/stent of RCA; PTCA/stent of mid LAD with adjunctive ReoPro administration.  DIAGNOSTIC CARDIAC CATH LAB PROCEDURE  6/25/2002    [facility]. Kissing balloon angioplasty LAD>diag Perclose. Dr. Gretta Huang and Dr. Whitney Zabala.  DIAGNOSTIC CARDIAC CATH LAB PROCEDURE  4/13/2005    [address] Cath/stent (TAX\"US) to diagonal at Heart Lab.  ENDOSCOPY, COLON, DIAGNOSTIC      FINGER TRIGGER RELEASE Right 11/20/2015    Release right long trigger finger.   Ayanna Cordova MD    HERNIA REPAIR Left YRS AGO    [facility]    HIATAL HERNIA REPAIR N/A 10/22/2019    LAPAROSCOPIC ROBOTIC XI ASSISTED PARAESOPHAGEAL HERNIA REPAIR WITH PARTIAL FUNDOPLICATION AND MYOFASCIAL FLAP, UPPER ENDOSCOPY performed by Randy Cervantes MD at [address]  INGUINAL HERNIA REPAIR Right 08/01/2017    robotic assisted    INTRACAPSULAR CATARACT EXTRACTION Left 11/11/2021    LEFT EYE CATARACT EXTRACTION IOL IMPLANT [facility] Lul [facility]restha [facility] Left [facility]    knee    KNEE ARTHROPLASTY Left 06/16/2014    Left TKA. Nakia Weiner MD    TONSILLECTOMY      UPPER GASTROINTESTINAL ENDOSCOPY      UPPER GASTROINTESTINAL ENDOSCOPY N/A 9/10/2019    EGD BIOPSY performed by Adam Mijares MD at [place] [address] N/A 7/12/2021    EGD BIOPSY performed by Adam Mijares MD at [address] [place] [address] History:    Social History     Tobacco Use    Smoking status: Never    Smokeless tobacco: Never   Substance Use Topics    Alcohol use: Yes     Comment: socially                                Counseling given: Not Answered      Vital Signs (Current):   Vitals:    02/24/23 1220   Weight: 200 lb (90.7 kg)   Height: 5' 8.5\" (1.74 m)                                              BP Readings from Last 3 Encounters:   02/23/23 (!) 177/90   01/31/23 (!) 162/84   01/11/23 132/86       NPO Status:                                                                                 BMI:   Wt Readings from Last 3 Encounters:   02/24/23 200 lb (90.7 kg)   02/23/23 200 lb (90.7 kg)   01/19/23 200 lb (90.7 kg)     Body mass index is 29.97 kg/m².     CBC:   Lab Results   Component Value Date/Time    WBC 10.2 02/23/2023 08:54 PM    RBC 5.48 02/23/2023 08:54 PM    HGB 16.4 02/23/2023 08:54 PM    HCT 47.3 02/23/2023 08:54 PM    MCV 86.3 02/23/2023 08:54 PM    RDW 13.2 02/23/2023 08:54 PM     02/23/2023 08:54 PM       CMP:   Lab Results   Component Value Date/Time     02/23/2023 08:54 PM    K 3.5 02/23/2023 08:54 PM     02/23/2023 08:54 PM    CO2 22 02/23/2023 08:54 PM    BUN 20 02/23/2023 08:54 PM    CREATININE 0.9 02/23/2023 08:54 PM    GFRAA >60 07/06/2022 12:00 PM    LABGLOM >60 02/23/2023 08:54 PM GLUCOSE 198 02/23/2023 08:54 PM    GLUCOSE 103 05/30/2012 04:20 AM    PROT 6.9 02/23/2023 08:54 PM    CALCIUM 9.1 02/23/2023 08:54 PM    BILITOT 1.6 02/23/2023 08:54 PM    ALKPHOS 131 02/23/2023 08:54 PM    AST 21 02/23/2023 08:54 PM    ALT 36 02/23/2023 08:54 PM       POC Tests: No results for input(s): POCGLU, POCNA, POCK, POCCL, POCBUN, POCHEMO, POCHCT in the last 72 hours. Coags:   Lab Results   Component Value Date/Time    PROTIME 11.3 03/04/2018 12:34 AM    PROTIME 12.4 05/29/2012 03:45 AM    INR 1.0 03/04/2018 12:34 AM    APTT 24.7 05/27/2012 12:05 AM       HCG (If Applicable): No results found for: PREGTESTUR, PREGSERUM, HCG, HCGQUANT     ABGs: No results found for: PHART, PO2ART, MRP1DVI, XPX6IZI, BEART, A1VUGOVL     Type & Screen (If Applicable):  No results found for: LABABO, LABRH    Drug/Infectious Status (If Applicable):  No results found for: HIV, HEPCAB    COVID-19 Screening (If Applicable): No results found for: COVID19      CT:  Impression   1. Fusiform dilation of the right mid ureter to approximately 13 mm. The   etiology is unclear. Consider retrograde pyelogram.   2. No urolithiasis or hydronephrosis. 3. Enlarged prostate. 4. Mild sigmoid diverticulosis without diverticulitis.    5. Small to moderate sized hiatal hernia.                   Anesthesia Evaluation  Patient summary reviewed and Nursing notes reviewed no history of anesthetic complications:   Airway: Mallampati: III  TM distance: >3 FB   Neck ROM: full  Mouth opening: > = 3 FB   Dental:          Pulmonary:   (+) shortness of breath:  decreased breath sounds: bilateral     (-) sleep apnea and not a current smoker                           Cardiovascular:  Exercise tolerance: good (>4 METS),   (+) hypertension: moderate, angina:, past MI: no interval change, CAD (STEMI - denies recent NTG usage or anginal symptoms): no interval change, CABG/stent (PTCA with stent x 2 on monotherapy with ASA):, GARZA: no interval change, hyperlipidemia      ECG reviewed  Rhythm: regular  Rate: normal           Beta Blocker:  Dose within 24 Hrs      ROS comment: EKG:  Sinus  Bradycardia   Right bundle branch block. Inferior infarct -probably not recent. Nonspecific T-abnormality. ABNORMAL      Neuro/Psych:   (+) depression/anxiety  (Anxiety)   (-) seizures, TIA and CVA            ROS comment: Near syncope  Cataracts GI/Hepatic/Renal:   (+) hiatal hernia, GERD: no interval change,      (-) hepatitis and no renal disease      ROS comment: Biliary colic. Endo/Other:    (+) Diabetes (BGM in  mg/dL)Type II DM, , blood dyscrasia: anemia, arthritis: OA., . Pt had no PAT visit       Abdominal:   (+) obese,           Vascular: negative vascular ROS. - DVT and PE. Other Findings:           Anesthesia Plan      MAC     ASA 3       Induction: intravenous. MIPS: Postoperative opioids intended and Prophylactic antiemetics administered. Anesthetic plan and risks discussed with patient. Plan discussed with CRNA.                     Tyrese Cohen DO   2/27/2023

## 2023-02-27 NOTE — PROGRESS NOTES
1325 pt states \"needs to move BM\" standby assisted to restroom. Pt pulled emergency cord stating \"I don't feel good\" taken back to room 28 in wheelchair. C/O suprapubic pain. Antunez tubing with cherry red and clots not freely draining. 1330 Irriagted antunez catheter with return of several small clots until light pink and no clots.

## 2023-02-27 NOTE — ANESTHESIA POSTPROCEDURE EVALUATION
Department of Anesthesiology  Postprocedure Note    Patient: Josiah Nugent  MRN: [id number]  YOB: 1955  Date of evaluation: 2/27/2023      Procedure Summary     Date: 02/27/23 Room / Location: [facility] 06 / [facility]    Anesthesia Start: 6982 Anesthesia Stop:     Procedure: CYSTOSCOPY RETROGRADE PYELOGRAM RIGHT URETEROSCPY, RIGHT URETERAL STENT INSERTION (Right: Ureter) Diagnosis:       Distension of ureter      Gross hematuria      (Distension of ureter [N13.4])      (Gross hematuria [R31.0])    Surgeons: Gail Gaffney MD Responsible Provider: Juancarlos Almonte DO    Anesthesia Type: MAC ASA Status: 3          Anesthesia Type: No value filed. Wilman Phase I: Wilman Score: 10    Wilman Phase II:        Anesthesia Post Evaluation    Patient location during evaluation: PACU  Patient participation: complete - patient participated  Level of consciousness: awake  Airway patency: patent  Nausea & Vomiting: no nausea and no vomiting (possible aspiration vomited small amout of yelowish fluid oxygen saturation 88 percent on 8l face mask)  Complications: no  Cardiovascular status: hemodynamically stable  Respiratory status: acceptable  Hydration status: euvolemic  Comments: Patient seen and examined in PACU. Possible aspiration post procedure when sitting in bed getting ready to leave the OR. Currently oxygen saturations are 94% on 8L simple mask. Patient denies symptoms or SOB. VSS otherwise. Encouraged coughing and incentive spirometer. CXR ordered. 2/27/23 @ 1300:  Seen and examined. Oxygen saturation 96% on 2L NC otherwise stable. Encouraged coughing and deep inspiration. CXR pending. 2/27/23 @1312:  Seen and examined. Remains stable. CXR normal.  Anticipate discharge.

## 2023-02-27 NOTE — PROGRESS NOTES
CLINICAL PHARMACY NOTE: MEDS TO BEDS    Total # of Prescriptions Filled: 2   The following medications were delivered to the patient:  Flomax  Percocet 5    Additional Documentation:

## 2023-02-27 NOTE — PROGRESS NOTES
Pt states intermittent pain is worsening, feels weak, and light headed. VSS at this time. Jung draining light red without clots. Dr Laura Kohli notified to revaluate patient for discharge.

## 2023-02-28 ENCOUNTER — APPOINTMENT (OUTPATIENT)
Dept: GENERAL RADIOLOGY | Age: 68
End: 2023-02-28
Attending: STUDENT IN AN ORGANIZED HEALTH CARE EDUCATION/TRAINING PROGRAM
Payer: MEDICARE

## 2023-02-28 LAB
ADENOVIRUS BY PCR: NOT DETECTED
BASOPHILS ABSOLUTE: 0.02 E9/L (ref 0–0.2)
BASOPHILS RELATIVE PERCENT: 0.1 % (ref 0–2)
BORDETELLA PARAPERTUSSIS BY PCR: NOT DETECTED
BORDETELLA PERTUSSIS BY PCR: NOT DETECTED
CHLAMYDOPHILIA PNEUMONIAE BY PCR: NOT DETECTED
CORONAVIRUS 229E BY PCR: NOT DETECTED
CORONAVIRUS HKU1 BY PCR: NOT DETECTED
CORONAVIRUS NL63 BY PCR: NOT DETECTED
CORONAVIRUS OC43 BY PCR: NOT DETECTED
EOSINOPHILS ABSOLUTE: 0 E9/L (ref 0.05–0.5)
EOSINOPHILS RELATIVE PERCENT: 0 % (ref 0–6)
HCT VFR BLD CALC: 45.4 % (ref 37–54)
HEMOGLOBIN: 16.1 G/DL (ref 12.5–16.5)
HUMAN METAPNEUMOVIRUS BY PCR: NOT DETECTED
HUMAN RHINOVIRUS/ENTEROVIRUS BY PCR: NOT DETECTED
IMMATURE GRANULOCYTES #: 0.17 E9/L
IMMATURE GRANULOCYTES %: 0.9 % (ref 0–5)
INFLUENZA A BY PCR: NOT DETECTED
INFLUENZA B BY PCR: NOT DETECTED
LYMPHOCYTES ABSOLUTE: 0.65 E9/L (ref 1.5–4)
LYMPHOCYTES RELATIVE PERCENT: 3.5 % (ref 20–42)
MCH RBC QN AUTO: 30.4 PG (ref 26–35)
MCHC RBC AUTO-ENTMCNC: 35.5 % (ref 32–34.5)
MCV RBC AUTO: 85.8 FL (ref 80–99.9)
MONOCYTES ABSOLUTE: 0.81 E9/L (ref 0.1–0.95)
MONOCYTES RELATIVE PERCENT: 4.3 % (ref 2–12)
MYCOPLASMA PNEUMONIAE BY PCR: NOT DETECTED
NEUTROPHILS ABSOLUTE: 17 E9/L (ref 1.8–7.3)
NEUTROPHILS RELATIVE PERCENT: 91.2 % (ref 43–80)
PARAINFLUENZA VIRUS 1 BY PCR: NOT DETECTED
PARAINFLUENZA VIRUS 2 BY PCR: NOT DETECTED
PARAINFLUENZA VIRUS 3 BY PCR: NOT DETECTED
PARAINFLUENZA VIRUS 4 BY PCR: NOT DETECTED
PDW BLD-RTO: 13.4 FL (ref 11.5–15)
PLATELET # BLD: 220 E9/L (ref 130–450)
PMV BLD AUTO: 10.6 FL (ref 7–12)
PRO-BNP: 567 PG/ML (ref 0–125)
PROCALCITONIN: 6.9 NG/ML (ref 0–0.08)
RBC # BLD: 5.29 E12/L (ref 3.8–5.8)
RESPIRATORY SYNCYTIAL VIRUS BY PCR: NOT DETECTED
SARS-COV-2, PCR: NOT DETECTED
WBC # BLD: 18.7 E9/L (ref 4.5–11.5)

## 2023-02-28 PROCEDURE — 2580000003 HC RX 258: Performed by: INTERNAL MEDICINE

## 2023-02-28 PROCEDURE — 96375 TX/PRO/DX INJ NEW DRUG ADDON: CPT

## 2023-02-28 PROCEDURE — 0202U NFCT DS 22 TRGT SARS-COV-2: CPT

## 2023-02-28 PROCEDURE — 6370000000 HC RX 637 (ALT 250 FOR IP): Performed by: INTERNAL MEDICINE

## 2023-02-28 PROCEDURE — 85025 COMPLETE CBC W/AUTO DIFF WBC: CPT

## 2023-02-28 PROCEDURE — 6370000000 HC RX 637 (ALT 250 FOR IP): Performed by: UROLOGY

## 2023-02-28 PROCEDURE — 2700000000 HC OXYGEN THERAPY PER DAY

## 2023-02-28 PROCEDURE — G0378 HOSPITAL OBSERVATION PER HR: HCPCS

## 2023-02-28 PROCEDURE — 2500000003 HC RX 250 WO HCPCS: Performed by: INTERNAL MEDICINE

## 2023-02-28 PROCEDURE — 36415 COLL VENOUS BLD VENIPUNCTURE: CPT

## 2023-02-28 PROCEDURE — 71045 X-RAY EXAM CHEST 1 VIEW: CPT

## 2023-02-28 PROCEDURE — 99223 1ST HOSP IP/OBS HIGH 75: CPT | Performed by: INTERNAL MEDICINE

## 2023-02-28 PROCEDURE — 2580000003 HC RX 258: Performed by: STUDENT IN AN ORGANIZED HEALTH CARE EDUCATION/TRAINING PROGRAM

## 2023-02-28 PROCEDURE — 96365 THER/PROPH/DIAG IV INF INIT: CPT

## 2023-02-28 PROCEDURE — 6370000000 HC RX 637 (ALT 250 FOR IP): Performed by: STUDENT IN AN ORGANIZED HEALTH CARE EDUCATION/TRAINING PROGRAM

## 2023-02-28 PROCEDURE — 6360000002 HC RX W HCPCS: Performed by: INTERNAL MEDICINE

## 2023-02-28 PROCEDURE — 83880 ASSAY OF NATRIURETIC PEPTIDE: CPT

## 2023-02-28 PROCEDURE — 84145 PROCALCITONIN (PCT): CPT

## 2023-02-28 RX ORDER — CITALOPRAM 20 MG/1
60 TABLET ORAL DAILY
Status: DISCONTINUED | OUTPATIENT
Start: 2023-02-28 | End: 2023-03-01 | Stop reason: HOSPADM

## 2023-02-28 RX ORDER — OMEGA-3/DHA/EPA/FISH OIL 300-1000MG
1 CAPSULE ORAL EVERY MORNING
Status: DISCONTINUED | OUTPATIENT
Start: 2023-02-28 | End: 2023-02-28 | Stop reason: DRUGHIGH

## 2023-02-28 RX ORDER — NITROGLYCERIN 0.4 MG/1
0.4 TABLET SUBLINGUAL EVERY 5 MIN PRN
Status: DISCONTINUED | OUTPATIENT
Start: 2023-02-28 | End: 2023-03-01 | Stop reason: HOSPADM

## 2023-02-28 RX ORDER — METRONIDAZOLE 500 MG/100ML
500 INJECTION, SOLUTION INTRAVENOUS EVERY 8 HOURS
Status: DISCONTINUED | OUTPATIENT
Start: 2023-02-28 | End: 2023-03-01

## 2023-02-28 RX ORDER — INSULIN LISPRO 100 [IU]/ML
0-4 INJECTION, SOLUTION INTRAVENOUS; SUBCUTANEOUS NIGHTLY
Status: DISCONTINUED | OUTPATIENT
Start: 2023-02-28 | End: 2023-03-01 | Stop reason: HOSPADM

## 2023-02-28 RX ORDER — FERROUS SULFATE 325(65) MG
325 TABLET ORAL
Status: DISCONTINUED | OUTPATIENT
Start: 2023-03-01 | End: 2023-03-01 | Stop reason: HOSPADM

## 2023-02-28 RX ORDER — INSULIN LISPRO 100 [IU]/ML
0-4 INJECTION, SOLUTION INTRAVENOUS; SUBCUTANEOUS
Status: DISCONTINUED | OUTPATIENT
Start: 2023-02-28 | End: 2023-03-01 | Stop reason: HOSPADM

## 2023-02-28 RX ORDER — DEXTROSE MONOHYDRATE 100 MG/ML
INJECTION, SOLUTION INTRAVENOUS CONTINUOUS PRN
Status: DISCONTINUED | OUTPATIENT
Start: 2023-02-28 | End: 2023-03-01 | Stop reason: HOSPADM

## 2023-02-28 RX ADMIN — ACETAMINOPHEN 650 MG: 325 TABLET ORAL at 07:14

## 2023-02-28 RX ADMIN — METOPROLOL TARTRATE 25 MG: 25 TABLET, FILM COATED ORAL at 22:31

## 2023-02-28 RX ADMIN — ACETAMINOPHEN 650 MG: 325 TABLET ORAL at 17:51

## 2023-02-28 RX ADMIN — METOPROLOL TARTRATE 25 MG: 25 TABLET, FILM COATED ORAL at 09:05

## 2023-02-28 RX ADMIN — PANTOPRAZOLE SODIUM 40 MG: 40 TABLET, DELAYED RELEASE ORAL at 07:14

## 2023-02-28 RX ADMIN — CITALOPRAM HYDROBROMIDE 60 MG: 20 TABLET ORAL at 22:31

## 2023-02-28 RX ADMIN — OXYCODONE AND ACETAMINOPHEN 1 TABLET: 5; 325 TABLET ORAL at 04:31

## 2023-02-28 RX ADMIN — AMLODIPINE BESYLATE 10 MG: 10 TABLET ORAL at 09:04

## 2023-02-28 RX ADMIN — ATORVASTATIN CALCIUM 80 MG: 40 TABLET, FILM COATED ORAL at 22:31

## 2023-02-28 RX ADMIN — LOSARTAN POTASSIUM 100 MG: 50 TABLET, FILM COATED ORAL at 09:04

## 2023-02-28 RX ADMIN — METRONIDAZOLE 500 MG: 500 INJECTION, SOLUTION INTRAVENOUS at 17:59

## 2023-02-28 RX ADMIN — Medication 10 ML: at 22:39

## 2023-02-28 RX ADMIN — ACETAMINOPHEN 650 MG: 325 TABLET ORAL at 00:09

## 2023-02-28 RX ADMIN — OXYCODONE AND ACETAMINOPHEN 1 TABLET: 5; 325 TABLET ORAL at 12:07

## 2023-02-28 RX ADMIN — CEFTRIAXONE 1000 MG: 1 INJECTION, POWDER, FOR SOLUTION INTRAMUSCULAR; INTRAVENOUS at 17:47

## 2023-02-28 RX ADMIN — OXYCODONE AND ACETAMINOPHEN 1 TABLET: 5; 325 TABLET ORAL at 22:31

## 2023-02-28 RX ADMIN — TAMSULOSIN HYDROCHLORIDE 0.4 MG: 0.4 CAPSULE ORAL at 09:04

## 2023-02-28 RX ADMIN — EZETIMIBE 10 MG: 10 TABLET ORAL at 09:04

## 2023-02-28 ASSESSMENT — PAIN SCALES - GENERAL
PAINLEVEL_OUTOF10: 10
PAINLEVEL_OUTOF10: 7
PAINLEVEL_OUTOF10: 8
PAINLEVEL_OUTOF10: 1
PAINLEVEL_OUTOF10: 5
PAINLEVEL_OUTOF10: 7

## 2023-02-28 ASSESSMENT — PAIN DESCRIPTION - DESCRIPTORS
DESCRIPTORS: ACHING

## 2023-02-28 ASSESSMENT — PAIN DESCRIPTION - ORIENTATION
ORIENTATION: MID
ORIENTATION: RIGHT

## 2023-02-28 ASSESSMENT — PAIN DESCRIPTION - LOCATION
LOCATION: FLANK
LOCATION: ABDOMEN

## 2023-02-28 NOTE — CONSULTS
[facility]   Consult for Medical Management      Reason for Consult:  Medical management    History of Present Illness:     75year old male with past medical history of DM-II, CAD, HTN, and HLD presents with gross hematuria and a right ureteral filling defect. He was seen by urology and had S/P cystoscopy, retrograde pyelogram, right ureteroscopy, right ureteral mass biopsy and excision of right ureteral kathy with right ureteral stent insertion. Patient then developed shortness of breath and hypoxia and IM was consulted. Patient denies any fevers, chills, nausea, vomiting, chest pain, cough. He states that he has been using his IS. Informant(s) for H&P: Patient, EMR    REVIEW OF SYSTEMS:  Complete ROS performed with patient, pertinent positives and negatives are listed in the HPI. PMH:  Past Medical History:   Diagnosis Date    Anemia     Anxiety attack     controlled with citolpram    CAD (coronary artery disease) 16 YRS AGO    MI X 2.  [place] Dr. Nany Venegas yearly    Diabetes mellitus ([facility] [address]     GERD (gastroesophageal reflux disease)     Hypercholesterolemia 1994    Hyperlipidemia     Hypertension 06/12/2014    B/P IS ELEVATED, PATIENT STATES HE NEEDS TO GET HIS LOPRESSOR REFILLED    NSTEMI (non-ST elevated myocardial infarction) ([facility] [address] 05/28/2012       Surgical History:  Past Surgical History:   Procedure Laterality Date    ANKLE SURGERY  6 YRS AGO    right ORIF    BLADDER SURGERY Right 2/27/2023    CYSTOSCOPY RETROGRADE PYELOGRAM RIGHT URETEROSCPY, RIGHT URETERAL STENT INSERTION performed by Matthew Lindsay MD at [address] [place] [address], LAPAROSCOPIC N/A 7/19/2019    CHOLECYSTECTOMY LAPAROSCOPIC ROBOTIC ASSISTED  XI performed by Kathy Jane MD at [address] [facility]      COLONOSCOPY  07/20/2015    COLONOSCOPY N/A 9/16/2022    COLORECTAL CANCER SCREENING, NOT HIGH RISK performed by Kathy Jane MD at [address] ANGIOPLASTY WITH STENT PLACEMENT  5/29/2012    Dr. Armen Meckel, Stent Mid LAD    DIAGNOSTIC CARDIAC CATH LAB PROCEDURE  9/17/1998    [facility]. Cath/PTCA/stent of RCA; PTCA/stent of mid LAD with adjunctive ReoPro administration. DIAGNOSTIC CARDIAC CATH LAB PROCEDURE  6/25/2002    [facility]. Kissing balloon angioplasty LAD>diag Perclose. Dr. Armen Meckel and Dr. Starr Richards. DIAGNOSTIC CARDIAC CATH LAB PROCEDURE  4/13/2005    [facility]. Cath/stent (TAX\"US) to diagonal at [facility]. ENDOSCOPY, COLON, DIAGNOSTIC      FINGER TRIGGER RELEASE Right 11/20/2015    Release right long trigger finger. Mirlande Herndon MD    HERNIA REPAIR Left YRS AGO    [facility]    HIATAL HERNIA REPAIR N/A 10/22/2019    LAPAROSCOPIC ROBOTIC XI ASSISTED PARAESOPHAGEAL HERNIA REPAIR WITH PARTIAL FUNDOPLICATION AND MYOFASCIAL FLAP, UPPER ENDOSCOPY performed by Roxana Kingston MD at [address] [facility] Right 08/01/2017    robotic assisted    INTRACAPSULAR CATARACT EXTRACTION Left 11/11/2021    LEFT EYE CATARACT EXTRACTION IOL IMPLANT performed by Franko Reece MD at [address] [place] [address] Left 2014    knee    KNEE ARTHROPLASTY Left 06/16/2014    Left TKA. Mirlande Herndon MD    TONSILLECTOMY      UPPER GASTROINTESTINAL ENDOSCOPY      UPPER GASTROINTESTINAL ENDOSCOPY N/A 9/10/2019    EGD BIOPSY performed by Roxana Kingston MD at [facility] 7/12/2021    EGD BIOPSY performed by Roxana Kingston MD at [place] [address]       Medications Prior to Admission:    Prior to Admission medications    Medication Sig Start Date End Date Taking? Authorizing Provider   tamsulosin (FLOMAX) 0.4 MG capsule Take 1 capsule by mouth daily 2/27/23  Yes Jose Carlos MD   oxyCODONE-acetaminophen (PERCOCET) 5-325 MG per tablet Take 1 tablet by mouth every 6 hours as needed for Pain for up to 3 days. Intended supply: 3 days.  Take lowest dose possible to manage pain Max Daily Amount: 4 tablets 2/27/23 3/2/23 Yes Elidia Chu Maya Crane MD   atorvastatin (LIPITOR) 80 MG tablet TAKE 1 TABLET BY MOUTH EVERY DAY 2/24/23   Arlene Yip DO   metoprolol tartrate (LOPRESSOR) 25 MG tablet TAKE 1 TABLET BY MOUTH TWICE A DAY 1/16/23   Arlene Yip DO   amLODIPine (NORVASC) 10 MG tablet TAKE 1 TABLET BY MOUTH EVERY DAY 1/16/23   Arlene Yip DO   empagliflozin (JARDIANCE) 10 MG tablet TAKE 1 TABLET BY MOUTH EVERY DAY 1/16/23   Arlene Yip DO   nitroGLYCERIN (NITROSTAT) 0.4 MG SL tablet Place 1 tablet under the tongue every 5 minutes as needed for Chest pain If chest pain: put 1 NTG tab under tongue - sit down - wait 5 min - if no relief, call 911. May repeat dose 2 more times 5 min apart while waiting for EMS (total of 3 doses). If dizzy do not take any further doses.  1/11/23   Arlene Yip DO   citalopram (CELEXA) 40 MG tablet TAKE 1 AND 1/2 TABLETS BY MOUTH EVERY MORNING 1/9/23   Arlene Yip DO   omeprazole (PRILOSEC) 40 MG delayed release capsule TAKE 1 CAPSULE BY MOUTH TWICE A DAY  Patient taking differently: Take 40 mg by mouth daily 1/4/23   Arlene Yip DO   losartan (COZAAR) 100 MG tablet TAKE 1 TABLET BY MOUTH EVERY DAY 10/10/22   Duy Lott DO   ezetimibe (ZETIA) 10 MG tablet Take 1 tablet by mouth daily 10/10/22   Arlene Yip DO   fluticasone [facility]) 50 MCG/ACT nasal spray 1 spray by Nasal route daily 10/10/22   LUIS FELIPE Glass   Semaglutide,0.25 or 0.5MG/DOS, (OZEMPIC, 0.25 OR 0.5 MG/DOSE,) 2 MG/1.5ML SOPN INJECT 0.25MG INTO THE SKIN ONE TIME PER WEEK 6/27/22   Arlene Yip DO   aspirin 81 MG tablet Take 81 mg by mouth daily    Historical Provider, MD   ferrous sulfate 325 (65 Fe) MG tablet Take 325 mg by mouth daily (with breakfast)     Historical Provider, MD   Cholecalciferol (VITAMIN D3) 5000 UNITS TABS Take 1 tablet by mouth every morning     Historical Provider, MD   Ascorbic Acid (VITAMIN C) 500 MG tablet Take 500 mg by mouth every morning     Historical Provider, MD fish oil-omega-3 fatty acids 1000 MG capsule Take 1 g by mouth every morning     Historical Provider, MD       Allergies:    Penicillins    Social History:    reports that he has never smoked. He has never used smokeless tobacco. He reports current alcohol use. He reports that he does not use drugs.     Family History:       Problem Relation Age of Onset    Hypertension Mother [de-identified]         from renal failure    Diabetes Mother     Coronary Art Dis Mother     Osteoporosis Mother     Osteoarthritis Father 80    High Cholesterol Brother     High Cholesterol Brother          PHYSICAL EXAM:  Vitals:  BP (!) 140/73   Pulse 84   Temp 98 °F (36.7 °C) (Oral)   Resp 16   Ht 5' 8.5\" (1.74 m)   Wt 200 lb (90.7 kg)   SpO2 94%   BMI 29.97 kg/m²   General Appearance: alert and oriented to person, place and time, well developed and well- nourished, in no acute distress  Skin: warm and dry, no rash or erythema  Head: normocephalic and atraumatic  Eyes: pupils equal, round, and reactive to light, extraocular eye movements intact, conjunctivae normal  ENT: tympanic membrane, external ear and ear canal normal bilaterally, nose without deformity, nasal mucosa and turbinates normal without polyps  Neck: supple and non-tender without mass, no thyromegaly or thyroid nodules, no cervical lymphadenopathy  Pulmonary/Chest: clear to auscultation bilaterally- no wheezes, rales or rhonchi, normal air movement, no respiratory distress  Cardiovascular: normal rate, regular rhythm, normal S1 and S2, no murmurs, rubs, clicks, or gallops, distal pulses intact, no carotid bruits  Abdomen: soft, non-tender, non-distended, normal bowel sounds, no masses or organomegaly  Extremities: no cyanosis, clubbing or edema  Musculoskeletal: normal range of motion, no joint swelling, deformity or tenderness  Neurologic: reflexes normal and symmetric, no cranial nerve deficit, gait, coordination and speech normal      LABS:  No results for input(s): NA, K, CL, CO2, BUN, CREATININE, GLUCOSE, CALCIUM in the last 72 hours.    Recent Labs     02/28/23  0440   WBC 18.7*   RBC 5.29   HGB 16.1   HCT 45.4   MCV 85.8   MCH 30.4   MCHC 35.5*   RDW 13.4      MPV 10.6       No results for input(s): POCGLU in the last 72 hours.      Radiology:   XR CHEST PORTABLE   Final Result   Tortuous great vessels.  No acute process.             ASSESSMENT:      Principal Problem:    Ureteral mass  Resolved Problems:    * No resolved hospital problems. *      PLAN:    S/P cystoscopy, retrograde pyelogram, right ureteroscopy, right ureteral mass biopsy and excision of right ureteral kathy with right ureteral stent insertion   Acute hypoxic respiratory failure - Patient is on 2 L NC. CXR from 2/27 was unremarkable. I have ordered CXR, procalcitonin, proBNP. For now I will add antibiotics. If workup is negative then will DC antibiotics.  Leukocytosis - Continue to monitor.   CAD S/P MI times 2 - Stable on lopressor, nitro, zetia, losartan, and asa  HLD - Continue lipitor  HTN - Stable on lopressor and norvasc  DM-II - Patient is on jardiance and semaglutide at home. Will continue jardiance and place on insulin ss.  DVT prophylaxis - As per primary service      Electronically signed by Maggy Hernandez DO on 2/28/2023 at 1:41 PM    NOTE: This report was transcribed using voice recognition software. Every effort was made to ensure accuracy; however, inadvertent computerized transcription errors may be present.

## 2023-02-28 NOTE — PROGRESS NOTES
2/28/2023 10:56 AM  Bettye Cheney  [id number]    Subjective: Antunez was removed this morning  Voiding comfortably   Feeling much better today   Wife present  Still on 2 Liters of oxygen     Review of Systems  Constitutional: No fever or chills   Respiratory: negative for cough and hemoptysis  Cardiovascular: negative for chest pain and dyspnea  Gastrointestinal: negative for abdominal pain, diarrhea, nausea and vomiting   : See above  Derm: negative for rash and skin lesion(s)  Neurological: negative for seizures and tremors  Musculoskeletal: Negative    Psychiatric: Negative   All other reviews are negative      Scheduled Meds:   metoprolol tartrate  25 mg Oral BID    losartan  100 mg Oral Daily    fluticasone  1 spray Nasal Daily    ezetimibe  10 mg Oral Daily    atorvastatin  80 mg Oral Nightly    amLODIPine  10 mg Oral Daily    pantoprazole  40 mg Oral QAM AC    sodium chloride flush  5-40 mL IntraVENous 2 times per day    tamsulosin  0.4 mg Oral Daily    acetaminophen  650 mg Oral Q6H       Objective:  Vitals:    02/28/23 0833   BP: (!) 140/73   Pulse: 84   Resp: 16   Temp: 98 °F (36.7 °C)   SpO2: 94%         Allergies: Penicillins    General Appearance: alert and oriented to person, place and time and in no acute distress  Skin: no rash or erythema  Head: normocephalic and atraumatic  Pulmonary/Chest: normal air movement, no respiratory distress  Abdomen: soft, non-tender, non-distended  Genitourinary: no antunez   Extremities: no cyanosis, clubbing or edema         Labs:     No results for input(s): NA, K, CL, CO2, BUN, CREATININE, GLUCOSE, CALCIUM in the last 72 hours.     Lab Results   Component Value Date/Time    HGB 16.1 02/28/2023 04:40 AM    HCT 45.4 02/28/2023 04:40 AM       Lab Results   Component Value Date    PSA 3.30 07/06/2022    PSA 2.90 04/07/2019    PSA 3.36 09/13/2018         Assessment/Plan:  POD#1--cystoscopy, retrograde pyelogram, right ureteroscopy, right ureteral mass biopsy and excision of right ureteral mass, right ureteral stent insertion       Spoke with nursing, need documented pulse ox on room   Ambulate the patient and take walking pulse ox as well  Rx on chart  Will need follow up next week to discuss biopsy results   Jung has been removed, voiding comfortably  If pulse ox ok then ok for discharge today.  If pulse ox remains low we will consult medicine   Will follow     MICHELLE Hines - DOMENICA CONNOLLY  Urology

## 2023-02-28 NOTE — CARE COORDINATION
Met with patient about diagnosis and discharge plan of care. POD#1 cystoscopy with excision of right ureteral mass. Iv fluids. Pt currently on o2-will need to wean prior to discharge. Pt lives with spouse in 2 story home. Independent prior to admit. No needs for home. If o2 needed, will use [facility] DME. PCP is Dr Lolita Mg. [place] [place]    Case Management Assessment  Initial Evaluation    Date/Time of Evaluation: 2/28/2023 11:11 AM  Assessment Completed by: Henrique Shah RN    If patient is discharged prior to next notation, then this note serves as note for discharge by case management. Patient Name: Ellen Reagan                   YOB: 1955  Diagnosis: Distension of ureter [N13.4]  Gross hematuria [R31.0]  Ureteral mass [N28.89]                   Date / Time: 2/27/2023  9:39 AM    Patient Admission Status: Observation   Readmission Risk (Low < 19, Mod (19-27), High > 27): No data recorded  Current PCP: Jayna Armando, DO  PCP verified by ? Yes    Chart Reviewed: Yes      History Provided by: Patient  Patient Orientation: Alert and Oriented, Person, Place, Situation, Self    Patient Cognition: Alert    Hospitalization in the last 30 days (Readmission):  No    If yes, Readmission Assessment in  Navigator will be completed. Advance Directives:      Code Status: Prior   Patient's Primary Decision Maker is:      Primary Decision Maker: Frances Ortega - Spouse - [phone]    Discharge Planning:    Patient lives with: Spouse/Significant Other Type of Home: House  Primary Care Giver: Self  Patient Support Systems include: Spouse/Significant Other   Current Financial resources:    Current community resources:    Current services prior to admission: None            Current DME:              Type of Home Care services:  None    ADLS  Prior functional level: Independent in ADLs/IADLs  Current functional level:  Independent in ADLs/IADLs    PT AM-PAC:   /24  OT AM-PAC:   /24    Family can provide assistance at DC: Yes  Would you like Case Management to discuss the discharge plan with any other family members/significant others, and if so, who? No  Plans to Return to Present Housing: Yes  Other Identified Issues/Barriers to RETURNING to current housing: none  Potential Assistance needed at discharge: N/A            Potential DME:    Patient expects to discharge to: [address] [place] [address] for transportation at discharge:      Financial    Payor: Radu Orn / Plan: MEDICARE PART A AND B / Product Type: *No Product type* /     Does insurance require precert for SNF: No    Potential assistance Purchasing Medications:    Meds-to-Beds request: Yes      [id number] [place] - [address]  [place] [address]  Phone: [phone] Fax: [phone]      Notes:    Factors facilitating achievement of predicted outcomes: Family support    Barriers to discharge: none    Additional Case Management Notes: The Plan for Transition of Care is related to the following treatment goals of Distension of ureter [N13.4]  Gross hematuria [R31.0]  Ureteral mass [H49.52]    IF APPLICABLE: The Patient and/or patient representative Laine Genao and his family were provided with a choice of provider and agrees with the discharge plan. Freedom of choice list with basic dialogue that supports the patient's individualized plan of care/goals and shares the quality data associated with the providers was provided to:     Patient Representative Name:       The Patient and/or Patient Representative Agree with the Discharge Plan?       Chilo Tobin RN  Case Management Department  Ph: [phone] Fax: [phone]

## 2023-02-28 NOTE — PROGRESS NOTES
Pharmacy Note    Roman Baumann was ordered Fish Oil/Omega-3 Fatty Acid capsules. As per the [address] [place] [address], herbals and certain dietary supplements will be discontinued.   The herbal or dietary supplement may be continued after discharge from the hospital.

## 2023-02-28 NOTE — PROGRESS NOTES
P Quality Flow/Interdisciplinary Rounds Progress Note        Quality Flow Rounds held on February 28, 2023    Disciplines Attending:  Bedside Nurse, , , and Nursing Unit Leadership    Josh White was admitted on 2/27/2023  9:39 AM    Anticipated Discharge Date:       Disposition:    Amos Score:  Amos Scale Score: 20    Readmission Risk              Risk of Unplanned Readmission:  0           Discussed patient goal for the day, patient clinical progression, and barriers to discharge.   The following Goal(s) of the Day/Commitment(s) have been identified:   Discharge planning      Kandy Goodrich RN  February 28, 2023

## 2023-02-28 NOTE — PROGRESS NOTES
Ambulating pulse ox performed. Patient was 90% O2 on room air. Standing 88% O2. Walking oxygen was 87%. 2L NC reapplied. Adithya Diaz NP notified of walking pulse ox results. Will consult medical management per order.

## 2023-02-28 NOTE — PROGRESS NOTES
Pharmacy Note    This patient was ordered empagliflozin (Jardiance) for Type 2 diabetes. Per the [address] [place] [address] Dr, this medication is non-formulary and not stocked by pharmacy for the reason indicated below. The medication can be reordered at discharge.      Medications in which risks outweigh benefits during hospitalization:           -  oral bisphosphonates         -  raloxifene (Evista)        -  SGLT2 inhibitors (ordered in the hospital for an indication other than heart failure or chronic kidney disease)    Medications that lack necessity during an acute hospital stay:        -  nasal antihistamines        -  nasal ipratropium 0.03% and 0.06%        -  nasal miacalcin        -  acyclovir topical cream/ointment orders for herpes labialis (cold sores)

## 2023-03-01 VITALS
HEIGHT: 69 IN | TEMPERATURE: 99.2 F | HEART RATE: 67 BPM | BODY MASS INDEX: 29.62 KG/M2 | OXYGEN SATURATION: 93 % | RESPIRATION RATE: 16 BRPM | SYSTOLIC BLOOD PRESSURE: 133 MMHG | WEIGHT: 200 LBS | DIASTOLIC BLOOD PRESSURE: 69 MMHG

## 2023-03-01 LAB
ANION GAP SERPL CALCULATED.3IONS-SCNC: 8 MMOL/L (ref 7–16)
BUN BLDV-MCNC: 22 MG/DL (ref 6–23)
CALCIUM SERPL-MCNC: 9.1 MG/DL (ref 8.6–10.2)
CHLORIDE BLD-SCNC: 104 MMOL/L (ref 98–107)
CO2: 26 MMOL/L (ref 22–29)
CREAT SERPL-MCNC: 0.9 MG/DL (ref 0.7–1.2)
GFR SERPL CREATININE-BSD FRML MDRD: >60 ML/MIN/1.73
GLUCOSE BLD-MCNC: 147 MG/DL (ref 74–99)
HCT VFR BLD CALC: 40.5 % (ref 37–54)
HEMOGLOBIN: 13.9 G/DL (ref 12.5–16.5)
MCH RBC QN AUTO: 29.9 PG (ref 26–35)
MCHC RBC AUTO-ENTMCNC: 34.3 % (ref 32–34.5)
MCV RBC AUTO: 87.1 FL (ref 80–99.9)
PDW BLD-RTO: 13.4 FL (ref 11.5–15)
PLATELET # BLD: 192 E9/L (ref 130–450)
PMV BLD AUTO: 10.5 FL (ref 7–12)
POTASSIUM SERPL-SCNC: 3.4 MMOL/L (ref 3.5–5)
RBC # BLD: 4.65 E12/L (ref 3.8–5.8)
SODIUM BLD-SCNC: 138 MMOL/L (ref 132–146)
WBC # BLD: 12.2 E9/L (ref 4.5–11.5)

## 2023-03-01 PROCEDURE — 96366 THER/PROPH/DIAG IV INF ADDON: CPT

## 2023-03-01 PROCEDURE — 85027 COMPLETE CBC AUTOMATED: CPT

## 2023-03-01 PROCEDURE — 2700000000 HC OXYGEN THERAPY PER DAY

## 2023-03-01 PROCEDURE — 6370000000 HC RX 637 (ALT 250 FOR IP): Performed by: STUDENT IN AN ORGANIZED HEALTH CARE EDUCATION/TRAINING PROGRAM

## 2023-03-01 PROCEDURE — 2500000003 HC RX 250 WO HCPCS: Performed by: INTERNAL MEDICINE

## 2023-03-01 PROCEDURE — 6370000000 HC RX 637 (ALT 250 FOR IP): Performed by: UROLOGY

## 2023-03-01 PROCEDURE — 6370000000 HC RX 637 (ALT 250 FOR IP): Performed by: INTERNAL MEDICINE

## 2023-03-01 PROCEDURE — 80048 BASIC METABOLIC PNL TOTAL CA: CPT

## 2023-03-01 PROCEDURE — G0378 HOSPITAL OBSERVATION PER HR: HCPCS

## 2023-03-01 PROCEDURE — 36415 COLL VENOUS BLD VENIPUNCTURE: CPT

## 2023-03-01 PROCEDURE — 99233 SBSQ HOSP IP/OBS HIGH 50: CPT | Performed by: INTERNAL MEDICINE

## 2023-03-01 PROCEDURE — 2580000003 HC RX 258: Performed by: STUDENT IN AN ORGANIZED HEALTH CARE EDUCATION/TRAINING PROGRAM

## 2023-03-01 RX ORDER — CEFUROXIME AXETIL 500 MG/1
500 TABLET ORAL EVERY 12 HOURS SCHEDULED
Qty: 14 TABLET | Refills: 0 | Status: SHIPPED | OUTPATIENT
Start: 2023-03-01 | End: 2023-03-08

## 2023-03-01 RX ORDER — DOXYCYCLINE HYCLATE 100 MG/1
100 CAPSULE ORAL EVERY 12 HOURS SCHEDULED
Status: DISCONTINUED | OUTPATIENT
Start: 2023-03-01 | End: 2023-03-01 | Stop reason: HOSPADM

## 2023-03-01 RX ORDER — CEFUROXIME AXETIL 500 MG/1
500 TABLET ORAL EVERY 12 HOURS SCHEDULED
Status: DISCONTINUED | OUTPATIENT
Start: 2023-03-01 | End: 2023-03-01 | Stop reason: HOSPADM

## 2023-03-01 RX ORDER — DOXYCYCLINE HYCLATE 100 MG
100 TABLET ORAL EVERY 12 HOURS SCHEDULED
Qty: 14 TABLET | Refills: 0 | Status: SHIPPED | OUTPATIENT
Start: 2023-03-01 | End: 2023-03-08

## 2023-03-01 RX ADMIN — METRONIDAZOLE 500 MG: 500 INJECTION, SOLUTION INTRAVENOUS at 09:25

## 2023-03-01 RX ADMIN — DOXYCYCLINE HYCLATE 100 MG: 100 CAPSULE ORAL at 12:58

## 2023-03-01 RX ADMIN — OXYCODONE AND ACETAMINOPHEN 1 TABLET: 5; 325 TABLET ORAL at 13:00

## 2023-03-01 RX ADMIN — EZETIMIBE 10 MG: 10 TABLET ORAL at 09:21

## 2023-03-01 RX ADMIN — AMLODIPINE BESYLATE 10 MG: 10 TABLET ORAL at 09:20

## 2023-03-01 RX ADMIN — FERROUS SULFATE TAB 325 MG (65 MG ELEMENTAL FE) 325 MG: 325 (65 FE) TAB at 09:20

## 2023-03-01 RX ADMIN — CEFUROXIME AXETIL 500 MG: 500 TABLET ORAL at 12:58

## 2023-03-01 RX ADMIN — TAMSULOSIN HYDROCHLORIDE 0.4 MG: 0.4 CAPSULE ORAL at 09:20

## 2023-03-01 RX ADMIN — METRONIDAZOLE 500 MG: 500 INJECTION, SOLUTION INTRAVENOUS at 02:34

## 2023-03-01 RX ADMIN — PANTOPRAZOLE SODIUM 40 MG: 40 TABLET, DELAYED RELEASE ORAL at 06:06

## 2023-03-01 RX ADMIN — LOSARTAN POTASSIUM 100 MG: 50 TABLET, FILM COATED ORAL at 09:20

## 2023-03-01 RX ADMIN — ACETAMINOPHEN 650 MG: 325 TABLET ORAL at 09:21

## 2023-03-01 RX ADMIN — ACETAMINOPHEN 650 MG: 325 TABLET ORAL at 02:29

## 2023-03-01 RX ADMIN — CITALOPRAM HYDROBROMIDE 60 MG: 20 TABLET ORAL at 09:21

## 2023-03-01 RX ADMIN — Medication 10 ML: at 09:26

## 2023-03-01 RX ADMIN — METOPROLOL TARTRATE 25 MG: 25 TABLET, FILM COATED ORAL at 09:20

## 2023-03-01 ASSESSMENT — PAIN DESCRIPTION - LOCATION: LOCATION: FLANK

## 2023-03-01 ASSESSMENT — PAIN SCALES - GENERAL: PAINLEVEL_OUTOF10: 5

## 2023-03-01 ASSESSMENT — PAIN DESCRIPTION - DESCRIPTORS: DESCRIPTORS: ACHING

## 2023-03-01 ASSESSMENT — PAIN DESCRIPTION - ORIENTATION: ORIENTATION: RIGHT

## 2023-03-01 NOTE — PROGRESS NOTES
3/1/2023 8:38 AM  Service: Urology  Group: [facility] (Hesham/Deepthi)    Kj Leak  [id number]    Subjective: Afebrile  The patient is ambulatory  His pulse ox on oxygen at bedrest is 94  Flank discomfort with voiding  Mild abdominal discomfort      Review of Systems  Respiratory: negative  Cardiovascular: negative  Gastrointestinal: negative  Hematologic/lymphatic: negative  Musculoskeletal:negative  Neurological: negative  Endocrine: negative    Scheduled Meds:   citalopram  60 mg Oral Daily    ferrous sulfate  325 mg Oral Daily with breakfast    cefTRIAXone (ROCEPHIN) IV  1,000 mg IntraVENous Q24H    metroNIDAZOLE  500 mg IntraVENous Q8H    insulin lispro  0-4 Units SubCUTAneous TID WC    insulin lispro  0-4 Units SubCUTAneous Nightly    metoprolol tartrate  25 mg Oral BID    losartan  100 mg Oral Daily    fluticasone  1 spray Nasal Daily    ezetimibe  10 mg Oral Daily    atorvastatin  80 mg Oral Nightly    amLODIPine  10 mg Oral Daily    pantoprazole  40 mg Oral QAM AC    sodium chloride flush  5-40 mL IntraVENous 2 times per day    tamsulosin  0.4 mg Oral Daily    acetaminophen  650 mg Oral Q6H       Objective:  Vitals:    03/01/23 0714   BP: (!) 155/74   Pulse: 79   Resp: 16   Temp: 98 °F (36.7 °C)   SpO2: 91%         Allergies: Penicillins    General Appearance: alert and oriented to person, place and time and in no acute distress  Skin: no rash or erythema  Head: normocephalic and atraumatic  Pulmonary/Chest: clear to auscultation bilaterally- no wheezes, rales or rhonchi, normal air movement, no respiratory distress and no chest wall tenderness  Abdomen: soft, non-tender, non-distended, normal bowel sounds, no masses or organomegaly and no inguinal adenopathy  Genitalia: No penile or scrotal swelling or masses.  Extremities: no cyanosis, clubbing or edema and Lb's sign negative bilaterally      Labs:     Recent Labs     03/01/23  0240      K 3.4*      CO2 26   BUN 22   CREATININE 0.9 GLUCOSE 147*   CALCIUM 9.1       Lab Results   Component Value Date/Time    HGB 13.9 03/01/2023 02:40 AM    HCT 40.5 03/01/2023 02:40 AM         Assessment:  Williams Alvarenga 75 y.o. male     Principal Problem:    Ureteral mass  Resolved Problems:    * No resolved hospital problems.  *      He is getting a chest x-ray today  His abdominal discomfort is most likely reflux from the stent  Plan:  When he is cleared from medicine he could be discharged he will follow-up with Ghassan Gomez in the office in 2 weeks        Rach Silva MD   [facility]  Urology

## 2023-03-01 NOTE — PROGRESS NOTES
Pulse ox was___90_% on room air at rest.  Ambulated patient on room air. Oxygen saturation was ____83_% on room air while ambulating. Oxygen applied. Recovery pulse ox was ____94_% on ____2_liters of oxygen while ambulating.

## 2023-03-01 NOTE — PROGRESS NOTES
[facility] Progress Note    Admitting Date and Time: 2/27/2023  9:39 AM  Admit Dx: Distension of ureter [N13.4]  Gross hematuria [R31.0]  Ureteral mass [N28.89]    Subjective:  Patient is being followed for Distension of ureter [N13.4]  Gross hematuria [R31.0]  Ureteral mass [N28.89]     Patient has no complaints at this time    ROS: denies fever, chills, cp, sob, n/v, HA unless stated above.       citalopram  60 mg Oral Daily    ferrous sulfate  325 mg Oral Daily with breakfast    cefTRIAXone (ROCEPHIN) IV  1,000 mg IntraVENous Q24H    metroNIDAZOLE  500 mg IntraVENous Q8H    insulin lispro  0-4 Units SubCUTAneous TID WC    insulin lispro  0-4 Units SubCUTAneous Nightly    metoprolol tartrate  25 mg Oral BID    losartan  100 mg Oral Daily    fluticasone  1 spray Nasal Daily    ezetimibe  10 mg Oral Daily    atorvastatin  80 mg Oral Nightly    amLODIPine  10 mg Oral Daily    pantoprazole  40 mg Oral QAM AC    sodium chloride flush  5-40 mL IntraVENous 2 times per day    tamsulosin  0.4 mg Oral Daily    acetaminophen  650 mg Oral Q6H     nitroGLYCERIN, 0.4 mg, Q5 Min PRN  glucose, 4 tablet, PRN  dextrose bolus, 125 mL, PRN   Or  dextrose bolus, 250 mL, PRN  glucagon (rDNA), 1 mg, PRN  dextrose, , Continuous PRN  sodium chloride flush, 5-40 mL, PRN  sodium chloride, , PRN  polyethylene glycol, 17 g, Daily PRN  ondansetron, 4 mg, Q8H PRN   Or  ondansetron, 4 mg, Q6H PRN  phenazopyridine, 100 mg, Q6H PRN  oxyCODONE-acetaminophen, 1 tablet, Q6H PRN         Objective:    BP (!) 155/74   Pulse 79   Temp 98 °F (36.7 °C) (Oral)   Resp 16   Ht 5' 8.5\" (1.74 m)   Wt 200 lb (90.7 kg)   SpO2 91%   BMI 29.97 kg/m²     General Appearance: alert and oriented to person, place and time and in no acute distress  Skin: warm and dry  Head: normocephalic and atraumatic  Eyes: pupils equal, round, and reactive to light, extraocular eye movements intact, conjunctivae normal  Neck: neck supple and non tender without mass   Pulmonary/Chest: clear to auscultation bilaterally- no wheezes, rales or rhonchi, normal air movement, no respiratory distress  Cardiovascular: normal rate, normal S1 and S2 and no carotid bruits  Abdomen: soft, non-tender, non-distended, normal bowel sounds, no masses or organomegaly  Extremities: no cyanosis, no clubbing and no edema  Neurologic: no cranial nerve deficit and speech normal        Recent Labs     03/01/23  0240      K 3.4*      CO2 26   BUN 22   CREATININE 0.9   GLUCOSE 147*   CALCIUM 9.1       Recent Labs     02/28/23  0440 03/01/23  0240   WBC 18.7* 12.2*   RBC 5.29 4.65   HGB 16.1 13.9   HCT 45.4 40.5   MCV 85.8 87.1   MCH 30.4 29.9   MCHC 35.5* 34.3   RDW 13.4 13.4    192   MPV 10.6 10.5         Assessment:    Principal Problem:    Ureteral mass  Resolved Problems:    * No resolved hospital problems. *      Plan:    S/P cystoscopy, retrograde pyelogram, right ureteroscopy, right ureteral mass biopsy and excision of right ureteral kathy with right ureteral stent insertion   Acute hypoxic respiratory failure 2/2 LLL pneumonia - Patient is on 2 L NC. CXR from 2/27 was unremarkable. Negative viral resp panel. Okay to DC on ceftin and doxycycline for 7 days. Med rec completed. I have ordered home oxygen evaluation. Leukocytosis - Continue to monitor. CAD S/P MI times 2 - Stable on lopressor, nitro, zetia, losartan, and asa  HLD - Continue lipitor  HTN - Stable on lopressor and norvasc  DM-II - Patient is on jardiance and semaglutide at home. Will continue jardiance and place on insulin ss. DVT prophylaxis - As per primary service    Okay to DC home as per IM. DC on 7 days of ceftin and doxy. Med rec completed. F/U with PCP in 1-2 weeks. NOTE: This report was transcribed using voice recognition software. Every effort was made to ensure accuracy; however, inadvertent computerized transcription errors may be present.     Electronically signed by Titus Willis DO on 3/1/2023 at 11:21 AM

## 2023-03-01 NOTE — CARE COORDINATION
Updated plan of care. POD#2. Remains on 2L/NC. Ambulating test to be done this am. Referral called [facility] DME for possible home o2. Will follow.  O2 orders completed-o

## 2023-03-02 ENCOUNTER — CARE COORDINATION (OUTPATIENT)
Dept: CASE MANAGEMENT | Age: 68
End: 2023-03-02

## 2023-03-02 DIAGNOSIS — N28.89 URETERAL MASS: Primary | ICD-10-CM

## 2023-03-02 PROCEDURE — 1111F DSCHRG MED/CURRENT MED MERGE: CPT | Performed by: INTERNAL MEDICINE

## 2023-03-02 NOTE — PROGRESS NOTES
CLINICAL PHARMACY NOTE: MEDS TO BEDS    Total # of Prescriptions Filled: 2   The following medications were delivered to the patient:  Doxycycline 100 mg  Cefuroxime 500 mg    Additional Documentation:

## 2023-03-02 NOTE — CARE COORDINATION
[facility] Care Transitions Initial Follow Up Call    Call within 2 business days of discharge: Yes    Patient Current Location: [address] [place] [address] Transition Nurse contacted the patient by telephone to perform post hospital discharge assessment. Verified name and  with patient as identifiers. Provided introduction to self, and explanation of the Care Transition Nurse role. Patient: Andree Oreilly Patient : 1955   MRN: <[id number]>  Reason for Admission: 2023 - 3/1/2023 [address] [place] [address]. Ureteral mass s/p CYSTOSCOPY RETROGRADE PYELOGRAM RIGHT URETEROSCPY, RIGHT URETERAL STENT INSERTION. (S/P cystoscopy, retrograde pyelogram, right ureteroscopy, right ureteral mass biopsy and excision of right ureteral kathy with right ureteral stent insertion. Acute hypoxic respiratory failure 2/2 LLL pneumonia). Discharge Date: 3/1/23 RARS: No data recorded  NR  CT    P [facility] CLINICAL STAFF routed to schedule 7 day hosp fu PCP    Uro/Drevna 3/9 9:00    START taking:  cefUROXime (CEFTIN)  doxycycline hyclate (VIBRA-TABS)  oxyCODONE-acetaminophen (Percocet)  tamsulosin [facility])    Last Discharge  Street       Date Complaint Diagnosis Description Type Department Provider    23  Distension of ureter . .. Admission (Discharged) LISS Norton MD            Was this an external facility discharge? No Discharge Facility: [address] [place] [address]    Challenges to be reviewed by the provider   Additional needs identified to be addressed with provider: No  none               Method of communication with provider: none. Brant Barreto reports he is feeling better. No fever, chills, or flu-like symptoms. No acute pain concerns. States no urine flow concerns and is passing pink-tinge urine w/ blood flecks. States appetite is \"better\" and no N/V. No elimination concerns. Wearing new O2 2L NC con't and sats 92-93% on oxygen and 88% off oxygen. No acute congestion. Has occasional cough.  Has not taken his FBS yet. Declines need for HHC. On doxycycline and ceftin therapy and advised to complete full course, v/u. Has post-op next Thur at 9:00. Care Transition Nurse reviewed discharge instructions with patient who verbalized understanding. The patient was given an opportunity to ask questions and does not have any further questions or concerns at this time. Were discharge instructions available to patient? Yes. Reviewed appropriate site of care based on symptoms and resources available to patient including: PCP  Specialist  Home health  When to call 911  Condition related references. The patient agrees to contact the PCP office for questions related to their healthcare. Advance Care Planning:   Does patient have an Advance Directive: Hannah Salazar [phone]     Medication reconciliation was performed with patient, who verbalizes understanding of administration of home medications. Medications reviewed, 1111F entered: yes    Was patient discharged with a pulse oximeter? Has own. Sats 88% off of O2 and 92-93% on 2L NC con't. Non-face-to-face services provided:  Reviewed s/s  bleeding to report to surgeon. Reviewed s/s UTI to report to surgeon. Offered patient enrollment in the Remote Patient Monitoring (RPM) program for in-home monitoring: Patient declined. Care Transitions 24 Hour Call    Schedule Follow Up Appointment with PCP: Declined  Do you have a copy of your discharge instructions?: Yes  Do you have all of your prescriptions and are they filled?: Yes  Have you scheduled your follow up appointment?: Yes  Do you feel like you have everything you need to keep you well at home?: Yes  Care Transitions Interventions    Physical Therapy: Declined     Occupational Therapy: Declined              Discussed follow-up appointments. If no appointment was previously scheduled, appointment scheduling offered: Yes. Is follow up appointment scheduled within 7 days of discharge?  See above appt list.    Follow Up  Future Appointments   Date Time Provider Gt Huang   7/12/2023  8:00 AM DO Ladonna Velez [facility]       Care Transition Nurse provided contact information. Plan for follow-up call in 5-7 days based on severity of symptoms and risk factors. Plan for next call:  CT FU/Final, Check urine and infectious concerns, Check sats and O2 tolerance.     Nicklas Bloch, RN

## 2023-03-06 ENCOUNTER — TELEPHONE (OUTPATIENT)
Dept: FAMILY MEDICINE CLINIC | Age: 68
End: 2023-03-06

## 2023-03-06 NOTE — TELEPHONE ENCOUNTER
Pt called stating that he was sent home from the hospital on O2 because his oxygen stats were in the 80s but now they are in the 90s. Pt would an order/letter to be sent to [facility] to have them  the O2.

## 2023-03-06 NOTE — LETTER
March 6, 2023       [place]  YOB: 1955   [id number] [facility]  [place] [address] Date of Visit:  3/6/2023       To Whom It May Concern: It is my medical opinion that Opal Mason no longer requires oxygen, it can be picked up    If you have any questions or concerns, please don't hesitate to call.     Sincerely,        Samson Kitchen, DO

## 2023-03-08 ENCOUNTER — OFFICE VISIT (OUTPATIENT)
Dept: PRIMARY CARE CLINIC | Age: 68
End: 2023-03-08

## 2023-03-08 VITALS
WEIGHT: 198 LBS | DIASTOLIC BLOOD PRESSURE: 64 MMHG | HEART RATE: 64 BPM | TEMPERATURE: 97.7 F | BODY MASS INDEX: 29.67 KG/M2 | SYSTOLIC BLOOD PRESSURE: 138 MMHG | OXYGEN SATURATION: 98 %

## 2023-03-08 DIAGNOSIS — Z09 HOSPITAL DISCHARGE FOLLOW-UP: Primary | ICD-10-CM

## 2023-03-08 NOTE — PROGRESS NOTES
He was recently admitted to the hospital for hematuria and a ureteral mass. He had surgery on the 27th which included biopsy of a ureteral mass as well as excision and stent insertion. He is now on flomax. He has finished his abx. His biopsy showed papillary urothelial CA. Following with urology for urothelial cancer  Still having some abdominal pain  Some improvement with Flomax     Post-Discharge Transitional Care  Follow Up      Mary Ellen Hunter   YOB: 1955    Date of Office Visit:  3/8/2023  Date of Hospital Admission: 2/27/23  Date of Hospital Discharge: 3/1/23  Risk of hospital readmission (high >=14%. Medium >=10%) :No data recorded    Care management risk score Rising risk (score 2-5) and Complex Care (Scores >=6): No Risk Score On File     Non face to face  following discharge, date last encounter closed (first attempt may have been earlier): 03/02/2023    Call initiated 2 business days of discharge: Yes    ASSESSMENT/PLAN:   Hospital discharge follow-up  -     NH DISCHARGE MEDS RECONCILED W/ CURRENT OUTPATIENT MED LIST    Medical Decision Making: moderate complexity  Return if symptoms worsen or fail to improve. Subjective:   HPI:  Follow up of Hospital problems/diagnosis(es): See below      Inpatient course: Discharge summary reviewed- see chart.     Interval history/Current status: See below      Patient Active Problem List   Diagnosis    Coronary artery disease involving native coronary artery of native heart without angina pectoris    Hypertension    Pure hypercholesterolemia    Anemia    GERD (gastroesophageal reflux disease)    GARZA (dyspnea on exertion)    Left knee DJD    Moderate obesity    Near syncope    Anxiety attack    NSTEMI (non-ST elevated myocardial infarction) ([facility] [address]    Hiatal hernia    Dizziness    Cholelithiasis    Biliary colic    Combined forms of age-related cataract of both eyes    Angina pectoris, unspecified    Atherosclerotic heart disease of native coronary artery with unspecified angina pectoris    Ureteral mass       Medications listed as ordered at the time of discharge from hospital     Medication List            Accurate as of March 8, 2023  9:58 AM. If you have any questions, ask your nurse or doctor. CHANGE how you take these medications      omeprazole 40 MG delayed release capsule  Commonly known as: PRILOSEC  TAKE 1 CAPSULE BY MOUTH TWICE A DAY  What changed: See the new instructions. CONTINUE taking these medications      amLODIPine 10 MG tablet  Commonly known as: NORVASC  TAKE 1 TABLET BY MOUTH EVERY DAY     aspirin 81 MG tablet     atorvastatin 80 MG tablet  Commonly known as: LIPITOR  TAKE 1 TABLET BY MOUTH EVERY DAY     cefUROXime 500 MG tablet  Commonly known as: CEFTIN  Take 1 tablet by mouth every 12 hours for 7 days     citalopram 40 MG tablet  Commonly known as: CELEXA  TAKE 1 AND 1/2 TABLETS BY MOUTH EVERY MORNING     doxycycline hyclate 100 MG tablet  Commonly known as: VIBRA-TABS  Take 1 tablet by mouth every 12 hours for 7 days     empagliflozin 10 MG tablet  Commonly known as: Jardiance  TAKE 1 TABLET BY MOUTH EVERY DAY     ezetimibe 10 MG tablet  Commonly known as: ZETIA  Take 1 tablet by mouth daily     ferrous sulfate 325 (65 Fe) MG tablet  Commonly known as: IRON 325     fish oil-omega-3 fatty acids 1000 MG capsule     fluticasone 50 MCG/ACT nasal spray  Commonly known as: Flonase  1 spray by Nasal route daily     losartan 100 MG tablet  Commonly known as: COZAAR  TAKE 1 TABLET BY MOUTH EVERY DAY     metoprolol tartrate 25 MG tablet  Commonly known as: LOPRESSOR  TAKE 1 TABLET BY MOUTH TWICE A DAY     nitroGLYCERIN 0.4 MG SL tablet  Commonly known as: NITROSTAT  Place 1 tablet under the tongue every 5 minutes as needed for Chest pain If chest pain: put 1 NTG tab under tongue - sit down - wait 5 min - if no relief, call 911.   May repeat dose 2 more times 5 min apart while waiting for EMS (total of 3 doses). If dizzy do not take any further doses. Ozempic (0.25 or 0.5 MG/DOSE) 2 MG/1.5ML Sopn  Generic drug: Semaglutide(0.25 or 0.5MG/DOS)  INJECT 0.25MG INTO THE SKIN ONE TIME PER WEEK     tamsulosin 0.4 MG capsule  Commonly known as: FLOMAX  Take 1 capsule by mouth daily     vitamin C 500 MG tablet  Commonly known as: ASCORBIC ACID     Vitamin D3 125 MCG (5000 UT) Tabs                Medications marked \"taking\" at this time  No outpatient medications have been marked as taking for the 3/8/23 encounter (Office Visit) with Deneen Godinez DO.        Medications patient taking as of now reconciled against medications ordered at time of hospital discharge: Yes        Objective:    /64   Pulse 64   Temp 97.7 °F (36.5 °C)   Wt 198 lb (89.8 kg)   SpO2 98%   BMI 29.67 kg/m²         An electronic signature was used to authenticate this note.   --Judy Tejeda DO

## 2023-03-09 ENCOUNTER — CARE COORDINATION (OUTPATIENT)
Dept: CASE MANAGEMENT | Age: 68
End: 2023-03-09

## 2023-03-09 NOTE — CARE COORDINATION
[facility] Care Transitions Follow Up Call    Patient Current Location: [address] Transition Nurse contacted the patient by telephone to follow up after admission. Verified name and  with patient as identifiers. Patient: Rossy Adams  Patient : 1955   MRN: <[id number]>  Reason for Admission: 2023 - 3/1/2023 [address] [place] [address] Blvd. Ureteral mass s/p CYSTOSCOPY RETROGRADE PYELOGRAM RIGHT URETEROSCPY, RIGHT URETERAL STENT INSERTION. (S/P cystoscopy, retrograde pyelogram, right ureteroscopy, right ureteral mass biopsy and excision of right ureteral kathy with right ureteral stent insertion. Acute hypoxic respiratory failure 2/2 LLL pneumonia). Pending RIGHT URETEROSCOPY POSSIBLE URETERAL BIOPSY LASER ABLATION OF URETERAL TUMOR RIGHT STENT CHANGE on 3/22/23. Discharge Date: 3/1/23 RARS: No data recorded  NR  CT      Uro/Drevna 3/9 9:00 Attended. PCP 3/8/23 Attended. Needs to be reviewed by the provider   Additional needs identified to be addressed with provider: No  none             Method of communication with provider: none. Any Jung reports he is feeling well. He has no acute pain concerns but does admit stent discomfort w/ certain movements/positions. Current urine color is clear yellow. No hydration concerns. No urine flow concerns on Flomax. States he completed antibiotic therapy yesterday (cefton and doxy). Turning in O2 and has not needed for 3 days w/ room air sats 95-96%. No sob, cough, or congestion concerns. Pending R URETEROSCOPY POSSIBLE URETERAL BIOPSY LASER ABLATION OF URETERAL TUMOR & R STENT CHANGE on 3/22/23. Will then plan on final sx approx 3 months after. He does not routinely check home BS and states not required for him. In good spirits. No home or med refill needs. Addressed changes since last contact:  none  Discussed follow-up appointments. If no appointment was previously scheduled, appointment scheduling offered: Yes.    Is follow up appointment scheduled within 7 days of discharge? See above appt list.    Follow Up  Future Appointments   Date Time Provider Gt Huang   7/12/2023  8:00 AM DO Ladonna Quinn [facility]     [facility] follow up appointment(s):     Care Transition Nurse reviewed red flags with patient and discussed any barriers to care and/or understanding of plan of care after discharge. Discussed appropriate site of care based on symptoms and resources available to patient including: PCP  Specialist  When to call 911  Condition related references. The patient agrees to contact the PCP office for questions related to their healthcare. Advance Care Planning:   reviewed and current. Patients top risk factors for readmission:  UTI  Interventions to address risk factors:  Reviewed excellent hydration, advised on HTN symptoms to report to physician and symptoms of urinary retention to report/return to ED, reviewed s/s UTI to report to physician/return to ED. Offered patient enrollment in the Remote Patient Monitoring (RPM) program for in-home monitoring: NA.     Care Transitions Subsequent and Final Call    Subsequent and Final Calls  Do you have any ongoing symptoms?: Yes  Patient-reported symptoms: Other, Pain  Have your medications changed?: No  Do you have any questions related to your medications?: No  Do you currently have any active services?: No  Do you have any needs or concerns that I can assist you with?: No  Identified Barriers: Lack of Education  Care Transitions Interventions    Physical Therapy: Declined     Occupational Therapy: Declined     Other Interventions:             Care Transition Nurse provided contact information for future needs. Plan for follow-up call in 7-10 days based on severity of symptoms and risk factors. Plan for next call:  CT FU, Check urine OP and edema, Check sx plan, Check needs.     Katalina Carmona RN

## 2023-03-13 DIAGNOSIS — E11.9 TYPE 2 DIABETES MELLITUS WITHOUT COMPLICATION, WITH LONG-TERM CURRENT USE OF INSULIN (HCC): ICD-10-CM

## 2023-03-13 DIAGNOSIS — Z79.4 TYPE 2 DIABETES MELLITUS WITHOUT COMPLICATION, WITH LONG-TERM CURRENT USE OF INSULIN (HCC): ICD-10-CM

## 2023-03-13 RX ORDER — SEMAGLUTIDE 1.34 MG/ML
INJECTION, SOLUTION SUBCUTANEOUS
Qty: 3 ML | Refills: 1 | Status: SHIPPED | OUTPATIENT
Start: 2023-03-13

## 2023-03-20 NOTE — PROGRESS NOTES
[facility] PRE-ADMISSION TESTING INSTRUCTIONS    The Preadmission Testing patient is instructed accordingly using the following criteria (check applicable):    ARRIVAL INSTRUCTIONS:  [x] Parking the day of Surgery is located in the Main Entrance lot. Upon entering the door, make an immediate right to the surgery reception desk    [x] Bring photo ID and insurance card    [] Bring in a copy of Living will or Durable Power of  papers. [x] Please be sure to arrange for responsible adult to provide transportation to and from the hospital    [x] Please arrange for responsible adult to be with you for the 24 hour period post procedure due to having anesthesia    [x] If you awake am of surgery not feeling well or have temperature >100 please call [phone]    GENERAL INSTRUCTIONS:    [x] Nothing by mouth after midnight, including gum, candy, mints or water    [x] You may brush your teeth, but do not swallow any water    [x] Take medications as instructed with 1-2 oz of water    [x] Stop herbal supplements and vitamins 5 days prior to procedure    [] Follow preop dosing of blood thinners per physician instructions    [] Take 1/2 dose of evening insulin, but no insulin after midnight    [x] No oral diabetic medications after midnight    [x] If diabetic and have low blood sugar or feel symptomatic, take 1-2oz apple juice only    [] Bring inhalers day of surgery    [] Bring C-PAP/ Bi-Pap day of surgery    [] Bring urine specimen day of surgery    [x] Shower or bath with soap, lather and rinse well, AM of Surgery, no lotion, powders or creams to surgical site    [] Follow bowel prep as instructed per surgeon    [x] No tobacco products within 24 hours of surgery     [x] No alcohol or illegal drug use within 24 hours of surgery.     [x] Jewelry, body piercing's, eyeglasses, contact lenses and dentures are not permitted into surgery (bring cases)      [x] Please do not wear any nail polish,

## 2023-03-21 RX ORDER — SODIUM CHLORIDE 9 MG/ML
INJECTION, SOLUTION INTRAVENOUS PRN
Status: CANCELLED | OUTPATIENT
Start: 2023-03-21

## 2023-03-21 RX ORDER — SODIUM CHLORIDE 0.9 % (FLUSH) 0.9 %
5-40 SYRINGE (ML) INJECTION EVERY 12 HOURS SCHEDULED
Status: CANCELLED | OUTPATIENT
Start: 2023-03-21

## 2023-03-21 RX ORDER — SODIUM CHLORIDE 0.9 % (FLUSH) 0.9 %
5-40 SYRINGE (ML) INJECTION PRN
Status: CANCELLED | OUTPATIENT
Start: 2023-03-21

## 2023-03-21 RX ORDER — SODIUM CHLORIDE 9 MG/ML
INJECTION, SOLUTION INTRAVENOUS CONTINUOUS
Status: CANCELLED | OUTPATIENT
Start: 2023-03-21

## 2023-03-22 ENCOUNTER — HOSPITAL ENCOUNTER (OUTPATIENT)
Dept: GENERAL RADIOLOGY | Age: 68
Setting detail: OUTPATIENT SURGERY
Discharge: HOME | End: 2023-03-24
Attending: STUDENT IN AN ORGANIZED HEALTH CARE EDUCATION/TRAINING PROGRAM
Payer: MEDICARE

## 2023-03-22 ENCOUNTER — HOSPITAL ENCOUNTER (OUTPATIENT)
Age: 68
Setting detail: OUTPATIENT SURGERY
Discharge: HOME | End: 2023-03-22
Attending: STUDENT IN AN ORGANIZED HEALTH CARE EDUCATION/TRAINING PROGRAM | Admitting: STUDENT IN AN ORGANIZED HEALTH CARE EDUCATION/TRAINING PROGRAM
Payer: MEDICARE

## 2023-03-22 ENCOUNTER — ANESTHESIA EVENT (OUTPATIENT)
Dept: OPERATING ROOM | Age: 68
End: 2023-03-22
Payer: MEDICARE

## 2023-03-22 ENCOUNTER — ANESTHESIA (OUTPATIENT)
Dept: OPERATING ROOM | Age: 68
End: 2023-03-22
Payer: MEDICARE

## 2023-03-22 VITALS
BODY MASS INDEX: 29.25 KG/M2 | DIASTOLIC BLOOD PRESSURE: 83 MMHG | OXYGEN SATURATION: 93 % | HEIGHT: 68 IN | TEMPERATURE: 98.3 F | RESPIRATION RATE: 17 BRPM | HEART RATE: 62 BPM | WEIGHT: 193 LBS | SYSTOLIC BLOOD PRESSURE: 165 MMHG

## 2023-03-22 DIAGNOSIS — R52 PAIN: ICD-10-CM

## 2023-03-22 DIAGNOSIS — N28.89 URETERAL MASS: Primary | ICD-10-CM

## 2023-03-22 LAB — METER GLUCOSE: 147 MG/DL (ref 74–99)

## 2023-03-22 PROCEDURE — 6360000002 HC RX W HCPCS: Performed by: NURSE PRACTITIONER

## 2023-03-22 PROCEDURE — C1769 GUIDE WIRE: HCPCS | Performed by: STUDENT IN AN ORGANIZED HEALTH CARE EDUCATION/TRAINING PROGRAM

## 2023-03-22 PROCEDURE — C2617 STENT, NON-COR, TEM W/O DEL: HCPCS | Performed by: STUDENT IN AN ORGANIZED HEALTH CARE EDUCATION/TRAINING PROGRAM

## 2023-03-22 PROCEDURE — 7100000011 HC PHASE II RECOVERY - ADDTL 15 MIN: Performed by: STUDENT IN AN ORGANIZED HEALTH CARE EDUCATION/TRAINING PROGRAM

## 2023-03-22 PROCEDURE — 2580000003 HC RX 258: Performed by: NURSE ANESTHETIST, CERTIFIED REGISTERED

## 2023-03-22 PROCEDURE — 7100000000 HC PACU RECOVERY - FIRST 15 MIN: Performed by: STUDENT IN AN ORGANIZED HEALTH CARE EDUCATION/TRAINING PROGRAM

## 2023-03-22 PROCEDURE — 2720000010 HC SURG SUPPLY STERILE: Performed by: STUDENT IN AN ORGANIZED HEALTH CARE EDUCATION/TRAINING PROGRAM

## 2023-03-22 PROCEDURE — 3600000003 HC SURGERY LEVEL 3 BASE: Performed by: STUDENT IN AN ORGANIZED HEALTH CARE EDUCATION/TRAINING PROGRAM

## 2023-03-22 PROCEDURE — 7100000010 HC PHASE II RECOVERY - FIRST 15 MIN: Performed by: STUDENT IN AN ORGANIZED HEALTH CARE EDUCATION/TRAINING PROGRAM

## 2023-03-22 PROCEDURE — 82962 GLUCOSE BLOOD TEST: CPT

## 2023-03-22 PROCEDURE — 74420 UROGRAPHY RTRGR +-KUB: CPT

## 2023-03-22 PROCEDURE — 2500000003 HC RX 250 WO HCPCS: Performed by: NURSE ANESTHETIST, CERTIFIED REGISTERED

## 2023-03-22 PROCEDURE — 2709999900 HC NON-CHARGEABLE SUPPLY: Performed by: STUDENT IN AN ORGANIZED HEALTH CARE EDUCATION/TRAINING PROGRAM

## 2023-03-22 PROCEDURE — 6360000002 HC RX W HCPCS: Performed by: NURSE ANESTHETIST, CERTIFIED REGISTERED

## 2023-03-22 PROCEDURE — 3700000000 HC ANESTHESIA ATTENDED CARE: Performed by: STUDENT IN AN ORGANIZED HEALTH CARE EDUCATION/TRAINING PROGRAM

## 2023-03-22 PROCEDURE — 6360000004 HC RX CONTRAST MEDICATION: Performed by: STUDENT IN AN ORGANIZED HEALTH CARE EDUCATION/TRAINING PROGRAM

## 2023-03-22 PROCEDURE — 7100000001 HC PACU RECOVERY - ADDTL 15 MIN: Performed by: STUDENT IN AN ORGANIZED HEALTH CARE EDUCATION/TRAINING PROGRAM

## 2023-03-22 PROCEDURE — 3700000001 HC ADD 15 MINUTES (ANESTHESIA): Performed by: STUDENT IN AN ORGANIZED HEALTH CARE EDUCATION/TRAINING PROGRAM

## 2023-03-22 PROCEDURE — 3600000013 HC SURGERY LEVEL 3 ADDTL 15MIN: Performed by: STUDENT IN AN ORGANIZED HEALTH CARE EDUCATION/TRAINING PROGRAM

## 2023-03-22 DEVICE — URETERAL STENT
Type: IMPLANTABLE DEVICE | Site: PENIS | Status: FUNCTIONAL
Brand: PERCUFLEX™

## 2023-03-22 RX ORDER — MIDAZOLAM HYDROCHLORIDE 1 MG/ML
INJECTION INTRAMUSCULAR; INTRAVENOUS PRN
Status: DISCONTINUED | OUTPATIENT
Start: 2023-03-22 | End: 2023-03-22 | Stop reason: SDUPTHER

## 2023-03-22 RX ORDER — HYDRALAZINE HYDROCHLORIDE 20 MG/ML
10 INJECTION INTRAMUSCULAR; INTRAVENOUS
Status: DISCONTINUED | OUTPATIENT
Start: 2023-03-22 | End: 2023-03-22 | Stop reason: HOSPADM

## 2023-03-22 RX ORDER — SODIUM CHLORIDE 0.9 % (FLUSH) 0.9 %
5-40 SYRINGE (ML) INJECTION EVERY 12 HOURS SCHEDULED
Status: DISCONTINUED | OUTPATIENT
Start: 2023-03-22 | End: 2023-03-22 | Stop reason: HOSPADM

## 2023-03-22 RX ORDER — SODIUM CHLORIDE 0.9 % (FLUSH) 0.9 %
5-40 SYRINGE (ML) INJECTION PRN
Status: DISCONTINUED | OUTPATIENT
Start: 2023-03-22 | End: 2023-03-22 | Stop reason: HOSPADM

## 2023-03-22 RX ORDER — OXYCODONE HYDROCHLORIDE 5 MG/1
5 TABLET ORAL
Status: DISCONTINUED | OUTPATIENT
Start: 2023-03-22 | End: 2023-03-22 | Stop reason: HOSPADM

## 2023-03-22 RX ORDER — LEVOFLOXACIN 5 MG/ML
500 INJECTION, SOLUTION INTRAVENOUS
Status: COMPLETED | OUTPATIENT
Start: 2023-03-22 | End: 2023-03-22

## 2023-03-22 RX ORDER — SODIUM CHLORIDE, SODIUM LACTATE, POTASSIUM CHLORIDE, CALCIUM CHLORIDE 600; 310; 30; 20 MG/100ML; MG/100ML; MG/100ML; MG/100ML
INJECTION, SOLUTION INTRAVENOUS CONTINUOUS
Status: DISCONTINUED | OUTPATIENT
Start: 2023-03-22 | End: 2023-03-22 | Stop reason: HOSPADM

## 2023-03-22 RX ORDER — DROPERIDOL 2.5 MG/ML
0.62 INJECTION, SOLUTION INTRAMUSCULAR; INTRAVENOUS
Status: DISCONTINUED | OUTPATIENT
Start: 2023-03-22 | End: 2023-03-22 | Stop reason: HOSPADM

## 2023-03-22 RX ORDER — LABETALOL HYDROCHLORIDE 5 MG/ML
10 INJECTION, SOLUTION INTRAVENOUS
Status: DISCONTINUED | OUTPATIENT
Start: 2023-03-22 | End: 2023-03-22 | Stop reason: HOSPADM

## 2023-03-22 RX ORDER — SODIUM CHLORIDE 9 MG/ML
INJECTION, SOLUTION INTRAVENOUS CONTINUOUS PRN
Status: DISCONTINUED | OUTPATIENT
Start: 2023-03-22 | End: 2023-03-22 | Stop reason: SDUPTHER

## 2023-03-22 RX ORDER — DIPHENHYDRAMINE HYDROCHLORIDE 50 MG/ML
12.5 INJECTION INTRAMUSCULAR; INTRAVENOUS
Status: DISCONTINUED | OUTPATIENT
Start: 2023-03-22 | End: 2023-03-22 | Stop reason: HOSPADM

## 2023-03-22 RX ORDER — LIDOCAINE HYDROCHLORIDE 20 MG/ML
INJECTION, SOLUTION INFILTRATION; PERINEURAL PRN
Status: DISCONTINUED | OUTPATIENT
Start: 2023-03-22 | End: 2023-03-22 | Stop reason: SDUPTHER

## 2023-03-22 RX ORDER — PROPOFOL 10 MG/ML
INJECTION, EMULSION INTRAVENOUS CONTINUOUS PRN
Status: DISCONTINUED | OUTPATIENT
Start: 2023-03-22 | End: 2023-03-22 | Stop reason: SDUPTHER

## 2023-03-22 RX ORDER — FENTANYL CITRATE 50 UG/ML
INJECTION, SOLUTION INTRAMUSCULAR; INTRAVENOUS PRN
Status: DISCONTINUED | OUTPATIENT
Start: 2023-03-22 | End: 2023-03-22 | Stop reason: SDUPTHER

## 2023-03-22 RX ORDER — SODIUM CHLORIDE 9 MG/ML
INJECTION, SOLUTION INTRAVENOUS CONTINUOUS
Status: DISCONTINUED | OUTPATIENT
Start: 2023-03-22 | End: 2023-03-22 | Stop reason: HOSPADM

## 2023-03-22 RX ORDER — PROPOFOL 10 MG/ML
INJECTION, EMULSION INTRAVENOUS PRN
Status: DISCONTINUED | OUTPATIENT
Start: 2023-03-22 | End: 2023-03-22 | Stop reason: SDUPTHER

## 2023-03-22 RX ORDER — SODIUM CHLORIDE 9 MG/ML
INJECTION, SOLUTION INTRAVENOUS PRN
Status: DISCONTINUED | OUTPATIENT
Start: 2023-03-22 | End: 2023-03-22 | Stop reason: HOSPADM

## 2023-03-22 RX ORDER — ONDANSETRON 2 MG/ML
4 INJECTION INTRAMUSCULAR; INTRAVENOUS
Status: DISCONTINUED | OUTPATIENT
Start: 2023-03-22 | End: 2023-03-22 | Stop reason: HOSPADM

## 2023-03-22 RX ORDER — DEXTROSE MONOHYDRATE 100 MG/ML
INJECTION, SOLUTION INTRAVENOUS CONTINUOUS PRN
Status: DISCONTINUED | OUTPATIENT
Start: 2023-03-22 | End: 2023-03-22 | Stop reason: HOSPADM

## 2023-03-22 RX ORDER — ACETAMINOPHEN 325 MG/1
650 TABLET ORAL
Status: DISCONTINUED | OUTPATIENT
Start: 2023-03-22 | End: 2023-03-22 | Stop reason: HOSPADM

## 2023-03-22 RX ORDER — OXYCODONE HYDROCHLORIDE AND ACETAMINOPHEN 5; 325 MG/1; MG/1
1 TABLET ORAL EVERY 6 HOURS PRN
Qty: 12 TABLET | Refills: 0 | Status: SHIPPED | OUTPATIENT
Start: 2023-03-22 | End: 2023-03-25

## 2023-03-22 RX ADMIN — SODIUM CHLORIDE: 9 INJECTION, SOLUTION INTRAVENOUS at 09:00

## 2023-03-22 RX ADMIN — PROPOFOL 150 MCG/KG/MIN: 10 INJECTION, EMULSION INTRAVENOUS at 10:32

## 2023-03-22 RX ADMIN — MIDAZOLAM 1 MG: 1 INJECTION INTRAMUSCULAR; INTRAVENOUS at 10:25

## 2023-03-22 RX ADMIN — FENTANYL CITRATE 50 MCG: 50 INJECTION, SOLUTION INTRAMUSCULAR; INTRAVENOUS at 10:32

## 2023-03-22 RX ADMIN — PROPOFOL 50 MG: 10 INJECTION, EMULSION INTRAVENOUS at 10:32

## 2023-03-22 RX ADMIN — LEVOFLOXACIN 500 MG: 5 INJECTION, SOLUTION INTRAVENOUS at 09:23

## 2023-03-22 RX ADMIN — LIDOCAINE HYDROCHLORIDE 40 MG: 20 INJECTION, SOLUTION INFILTRATION; PERINEURAL at 10:32

## 2023-03-22 RX ADMIN — MIDAZOLAM 1 MG: 1 INJECTION INTRAMUSCULAR; INTRAVENOUS at 10:40

## 2023-03-22 RX ADMIN — FENTANYL CITRATE 50 MCG: 50 INJECTION, SOLUTION INTRAMUSCULAR; INTRAVENOUS at 10:40

## 2023-03-22 ASSESSMENT — ENCOUNTER SYMPTOMS
SHORTNESS OF BREATH: 1
DYSPNEA ACTIVITY LEVEL: NO INTERVAL CHANGE

## 2023-03-22 ASSESSMENT — PAIN SCALES - GENERAL
PAINLEVEL_OUTOF10: 0

## 2023-03-22 ASSESSMENT — PAIN - FUNCTIONAL ASSESSMENT
PAIN_FUNCTIONAL_ASSESSMENT: ACTIVITIES ARE NOT PREVENTED
PAIN_FUNCTIONAL_ASSESSMENT: 0-10

## 2023-03-22 ASSESSMENT — LIFESTYLE VARIABLES: SMOKING_STATUS: 0

## 2023-03-22 NOTE — DISCHARGE INSTRUCTIONS
Cystoscopy Discharge Instructions    You may experience : Burning sensation when you void     A feeling of a need to go to the bathroom frequently     Your urine may be blood tinged    These symptoms should be relieved within a few hours as you increase the amounts of fluids you drink and the number of times that you empty your bladder. We recommended that you drink plenty of fluid a few days after surgery. No strenuous activities until you talk to your doctor. You may feel light headed up to 24 hours after anesthesia. You should not do the following for the next 24 hours. Drive a car, operate machinery or power tools     Drink any alcoholic drinks (not even beer or wine)     Make any important decisions, i.e., signing important papers. It is recommended that you begin with clear liquids and/or light foods. If you  Are not nauseated, progress to your normal diet. If you are unable to urinate you should call your surgeon.

## 2023-03-22 NOTE — ANESTHESIA POSTPROCEDURE EVALUATION
Department of Anesthesiology  Postprocedure Note    Patient: Flores Turcios  MRN: [id number]  YOB: 1955  Date of evaluation: 3/22/2023      Procedure Summary     Date: 03/22/23 Room / Location: [facility] OR 06 / [facility]    Anesthesia Start: 1025 Anesthesia Stop:     Procedure: CYSTOSCOPY RETROGRADE PYELOGRAM URETEROSCOPY LASER ABLATION OF URETERAL TUMOR RIGHT STENT CHANGE (Right: Penis) Diagnosis:       Malignant neoplasm of ureter, unspecified laterality ([address]      (Malignant neoplasm of ureter, unspecified laterality ([address] [C66.9])    Surgeons: Johan Bryant MD Responsible Provider: Chelly Arriaga MD    Anesthesia Type: MAC ASA Status: 3          Anesthesia Type: No value filed.     Wilman Phase I: Wilman Score: 10    Wilman Phase II:        Anesthesia Post Evaluation    Patient location during evaluation: PACU  Patient participation: complete - patient participated  Level of consciousness: awake  Airway patency: patent  Nausea & Vomiting: no nausea and no vomiting  Complications: no  Cardiovascular status: hemodynamically stable  Respiratory status: acceptable  Hydration status: euvolemic

## 2023-03-22 NOTE — ANESTHESIA PRE PROCEDURE
angina pectoris I25.10    Hypertension I10    Pure hypercholesterolemia E78.00    Anemia D64.9    GERD (gastroesophageal reflux disease) K21.9    GARZA (dyspnea on exertion) R06.09    Left knee DJD M17.12    Moderate obesity E66.8    Near syncope R55    Anxiety attack F41.0    NSTEMI (non-ST elevated myocardial infarction) ([facility]) I21.4    Hiatal hernia K44.9    Dizziness R42    Cholelithiasis P68.20    Biliary colic U36.21    Combined forms of age-related cataract of both eyes H25.813    Angina pectoris, unspecified I20.9    Atherosclerotic heart disease of native coronary artery with unspecified angina pectoris I25.119    Ureteral mass N28.89       Past Medical History:        Diagnosis Date    Anemia     Anxiety attack     controlled with citolpram    CAD (coronary artery disease) 16 YRS AGO    MI X 2.  August Joyner yearly    Diabetes mellitus ([facility] [address]     GERD (gastroesophageal reflux disease)     Hematuria     Hypercholesterolemia 1994    Hyperlipidemia     Hypertension 06/12/2014    B/P IS ELEVATED, PATIENT STATES HE NEEDS TO GET HIS LOPRESSOR REFILLED    NSTEMI (non-ST elevated myocardial infarction) ([facility] [address] 05/28/2012    Ureteral mass        Past Surgical History:        Procedure Laterality Date    ANKLE SURGERY  6 YRS AGO    right ORIF    BLADDER SURGERY Right 2/27/2023    CYSTOSCOPY RETROGRADE PYELOGRAM RIGHT URETEROSCPY, RIGHT URETERAL STENT INSERTION performed by Florence Monroy MD at [facility] , LAPAROSCOPIC N/A 7/19/2019    CHOLECYSTECTOMY LAPAROSCOPIC ROBOTIC ASSISTED  XI performed by Garry Luis MD at [facility] COLONOSCOPY      COLONOSCOPY  07/20/2015    COLONOSCOPY N/A 9/16/2022    COLORECTAL CANCER SCREENING, NOT HIGH RISK performed by Garry Luis MD at [place] [address]  5/29/2012    Dr. Jessica Aburto, Stent Mid LAD    DIAGNOSTIC CARDIAC CATH LAB PROCEDURE  9/17/1998

## 2023-03-22 NOTE — H&P
ASSISTED PARAESOPHAGEAL HERNIA REPAIR WITH PARTIAL FUNDOPLICATION AND MYOFASCIAL FLAP, UPPER ENDOSCOPY performed by Laurel Jordan MD at [address] Right 2017    robotic assisted    INTRACAPSULAR CATARACT EXTRACTION Left 2021    LEFT EYE CATARACT EXTRACTION IOL IMPLANT performed by Elio Gramajo MD at [address] [facility] [address] [facility] [address] Left     knee    KNEE ARTHROPLASTY Left 2014    Left TKA. Dennis Persaud MD    TONSILLECTOMY      UPPER GASTROINTESTINAL ENDOSCOPY      UPPER GASTROINTESTINAL ENDOSCOPY N/A 9/10/2019    EGD BIOPSY performed by Laurel Jordan MD at [place] [address] N/A 2021    EGD BIOPSY performed by Laurel Jordan MD at [facility] History   Problem Relation Age of Onset    Hypertension Mother [de-identified]         from renal failure    Diabetes Mother     Coronary Art Dis Mother     Osteoporosis Mother     Osteoarthritis Father 80    High Cholesterol Brother     High Cholesterol Brother        Prior to Admission medications    Medication Sig Start Date End Date Taking?  Authorizing Provider   Semaglutide,0.25 or 0.5MG/DOS, (OZEMPIC, 0.25 OR 0.5 MG/DOSE,) 2 MG/1.5ML SOPN INJECT 0.25MG INTO THE SKIN ONE TIME PER WEEK 3/13/23   Flavia Phoenix Rudnicki, DO   tamsulosin (FLOMAX) 0.4 MG capsule Take 1 capsule by mouth daily 23   Joseph Nolen MD   atorvastatin (LIPITOR) 80 MG tablet TAKE 1 TABLET BY MOUTH EVERY DAY 23   Flavia Phoenix Rudnicki, DO   metoprolol tartrate (LOPRESSOR) 25 MG tablet TAKE 1 TABLET BY MOUTH TWICE A DAY 23   Flavia Phoenix Rudnicki, DO   amLODIPine (NORVASC) 10 MG tablet TAKE 1 TABLET BY MOUTH EVERY DAY  Patient taking differently: Take 10 mg by mouth every morning 23   Lisa Phoenix Theodora DO   empagliflozin (JARDIANCE) 10 MG tablet TAKE 1 TABLET BY MOUTH EVERY DAY 23   Flavia Phoenix Rudnicki, DO   nitroGLYCERIN (NITROSTAT) 0.4 MG SL tablet Place 1 tablet under the tongue every

## 2023-03-30 ENCOUNTER — CARE COORDINATION (OUTPATIENT)
Dept: CASE MANAGEMENT | Age: 68
End: 2023-03-30

## 2023-03-30 NOTE — CARE COORDINATION
[facility] Care Transitions Follow Up Call    Care Transition Nurse attempted subsequent CT outreach leaving Hipaa VM w/ my contact info. Patient: Ronnell Peabody  Patient : 1955   MRN: <[id number]>  Reason for Admission:  2023 - 3/1/2023 [facility]. Ureteral mass s/p CYSTOSCOPY RETROGRADE PYELOGRAM RIGHT URETEROSCPY, RIGHT URETERAL STENT INSERTION. (S/P cystoscopy, retrograde pyelogram, right ureteroscopy, right ureteral mass biopsy and excision of right ureteral kathy with right ureteral stent insertion. Acute hypoxic respiratory failure 2/2 LLL pneumonia). 3/22/2023 [facility]. s/p CYSTOSCOPY RETROGRADE PYELOGRAM URETEROSCOPY LASER ABLATION OF URETERAL TUMOR RIGHT STENT CHANGE  Discharge Date: 3/22/23 RARS: No data recorded  NR  CT      Uro/Drevna 3/9 9:00 Attended. PCP 3/8/23 Attended.     Jayna Holder RN

## 2023-03-31 ENCOUNTER — CARE COORDINATION (OUTPATIENT)
Dept: CASE MANAGEMENT | Age: 68
End: 2023-03-31

## 2023-03-31 DIAGNOSIS — G72.0 CHOLESTEROL-LOWERING AGENT MYOPATHY: ICD-10-CM

## 2023-03-31 DIAGNOSIS — T46.6X5A CHOLESTEROL-LOWERING AGENT MYOPATHY: ICD-10-CM

## 2023-03-31 RX ORDER — LOSARTAN POTASSIUM 100 MG/1
TABLET ORAL
Qty: 90 TABLET | Refills: 1 | Status: SHIPPED | OUTPATIENT
Start: 2023-03-31

## 2023-03-31 RX ORDER — EZETIMIBE 10 MG/1
TABLET ORAL
Qty: 90 TABLET | Refills: 1 | Status: SHIPPED | OUTPATIENT
Start: 2023-03-31

## 2023-03-31 NOTE — CARE COORDINATION
[facility] Care Transitions Follow Up Call    Patient Current Location:  Home: [address] [place] [address]    Care Transition Nurse contacted the patient by telephone to follow up after admission. Verified name and  with patient as identifiers. Patient: Ivette Weiner  Patient : 1955   MRN: <[id number]>  Reason for Admission: 2023 - 3/1/2023 [address] [place] [address] Blvd. Ureteral mass s/p CYSTOSCOPY RETROGRADE PYELOGRAM RIGHT URETEROSCPY, RIGHT URETERAL STENT INSERTION. (S/P cystoscopy, retrograde pyelogram, right ureteroscopy, right ureteral mass biopsy and excision of right ureteral kathy with right ureteral stent insertion. Acute hypoxic respiratory failure 2/2 LLL pneumonia). 3/22/2023 [address] [place], [place] [address]vd. s/p CYSTOSCOPY RETROGRADE PYELOGRAM URETEROSCOPY LASER ABLATION OF URETERAL TUMOR RIGHT STENT CHANGE. Discharge Date: 3/22/23 RARS: No data recorded  NR  CT      Uro/Drevna 3/9 9:00 Attended. PCP 3/8/23 Attended. Needs to be reviewed by the provider   Additional needs identified to be addressed with provider: No  none             Method of communication with provider: none. Angeles Scale reports no concerns since stent exchange. No urinary stream concerns. Reports urine color as clear yellow. No fevers, chills, or flu-like symptoms. He has no edema concerns or SOB. States no home or med refill needs. Declines need for ACM. Addressed changes since last contact:  none  Discussed follow-up appointments. If no appointment was previously scheduled, appointment scheduling offered: Yes. Is follow up appointment scheduled within 7 days of discharge? Uro/Drevna 3/9 9:00 Attended. PCP 3/8/23 Attended.     Follow Up  Future Appointments   Date Time Provider Gt Huang   2023  8:00 AM DO Ladonna Calohun [facility]     Non-[facility] follow up appointment(s):     Care Transition Nurse reviewed red flags with patient and discussed any barriers to care and/or

## 2023-04-27 ENCOUNTER — OFFICE VISIT (OUTPATIENT)
Dept: ORTHOPEDIC SURGERY | Age: 68
End: 2023-04-27

## 2023-04-27 VITALS — BODY MASS INDEX: 29.25 KG/M2 | WEIGHT: 193 LBS | HEIGHT: 68 IN

## 2023-04-27 DIAGNOSIS — M75.102 TEAR OF LEFT ROTATOR CUFF, UNSPECIFIED TEAR EXTENT, UNSPECIFIED WHETHER TRAUMATIC: Primary | ICD-10-CM

## 2023-04-27 RX ORDER — BUPIVACAINE HYDROCHLORIDE 2.5 MG/ML
2 INJECTION, SOLUTION INFILTRATION; PERINEURAL ONCE
Status: COMPLETED | OUTPATIENT
Start: 2023-04-27 | End: 2023-04-27

## 2023-04-27 RX ORDER — TRIAMCINOLONE ACETONIDE 40 MG/ML
40 INJECTION, SUSPENSION INTRA-ARTICULAR; INTRAMUSCULAR ONCE
Status: COMPLETED | OUTPATIENT
Start: 2023-04-27 | End: 2023-04-27

## 2023-04-27 RX ADMIN — TRIAMCINOLONE ACETONIDE 40 MG: 40 INJECTION, SUSPENSION INTRA-ARTICULAR; INTRAMUSCULAR at 11:45

## 2023-04-27 RX ADMIN — BUPIVACAINE HYDROCHLORIDE 5 MG: 2.5 INJECTION, SOLUTION INFILTRATION; PERINEURAL at 11:44

## 2023-04-27 NOTE — PROGRESS NOTES
Mother     Osteoarthritis Father 80    High Cholesterol Brother     High Cholesterol Brother        SOCIAL HISTORY  Social History     Occupational History    Not on file   Tobacco Use    Smoking status: Never    Smokeless tobacco: Never   Vaping Use    Vaping Use: Never used   Substance and Sexual Activity    Alcohol use: Yes     Comment: socially    Drug use: No    Sexual activity: Not on file       CURRENT MEDICATIONS     Current Outpatient Medications:     losartan (COZAAR) 100 MG tablet, TAKE 1 TABLET BY MOUTH EVERY DAY, Disp: 90 tablet, Rfl: 1    ezetimibe (ZETIA) 10 MG tablet, TAKE 1 TABLET BY MOUTH EVERY DAY, Disp: 90 tablet, Rfl: 1    Semaglutide,0.25 or 0.5MG/DOS, (OZEMPIC, 0.25 OR 0.5 MG/DOSE,) 2 MG/1.5ML SOPN, INJECT 0.25MG INTO THE SKIN ONE TIME PER WEEK, Disp: 3 mL, Rfl: 1    tamsulosin (FLOMAX) 0.4 MG capsule, Take 1 capsule by mouth daily, Disp: 30 capsule, Rfl: 0    atorvastatin (LIPITOR) 80 MG tablet, TAKE 1 TABLET BY MOUTH EVERY DAY, Disp: 90 tablet, Rfl: 1    metoprolol tartrate (LOPRESSOR) 25 MG tablet, TAKE 1 TABLET BY MOUTH TWICE A DAY, Disp: 180 tablet, Rfl: 1    amLODIPine (NORVASC) 10 MG tablet, TAKE 1 TABLET BY MOUTH EVERY DAY (Patient taking differently: Take 1 tablet by mouth every morning), Disp: 90 tablet, Rfl: 1    empagliflozin (JARDIANCE) 10 MG tablet, TAKE 1 TABLET BY MOUTH EVERY DAY, Disp: 90 tablet, Rfl: 1    nitroGLYCERIN (NITROSTAT) 0.4 MG SL tablet, Place 1 tablet under the tongue every 5 minutes as needed for Chest pain If chest pain: put 1 NTG tab under tongue - sit down - wait 5 min - if no relief, call 911. May repeat dose 2 more times 5 min apart while waiting for EMS (total of 3 doses). If dizzy do not take any further doses. , Disp: 90 tablet, Rfl: 1    citalopram (CELEXA) 40 MG tablet, TAKE 1 AND 1/2 TABLETS BY MOUTH EVERY MORNING, Disp: 135 tablet, Rfl: 1    omeprazole (PRILOSEC) 40 MG delayed release capsule, TAKE 1 CAPSULE BY MOUTH TWICE A DAY (Patient taking

## 2023-05-26 DIAGNOSIS — Z79.4 TYPE 2 DIABETES MELLITUS WITHOUT COMPLICATION, WITH LONG-TERM CURRENT USE OF INSULIN (HCC): ICD-10-CM

## 2023-05-26 DIAGNOSIS — E11.9 TYPE 2 DIABETES MELLITUS WITHOUT COMPLICATION, WITH LONG-TERM CURRENT USE OF INSULIN (HCC): ICD-10-CM

## 2023-05-26 RX ORDER — SEMAGLUTIDE 0.68 MG/ML
INJECTION, SOLUTION SUBCUTANEOUS
Qty: 3 ML | Refills: 1 | Status: ON HOLD | OUTPATIENT
Start: 2023-05-26

## 2023-05-27 ENCOUNTER — APPOINTMENT (OUTPATIENT)
Dept: GENERAL RADIOLOGY | Age: 68
DRG: 522 | End: 2023-05-27
Payer: MEDICARE

## 2023-05-27 ENCOUNTER — HOSPITAL ENCOUNTER (INPATIENT)
Age: 68
LOS: 4 days | Discharge: NURSING, REHAB OR LONG-TERM CARE | DRG: 522 | End: 2023-05-31
Attending: EMERGENCY MEDICINE | Admitting: STUDENT IN AN ORGANIZED HEALTH CARE EDUCATION/TRAINING PROGRAM
Payer: MEDICARE

## 2023-05-27 ENCOUNTER — APPOINTMENT (OUTPATIENT)
Dept: CT IMAGING | Age: 68
DRG: 522 | End: 2023-05-27
Payer: MEDICARE

## 2023-05-27 DIAGNOSIS — S72.145A CLOSED NONDISPLACED INTERTROCHANTERIC FRACTURE OF LEFT FEMUR, INITIAL ENCOUNTER (HCC): ICD-10-CM

## 2023-05-27 DIAGNOSIS — S72.002A CLOSED FRACTURE OF NECK OF LEFT FEMUR, INITIAL ENCOUNTER (HCC): ICD-10-CM
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
## 2023-05-27 LAB
ABO + RH BLD: NORMAL
ALBUMIN SERPL-MCNC: 4.2 G/DL (ref 3.5–5.2)
ALP SERPL-CCNC: 118 U/L (ref 40–129)
ALT SERPL-CCNC: 33 U/L (ref 0–40)
ANION GAP SERPL CALCULATED.3IONS-SCNC: 12 MMOL/L (ref 7–16)
APTT BLD: 30.2 SEC (ref 24.5–35.1)
AST SERPL-CCNC: 28 U/L (ref 0–39)
BASOPHILS # BLD: 0.06 E9/L (ref 0–0.2)
BASOPHILS NFR BLD: 0.6 % (ref 0–2)
BILIRUB SERPL-MCNC: 1.9 MG/DL (ref 0–1.2)
BILIRUB UR QL STRIP: NEGATIVE
BLD GP AB SCN SERPL QL: NORMAL
BUN SERPL-MCNC: 23 MG/DL (ref 6–23)
CALCIUM SERPL-MCNC: 9.3 MG/DL (ref 8.6–10.2)
CHLORIDE SERPL-SCNC: 101 MMOL/L (ref 98–107)
CLARITY UR: CLEAR
CO2 SERPL-SCNC: 25 MMOL/L (ref 22–29)
COLOR UR: YELLOW
CREAT SERPL-MCNC: 1 MG/DL (ref 0.7–1.2)
EOSINOPHIL # BLD: 0.25 E9/L (ref 0.05–0.5)
EOSINOPHIL NFR BLD: 2.3 % (ref 0–6)
ERYTHROCYTE [DISTWIDTH] IN BLOOD BY AUTOMATED COUNT: 12.8 FL (ref 11.5–15)
GLUCOSE SERPL-MCNC: 159 MG/DL (ref 74–99)
GLUCOSE UR STRIP-MCNC: 500 MG/DL
HBA1C MFR BLD: 6.1 % (ref 4–5.6)
HCT VFR BLD AUTO: 46.6 % (ref 37–54)
HGB BLD-MCNC: 15.9 G/DL (ref 12.5–16.5)
HGB UR QL STRIP: NEGATIVE
IMM GRANULOCYTES # BLD: 0.09 E9/L
IMM GRANULOCYTES NFR BLD: 0.8 % (ref 0–5)
INR BLD: 1.1
KETONES UR STRIP-MCNC: ABNORMAL MG/DL
LEUKOCYTE ESTERASE UR QL STRIP: NEGATIVE
LYMPHOCYTES # BLD: 1.7 E9/L (ref 1.5–4)
LYMPHOCYTES NFR BLD: 15.9 % (ref 20–42)
MCH RBC QN AUTO: 29.8 PG (ref 26–35)
MCHC RBC AUTO-ENTMCNC: 34.1 % (ref 32–34.5)
MCV RBC AUTO: 87.4 FL (ref 80–99.9)
MONOCYTES # BLD: 0.68 E9/L (ref 0.1–0.95)
MONOCYTES NFR BLD: 6.4 % (ref 2–12)
NEUTROPHILS # BLD: 7.88 E9/L (ref 1.8–7.3)
NEUTS SEG NFR BLD: 74 % (ref 43–80)
NITRITE UR QL STRIP: NEGATIVE
PH UR STRIP: 6 [PH] (ref 5–9)
PLATELET # BLD AUTO: 209 E9/L (ref 130–450)
PMV BLD AUTO: 10.4 FL (ref 7–12)
POTASSIUM SERPL-SCNC: 3.5 MMOL/L (ref 3.5–5)
PROT SERPL-MCNC: 6.7 G/DL (ref 6.4–8.3)
PROT UR STRIP-MCNC: NEGATIVE MG/DL
PROTHROMBIN TIME: 11.7 SEC (ref 9.3–12.4)
RBC # BLD AUTO: 5.33 E12/L (ref 3.8–5.8)
SODIUM SERPL-SCNC: 138 MMOL/L (ref 132–146)
SP GR UR STRIP: 1.02 (ref 1–1.03)
TROPONIN, HIGH SENSITIVITY: 16 NG/L (ref 0–11)
TROPONIN, HIGH SENSITIVITY: 18 NG/L (ref 0–11)
UROBILINOGEN UR STRIP-ACNC: 0.2 E.U./DL
WBC # BLD: 10.7 E9/L (ref 4.5–11.5)

## 2023-05-27 PROCEDURE — 2060000000 HC ICU INTERMEDIATE R&B

## 2023-05-27 PROCEDURE — 86850 RBC ANTIBODY SCREEN: CPT

## 2023-05-27 PROCEDURE — 6360000002 HC RX W HCPCS: Performed by: INTERNAL MEDICINE

## 2023-05-27 PROCEDURE — 85610 PROTHROMBIN TIME: CPT

## 2023-05-27 PROCEDURE — 85730 THROMBOPLASTIN TIME PARTIAL: CPT

## 2023-05-27 PROCEDURE — 96374 THER/PROPH/DIAG INJ IV PUSH: CPT

## 2023-05-27 PROCEDURE — 70450 CT HEAD/BRAIN W/O DYE: CPT

## 2023-05-27 PROCEDURE — 99285 EMERGENCY DEPT VISIT HI MDM: CPT

## 2023-05-27 PROCEDURE — 80053 COMPREHEN METABOLIC PANEL: CPT

## 2023-05-27 PROCEDURE — 2500000003 HC RX 250 WO HCPCS: Performed by: NURSE PRACTITIONER

## 2023-05-27 PROCEDURE — 86900 BLOOD TYPING SEROLOGIC ABO: CPT

## 2023-05-27 PROCEDURE — 81003 URINALYSIS AUTO W/O SCOPE: CPT

## 2023-05-27 PROCEDURE — 71045 X-RAY EXAM CHEST 1 VIEW: CPT

## 2023-05-27 PROCEDURE — 86901 BLOOD TYPING SEROLOGIC RH(D): CPT

## 2023-05-27 PROCEDURE — 96376 TX/PRO/DX INJ SAME DRUG ADON: CPT

## 2023-05-27 PROCEDURE — 85025 COMPLETE CBC W/AUTO DIFF WBC: CPT

## 2023-05-27 PROCEDURE — 73502 X-RAY EXAM HIP UNI 2-3 VIEWS: CPT

## 2023-05-27 PROCEDURE — 83036 HEMOGLOBIN GLYCOSYLATED A1C: CPT

## 2023-05-27 PROCEDURE — 84484 ASSAY OF TROPONIN QUANT: CPT

## 2023-05-27 PROCEDURE — 72125 CT NECK SPINE W/O DYE: CPT

## 2023-05-27 PROCEDURE — 73552 X-RAY EXAM OF FEMUR 2/>: CPT

## 2023-05-27 PROCEDURE — 93005 ELECTROCARDIOGRAM TRACING: CPT | Performed by: NURSE PRACTITIONER

## 2023-05-27 RX ORDER — DEXTROSE MONOHYDRATE 100 MG/ML
INJECTION, SOLUTION INTRAVENOUS CONTINUOUS PRN
Status: DISCONTINUED | OUTPATIENT
Start: 2023-05-27 | End: 2023-05-31 | Stop reason: HOSPADM

## 2023-05-27 RX ORDER — HYDROMORPHONE HYDROCHLORIDE 1 MG/ML
1 INJECTION, SOLUTION INTRAMUSCULAR; INTRAVENOUS; SUBCUTANEOUS ONCE
Status: COMPLETED | OUTPATIENT
Start: 2023-05-27 | End: 2023-05-27

## 2023-05-27 RX ORDER — INSULIN LISPRO 100 [IU]/ML
0-8 INJECTION, SOLUTION INTRAVENOUS; SUBCUTANEOUS
Status: DISCONTINUED | OUTPATIENT
Start: 2023-05-27 | End: 2023-05-31 | Stop reason: HOSPADM

## 2023-05-27 RX ORDER — MORPHINE SULFATE 2 MG/ML
2 INJECTION, SOLUTION INTRAMUSCULAR; INTRAVENOUS
Status: DISCONTINUED | OUTPATIENT
Start: 2023-05-27 | End: 2023-05-28

## 2023-05-27 RX ORDER — INSULIN LISPRO 100 [IU]/ML
0-4 INJECTION, SOLUTION INTRAVENOUS; SUBCUTANEOUS NIGHTLY
Status: DISCONTINUED | OUTPATIENT
Start: 2023-05-27 | End: 2023-05-31 | Stop reason: HOSPADM

## 2023-05-27 RX ADMIN — HYDROMORPHONE HYDROCHLORIDE 1 MG: 1 INJECTION, SOLUTION INTRAMUSCULAR; INTRAVENOUS; SUBCUTANEOUS at 18:56

## 2023-05-27 RX ADMIN — MORPHINE SULFATE 2 MG: 2 INJECTION, SOLUTION INTRAMUSCULAR; INTRAVENOUS at 21:16

## 2023-05-27 RX ADMIN — HYDROMORPHONE HYDROCHLORIDE 1 MG: 1 INJECTION, SOLUTION INTRAMUSCULAR; INTRAVENOUS; SUBCUTANEOUS at 16:21

## 2023-05-27 ASSESSMENT — PAIN SCALES - GENERAL
PAINLEVEL_OUTOF10: 9
PAINLEVEL_OUTOF10: 10

## 2023-05-27 ASSESSMENT — PAIN DESCRIPTION - ORIENTATION: ORIENTATION: LEFT

## 2023-05-27 ASSESSMENT — PAIN DESCRIPTION - LOCATION: LOCATION: HIP

## 2023-05-27 ASSESSMENT — PAIN - FUNCTIONAL ASSESSMENT: PAIN_FUNCTIONAL_ASSESSMENT: 0-10

## 2023-05-28 ENCOUNTER — ANESTHESIA EVENT (OUTPATIENT)
Dept: OPERATING ROOM | Age: 68
DRG: 522 | End: 2023-05-28
Payer: MEDICARE

## 2023-05-28 ENCOUNTER — ANESTHESIA (OUTPATIENT)
Dept: OPERATING ROOM | Age: 68
DRG: 522 | End: 2023-05-28
Payer: MEDICARE

## 2023-05-28 LAB
ANION GAP SERPL CALCULATED.3IONS-SCNC: 11 MMOL/L (ref 7–16)
BASOPHILS # BLD: 0.06 E9/L (ref 0–0.2)
BASOPHILS NFR BLD: 0.5 % (ref 0–2)
BUN SERPL-MCNC: 20 MG/DL (ref 6–23)
CALCIUM SERPL-MCNC: 9 MG/DL (ref 8.6–10.2)
CHLORIDE SERPL-SCNC: 103 MMOL/L (ref 98–107)
CHP ED QC CHECK: NORMAL
CO2 SERPL-SCNC: 24 MMOL/L (ref 22–29)
CREAT SERPL-MCNC: 0.8 MG/DL (ref 0.7–1.2)
EOSINOPHIL # BLD: 0.25 E9/L (ref 0.05–0.5)
EOSINOPHIL NFR BLD: 2.2 % (ref 0–6)
ERYTHROCYTE [DISTWIDTH] IN BLOOD BY AUTOMATED COUNT: 13 FL (ref 11.5–15)
GLUCOSE BLD-MCNC: 105 MG/DL
GLUCOSE SERPL-MCNC: 117 MG/DL (ref 74–99)
HCT VFR BLD AUTO: 44.6 % (ref 37–54)
HGB BLD-MCNC: 15 G/DL (ref 12.5–16.5)
IMM GRANULOCYTES # BLD: 0.05 E9/L
IMM GRANULOCYTES NFR BLD: 0.4 % (ref 0–5)
LYMPHOCYTES # BLD: 1.39 E9/L (ref 1.5–4)
LYMPHOCYTES NFR BLD: 12 % (ref 20–42)
MCH RBC QN AUTO: 29.8 PG (ref 26–35)
MCHC RBC AUTO-ENTMCNC: 33.6 % (ref 32–34.5)
MCV RBC AUTO: 88.7 FL (ref 80–99.9)
METER GLUCOSE: 105 MG/DL (ref 74–99)
METER GLUCOSE: 112 MG/DL (ref 74–99)
METER GLUCOSE: 116 MG/DL (ref 74–99)
MONOCYTES # BLD: 0.69 E9/L (ref 0.1–0.95)
MONOCYTES NFR BLD: 5.9 % (ref 2–12)
NEUTROPHILS # BLD: 9.18 E9/L (ref 1.8–7.3)
NEUTS SEG NFR BLD: 79 % (ref 43–80)
PLATELET # BLD AUTO: 208 E9/L (ref 130–450)
PMV BLD AUTO: 10.2 FL (ref 7–12)
POTASSIUM SERPL-SCNC: 3.6 MMOL/L (ref 3.5–5)
RBC # BLD AUTO: 5.03 E12/L (ref 3.8–5.8)
SODIUM SERPL-SCNC: 138 MMOL/L (ref 132–146)
WBC # BLD: 11.6 E9/L (ref 4.5–11.5)

## 2023-05-28 PROCEDURE — 2060000000 HC ICU INTERMEDIATE R&B

## 2023-05-28 PROCEDURE — 85025 COMPLETE CBC W/AUTO DIFF WBC: CPT

## 2023-05-28 PROCEDURE — 80048 BASIC METABOLIC PNL TOTAL CA: CPT

## 2023-05-28 PROCEDURE — 6360000002 HC RX W HCPCS: Performed by: INTERNAL MEDICINE

## 2023-05-28 PROCEDURE — 2500000003 HC RX 250 WO HCPCS: Performed by: NURSE PRACTITIONER

## 2023-05-28 PROCEDURE — 6370000000 HC RX 637 (ALT 250 FOR IP): Performed by: INTERNAL MEDICINE

## 2023-05-28 PROCEDURE — 2500000003 HC RX 250 WO HCPCS: Performed by: INTERNAL MEDICINE

## 2023-05-28 PROCEDURE — 82962 GLUCOSE BLOOD TEST: CPT

## 2023-05-28 PROCEDURE — 2580000003 HC RX 258: Performed by: INTERNAL MEDICINE

## 2023-05-28 RX ORDER — ATORVASTATIN CALCIUM 80 MG/1
80 TABLET, FILM COATED ORAL DAILY
Status: ON HOLD | COMMUNITY

## 2023-05-28 RX ORDER — CITALOPRAM 10 MG/1
10 TABLET ORAL DAILY
Status: DISCONTINUED | OUTPATIENT
Start: 2023-05-28 | End: 2023-05-28

## 2023-05-28 RX ORDER — ACETAMINOPHEN 325 MG/1
650 TABLET ORAL EVERY 6 HOURS PRN
Status: DISCONTINUED | OUTPATIENT
Start: 2023-05-28 | End: 2023-05-31 | Stop reason: HOSPADM

## 2023-05-28 RX ORDER — ACETAMINOPHEN 650 MG/1
650 SUPPOSITORY RECTAL EVERY 6 HOURS PRN
Status: DISCONTINUED | OUTPATIENT
Start: 2023-05-28 | End: 2023-05-31 | Stop reason: HOSPADM

## 2023-05-28 RX ORDER — POTASSIUM CHLORIDE 7.45 MG/ML
10 INJECTION INTRAVENOUS PRN
Status: DISCONTINUED | OUTPATIENT
Start: 2023-05-28 | End: 2023-05-31 | Stop reason: HOSPADM

## 2023-05-28 RX ORDER — SODIUM CHLORIDE 0.9 % (FLUSH) 0.9 %
10 SYRINGE (ML) INJECTION EVERY 12 HOURS SCHEDULED
Status: DISCONTINUED | OUTPATIENT
Start: 2023-05-28 | End: 2023-05-31 | Stop reason: HOSPADM

## 2023-05-28 RX ORDER — SODIUM CHLORIDE 9 MG/ML
INJECTION, SOLUTION INTRAVENOUS PRN
Status: DISCONTINUED | OUTPATIENT
Start: 2023-05-28 | End: 2023-05-31 | Stop reason: HOSPADM

## 2023-05-28 RX ORDER — SODIUM CHLORIDE 0.9 % (FLUSH) 0.9 %
10 SYRINGE (ML) INJECTION PRN
Status: DISCONTINUED | OUTPATIENT
Start: 2023-05-28 | End: 2023-05-31 | Stop reason: HOSPADM

## 2023-05-28 RX ORDER — HYDRALAZINE HYDROCHLORIDE 20 MG/ML
10 INJECTION INTRAMUSCULAR; INTRAVENOUS EVERY 6 HOURS PRN
Status: DISCONTINUED | OUTPATIENT
Start: 2023-05-28 | End: 2023-05-31 | Stop reason: HOSPADM

## 2023-05-28 RX ORDER — FERROUS SULFATE 325(65) MG
325 TABLET ORAL
Status: DISCONTINUED | OUTPATIENT
Start: 2023-05-28 | End: 2023-05-31 | Stop reason: HOSPADM

## 2023-05-28 RX ORDER — ASCORBIC ACID 500 MG
500 TABLET ORAL EVERY MORNING
Status: DISCONTINUED | OUTPATIENT
Start: 2023-05-28 | End: 2023-05-28

## 2023-05-28 RX ORDER — EZETIMIBE 10 MG/1
10 TABLET ORAL DAILY
Status: ON HOLD | COMMUNITY

## 2023-05-28 RX ORDER — PROMETHAZINE HYDROCHLORIDE 12.5 MG/1
12.5 TABLET ORAL EVERY 6 HOURS PRN
Status: DISCONTINUED | OUTPATIENT
Start: 2023-05-28 | End: 2023-05-31 | Stop reason: HOSPADM

## 2023-05-28 RX ORDER — CITALOPRAM 40 MG/1
60 TABLET ORAL DAILY
Status: ON HOLD | COMMUNITY

## 2023-05-28 RX ORDER — LOSARTAN POTASSIUM 50 MG/1
100 TABLET ORAL DAILY
Status: DISCONTINUED | OUTPATIENT
Start: 2023-05-28 | End: 2023-05-29

## 2023-05-28 RX ORDER — PANTOPRAZOLE SODIUM 40 MG/1
40 TABLET, DELAYED RELEASE ORAL
Status: DISCONTINUED | OUTPATIENT
Start: 2023-05-28 | End: 2023-05-31 | Stop reason: HOSPADM

## 2023-05-28 RX ORDER — MAGNESIUM SULFATE IN WATER 40 MG/ML
2000 INJECTION, SOLUTION INTRAVENOUS PRN
Status: DISCONTINUED | OUTPATIENT
Start: 2023-05-28 | End: 2023-05-31 | Stop reason: HOSPADM

## 2023-05-28 RX ORDER — ONDANSETRON 2 MG/ML
4 INJECTION INTRAMUSCULAR; INTRAVENOUS EVERY 6 HOURS PRN
Status: DISCONTINUED | OUTPATIENT
Start: 2023-05-28 | End: 2023-05-31 | Stop reason: HOSPADM

## 2023-05-28 RX ORDER — FLUTICASONE PROPIONATE 50 MCG
1 SPRAY, SUSPENSION (ML) NASAL DAILY PRN
Status: DISCONTINUED | OUTPATIENT
Start: 2023-05-28 | End: 2023-05-31 | Stop reason: HOSPADM

## 2023-05-28 RX ORDER — TAMSULOSIN HYDROCHLORIDE 0.4 MG/1
0.4 CAPSULE ORAL DAILY
Status: DISCONTINUED | OUTPATIENT
Start: 2023-05-28 | End: 2023-05-31 | Stop reason: HOSPADM

## 2023-05-28 RX ORDER — NITROGLYCERIN 0.4 MG/1
0.4 TABLET SUBLINGUAL EVERY 5 MIN PRN
Status: DISCONTINUED | OUTPATIENT
Start: 2023-05-28 | End: 2023-05-31 | Stop reason: HOSPADM

## 2023-05-28 RX ORDER — HYDROMORPHONE HYDROCHLORIDE 1 MG/ML
1.25 INJECTION, SOLUTION INTRAMUSCULAR; INTRAVENOUS; SUBCUTANEOUS
Status: DISCONTINUED | OUTPATIENT
Start: 2023-05-28 | End: 2023-05-31

## 2023-05-28 RX ORDER — ENOXAPARIN SODIUM 100 MG/ML
40 INJECTION SUBCUTANEOUS DAILY
Status: DISCONTINUED | OUTPATIENT
Start: 2023-05-28 | End: 2023-05-29

## 2023-05-28 RX ORDER — AMLODIPINE BESYLATE 10 MG/1
10 TABLET ORAL EVERY MORNING
Status: DISCONTINUED | OUTPATIENT
Start: 2023-05-28 | End: 2023-05-31 | Stop reason: HOSPADM

## 2023-05-28 RX ORDER — LOSARTAN POTASSIUM 100 MG/1
100 TABLET ORAL DAILY
Status: ON HOLD | COMMUNITY

## 2023-05-28 RX ORDER — AMLODIPINE BESYLATE 10 MG/1
10 TABLET ORAL DAILY
Status: ON HOLD | COMMUNITY

## 2023-05-28 RX ORDER — HYDROMORPHONE HYDROCHLORIDE 1 MG/ML
1 INJECTION, SOLUTION INTRAMUSCULAR; INTRAVENOUS; SUBCUTANEOUS
Status: DISCONTINUED | OUTPATIENT
Start: 2023-05-28 | End: 2023-05-28

## 2023-05-28 RX ORDER — POTASSIUM CHLORIDE 20 MEQ/1
40 TABLET, EXTENDED RELEASE ORAL PRN
Status: DISCONTINUED | OUTPATIENT
Start: 2023-05-28 | End: 2023-05-31 | Stop reason: HOSPADM

## 2023-05-28 RX ORDER — ATORVASTATIN CALCIUM 40 MG/1
80 TABLET, FILM COATED ORAL DAILY
Status: DISCONTINUED | OUTPATIENT
Start: 2023-05-28 | End: 2023-05-31 | Stop reason: HOSPADM

## 2023-05-28 RX ORDER — OMEPRAZOLE 40 MG/1
40 CAPSULE, DELAYED RELEASE ORAL 2 TIMES DAILY
Status: ON HOLD | COMMUNITY

## 2023-05-28 RX ORDER — ASPIRIN 81 MG/1
81 TABLET, CHEWABLE ORAL DAILY
Status: DISCONTINUED | OUTPATIENT
Start: 2023-05-28 | End: 2023-05-29

## 2023-05-28 RX ORDER — EZETIMIBE 10 MG/1
10 TABLET ORAL DAILY
Status: DISCONTINUED | OUTPATIENT
Start: 2023-05-28 | End: 2023-05-31 | Stop reason: HOSPADM

## 2023-05-28 RX ORDER — POLYETHYLENE GLYCOL 3350 17 G/17G
17 POWDER, FOR SOLUTION ORAL DAILY PRN
Status: DISCONTINUED | OUTPATIENT
Start: 2023-05-28 | End: 2023-05-31 | Stop reason: HOSPADM

## 2023-05-28 RX ORDER — CITALOPRAM 20 MG/1
60 TABLET ORAL DAILY
Status: DISCONTINUED | OUTPATIENT
Start: 2023-05-28 | End: 2023-05-31 | Stop reason: HOSPADM

## 2023-05-28 RX ADMIN — HYDROMORPHONE HYDROCHLORIDE 1 MG: 1 INJECTION, SOLUTION INTRAMUSCULAR; INTRAVENOUS; SUBCUTANEOUS at 07:43

## 2023-05-28 RX ADMIN — METOPROLOL TARTRATE 25 MG: 25 TABLET, FILM COATED ORAL at 20:58

## 2023-05-28 RX ADMIN — HYDROMORPHONE HYDROCHLORIDE 1.25 MG: 1 INJECTION, SOLUTION INTRAMUSCULAR; INTRAVENOUS; SUBCUTANEOUS at 12:52

## 2023-05-28 RX ADMIN — HYDROMORPHONE HYDROCHLORIDE 1.25 MG: 1 INJECTION, SOLUTION INTRAMUSCULAR; INTRAVENOUS; SUBCUTANEOUS at 18:33

## 2023-05-28 RX ADMIN — MORPHINE SULFATE 2 MG: 2 INJECTION, SOLUTION INTRAMUSCULAR; INTRAVENOUS at 01:11

## 2023-05-28 RX ADMIN — HYDROMORPHONE HYDROCHLORIDE 1.25 MG: 1 INJECTION, SOLUTION INTRAMUSCULAR; INTRAVENOUS; SUBCUTANEOUS at 15:45

## 2023-05-28 RX ADMIN — HYDROMORPHONE HYDROCHLORIDE 1.25 MG: 1 INJECTION, SOLUTION INTRAMUSCULAR; INTRAVENOUS; SUBCUTANEOUS at 10:13

## 2023-05-28 RX ADMIN — SODIUM CHLORIDE, PRESERVATIVE FREE 10 ML: 5 INJECTION INTRAVENOUS at 20:59

## 2023-05-28 RX ADMIN — HYDROMORPHONE HYDROCHLORIDE 1 MG: 1 INJECTION, SOLUTION INTRAMUSCULAR; INTRAVENOUS; SUBCUTANEOUS at 02:48

## 2023-05-28 RX ADMIN — HYDROMORPHONE HYDROCHLORIDE 1 MG: 1 INJECTION, SOLUTION INTRAMUSCULAR; INTRAVENOUS; SUBCUTANEOUS at 05:43

## 2023-05-28 RX ADMIN — HYDROMORPHONE HYDROCHLORIDE 1.25 MG: 1 INJECTION, SOLUTION INTRAMUSCULAR; INTRAVENOUS; SUBCUTANEOUS at 20:58

## 2023-05-28 ASSESSMENT — PAIN DESCRIPTION - LOCATION
LOCATION: HIP
LOCATION: LEG
LOCATION: HIP
LOCATION: LEG
LOCATION: LEG
LOCATION: HIP
LOCATION: LEG

## 2023-05-28 ASSESSMENT — PAIN DESCRIPTION - DESCRIPTORS
DESCRIPTORS: ACHING;THROBBING
DESCRIPTORS: THROBBING;SHARP;STABBING
DESCRIPTORS: ACHING

## 2023-05-28 ASSESSMENT — PAIN SCALES - GENERAL
PAINLEVEL_OUTOF10: 9
PAINLEVEL_OUTOF10: 8
PAINLEVEL_OUTOF10: 9
PAINLEVEL_OUTOF10: 10
PAINLEVEL_OUTOF10: 9
PAINLEVEL_OUTOF10: 9
PAINLEVEL_OUTOF10: 10

## 2023-05-28 ASSESSMENT — PAIN DESCRIPTION - ORIENTATION
ORIENTATION: LEFT

## 2023-05-29 ENCOUNTER — APPOINTMENT (OUTPATIENT)
Dept: GENERAL RADIOLOGY | Age: 68
DRG: 522 | End: 2023-05-29
Payer: MEDICARE

## 2023-05-29 LAB
ANION GAP SERPL CALCULATED.3IONS-SCNC: 13 MMOL/L (ref 7–16)
BASOPHILS # BLD: 0.07 E9/L (ref 0–0.2)
BASOPHILS NFR BLD: 0.5 % (ref 0–2)
BUN SERPL-MCNC: 30 MG/DL (ref 6–23)
CALCIUM SERPL-MCNC: 9.8 MG/DL (ref 8.6–10.2)
CHLORIDE SERPL-SCNC: 102 MMOL/L (ref 98–107)
CO2 SERPL-SCNC: 27 MMOL/L (ref 22–29)
CREAT SERPL-MCNC: 0.8 MG/DL (ref 0.7–1.2)
EOSINOPHIL # BLD: 0.02 E9/L (ref 0.05–0.5)
EOSINOPHIL NFR BLD: 0.1 % (ref 0–6)
ERYTHROCYTE [DISTWIDTH] IN BLOOD BY AUTOMATED COUNT: 13 FL (ref 11.5–15)
GLUCOSE SERPL-MCNC: 140 MG/DL (ref 74–99)
HCT VFR BLD AUTO: 47.6 % (ref 37–54)
HGB BLD-MCNC: 15.8 G/DL (ref 12.5–16.5)
IMM GRANULOCYTES # BLD: 0.08 E9/L
IMM GRANULOCYTES NFR BLD: 0.5 % (ref 0–5)
LYMPHOCYTES # BLD: 0.69 E9/L (ref 1.5–4)
LYMPHOCYTES NFR BLD: 4.6 % (ref 20–42)
MCH RBC QN AUTO: 29.7 PG (ref 26–35)
MCHC RBC AUTO-ENTMCNC: 33.2 % (ref 32–34.5)
MCV RBC AUTO: 89.5 FL (ref 80–99.9)
METER GLUCOSE: 148 MG/DL (ref 74–99)
METER GLUCOSE: 177 MG/DL (ref 74–99)
MONOCYTES # BLD: 0.74 E9/L (ref 0.1–0.95)
MONOCYTES NFR BLD: 5 % (ref 2–12)
NEUTROPHILS # BLD: 13.29 E9/L (ref 1.8–7.3)
NEUTS SEG NFR BLD: 89.3 % (ref 43–80)
PLATELET # BLD AUTO: 214 E9/L (ref 130–450)
PMV BLD AUTO: 10 FL (ref 7–12)
POTASSIUM SERPL-SCNC: 3.7 MMOL/L (ref 3.5–5)
RBC # BLD AUTO: 5.32 E12/L (ref 3.8–5.8)
SODIUM SERPL-SCNC: 142 MMOL/L (ref 132–146)
WBC # BLD: 14.9 E9/L (ref 4.5–11.5)

## 2023-05-29 PROCEDURE — 0SRB03A REPLACEMENT OF LEFT HIP JOINT WITH CERAMIC SYNTHETIC SUBSTITUTE, UNCEMENTED, OPEN APPROACH: ICD-10-PCS | Performed by: STUDENT IN AN ORGANIZED HEALTH CARE EDUCATION/TRAINING PROGRAM

## 2023-05-29 PROCEDURE — 6370000000 HC RX 637 (ALT 250 FOR IP): Performed by: ORTHOPAEDIC SURGERY

## 2023-05-29 PROCEDURE — 2580000003 HC RX 258: Performed by: ORTHOPAEDIC SURGERY

## 2023-05-29 PROCEDURE — 2580000003 HC RX 258: Performed by: STUDENT IN AN ORGANIZED HEALTH CARE EDUCATION/TRAINING PROGRAM

## 2023-05-29 PROCEDURE — 2500000003 HC RX 250 WO HCPCS: Performed by: ORTHOPAEDIC SURGERY

## 2023-05-29 PROCEDURE — 7100000001 HC PACU RECOVERY - ADDTL 15 MIN: Performed by: STUDENT IN AN ORGANIZED HEALTH CARE EDUCATION/TRAINING PROGRAM

## 2023-05-29 PROCEDURE — 85025 COMPLETE CBC W/AUTO DIFF WBC: CPT

## 2023-05-29 PROCEDURE — 80048 BASIC METABOLIC PNL TOTAL CA: CPT

## 2023-05-29 PROCEDURE — 2500000003 HC RX 250 WO HCPCS: Performed by: NURSE PRACTITIONER

## 2023-05-29 PROCEDURE — 36415 COLL VENOUS BLD VENIPUNCTURE: CPT

## 2023-05-29 PROCEDURE — 3700000001 HC ADD 15 MINUTES (ANESTHESIA): Performed by: STUDENT IN AN ORGANIZED HEALTH CARE EDUCATION/TRAINING PROGRAM

## 2023-05-29 PROCEDURE — 6360000002 HC RX W HCPCS: Performed by: INTERNAL MEDICINE

## 2023-05-29 PROCEDURE — 3600000004 HC SURGERY LEVEL 4 BASE: Performed by: STUDENT IN AN ORGANIZED HEALTH CARE EDUCATION/TRAINING PROGRAM

## 2023-05-29 PROCEDURE — 2500000003 HC RX 250 WO HCPCS: Performed by: STUDENT IN AN ORGANIZED HEALTH CARE EDUCATION/TRAINING PROGRAM

## 2023-05-29 PROCEDURE — 27130 TOTAL HIP ARTHROPLASTY: CPT | Performed by: STUDENT IN AN ORGANIZED HEALTH CARE EDUCATION/TRAINING PROGRAM

## 2023-05-29 PROCEDURE — 88305 TISSUE EXAM BY PATHOLOGIST: CPT

## 2023-05-29 PROCEDURE — 6370000000 HC RX 637 (ALT 250 FOR IP): Performed by: STUDENT IN AN ORGANIZED HEALTH CARE EDUCATION/TRAINING PROGRAM

## 2023-05-29 PROCEDURE — 73502 X-RAY EXAM HIP UNI 2-3 VIEWS: CPT

## 2023-05-29 PROCEDURE — 82962 GLUCOSE BLOOD TEST: CPT

## 2023-05-29 PROCEDURE — 7100000000 HC PACU RECOVERY - FIRST 15 MIN: Performed by: STUDENT IN AN ORGANIZED HEALTH CARE EDUCATION/TRAINING PROGRAM

## 2023-05-29 PROCEDURE — 3700000000 HC ANESTHESIA ATTENDED CARE: Performed by: STUDENT IN AN ORGANIZED HEALTH CARE EDUCATION/TRAINING PROGRAM

## 2023-05-29 PROCEDURE — 2580000003 HC RX 258: Performed by: ANESTHESIOLOGIST ASSISTANT

## 2023-05-29 PROCEDURE — 2500000003 HC RX 250 WO HCPCS: Performed by: ANESTHESIOLOGIST ASSISTANT

## 2023-05-29 PROCEDURE — 6360000002 HC RX W HCPCS: Performed by: ANESTHESIOLOGIST ASSISTANT

## 2023-05-29 PROCEDURE — C1776 JOINT DEVICE (IMPLANTABLE): HCPCS | Performed by: STUDENT IN AN ORGANIZED HEALTH CARE EDUCATION/TRAINING PROGRAM

## 2023-05-29 PROCEDURE — 88311 DECALCIFY TISSUE: CPT

## 2023-05-29 PROCEDURE — A4216 STERILE WATER/SALINE, 10 ML: HCPCS | Performed by: STUDENT IN AN ORGANIZED HEALTH CARE EDUCATION/TRAINING PROGRAM

## 2023-05-29 PROCEDURE — 2700000000 HC OXYGEN THERAPY PER DAY

## 2023-05-29 PROCEDURE — 2060000000 HC ICU INTERMEDIATE R&B

## 2023-05-29 PROCEDURE — 6360000002 HC RX W HCPCS: Performed by: STUDENT IN AN ORGANIZED HEALTH CARE EDUCATION/TRAINING PROGRAM

## 2023-05-29 PROCEDURE — 2709999900 HC NON-CHARGEABLE SUPPLY: Performed by: STUDENT IN AN ORGANIZED HEALTH CARE EDUCATION/TRAINING PROGRAM

## 2023-05-29 PROCEDURE — 6360000002 HC RX W HCPCS: Performed by: ORTHOPAEDIC SURGERY

## 2023-05-29 PROCEDURE — 3600000014 HC SURGERY LEVEL 4 ADDTL 15MIN: Performed by: STUDENT IN AN ORGANIZED HEALTH CARE EDUCATION/TRAINING PROGRAM

## 2023-05-29 PROCEDURE — 2720000010 HC SURG SUPPLY STERILE: Performed by: STUDENT IN AN ORGANIZED HEALTH CARE EDUCATION/TRAINING PROGRAM

## 2023-05-29 DEVICE — INSERT ACET F 0 DEG 36 MM HIP X3 TRIDENT: Type: IMPLANTABLE DEVICE | Site: HIP | Status: FUNCTIONAL

## 2023-05-29 DEVICE — SCREW BNE L30MM DIA6.5MM STD CANC HIP TI HMSPHR THRD DRVR: Type: IMPLANTABLE DEVICE | Site: HIP | Status: FUNCTIONAL

## 2023-05-29 DEVICE — SCREW BNE L35MM DIA65MM LO PROF HEX TRIDENT LL: Type: IMPLANTABLE DEVICE | Site: HIP | Status: FUNCTIONAL

## 2023-05-29 DEVICE — SHELL ACET SZ F DIA56MM 5 CLUS H TRITANIUM PRESSFIT PRI: Type: IMPLANTABLE DEVICE | Site: HIP | Status: FUNCTIONAL

## 2023-05-29 DEVICE — STEM FEM SZ 6 L111MM NK L35MM 45MM OFFSET 127DEG HIP TI: Type: IMPLANTABLE DEVICE | Site: HIP | Status: FUNCTIONAL

## 2023-05-29 DEVICE — HEAD FEM DIA36MM -2.5MM OFFSET HIP BIOLOX DELT CERAMIC TAPR: Type: IMPLANTABLE DEVICE | Site: HIP | Status: FUNCTIONAL

## 2023-05-29 RX ORDER — TOBRAMYCIN 1.2 G/30ML
INJECTION, POWDER, LYOPHILIZED, FOR SOLUTION INTRAVENOUS PRN
Status: DISCONTINUED | OUTPATIENT
Start: 2023-05-29 | End: 2023-05-29 | Stop reason: ALTCHOICE

## 2023-05-29 RX ORDER — LIDOCAINE HYDROCHLORIDE 20 MG/ML
INJECTION, SOLUTION INTRAVENOUS PRN
Status: DISCONTINUED | OUTPATIENT
Start: 2023-05-29 | End: 2023-05-29 | Stop reason: SDUPTHER

## 2023-05-29 RX ORDER — SODIUM CHLORIDE 0.9 % (FLUSH) 0.9 %
5-40 SYRINGE (ML) INJECTION EVERY 12 HOURS SCHEDULED
Status: DISCONTINUED | OUTPATIENT
Start: 2023-05-29 | End: 2023-05-29 | Stop reason: HOSPADM

## 2023-05-29 RX ORDER — ENOXAPARIN SODIUM 100 MG/ML
40 INJECTION SUBCUTANEOUS DAILY
Status: DISCONTINUED | OUTPATIENT
Start: 2023-05-29 | End: 2023-05-31 | Stop reason: HOSPADM

## 2023-05-29 RX ORDER — SODIUM CHLORIDE 0.9 % (FLUSH) 0.9 %
5-40 SYRINGE (ML) INJECTION PRN
Status: DISCONTINUED | OUTPATIENT
Start: 2023-05-29 | End: 2023-05-29 | Stop reason: HOSPADM

## 2023-05-29 RX ORDER — HYDROMORPHONE HYDROCHLORIDE 1 MG/ML
0.5 INJECTION, SOLUTION INTRAMUSCULAR; INTRAVENOUS; SUBCUTANEOUS EVERY 5 MIN PRN
Status: DISCONTINUED | OUTPATIENT
Start: 2023-05-29 | End: 2023-05-29 | Stop reason: HOSPADM

## 2023-05-29 RX ORDER — PROPOFOL 10 MG/ML
INJECTION, EMULSION INTRAVENOUS PRN
Status: DISCONTINUED | OUTPATIENT
Start: 2023-05-29 | End: 2023-05-29 | Stop reason: SDUPTHER

## 2023-05-29 RX ORDER — SODIUM CHLORIDE 9 MG/ML
INJECTION, SOLUTION INTRAVENOUS CONTINUOUS PRN
Status: DISCONTINUED | OUTPATIENT
Start: 2023-05-29 | End: 2023-05-29 | Stop reason: SDUPTHER

## 2023-05-29 RX ORDER — CEFAZOLIN SODIUM 2 G/50ML
SOLUTION INTRAVENOUS PRN
Status: DISCONTINUED | OUTPATIENT
Start: 2023-05-29 | End: 2023-05-29 | Stop reason: SDUPTHER

## 2023-05-29 RX ORDER — ASPIRIN 81 MG/1
81 TABLET, CHEWABLE ORAL DAILY
Status: DISCONTINUED | OUTPATIENT
Start: 2023-05-30 | End: 2023-05-31 | Stop reason: HOSPADM

## 2023-05-29 RX ORDER — SODIUM CHLORIDE 9 MG/ML
INJECTION, SOLUTION INTRAVENOUS PRN
Status: DISCONTINUED | OUTPATIENT
Start: 2023-05-29 | End: 2023-05-29 | Stop reason: HOSPADM

## 2023-05-29 RX ORDER — PHENYLEPHRINE HYDROCHLORIDE 10 MG/ML
INJECTION INTRAVENOUS PRN
Status: DISCONTINUED | OUTPATIENT
Start: 2023-05-29 | End: 2023-05-29 | Stop reason: SDUPTHER

## 2023-05-29 RX ORDER — MEPERIDINE HYDROCHLORIDE 25 MG/ML
12.5 INJECTION INTRAMUSCULAR; INTRAVENOUS; SUBCUTANEOUS
Status: DISCONTINUED | OUTPATIENT
Start: 2023-05-29 | End: 2023-05-29 | Stop reason: HOSPADM

## 2023-05-29 RX ORDER — ONDANSETRON 2 MG/ML
INJECTION INTRAMUSCULAR; INTRAVENOUS PRN
Status: DISCONTINUED | OUTPATIENT
Start: 2023-05-29 | End: 2023-05-29 | Stop reason: SDUPTHER

## 2023-05-29 RX ORDER — DEXAMETHASONE SODIUM PHOSPHATE 10 MG/ML
INJECTION INTRAMUSCULAR; INTRAVENOUS PRN
Status: DISCONTINUED | OUTPATIENT
Start: 2023-05-29 | End: 2023-05-29 | Stop reason: SDUPTHER

## 2023-05-29 RX ORDER — FENTANYL CITRATE 50 UG/ML
INJECTION, SOLUTION INTRAMUSCULAR; INTRAVENOUS PRN
Status: DISCONTINUED | OUTPATIENT
Start: 2023-05-29 | End: 2023-05-29 | Stop reason: SDUPTHER

## 2023-05-29 RX ORDER — VECURONIUM BROMIDE 1 MG/ML
INJECTION, POWDER, LYOPHILIZED, FOR SOLUTION INTRAVENOUS PRN
Status: DISCONTINUED | OUTPATIENT
Start: 2023-05-29 | End: 2023-05-29 | Stop reason: SDUPTHER

## 2023-05-29 RX ORDER — LOSARTAN POTASSIUM 50 MG/1
100 TABLET ORAL DAILY
Status: DISCONTINUED | OUTPATIENT
Start: 2023-05-30 | End: 2023-05-31 | Stop reason: HOSPADM

## 2023-05-29 RX ORDER — HYDROMORPHONE HYDROCHLORIDE 1 MG/ML
0.25 INJECTION, SOLUTION INTRAMUSCULAR; INTRAVENOUS; SUBCUTANEOUS EVERY 5 MIN PRN
Status: DISCONTINUED | OUTPATIENT
Start: 2023-05-29 | End: 2023-05-29 | Stop reason: HOSPADM

## 2023-05-29 RX ORDER — ONDANSETRON 2 MG/ML
4 INJECTION INTRAMUSCULAR; INTRAVENOUS
Status: DISCONTINUED | OUTPATIENT
Start: 2023-05-29 | End: 2023-05-29 | Stop reason: HOSPADM

## 2023-05-29 RX ORDER — OXYCODONE HYDROCHLORIDE AND ACETAMINOPHEN 5; 325 MG/1; MG/1
1 TABLET ORAL EVERY 6 HOURS PRN
Qty: 28 TABLET | Refills: 0 | Status: ON HOLD | OUTPATIENT
Start: 2023-05-29 | End: 2023-06-05

## 2023-05-29 RX ADMIN — HYDROMORPHONE HYDROCHLORIDE 1.25 MG: 1 INJECTION, SOLUTION INTRAMUSCULAR; INTRAVENOUS; SUBCUTANEOUS at 01:43

## 2023-05-29 RX ADMIN — PHENYLEPHRINE HYDROCHLORIDE 100 MCG: 10 INJECTION, SOLUTION INTRAVENOUS at 08:49

## 2023-05-29 RX ADMIN — SUGAMMADEX 200 MG: 100 INJECTION, SOLUTION INTRAVENOUS at 09:14

## 2023-05-29 RX ADMIN — AMLODIPINE BESYLATE 10 MG: 10 TABLET ORAL at 11:16

## 2023-05-29 RX ADMIN — PROPOFOL 100 MG: 10 INJECTION, EMULSION INTRAVENOUS at 07:51

## 2023-05-29 RX ADMIN — ATORVASTATIN CALCIUM 80 MG: 40 TABLET, FILM COATED ORAL at 11:16

## 2023-05-29 RX ADMIN — LIDOCAINE HYDROCHLORIDE 100 MG: 20 INJECTION, SOLUTION INTRAVENOUS at 07:51

## 2023-05-29 RX ADMIN — SODIUM CHLORIDE, PRESERVATIVE FREE 10 ML: 5 INJECTION INTRAVENOUS at 11:15

## 2023-05-29 RX ADMIN — EZETIMIBE 10 MG: 10 TABLET ORAL at 11:16

## 2023-05-29 RX ADMIN — HYDRALAZINE HYDROCHLORIDE 10 MG: 20 INJECTION INTRAMUSCULAR; INTRAVENOUS at 00:13

## 2023-05-29 RX ADMIN — ONDANSETRON HYDROCHLORIDE 4 MG: 2 SOLUTION INTRAMUSCULAR; INTRAVENOUS at 07:51

## 2023-05-29 RX ADMIN — SODIUM CHLORIDE, PRESERVATIVE FREE 10 ML: 5 INJECTION INTRAVENOUS at 16:52

## 2023-05-29 RX ADMIN — PHENYLEPHRINE HYDROCHLORIDE 100 MCG: 10 INJECTION, SOLUTION INTRAVENOUS at 08:32

## 2023-05-29 RX ADMIN — TRANEXAMIC ACID 1000 MG: 100 INJECTION, SOLUTION INTRAVENOUS at 09:14

## 2023-05-29 RX ADMIN — TAMSULOSIN HYDROCHLORIDE 0.4 MG: 0.4 CAPSULE ORAL at 11:16

## 2023-05-29 RX ADMIN — FENTANYL CITRATE 100 MCG: 0.05 INJECTION, SOLUTION INTRAMUSCULAR; INTRAVENOUS at 08:01

## 2023-05-29 RX ADMIN — FENTANYL CITRATE 150 MCG: 0.05 INJECTION, SOLUTION INTRAMUSCULAR; INTRAVENOUS at 07:51

## 2023-05-29 RX ADMIN — CEFAZOLIN 2000 MG: 2 INJECTION, POWDER, FOR SOLUTION INTRAMUSCULAR; INTRAVENOUS at 16:51

## 2023-05-29 RX ADMIN — METOPROLOL TARTRATE 25 MG: 25 TABLET, FILM COATED ORAL at 11:16

## 2023-05-29 RX ADMIN — PHENYLEPHRINE HYDROCHLORIDE 200 MCG: 10 INJECTION, SOLUTION INTRAVENOUS at 08:37

## 2023-05-29 RX ADMIN — SODIUM CHLORIDE, PRESERVATIVE FREE 10 ML: 5 INJECTION INTRAVENOUS at 21:00

## 2023-05-29 RX ADMIN — ACETAMINOPHEN 650 MG: 325 TABLET ORAL at 19:43

## 2023-05-29 RX ADMIN — DEXAMETHASONE SODIUM PHOSPHATE 10 MG: 10 INJECTION INTRAMUSCULAR; INTRAVENOUS at 07:51

## 2023-05-29 RX ADMIN — HYDROMORPHONE HYDROCHLORIDE 1.25 MG: 1 INJECTION, SOLUTION INTRAMUSCULAR; INTRAVENOUS; SUBCUTANEOUS at 04:26

## 2023-05-29 RX ADMIN — ENOXAPARIN SODIUM 40 MG: 100 INJECTION SUBCUTANEOUS at 11:15

## 2023-05-29 RX ADMIN — CEFAZOLIN 2000 MG: 2 INJECTION, POWDER, FOR SOLUTION INTRAMUSCULAR; INTRAVENOUS at 23:47

## 2023-05-29 RX ADMIN — CEFAZOLIN SODIUM 2000 MG: 2 SOLUTION INTRAVENOUS at 08:00

## 2023-05-29 RX ADMIN — METOPROLOL TARTRATE 25 MG: 25 TABLET, FILM COATED ORAL at 21:00

## 2023-05-29 RX ADMIN — CITALOPRAM HYDROBROMIDE 60 MG: 20 TABLET ORAL at 11:17

## 2023-05-29 RX ADMIN — VECURONIUM BROMIDE 5 MG: 10 INJECTION, POWDER, LYOPHILIZED, FOR SOLUTION INTRAVENOUS at 07:51

## 2023-05-29 RX ADMIN — FERROUS SULFATE TAB 325 MG (65 MG ELEMENTAL FE) 325 MG: 325 (65 FE) TAB at 11:16

## 2023-05-29 RX ADMIN — SODIUM CHLORIDE: 9 INJECTION, SOLUTION INTRAVENOUS at 07:47

## 2023-05-29 ASSESSMENT — PAIN DESCRIPTION - ONSET: ONSET: SUDDEN

## 2023-05-29 ASSESSMENT — PAIN SCALES - GENERAL
PAINLEVEL_OUTOF10: 10
PAINLEVEL_OUTOF10: 10
PAINLEVEL_OUTOF10: 0
PAINLEVEL_OUTOF10: 10
PAINLEVEL_OUTOF10: 0
PAINLEVEL_OUTOF10: 0

## 2023-05-29 ASSESSMENT — PAIN DESCRIPTION - ORIENTATION
ORIENTATION: LEFT

## 2023-05-29 ASSESSMENT — PAIN DESCRIPTION - DESCRIPTORS
DESCRIPTORS: ACHING;DISCOMFORT
DESCRIPTORS: ACHING;DISCOMFORT
DESCRIPTORS: ACHING;NAGGING;DISCOMFORT

## 2023-05-29 ASSESSMENT — PAIN DESCRIPTION - LOCATION
LOCATION: HIP;SHOULDER

## 2023-05-29 ASSESSMENT — LIFESTYLE VARIABLES: SMOKING_STATUS: 0

## 2023-05-29 ASSESSMENT — PAIN DESCRIPTION - PAIN TYPE: TYPE: ACUTE PAIN

## 2023-05-29 ASSESSMENT — PAIN DESCRIPTION - FREQUENCY: FREQUENCY: INTERMITTENT

## 2023-05-29 ASSESSMENT — ENCOUNTER SYMPTOMS
SHORTNESS OF BREATH: 1
DYSPNEA ACTIVITY LEVEL: NO INTERVAL CHANGE

## 2023-05-29 NOTE — ANESTHESIA PRE PROCEDURE
José Miguel Hamm MD at [address] History:    Social History     Tobacco Use    Smoking status: Never    Smokeless tobacco: Never   Substance Use Topics    Alcohol use: Yes     Comment: socially                                Counseling given: Not Answered      Vital Signs (Current):   Vitals:    05/28/23 2325 05/29/23 0045 05/29/23 0133 05/29/23 0400   BP: (!) 173/92 (!) 161/84 (!) 154/74 135/74   Pulse: (!) 124 (!) 134  99   Resp: 18   20   Temp: 98 °F (36.7 °C)   98.5 °F (36.9 °C)   TempSrc: Temporal   Temporal   SpO2: 95%   95%   Weight:                                                  BP Readings from Last 3 Encounters:   05/29/23 135/74   03/22/23 (!) 165/83   03/08/23 138/64       NPO Status:  > 8hrs                                                                               BMI:   Wt Readings from Last 3 Encounters:   05/27/23 198 lb (89.8 kg)   04/27/23 193 lb (87.5 kg)   03/22/23 193 lb (87.5 kg)     Body mass index is 30.11 kg/m².     CBC:   Lab Results   Component Value Date/Time    WBC 14.9 05/29/2023 04:40 AM    RBC 5.32 05/29/2023 04:40 AM    HGB 15.8 05/29/2023 04:40 AM    HCT 47.6 05/29/2023 04:40 AM    MCV 89.5 05/29/2023 04:40 AM    RDW 13.0 05/29/2023 04:40 AM     05/29/2023 04:40 AM       CMP:   Lab Results   Component Value Date/Time     05/29/2023 04:40 AM    K 3.7 05/29/2023 04:40 AM     05/29/2023 04:40 AM    CO2 27 05/29/2023 04:40 AM    BUN 30 05/29/2023 04:40 AM    CREATININE 0.8 05/29/2023 04:40 AM    GFRAA >60 07/06/2022 12:00 PM    LABGLOM >60 05/29/2023 04:40 AM    GLUCOSE 140 05/29/2023 04:40 AM    GLUCOSE 103 05/30/2012 04:20 AM    PROT 6.7 05/27/2023 04:22 PM    CALCIUM 9.8 05/29/2023 04:40 AM    BILITOT 1.9 05/27/2023 04:22 PM    ALKPHOS 118 05/27/2023 04:22 PM    AST 28 05/27/2023 04:22 PM    ALT 33 05/27/2023 04:22 PM       POC Tests: No results for input(s): POCGLU, POCNA, POCK, POCCL, POCBUN, POCHEMO, POCHCT in the last 72

## 2023-05-29 NOTE — BRIEF OP NOTE
Brief Postoperative Note      Patient: Damian Borges  YOB: 1955  MRN: [id number]    Date of Procedure: 5/29/2023    Pre-Op Diagnosis Codes:     * Closed fracture of neck of left femur, [facility] Foster Perez    Post-Op Diagnosis: Same       Procedure(s):  Left total hip arthroplasty    Surgeon(s):  Bessie Palmer DO    Assistant:  Resident: Timothy Reis DO; Andrew Thurman DO    Anesthesia: General    Estimated Blood Loss (mL): less than 086     Complications: None    Specimens:   * No specimens in log *    Implants:  Implant Name Type Inv.  Item Serial No.  Lot No. LRB No. Used Action   SHELL ACET SZ F [id number] 5 CLUS H TRITANIUM PRESSFIT TYRELL - [id number]  SHELL ACET SZ F [id number] 5 CLUS H TRITANIUM PRESSFIT TYRELL  [employer] [facility] [id number] Left 1 Implanted   INSERT ACET F 0 DEG 36 MM HIP X3 TRIDENT - [id number]  INSERT ACET F 0 DEG 36 MM HIP X3 TRIDENT  [employer] [facility] [id number] Left 1 Implanted   SCREW BNE L30MM DIA6.5MM STD CANC HIP TI HMSPHR THRD DRVR - [id number]  SCREW BNE L30MM DIA6.5MM STD CANC HIP TI HMSPHR THRD DRVR  [employer] [facility] [id number] Left 1 Implanted   SCREW BNE L35MM [id number] LO PROF HEX TRIDENT LL - [id number]  SCREW BNE L35MM [id number] LO PROF HEX TRIDENT LL  [employer] [facility] [id number] Left 1 Implanted   STEM FEM SZ 6 L111MM NK L35MM 45MM OFFSET 127DEG HIP TI - [id number]  STEM FEM SZ 6 L111MM NK L35MM 45MM OFFSET 127DEG HIP TI  [employer] [facility] [id number] Left 1 Implanted   HEAD FEM [id number] -2.5MM OFFSET HIP BIOLOX DELT CERAMIC TAPR - [id number]  HEAD FEM [id number] -2.5MM OFFSET HIP BIOLOX DELT CERAMIC TAPR  [employer] [facility] [id number] Left 1 Implanted         Drains: * No LDAs found *    Findings: Left total hip arthroplasty      Electronically signed by Deyanira Valles DO on 5/29/2023 at 9:12 AM

## 2023-05-29 NOTE — PLAN OF CARE
Problem: Discharge Planning  Goal: Discharge to home or other facility with appropriate resources  Outcome: Progressing  Flowsheets (Taken 5/29/2023 1100)  Discharge to home or other facility with appropriate resources:   Identify barriers to discharge with patient and caregiver   Arrange for needed discharge resources and transportation as appropriate   Identify discharge learning needs (meds, wound care, etc)     Problem: Pain  Goal: Verbalizes/displays adequate comfort level or baseline comfort level  Outcome: Progressing  Flowsheets (Taken 5/29/2023 0400 by Trinidad Rosenberg)  Verbalizes/displays adequate comfort level or baseline comfort level:   Encourage patient to monitor pain and request assistance   Assess pain using appropriate pain scale     Problem: ABCDS Injury Assessment  Goal: Absence of physical injury  Outcome: Progressing  Flowsheets (Taken 5/29/2023 1100)  Absence of Physical Injury: Implement safety measures based on patient assessment     Problem: Safety - Adult  Goal: Free from fall injury  Outcome: Progressing  Flowsheets (Taken 5/29/2023 1100)  Free From Fall Injury: Instruct family/caregiver on patient safety

## 2023-05-29 NOTE — ANESTHESIA POSTPROCEDURE EVALUATION
Department of Anesthesiology  Postprocedure Note    Patient: Isabel Carpenter  MRN: [id number]  YOB: 1955  Date of evaluation: 5/29/2023      Procedure Summary     Date: 05/29/23 Room / Location: [facility] OR 09 / [facility]    Anesthesia Start: [address] Anesthesia Stop: 9217    Procedure: TOTAL HIP ARTHROPLASTY (Left: Hip) Diagnosis:       Closed fracture of neck of left femur, initial encounter ([facility]      (LEFT FEMORAL NECK FRACTURE)    Surgeons: Tina Good DO Responsible Provider: Vinod Guillen DO    Anesthesia Type: general ASA Status: 4          Anesthesia Type: No value filed.     Wilman Phase I: Wilman Score: 9    Wilman Phase II:        Anesthesia Post Evaluation    Patient location during evaluation: PACU  Patient participation: complete - patient participated  Level of consciousness: awake and alert  Airway patency: patent  Nausea & Vomiting: no nausea and no vomiting  Complications: no  Cardiovascular status: blood pressure returned to baseline  Respiratory status: acceptable  Hydration status: euvolemic  Multimodal analgesia pain management approach

## 2023-05-29 NOTE — DISCHARGE INSTRUCTIONS
[facility]) Department of Orthopedic Surgery  [address] [facility]    Dr. Lucio Holter, DO    Orthopaedics Discharge Instructions   Weight bearing Status - Weight bearing as tolerated - on left lower Extremity  Pain medication Per Prescriptions  Contact Office for Medication Refill- [phone]  Office can refill pain med every 7 days  If patient discharging to facility then pain control will be continued per facility physician  Ice to operative/injured site for 15-30 minutes of each hour for next 5 days    Recommend that you continue to ice the area 2-3 times per day after this   Elevate operative/injured limb on 2 pillows at home  Goal is to have limb above the heart if able  Continue DVT Prophylaxis (blood clot prevention) as Prescribed: aspirin 325mg twice a day   Wound care - keep dressing clean dry and intact until post operative day 7 ok to remove at that point and cover with a clean dry dressing as needed for drainage. Follow Up in Office in 2 weeks. Your first post op appointment is often with one of our PAs. Call the office at [phone] for directions or with any questions. Watch for these significant complications. Call physician if they or any other problems occur:  Fever over 101°, redness, swelling or warmth at the operative site  Unrelieved nausea    Foul smelling or cloudy drainage at the operative site   Unrelieved pain    Blood soaked dressing. (Some oozing may be normal)     Numb, pale, blue, cold or tingling extremity    Future Appointments   Date Time Provider Gt Huang   7/12/2023  8:00 AM DO Ladonna Miller [facility]         It is the Department of Orthopaedic Trauma's standard of practice that providers will de-escalate(wean) all patients from narcotic(opioid) medications during the post-operative period. We provide multimodal pain control but opioid medications are tapered in all of our patients.   If patient requires referral to pain

## 2023-05-29 NOTE — OP NOTE
Operative Note      Patient: Dc Quiroz  YOB: 1955  MRN: [id number]    Date of Procedure: 5/29/2023    Pre-Op Diagnosis Codes:     * Closed fracture of neck of left femur, initial [facility] [S72.002A]    Post-Op Diagnosis: Same       Procedure(s):  Left TOTAL HIP ARTHROPLASTY    Surgeon(s):  Samuel Cornell DO    Assistant:   Resident: Mike Mcgill DO; Letty Rose DO    Anesthesia: General    Estimated Blood Loss (mL): less than 220     Complications: None    Specimens:   * No specimens in log *    Implants:  Implant Name Type Inv. Item Serial No.  Lot No. LRB No. Used Action   SHELL ACET SZ F [id number] 5 CLUS H TRITANIUM PRESSFIT TYRELL - [id number]  SHELL ACET SZ F [id number] 5 CLUS H TRITANIUM PRESSFIT TYRELL  [employer] [facility] [id number] Left 1 Implanted   INSERT ACET F 0 DEG 36 MM HIP X3 TRIDENT - [id number]  INSERT ACET F 0 DEG 36 MM HIP X3 TRIDENT  [employer] [facility] [id number] Left 1 Implanted   SCREW BNE L30MM DIA6.5MM STD CANC HIP TI HMSPHR THRD DRVR - [id number]  SCREW BNE L30MM DIA6.5MM STD CANC HIP TI HMSPHR THRD DRVR  [employer] [facility] [id number] Left 1 Implanted   SCREW BNE L35MM [id number] LO PROF HEX TRIDENT LL - [id number]  SCREW BNE L35MM [id number] LO PROF HEX TRIDENT LL  [employer] [facility] [id number] Left 1 Implanted   STEM FEM SZ 6 L111MM NK L35MM 45MM OFFSET 127DEG HIP TI - [id number]  STEM FEM SZ 6 L111MM NK L35MM 45MM OFFSET 127DEG HIP TI  [employer] [facility] [id number] Left 1 Implanted   HEAD FEM [id number] -2.5MM OFFSET HIP BIOLOX DELT CERAMIC TAPR - [id number]  HEAD FEM [id number] -2.5MM OFFSET HIP BIOLOX DELT CERAMIC TAPR  [employer] [facility] [id number] Left 1 Implanted         Drains: * No LDAs found *    Findings: See operative report below      Detailed Description of Procedure:   Delmy Vasquez is a 59-year-old male who fell over the weekend sustaining a displaced left femoral neck fracture.   He was seen and examined and imaging was

## 2023-05-30 LAB
ANION GAP SERPL CALCULATED.3IONS-SCNC: 9 MMOL/L (ref 7–16)
BASOPHILS # BLD: 0.03 E9/L (ref 0–0.2)
BASOPHILS NFR BLD: 0.2 % (ref 0–2)
BUN SERPL-MCNC: 35 MG/DL (ref 6–23)
CALCIUM SERPL-MCNC: 9.6 MG/DL (ref 8.6–10.2)
CHLORIDE SERPL-SCNC: 107 MMOL/L (ref 98–107)
CO2 SERPL-SCNC: 27 MMOL/L (ref 22–29)
CREAT SERPL-MCNC: 0.9 MG/DL (ref 0.7–1.2)
EKG ATRIAL RATE: 70 BPM
EKG P AXIS: 85 DEGREES
EKG P-R INTERVAL: 148 MS
EKG Q-T INTERVAL: 448 MS
EKG QRS DURATION: 138 MS
EKG QTC CALCULATION (BAZETT): 483 MS
EKG R AXIS: -29 DEGREES
EKG T AXIS: 1 DEGREES
EKG VENTRICULAR RATE: 70 BPM
EOSINOPHIL # BLD: 0 E9/L (ref 0.05–0.5)
EOSINOPHIL NFR BLD: 0 % (ref 0–6)
ERYTHROCYTE [DISTWIDTH] IN BLOOD BY AUTOMATED COUNT: 13 FL (ref 11.5–15)
GLUCOSE SERPL-MCNC: 171 MG/DL (ref 74–99)
HCT VFR BLD AUTO: 38.8 % (ref 37–54)
HGB BLD-MCNC: 13 G/DL (ref 12.5–16.5)
IMM GRANULOCYTES # BLD: 0.09 E9/L
IMM GRANULOCYTES NFR BLD: 0.5 % (ref 0–5)
LYMPHOCYTES # BLD: 0.73 E9/L (ref 1.5–4)
LYMPHOCYTES NFR BLD: 4.4 % (ref 20–42)
MCH RBC QN AUTO: 30 PG (ref 26–35)
MCHC RBC AUTO-ENTMCNC: 33.5 % (ref 32–34.5)
MCV RBC AUTO: 89.4 FL (ref 80–99.9)
METER GLUCOSE: 139 MG/DL (ref 74–99)
METER GLUCOSE: 146 MG/DL (ref 74–99)
MONOCYTES # BLD: 1.24 E9/L (ref 0.1–0.95)
MONOCYTES NFR BLD: 7.5 % (ref 2–12)
NEUTROPHILS # BLD: 14.36 E9/L (ref 1.8–7.3)
NEUTS SEG NFR BLD: 87.4 % (ref 43–80)
PLATELET # BLD AUTO: 183 E9/L (ref 130–450)
PMV BLD AUTO: 10.4 FL (ref 7–12)
POTASSIUM SERPL-SCNC: 4 MMOL/L (ref 3.5–5)
PROCALCITONIN: 0.13 NG/ML (ref 0–0.08)
RBC # BLD AUTO: 4.34 E12/L (ref 3.8–5.8)
SODIUM SERPL-SCNC: 143 MMOL/L (ref 132–146)
WBC # BLD: 16.5 E9/L (ref 4.5–11.5)

## 2023-05-30 PROCEDURE — 6370000000 HC RX 637 (ALT 250 FOR IP): Performed by: ORTHOPAEDIC SURGERY

## 2023-05-30 PROCEDURE — 93010 ELECTROCARDIOGRAM REPORT: CPT | Performed by: INTERNAL MEDICINE

## 2023-05-30 PROCEDURE — 2500000003 HC RX 250 WO HCPCS: Performed by: ORTHOPAEDIC SURGERY

## 2023-05-30 PROCEDURE — 2060000000 HC ICU INTERMEDIATE R&B

## 2023-05-30 PROCEDURE — 97165 OT EVAL LOW COMPLEX 30 MIN: CPT

## 2023-05-30 PROCEDURE — 2700000000 HC OXYGEN THERAPY PER DAY

## 2023-05-30 PROCEDURE — 82962 GLUCOSE BLOOD TEST: CPT

## 2023-05-30 PROCEDURE — 80048 BASIC METABOLIC PNL TOTAL CA: CPT

## 2023-05-30 PROCEDURE — 97530 THERAPEUTIC ACTIVITIES: CPT

## 2023-05-30 PROCEDURE — 36415 COLL VENOUS BLD VENIPUNCTURE: CPT

## 2023-05-30 PROCEDURE — 84145 PROCALCITONIN (PCT): CPT

## 2023-05-30 PROCEDURE — 2580000003 HC RX 258: Performed by: INTERNAL MEDICINE

## 2023-05-30 PROCEDURE — 2580000003 HC RX 258: Performed by: ORTHOPAEDIC SURGERY

## 2023-05-30 PROCEDURE — 97535 SELF CARE MNGMENT TRAINING: CPT

## 2023-05-30 PROCEDURE — 6360000002 HC RX W HCPCS: Performed by: ORTHOPAEDIC SURGERY

## 2023-05-30 PROCEDURE — 85025 COMPLETE CBC W/AUTO DIFF WBC: CPT

## 2023-05-30 PROCEDURE — 97161 PT EVAL LOW COMPLEX 20 MIN: CPT

## 2023-05-30 RX ORDER — SODIUM CHLORIDE 9 MG/ML
INJECTION, SOLUTION INTRAVENOUS CONTINUOUS
Status: ACTIVE | OUTPATIENT
Start: 2023-05-30 | End: 2023-05-30

## 2023-05-30 RX ADMIN — CITALOPRAM HYDROBROMIDE 60 MG: 20 TABLET ORAL at 09:05

## 2023-05-30 RX ADMIN — HYDROMORPHONE HYDROCHLORIDE 1.25 MG: 1 INJECTION, SOLUTION INTRAMUSCULAR; INTRAVENOUS; SUBCUTANEOUS at 12:52

## 2023-05-30 RX ADMIN — ATORVASTATIN CALCIUM 80 MG: 40 TABLET, FILM COATED ORAL at 09:06

## 2023-05-30 RX ADMIN — ASPIRIN 81 MG CHEWABLE TABLET 81 MG: 81 TABLET CHEWABLE at 09:05

## 2023-05-30 RX ADMIN — FERROUS SULFATE TAB 325 MG (65 MG ELEMENTAL FE) 325 MG: 325 (65 FE) TAB at 09:10

## 2023-05-30 RX ADMIN — HYDROMORPHONE HYDROCHLORIDE 1.25 MG: 1 INJECTION, SOLUTION INTRAMUSCULAR; INTRAVENOUS; SUBCUTANEOUS at 15:11

## 2023-05-30 RX ADMIN — HYDROMORPHONE HYDROCHLORIDE 1.25 MG: 1 INJECTION, SOLUTION INTRAMUSCULAR; INTRAVENOUS; SUBCUTANEOUS at 20:26

## 2023-05-30 RX ADMIN — HYDROMORPHONE HYDROCHLORIDE 1.25 MG: 1 INJECTION, SOLUTION INTRAMUSCULAR; INTRAVENOUS; SUBCUTANEOUS at 00:43

## 2023-05-30 RX ADMIN — PANTOPRAZOLE SODIUM 40 MG: 40 TABLET, DELAYED RELEASE ORAL at 05:56

## 2023-05-30 RX ADMIN — SODIUM CHLORIDE, PRESERVATIVE FREE 10 ML: 5 INJECTION INTRAVENOUS at 09:06

## 2023-05-30 RX ADMIN — TAMSULOSIN HYDROCHLORIDE 0.4 MG: 0.4 CAPSULE ORAL at 09:50

## 2023-05-30 RX ADMIN — SODIUM CHLORIDE: 9 INJECTION, SOLUTION INTRAVENOUS at 15:09

## 2023-05-30 RX ADMIN — LOSARTAN POTASSIUM 100 MG: 50 TABLET, FILM COATED ORAL at 09:05

## 2023-05-30 RX ADMIN — ENOXAPARIN SODIUM 40 MG: 100 INJECTION SUBCUTANEOUS at 09:05

## 2023-05-30 RX ADMIN — SODIUM CHLORIDE, PRESERVATIVE FREE 10 ML: 5 INJECTION INTRAVENOUS at 20:29

## 2023-05-30 RX ADMIN — EZETIMIBE 10 MG: 10 TABLET ORAL at 09:05

## 2023-05-30 RX ADMIN — HYDROMORPHONE HYDROCHLORIDE 1.25 MG: 1 INJECTION, SOLUTION INTRAMUSCULAR; INTRAVENOUS; SUBCUTANEOUS at 23:26

## 2023-05-30 RX ADMIN — HYDROMORPHONE HYDROCHLORIDE 1.25 MG: 1 INJECTION, SOLUTION INTRAMUSCULAR; INTRAVENOUS; SUBCUTANEOUS at 09:05

## 2023-05-30 RX ADMIN — METOPROLOL TARTRATE 25 MG: 25 TABLET, FILM COATED ORAL at 20:26

## 2023-05-30 RX ADMIN — METOPROLOL TARTRATE 25 MG: 25 TABLET, FILM COATED ORAL at 09:06

## 2023-05-30 RX ADMIN — AMLODIPINE BESYLATE 10 MG: 10 TABLET ORAL at 09:06

## 2023-05-30 ASSESSMENT — PAIN DESCRIPTION - ORIENTATION
ORIENTATION: LEFT

## 2023-05-30 ASSESSMENT — PAIN SCALES - GENERAL
PAINLEVEL_OUTOF10: 10
PAINLEVEL_OUTOF10: 8
PAINLEVEL_OUTOF10: 9

## 2023-05-30 ASSESSMENT — PAIN DESCRIPTION - LOCATION
LOCATION: LEG;HIP
LOCATION: HIP

## 2023-05-30 ASSESSMENT — PAIN DESCRIPTION - DESCRIPTORS
DESCRIPTORS: ACHING;SPASM;DISCOMFORT
DESCRIPTORS: SHARP

## 2023-05-30 NOTE — CARE COORDINATION
Care Coordination  The patient was admitted after the patient fell at home and landed on his lift hip. He also hit his head but did not lose consciousness. The patient was taken to surgery yesterday  for a total hip arthoplasty. Pt  [facility] this am 11/24 and ot is pending. Therapy is recommending acute rehab for the patient . Pmr referral made await their assessment. Per the patient he lives with his wife and son in a 2 story home with 5 stairs to enter. His bed is on the second floor . He has no dme as he was independent prior to admission. His primary care physician is Dr Martin Edmonds and his pharmacy is [facility]. Will await acute rehab assessment.

## 2023-05-31 ENCOUNTER — HOSPITAL ENCOUNTER (INPATIENT)
Age: 68
LOS: 9 days | Discharge: HOME | End: 2023-06-09
Attending: PHYSICAL MEDICINE & REHABILITATION | Admitting: PHYSICAL MEDICINE & REHABILITATION
Payer: MEDICARE

## 2023-05-31 VITALS
SYSTOLIC BLOOD PRESSURE: 144 MMHG | OXYGEN SATURATION: 93 % | RESPIRATION RATE: 22 BRPM | BODY MASS INDEX: 31.03 KG/M2 | DIASTOLIC BLOOD PRESSURE: 79 MMHG | TEMPERATURE: 97.1 F | HEART RATE: 95 BPM | WEIGHT: 204.06 LBS

## 2023-05-31 DIAGNOSIS — S72.002A FRACTURED HIP, LEFT, CLOSED, INITIAL ENCOUNTER (HCC): Primary | ICD-10-CM

## 2023-05-31 LAB
ANION GAP SERPL CALCULATED.3IONS-SCNC: 10 MMOL/L (ref 7–16)
BASOPHILS # BLD: 0.03 E9/L (ref 0–0.2)
BASOPHILS NFR BLD: 0.3 % (ref 0–2)
BUN SERPL-MCNC: 29 MG/DL (ref 6–23)
CALCIUM SERPL-MCNC: 9.7 MG/DL (ref 8.6–10.2)
CHLORIDE SERPL-SCNC: 103 MMOL/L (ref 98–107)
CO2 SERPL-SCNC: 28 MMOL/L (ref 22–29)
CREAT SERPL-MCNC: 0.9 MG/DL (ref 0.7–1.2)
EOSINOPHIL # BLD: 0.22 E9/L (ref 0.05–0.5)
EOSINOPHIL NFR BLD: 2.1 % (ref 0–6)
ERYTHROCYTE [DISTWIDTH] IN BLOOD BY AUTOMATED COUNT: 13.3 FL (ref 11.5–15)
GLUCOSE SERPL-MCNC: 142 MG/DL (ref 74–99)
HCT VFR BLD AUTO: 39.4 % (ref 37–54)
HGB BLD-MCNC: 12.8 G/DL (ref 12.5–16.5)
IMM GRANULOCYTES # BLD: 0.08 E9/L
IMM GRANULOCYTES NFR BLD: 0.7 % (ref 0–5)
LYMPHOCYTES # BLD: 1.14 E9/L (ref 1.5–4)
LYMPHOCYTES NFR BLD: 10.6 % (ref 20–42)
MAGNESIUM SERPL-MCNC: 1.9 MG/DL (ref 1.6–2.6)
MCH RBC QN AUTO: 29.8 PG (ref 26–35)
MCHC RBC AUTO-ENTMCNC: 32.5 % (ref 32–34.5)
MCV RBC AUTO: 91.6 FL (ref 80–99.9)
METER GLUCOSE: 111 MG/DL (ref 74–99)
METER GLUCOSE: 125 MG/DL (ref 74–99)
METER GLUCOSE: 130 MG/DL (ref 74–99)
METER GLUCOSE: 144 MG/DL (ref 74–99)
MONOCYTES # BLD: 0.95 E9/L (ref 0.1–0.95)
MONOCYTES NFR BLD: 8.9 % (ref 2–12)
NEUTROPHILS # BLD: 8.31 E9/L (ref 1.8–7.3)
NEUTS SEG NFR BLD: 77.4 % (ref 43–80)
PHOSPHATE SERPL-MCNC: 2.7 MG/DL (ref 2.5–4.5)
PLATELET # BLD AUTO: 186 E9/L (ref 130–450)
PMV BLD AUTO: 10.4 FL (ref 7–12)
POTASSIUM SERPL-SCNC: 3.6 MMOL/L (ref 3.5–5)
RBC # BLD AUTO: 4.3 E12/L (ref 3.8–5.8)
SODIUM SERPL-SCNC: 141 MMOL/L (ref 132–146)
WBC # BLD: 10.7 E9/L (ref 4.5–11.5)

## 2023-05-31 PROCEDURE — 80048 BASIC METABOLIC PNL TOTAL CA: CPT

## 2023-05-31 PROCEDURE — 83735 ASSAY OF MAGNESIUM: CPT

## 2023-05-31 PROCEDURE — 6370000000 HC RX 637 (ALT 250 FOR IP): Performed by: ORTHOPAEDIC SURGERY

## 2023-05-31 PROCEDURE — 2700000000 HC OXYGEN THERAPY PER DAY

## 2023-05-31 PROCEDURE — 84100 ASSAY OF PHOSPHORUS: CPT

## 2023-05-31 PROCEDURE — 82962 GLUCOSE BLOOD TEST: CPT

## 2023-05-31 PROCEDURE — 6370000000 HC RX 637 (ALT 250 FOR IP): Performed by: PHYSICAL MEDICINE & REHABILITATION

## 2023-05-31 PROCEDURE — 6370000000 HC RX 637 (ALT 250 FOR IP): Performed by: INTERNAL MEDICINE

## 2023-05-31 PROCEDURE — 6360000002 HC RX W HCPCS: Performed by: ORTHOPAEDIC SURGERY

## 2023-05-31 PROCEDURE — 2500000003 HC RX 250 WO HCPCS: Performed by: ORTHOPAEDIC SURGERY

## 2023-05-31 PROCEDURE — 2580000003 HC RX 258: Performed by: ORTHOPAEDIC SURGERY

## 2023-05-31 PROCEDURE — 36415 COLL VENOUS BLD VENIPUNCTURE: CPT

## 2023-05-31 PROCEDURE — 85025 COMPLETE CBC W/AUTO DIFF WBC: CPT

## 2023-05-31 PROCEDURE — 1280000000 HC REHAB R&B

## 2023-05-31 RX ORDER — POTASSIUM CHLORIDE 20 MEQ/1
40 TABLET, EXTENDED RELEASE ORAL PRN
Status: CANCELLED | OUTPATIENT
Start: 2023-05-31

## 2023-05-31 RX ORDER — ENOXAPARIN SODIUM 100 MG/ML
40 INJECTION SUBCUTANEOUS DAILY
Status: CANCELLED | OUTPATIENT
Start: 2023-06-01

## 2023-05-31 RX ORDER — SODIUM CHLORIDE 0.9 % (FLUSH) 0.9 %
10 SYRINGE (ML) INJECTION EVERY 12 HOURS SCHEDULED
Status: CANCELLED | OUTPATIENT
Start: 2023-05-31

## 2023-05-31 RX ORDER — POTASSIUM CHLORIDE 7.45 MG/ML
10 INJECTION INTRAVENOUS PRN
Status: CANCELLED | OUTPATIENT
Start: 2023-05-31

## 2023-05-31 RX ORDER — GABAPENTIN 100 MG/1
100 CAPSULE ORAL 3 TIMES DAILY
Status: DISCONTINUED | OUTPATIENT
Start: 2023-05-31 | End: 2023-06-09 | Stop reason: HOSPADM

## 2023-05-31 RX ORDER — EZETIMIBE 10 MG/1
10 TABLET ORAL DAILY
Status: CANCELLED | OUTPATIENT
Start: 2023-06-01

## 2023-05-31 RX ORDER — CITALOPRAM 20 MG/1
60 TABLET ORAL DAILY
Status: CANCELLED | OUTPATIENT
Start: 2023-06-01

## 2023-05-31 RX ORDER — SODIUM CHLORIDE 0.9 % (FLUSH) 0.9 %
10 SYRINGE (ML) INJECTION EVERY 12 HOURS SCHEDULED
Status: DISCONTINUED | OUTPATIENT
Start: 2023-05-31 | End: 2023-06-09 | Stop reason: HOSPADM

## 2023-05-31 RX ORDER — INSULIN LISPRO 100 [IU]/ML
0-8 INJECTION, SOLUTION INTRAVENOUS; SUBCUTANEOUS
Status: CANCELLED | OUTPATIENT
Start: 2023-05-31

## 2023-05-31 RX ORDER — ASPIRIN 81 MG/1
81 TABLET, CHEWABLE ORAL DAILY
Status: CANCELLED | OUTPATIENT
Start: 2023-06-01

## 2023-05-31 RX ORDER — ATORVASTATIN CALCIUM 40 MG/1
80 TABLET, FILM COATED ORAL DAILY
Status: CANCELLED | OUTPATIENT
Start: 2023-06-01

## 2023-05-31 RX ORDER — NITROGLYCERIN 0.4 MG/1
0.4 TABLET SUBLINGUAL EVERY 5 MIN PRN
Status: DISCONTINUED | OUTPATIENT
Start: 2023-05-31 | End: 2023-06-09 | Stop reason: HOSPADM

## 2023-05-31 RX ORDER — SODIUM CHLORIDE 9 MG/ML
INJECTION, SOLUTION INTRAVENOUS PRN
Status: DISCONTINUED | OUTPATIENT
Start: 2023-05-31 | End: 2023-06-09 | Stop reason: HOSPADM

## 2023-05-31 RX ORDER — NITROGLYCERIN 0.4 MG/1
0.4 TABLET SUBLINGUAL EVERY 5 MIN PRN
Status: CANCELLED | OUTPATIENT
Start: 2023-05-31

## 2023-05-31 RX ORDER — MAGNESIUM SULFATE IN WATER 40 MG/ML
2000 INJECTION, SOLUTION INTRAVENOUS PRN
Status: CANCELLED | OUTPATIENT
Start: 2023-05-31

## 2023-05-31 RX ORDER — HYDRALAZINE HYDROCHLORIDE 20 MG/ML
10 INJECTION INTRAMUSCULAR; INTRAVENOUS EVERY 6 HOURS PRN
Status: DISCONTINUED | OUTPATIENT
Start: 2023-05-31 | End: 2023-06-02

## 2023-05-31 RX ORDER — POLYETHYLENE GLYCOL 3350 17 G/17G
17 POWDER, FOR SOLUTION ORAL DAILY PRN
Status: DISCONTINUED | OUTPATIENT
Start: 2023-05-31 | End: 2023-06-01

## 2023-05-31 RX ORDER — ONDANSETRON 2 MG/ML
4 INJECTION INTRAMUSCULAR; INTRAVENOUS EVERY 6 HOURS PRN
Status: DISCONTINUED | OUTPATIENT
Start: 2023-05-31 | End: 2023-06-09 | Stop reason: HOSPADM

## 2023-05-31 RX ORDER — AMLODIPINE BESYLATE 10 MG/1
10 TABLET ORAL EVERY MORNING
Status: DISCONTINUED | OUTPATIENT
Start: 2023-06-01 | End: 2023-06-09 | Stop reason: HOSPADM

## 2023-05-31 RX ORDER — OXYCODONE HYDROCHLORIDE 10 MG/1
10 TABLET ORAL EVERY 4 HOURS PRN
Status: DISCONTINUED | OUTPATIENT
Start: 2023-05-31 | End: 2023-06-09 | Stop reason: HOSPADM

## 2023-05-31 RX ORDER — LOSARTAN POTASSIUM 50 MG/1
100 TABLET ORAL DAILY
Status: DISCONTINUED | OUTPATIENT
Start: 2023-06-01 | End: 2023-06-09 | Stop reason: HOSPADM

## 2023-05-31 RX ORDER — GABAPENTIN 100 MG/1
100 CAPSULE ORAL 3 TIMES DAILY
Status: CANCELLED | OUTPATIENT
Start: 2023-05-31

## 2023-05-31 RX ORDER — DEXTROSE MONOHYDRATE 100 MG/ML
INJECTION, SOLUTION INTRAVENOUS CONTINUOUS PRN
Status: DISCONTINUED | OUTPATIENT
Start: 2023-05-31 | End: 2023-06-09 | Stop reason: HOSPADM

## 2023-05-31 RX ORDER — HYDROCODONE BITARTRATE AND ACETAMINOPHEN 5; 325 MG/1; MG/1
1 TABLET ORAL EVERY 6 HOURS PRN
Status: CANCELLED | OUTPATIENT
Start: 2023-05-31

## 2023-05-31 RX ORDER — ACETAMINOPHEN 325 MG/1
650 TABLET ORAL EVERY 6 HOURS PRN
Status: DISCONTINUED | OUTPATIENT
Start: 2023-05-31 | End: 2023-05-31 | Stop reason: SDUPTHER

## 2023-05-31 RX ORDER — ASPIRIN 81 MG/1
81 TABLET, CHEWABLE ORAL DAILY
Status: DISCONTINUED | OUTPATIENT
Start: 2023-06-01 | End: 2023-06-09 | Stop reason: HOSPADM

## 2023-05-31 RX ORDER — ACETAMINOPHEN 325 MG/1
650 TABLET ORAL EVERY 4 HOURS PRN
Status: DISCONTINUED | OUTPATIENT
Start: 2023-05-31 | End: 2023-06-09 | Stop reason: HOSPADM

## 2023-05-31 RX ORDER — SODIUM CHLORIDE 0.9 % (FLUSH) 0.9 %
10 SYRINGE (ML) INJECTION PRN
Status: DISCONTINUED | OUTPATIENT
Start: 2023-05-31 | End: 2023-06-09 | Stop reason: HOSPADM

## 2023-05-31 RX ORDER — ONDANSETRON 2 MG/ML
4 INJECTION INTRAMUSCULAR; INTRAVENOUS EVERY 6 HOURS PRN
Status: CANCELLED | OUTPATIENT
Start: 2023-05-31

## 2023-05-31 RX ORDER — PROMETHAZINE HYDROCHLORIDE 12.5 MG/1
12.5 TABLET ORAL EVERY 6 HOURS PRN
Status: DISCONTINUED | OUTPATIENT
Start: 2023-05-31 | End: 2023-06-09 | Stop reason: HOSPADM

## 2023-05-31 RX ORDER — POLYETHYLENE GLYCOL 3350 17 G/17G
17 POWDER, FOR SOLUTION ORAL DAILY PRN
Status: DISCONTINUED | OUTPATIENT
Start: 2023-05-31 | End: 2023-06-09 | Stop reason: HOSPADM

## 2023-05-31 RX ORDER — POTASSIUM CHLORIDE 7.45 MG/ML
10 INJECTION INTRAVENOUS PRN
Status: DISCONTINUED | OUTPATIENT
Start: 2023-05-31 | End: 2023-06-02

## 2023-05-31 RX ORDER — HYDRALAZINE HYDROCHLORIDE 20 MG/ML
10 INJECTION INTRAMUSCULAR; INTRAVENOUS EVERY 6 HOURS PRN
Status: CANCELLED | OUTPATIENT
Start: 2023-05-31

## 2023-05-31 RX ORDER — PANTOPRAZOLE SODIUM 40 MG/1
40 TABLET, DELAYED RELEASE ORAL
Status: CANCELLED | OUTPATIENT
Start: 2023-06-01

## 2023-05-31 RX ORDER — SODIUM CHLORIDE 0.9 % (FLUSH) 0.9 %
10 SYRINGE (ML) INJECTION PRN
Status: CANCELLED | OUTPATIENT
Start: 2023-05-31

## 2023-05-31 RX ORDER — DEXTROSE MONOHYDRATE 100 MG/ML
INJECTION, SOLUTION INTRAVENOUS CONTINUOUS PRN
Status: CANCELLED | OUTPATIENT
Start: 2023-05-31

## 2023-05-31 RX ORDER — ACETAMINOPHEN 650 MG/1
650 SUPPOSITORY RECTAL EVERY 6 HOURS PRN
Status: DISCONTINUED | OUTPATIENT
Start: 2023-05-31 | End: 2023-06-01

## 2023-05-31 RX ORDER — GABAPENTIN 100 MG/1
100 CAPSULE ORAL 3 TIMES DAILY
Status: DISCONTINUED | OUTPATIENT
Start: 2023-05-31 | End: 2023-05-31 | Stop reason: HOSPADM

## 2023-05-31 RX ORDER — ACETAMINOPHEN 650 MG/1
650 SUPPOSITORY RECTAL EVERY 6 HOURS PRN
Status: CANCELLED | OUTPATIENT
Start: 2023-05-31

## 2023-05-31 RX ORDER — SODIUM CHLORIDE 9 MG/ML
INJECTION, SOLUTION INTRAVENOUS PRN
Status: CANCELLED | OUTPATIENT
Start: 2023-05-31

## 2023-05-31 RX ORDER — PANTOPRAZOLE SODIUM 40 MG/1
40 TABLET, DELAYED RELEASE ORAL
Status: DISCONTINUED | OUTPATIENT
Start: 2023-06-01 | End: 2023-06-09 | Stop reason: HOSPADM

## 2023-05-31 RX ORDER — FERROUS SULFATE 325(65) MG
325 TABLET ORAL
Status: DISCONTINUED | OUTPATIENT
Start: 2023-06-01 | End: 2023-06-09 | Stop reason: HOSPADM

## 2023-05-31 RX ORDER — AMLODIPINE BESYLATE 10 MG/1
10 TABLET ORAL EVERY MORNING
Status: CANCELLED | OUTPATIENT
Start: 2023-06-01

## 2023-05-31 RX ORDER — FLUTICASONE PROPIONATE 50 MCG
1 SPRAY, SUSPENSION (ML) NASAL DAILY PRN
Status: CANCELLED | OUTPATIENT
Start: 2023-05-31

## 2023-05-31 RX ORDER — HYDROCODONE BITARTRATE AND ACETAMINOPHEN 5; 325 MG/1; MG/1
1 TABLET ORAL EVERY 6 HOURS PRN
Status: DISCONTINUED | OUTPATIENT
Start: 2023-05-31 | End: 2023-05-31

## 2023-05-31 RX ORDER — PROMETHAZINE HYDROCHLORIDE 12.5 MG/1
12.5 TABLET ORAL EVERY 6 HOURS PRN
Status: CANCELLED | OUTPATIENT
Start: 2023-05-31

## 2023-05-31 RX ORDER — EZETIMIBE 10 MG/1
10 TABLET ORAL DAILY
Status: DISCONTINUED | OUTPATIENT
Start: 2023-06-01 | End: 2023-06-09 | Stop reason: HOSPADM

## 2023-05-31 RX ORDER — INSULIN LISPRO 100 [IU]/ML
0-4 INJECTION, SOLUTION INTRAVENOUS; SUBCUTANEOUS NIGHTLY
Status: CANCELLED | OUTPATIENT
Start: 2023-05-31

## 2023-05-31 RX ORDER — LOSARTAN POTASSIUM 50 MG/1
100 TABLET ORAL DAILY
Status: CANCELLED | OUTPATIENT
Start: 2023-06-01

## 2023-05-31 RX ORDER — CITALOPRAM 20 MG/1
60 TABLET ORAL DAILY
Status: DISCONTINUED | OUTPATIENT
Start: 2023-06-01 | End: 2023-06-09 | Stop reason: HOSPADM

## 2023-05-31 RX ORDER — ATORVASTATIN CALCIUM 80 MG/1
80 TABLET, FILM COATED ORAL DAILY
Status: DISCONTINUED | OUTPATIENT
Start: 2023-06-01 | End: 2023-06-09 | Stop reason: HOSPADM

## 2023-05-31 RX ORDER — FLUTICASONE PROPIONATE 50 MCG
1 SPRAY, SUSPENSION (ML) NASAL DAILY PRN
Status: DISCONTINUED | OUTPATIENT
Start: 2023-05-31 | End: 2023-06-09 | Stop reason: HOSPADM

## 2023-05-31 RX ORDER — POTASSIUM CHLORIDE 20 MEQ/1
40 TABLET, EXTENDED RELEASE ORAL PRN
Status: DISCONTINUED | OUTPATIENT
Start: 2023-05-31 | End: 2023-06-02

## 2023-05-31 RX ORDER — INSULIN LISPRO 100 [IU]/ML
0-8 INJECTION, SOLUTION INTRAVENOUS; SUBCUTANEOUS
Status: DISCONTINUED | OUTPATIENT
Start: 2023-05-31 | End: 2023-06-09 | Stop reason: HOSPADM

## 2023-05-31 RX ORDER — HYDROCODONE BITARTRATE AND ACETAMINOPHEN 5; 325 MG/1; MG/1
1 TABLET ORAL EVERY 6 HOURS PRN
Status: DISCONTINUED | OUTPATIENT
Start: 2023-05-31 | End: 2023-05-31 | Stop reason: HOSPADM

## 2023-05-31 RX ORDER — ACETAMINOPHEN 325 MG/1
650 TABLET ORAL EVERY 6 HOURS PRN
Status: CANCELLED | OUTPATIENT
Start: 2023-05-31

## 2023-05-31 RX ORDER — ENOXAPARIN SODIUM 100 MG/ML
40 INJECTION SUBCUTANEOUS DAILY
Status: DISCONTINUED | OUTPATIENT
Start: 2023-06-01 | End: 2023-06-09 | Stop reason: HOSPADM

## 2023-05-31 RX ORDER — MAGNESIUM SULFATE IN WATER 40 MG/ML
2000 INJECTION, SOLUTION INTRAVENOUS PRN
Status: DISCONTINUED | OUTPATIENT
Start: 2023-05-31 | End: 2023-06-09 | Stop reason: HOSPADM

## 2023-05-31 RX ORDER — TAMSULOSIN HYDROCHLORIDE 0.4 MG/1
0.4 CAPSULE ORAL DAILY
Status: CANCELLED | OUTPATIENT
Start: 2023-06-01

## 2023-05-31 RX ORDER — FERROUS SULFATE 325(65) MG
325 TABLET ORAL
Status: CANCELLED | OUTPATIENT
Start: 2023-06-01

## 2023-05-31 RX ORDER — POTASSIUM CHLORIDE 7.45 MG/ML
10 INJECTION INTRAVENOUS PRN
Status: DISCONTINUED | OUTPATIENT
Start: 2023-05-31 | End: 2023-06-01

## 2023-05-31 RX ORDER — OXYCODONE HYDROCHLORIDE 5 MG/1
5 TABLET ORAL EVERY 4 HOURS PRN
Status: DISCONTINUED | OUTPATIENT
Start: 2023-05-31 | End: 2023-06-09 | Stop reason: HOSPADM

## 2023-05-31 RX ORDER — POLYETHYLENE GLYCOL 3350 17 G/17G
17 POWDER, FOR SOLUTION ORAL DAILY PRN
Status: CANCELLED | OUTPATIENT
Start: 2023-05-31

## 2023-05-31 RX ORDER — INSULIN LISPRO 100 [IU]/ML
0-4 INJECTION, SOLUTION INTRAVENOUS; SUBCUTANEOUS NIGHTLY
Status: DISCONTINUED | OUTPATIENT
Start: 2023-05-31 | End: 2023-06-09 | Stop reason: HOSPADM

## 2023-05-31 RX ORDER — TAMSULOSIN HYDROCHLORIDE 0.4 MG/1
0.4 CAPSULE ORAL DAILY
Status: DISCONTINUED | OUTPATIENT
Start: 2023-06-01 | End: 2023-06-09 | Stop reason: HOSPADM

## 2023-05-31 RX ADMIN — HYDROCODONE BITARTRATE AND ACETAMINOPHEN 1 TABLET: 5; 325 TABLET ORAL at 14:05

## 2023-05-31 RX ADMIN — ASPIRIN 81 MG CHEWABLE TABLET 81 MG: 81 TABLET CHEWABLE at 09:44

## 2023-05-31 RX ADMIN — HYDROMORPHONE HYDROCHLORIDE 1.25 MG: 1 INJECTION, SOLUTION INTRAMUSCULAR; INTRAVENOUS; SUBCUTANEOUS at 04:23

## 2023-05-31 RX ADMIN — OXYCODONE HYDROCHLORIDE 10 MG: 5 TABLET ORAL at 22:58

## 2023-05-31 RX ADMIN — CITALOPRAM HYDROBROMIDE 60 MG: 20 TABLET ORAL at 09:44

## 2023-05-31 RX ADMIN — GABAPENTIN 100 MG: 100 CAPSULE ORAL at 10:30

## 2023-05-31 RX ADMIN — ATORVASTATIN CALCIUM 80 MG: 40 TABLET, FILM COATED ORAL at 09:43

## 2023-05-31 RX ADMIN — ENOXAPARIN SODIUM 40 MG: 100 INJECTION SUBCUTANEOUS at 09:43

## 2023-05-31 RX ADMIN — AMLODIPINE BESYLATE 10 MG: 10 TABLET ORAL at 09:45

## 2023-05-31 RX ADMIN — SODIUM CHLORIDE, PRESERVATIVE FREE 10 ML: 5 INJECTION INTRAVENOUS at 10:31

## 2023-05-31 RX ADMIN — LOSARTAN POTASSIUM 100 MG: 50 TABLET, FILM COATED ORAL at 09:45

## 2023-05-31 RX ADMIN — OXYCODONE HYDROCHLORIDE 10 MG: 5 TABLET ORAL at 18:55

## 2023-05-31 RX ADMIN — PANTOPRAZOLE SODIUM 40 MG: 40 TABLET, DELAYED RELEASE ORAL at 07:08

## 2023-05-31 RX ADMIN — HYDROMORPHONE HYDROCHLORIDE 1.25 MG: 1 INJECTION, SOLUTION INTRAMUSCULAR; INTRAVENOUS; SUBCUTANEOUS at 07:07

## 2023-05-31 RX ADMIN — GABAPENTIN 100 MG: 100 CAPSULE ORAL at 21:06

## 2023-05-31 RX ADMIN — HYDROMORPHONE HYDROCHLORIDE 1.25 MG: 1 INJECTION, SOLUTION INTRAMUSCULAR; INTRAVENOUS; SUBCUTANEOUS at 10:30

## 2023-05-31 RX ADMIN — METOPROLOL TARTRATE 25 MG: 25 TABLET, FILM COATED ORAL at 09:45

## 2023-05-31 RX ADMIN — EZETIMIBE 10 MG: 10 TABLET ORAL at 09:45

## 2023-05-31 RX ADMIN — METOPROLOL TARTRATE 25 MG: 25 TABLET, FILM COATED ORAL at 21:06

## 2023-05-31 RX ADMIN — GABAPENTIN 100 MG: 100 CAPSULE ORAL at 14:31

## 2023-05-31 RX ADMIN — TAMSULOSIN HYDROCHLORIDE 0.4 MG: 0.4 CAPSULE ORAL at 09:44

## 2023-05-31 RX ADMIN — FERROUS SULFATE TAB 325 MG (65 MG ELEMENTAL FE) 325 MG: 325 (65 FE) TAB at 09:45

## 2023-05-31 ASSESSMENT — PAIN DESCRIPTION - DESCRIPTORS
DESCRIPTORS: THROBBING;ACHING;DISCOMFORT
DESCRIPTORS: ACHING;DISCOMFORT;SORE
DESCRIPTORS: ACHING

## 2023-05-31 ASSESSMENT — PAIN SCALES - WONG BAKER: WONGBAKER_NUMERICALRESPONSE: 2

## 2023-05-31 ASSESSMENT — PAIN DESCRIPTION - LOCATION
LOCATION: HIP
LOCATION: HIP
LOCATION: HIP;LEG
LOCATION: LEG;HIP

## 2023-05-31 ASSESSMENT — PAIN SCALES - GENERAL
PAINLEVEL_OUTOF10: 10
PAINLEVEL_OUTOF10: 9
PAINLEVEL_OUTOF10: 8
PAINLEVEL_OUTOF10: 8
PAINLEVEL_OUTOF10: 3
PAINLEVEL_OUTOF10: 10
PAINLEVEL_OUTOF10: 10

## 2023-05-31 ASSESSMENT — PAIN DESCRIPTION - ORIENTATION
ORIENTATION: LEFT

## 2023-05-31 NOTE — CARE COORDINATION
Care Coordination:  Per liaison, they will accept patient to ARU. Bed available today. They requested IV Dilaudid be discontinued. RN informed and order obtained. Patient will transfer to ARU today. Liaison to coordinate with floor re  orders, room number and transfer time. Patient aware of plan.

## 2023-05-31 NOTE — PLAN OF CARE
Problem: Discharge Planning  Goal: Discharge to home or other facility with appropriate resources  Outcome: Completed     Problem: Pain  Goal: Verbalizes/displays adequate comfort level or baseline comfort level  Outcome: Completed  Flowsheets (Taken 5/31/2023 0000 by Kika Kenyon)  Verbalizes/displays adequate comfort level or baseline comfort level:   Encourage patient to monitor pain and request assistance   Assess pain using appropriate pain scale   Administer analgesics based on type and severity of pain and evaluate response     Problem: ABCDS Injury Assessment  Goal: Absence of physical injury  Outcome: Completed     Problem: Safety - Adult  Goal: Free from fall injury  Outcome: Completed     Problem: Chronic Conditions and Co-morbidities  Goal: Patient's chronic conditions and co-morbidity symptoms are monitored and maintained or improved  Outcome: Completed

## 2023-05-31 NOTE — CONSULTS
[address] [facility]                  [address] [place],  [address]                                  CONSULTATION    PATIENT NAME: Xander Tomlinson                    :        1955  MED REC NO:   [id number]                            ROOM:       4516  ACCOUNT NO:   [de-identified]                           ADMIT DATE: 2023  PROVIDER:     Lavinia Durham MD    CONSULT DATE:  2023    REHAB CONSULT    CHIEF COMPLAINT:  Fractured right femur. HISTORY OF PRESENT ILLNESS:  The patient had a fall when he was helping  at [employer] and was brought to [address] [place] [address] [facility] on 2023. He was  found to have a closed fracture of the left femur. He underwent a left  hip hemiarthroplasty on 2023. He tolerated the surgery well. Postop, he has been evaluated by Therapy. He is moderate assist for  transfers, moderate for ambulation of only a couple of steps, maximal  assist for his ADLs, and I was asked to see him for planning further  rehab. PAST MEDICAL HISTORY:  The patient has had several other orthopedic  procedures. He has had a history of coronary artery disease and  hypertension, previous left knee surgery, and previous shoulder surgery. ALLERGIES:  PENICILLIN. CURRENT MEDICATIONS:  Norvasc, aspirin, Lipitor, Celexa, Lovenox, Zetia,  iron, gabapentin, sliding scale insulin, Cozaar, Lopressor, Protonix,  and Flomax. SOCIAL HISTORY:  He lives with his wife. REVIEW OF SYSTEMS:  Noncontributory except as above. PHYSICAL EXAMINATION:  GENERAL:  Well-nourished, well-developed, white male, in no acute  distress. HEAD:  Normocephalic and atraumatic. EYES:  Pupils equal, round, and reactive to light. LUNGS:  Clear to P and A. HEART:  Regular rate and rhythm. S1 and S2 normal.  No murmurs or  gallops. ABDOMEN:  Bowel sounds normal.  Soft and nontender. No masses or  organomegaly. EXTREMITIES:  Without clubbing, cyanosis, or edema.

## 2023-05-31 NOTE — PROGRESS NOTES
Acute Rehab Pre-Admission Screen      Referral date: 5/31/2023  Onset/Hospital Admit Date: 5/27/2023  3:35 PM    Current Location: [facility]    Name: Echo Grady  YOB: 1955  Age: 75 y.o. Admitting Diagnosis: Left Intertrochanteric fracture femur  Address: [address] [place] [address] Phone: [phone] (home)  Karley Casey #:     Sex: male  Race:A. White  Ethnicity: A. No, not of ,  or Uzbek origin    Marital Status:      Ethnic/Cultural/Mandaeism Considerations: Anglican                ADVANCED DIRECTIVES:     Full code   Copies are in the chart                 COVERAGE INFORMATION   Primary Insurer: Medicare A  & B  Secondary Insurer: Lydia  Medicare #: [id number]    Verified coverage with : Financial department        MEDICAL UPDATE:  History of present admission: Jen Paulson is a 75 y.o. male who was admitted after a fall. He missed a step and landed on his left hip. He did hit his head. He did not loose consciousness. He was found to have a left intertrochanteric fracture. He had a left total hip arthroplasty done on 5/29/2023. He has a past medical history of anemia, anxiety, CAD, DM, GERD, Hypercholesterolemia, Hyperlipidemia, and Hypertension, NSTEMI, and Ureteral mass. He will benefit from 3 or more face to face visits a week from a physician as well as nursing monitoring his blood pressure and blood sugars while in acute rehab.      PHYSICIAN / REFERRAL INFORMATION  Referring Physician: Dr. Priyanka Reyes MD  Attending Physician: Moy Lopez MD  Primary Care Physician: Keren Hawthorne DO  Consultants/Opinions (see full consult notes on chart): Orthopedic Surgery    SOCIAL INFORMATION  Primary  Contact: Daren Loveless  Relationship: spouse  Primary Phone: [phone]   Secondary  Contact: Jackie Tobias  Relationship: child  Primary Phone: [phone]      Previous Community Services: none  Caregiver available:
Consult received. Will discuss with Dr. Denver Freestone for appropriateness for ARU.   Alexandria Elizabeth RN  ARU Pre-screener/  [phone]
Hospitalist Progress Note      SYNOPSIS: Patient admitted on 2023 for S/p fall (patient missed a step and fell down); XR of the femur/pelvis shows: a left femoral neck fracture. Orthopedics on consult and patient is  now s/p S/P L MARIO 23. SUBJECTIVE:    Complaining of hiccups   No abdominal pain etc      Temp (24hrs), Av.1 °F (36.7 °C), Min:97.1 °F (36.2 °C), Max:98.8 °F (37.1 °C)    DIET: ADULT DIET; Regular; 4 carb choices (60 gm/meal); Low Sodium (2 gm)  CODE: Full Code  No intake or output data in the 24 hours ending 23 0958      OBJECTIVE:    BP (!) 144/79   Pulse 95   Temp 97.1 °F (36.2 °C) (Temporal)   Resp 22   Wt 204 lb 1 oz (92.6 kg)   SpO2 93%   BMI 31.03 kg/m²     General appearance: No apparent distress, appears stated age and cooperative. HEENT:  Conjunctivae/corneas clear. Neck: Supple. No jugular venous distention. Respiratory: Clear to auscultation bilaterally, normal respiratory effort  Cardiovascular: Regular rate rhythm, normal S1-S2  Abdomen: Soft, nontender, nondistended  Musculoskeletal: Left hip surgical site is c/d/I. Skin:  No rashes  on visible skin  Neurologic: awake, alert and following commands     ASSESSMENT:   25-year-old male with past medical history of diabetes mellitus, CAD hypertension hyperlipidemia who presents to the hospital after a fall at home. According to the patient he missed a step and fell down. He mentions he landed on his left hip, he also hit his head but did not lose consciousness. CT head: no acute intracranial abnormality; XR of the femur/pelvis shows: a left femoral neck fracture. Orthopedics on consult and patient is  now s/p S/P L MARIO 23.      PLAN:    1.) Closed left femoral neck fracture  -PT/OT consult  -Analgesics PRN   - Orthopedics following   - S/p S/P L MARIO 23.   - PT has seen- believes patient will benefit from PM&R  - wean O2 as tolerated- not on oxygen at home     2.) Diabetes mellitus  SSI; Hb1C:
Hospitalist Progress Note      SYNOPSIS: Patient admitted on 2023 for S/p fall (patient missed a step and fell down); XR of the femur/pelvis shows: a left femoral neck fracture. Orthopedics on consult and patient is  now s/p S/P L MARIO 23. SUBJECTIVE:    Patient seen and examined. Doing well. +analgesics are adequately taking care of the pain at the site of surgery. No CP or SOB. No abd pain. No n/v/f/c. No abd pain. No dysuria. Temp (24hrs), Av °F (36.7 °C), Min:97.3 °F (36.3 °C), Max:99 °F (37.2 °C)    DIET: ADULT DIET; Regular; 4 carb choices (60 gm/meal); Low Sodium (2 gm)  CODE: Full Code    Intake/Output Summary (Last 24 hours) at 2023 08  Last data filed at 2023  Gross per 24 hour   Intake 1780 ml   Output 550 ml   Net 1230 ml       OBJECTIVE:    /78   Pulse 98   Temp 97.9 °F (36.6 °C) (Temporal)   Resp 18   Wt 198 lb (89.8 kg)   SpO2 92%   BMI 30.11 kg/m²     General appearance: No apparent distress, appears stated age and cooperative. HEENT:  Conjunctivae/corneas clear. Neck: Supple. No jugular venous distention. Respiratory: Clear to auscultation bilaterally, normal respiratory effort  Cardiovascular: Regular rate rhythm, normal S1-S2  Abdomen: Soft, nontender, nondistended  Musculoskeletal: Left hip surgical site is c/d/I. Skin:  No rashes  on visible skin  Neurologic: awake, alert and following commands     ASSESSMENT:   65-year-old male with past medical history of diabetes mellitus, CAD hypertension hyperlipidemia who presents to the hospital after a fall at home. According to the patient he missed a step and fell down. He mentions he landed on his left hip, he also hit his head but did not lose consciousness. CT head: no acute intracranial abnormality; XR of the femur/pelvis shows: a left femoral neck fracture. Orthopedics on consult and patient is  now s/p S/P L MARIO 23.      PLAN:    1.) Closed left femoral neck fracture  -PT/OT
Hospitalist Progress Note      Synopsis: Patient admitted for L hip fx. Patient presented to the ED w/ L hip pain after a fall. He reports he was caring a large vase of flowers down steps and missed a step causing him to fall. He did strike his head but no LOC. Imaging revealed a closed L femoral neck fracture. Orthopedic surgery was consulted. Plan was for ORIF on  though was rescheduled for today d/t OR availability. Hospital day 2     Subjective:  Stable overnight. No issues reported. Records reviewed. Patient in OR on my attempts to examine. Temp (24hrs), Av.1 °F (36.7 °C), Min:97.6 °F (36.4 °C), Max:98.6 °F (37 °C)    DIET: ADULT DIET;  Regular  CODE: Full Code    Intake/Output Summary (Last 24 hours) at 2023 1224  Last data filed at 2023 1107  Gross per 24 hour   Intake 1200 ml   Output 750 ml   Net 450 ml         Objective:    /65   Pulse 87   Temp 97.6 °F (36.4 °C) (Temporal)   Resp 13   Wt 198 lb (89.8 kg)   SpO2 97%   BMI 30.11 kg/m²       Medications:  REVIEWED DAILY    Infusion Medications    sodium chloride      dextrose       Scheduled Medications    [START ON 2023] losartan  100 mg Oral Daily    [START ON 2023] aspirin  81 mg Oral Daily    enoxaparin  40 mg SubCUTAneous Daily    ceFAZolin  2,000 mg IntraVENous Q8H    sodium chloride flush  10 mL IntraVENous 2 times per day    amLODIPine  10 mg Oral QAM    atorvastatin  80 mg Oral Daily    metoprolol tartrate  25 mg Oral BID    pantoprazole  40 mg Oral QAM AC    tamsulosin  0.4 mg Oral Daily    citalopram  60 mg Oral Daily    ezetimibe  10 mg Oral Daily    ferrous sulfate  325 mg Oral Daily with breakfast    insulin lispro  0-8 Units SubCUTAneous TID WC    insulin lispro  0-4 Units SubCUTAneous Nightly     PRN Meds: sodium chloride flush, sodium chloride, potassium chloride **OR** potassium alternative oral replacement **OR** potassium chloride, potassium chloride, magnesium sulfate,
I have seen and evaluated the patient and agree with the above assessment on today's visit. I have performed the key components of the history and physical examination and concur completely with the findings and plans as documented. Agree with ROS, examination, FMH, PMH, PSH, SocHx, and allergies as above. Patient seen again this morning. He is having some lateral sided pain controlled medication. He was able to ambulate short distance therapy yesterday. Overall he is feeling well. He is planning on going to acute inpatient rehab at discharge. We will see how he does with therapy. Continue DVT prophylaxis in the form of aspirin. He will follow-up in my office in 2 weeks. All questions answered in detail. We will follow peripherally for now      Sergey Gonsalez DO   Orthopaedic Surgery   5/31/2023  7:56 AM        Department of Orthopedic Surgery  Progress Note    Patient seen and examined, resting in bed. Admits to mild lateral thigh pain but controlled with medication. No new complaints. Admits to flatulence, no BM yet per patient.     VITALS:  BP (!) 157/79   Pulse 95   Temp 98.7 °F (37.1 °C) (Temporal)   Resp 18   Wt 198 lb (89.8 kg)   SpO2 96%   BMI 30.11 kg/m²     General: awake, alert cooperative in no acute distress    MUSCULOSKELETAL:   left lower extremity:  Dressing C/D/I  Compartments soft and compressible  +PF/DF/EHL  +2/4 DP & PT pulses, Brisk Cap refill, Toes warm and perfused  Distal sensation grossly intact to Peroneals, Sural, Saphenous, and tibial nrs    CBC:   Lab Results   Component Value Date/Time    WBC 10.7 05/31/2023 04:51 AM    HGB 12.8 05/31/2023 04:51 AM    HCT 39.4 05/31/2023 04:51 AM     05/31/2023 04:51 AM     PT/INR:    Lab Results   Component Value Date/Time    PROTIME 11.7 05/27/2023 04:22 PM    PROTIME 12.4 05/29/2012 03:45 AM    INR 1.1 05/27/2023 04:22 PM         ASSESSMENT  S/P L MARIO for a femoral neck fracture - 5/29    PLAN      Continue physical
I have seen and evaluated the patient and agree with the above assessment on today's visit. I have performed the key components of the history and physical examination and concur completely with the findings and plans as documented. Agree with ROS, examination, FMH, PMH, PSH, SocHx, and allergies as above. Patient seen and examined at bedside. He just got back in bed from sitting in his chair. He is feeling good. He has some lateral sided pain over his incision but his hip is feeling much better. Denies fever chills. Denies nausea and vomiting. Left lower extremity: Dressing clean dry and intact thigh soft compressible. He has 5-5 strength with active ankle plantar dorsiflexion. Foot is warm well perfused. Assessment: Day1 status post left total hip arthroplasty for femoral neck fracture    Plan:  Weightbearing as tolerated with anterolateral hip cautions  Will finish IV antibiotic coverage today  PT OT  DVT prophylaxis, aspirin and early mobilization  Pain control  Discharge planning      [facility]   5/30/2023  1:31 PM        Department of Orthopedic Surgery  Progress Note    Patient seen and examined. Pain controlled. No new complaints. Denies chest pain, shortness of breath, dizziness/lightheadedness. Resting in bed comfortably this morning. All questions and concerns answered. We discussed his surgery.     VITALS:  BP (!) 144/79   Pulse 99   Temp 98 °F (36.7 °C) (Temporal)   Resp 16   Wt 198 lb (89.8 kg)   SpO2 93%   BMI 30.11 kg/m²     General: awake, alert cooperative in no acute distress    MUSCULOSKELETAL:   left lower extremity:  Dressing C/D/I  Compartments soft and compressible  +PF/DF/EHL  +2/4 DP & PT pulses, Brisk Cap refill, Toes warm and perfused  Distal sensation grossly intact to Peroneals, Sural, Saphenous, and tibial nrs    CBC:   Lab Results   Component Value Date/Time    WBC 14.9 05/29/2023 04:40 AM    HGB 15.8 05/29/2023 04:40 AM    HCT
Met with patient at bedside. He is aware that he will be moving to ARU room [facility]. He is aware of the visiting hours and that he will need comfortable clothes for therapy.    Petey Calabrese RN  [phone]
Met with patient at bedside. I explained the ARU program. He is interested in coming. He will need to transition to PO pain meds. SW updated. Dr. Yg Ndiaye is agreeable for patient to come to ARU.    Sunnydaxa Silva RN  ARU Pre-screener/case manger  [phone]
Nurse to nurse report called and given to Sanjeev Caceres RN for patient to transfer to [facility]. Patient currently eating lunch will order transport when patient is finished. All belongings accounts and verified patient.      Darwin Zayas RN, BSN
Occupational Therapy    OT order received. Chart reviewed. Pt planned for surgical intervention 5/29. Will re-attempt eval post-op.     Sherleen Mortimer, [facility], OTR/L [id number]
Patient seen and examined in preoperative holding. My initials were placed on his left hip. Informed consent was obtained and signed and placed in the chart. Patient understands the risks of blood loss, blood clot, infection, damage to surrounding neurovascular structures, arthrofibrosis, catastrophic failure, periprosthetic fracture, limb length inequality, instability amongst others. He wishes to receive her surgery. Pending today's left total hip arthroplasty for femoral neck fracture. He will be weightbearing as tolerated postoperatively and received pain medicines DVT prophylaxis. All questions answered in detail.     Nino Mustafa DO   Orthopaedic Surgery   5/29/2023  7:17 AM
Patient's PRN dilaudid is discontinued in order to transfer to PMR. Perfect serve message sent to Dr. Jelena Jolly regarding getting oral PRN pain medication for patient.      Sary Duvall RN, BSN
Physical Therapy    PT evaluation orders received and chart review completed. Pt to OR for L femur fx this date. Will follow and re-attempt as appropriate. Thank you.     Samantha Johns PT, DPT  [id number]
Physical Therapy    Pt to benefit from PM&R consult to assist in returning to PLOF with intense rehabilitation at [facility]. Pt is motivated and has good family support.     Cyndee Martines PT, DPT  [id number]
Physical Therapy  Physical Therapy Initial Assessment     Name: Jef Soto  : 1955  MRN: [id number]      Date of Service: 2023    Evaluating PT:  Jvay Dash PT, DPT [id number]    Room #:  3352/9564-B  Diagnosis:  Fall against object Mirtha Dent  Fall, initial encounter [Y24. XXXA]  Closed nondisplaced intertrochanteric fracture of left femur, [facility] [S72.145A]  PMHx/PSHx:    Past Medical History:   Diagnosis Date    Anemia     Anxiety attack     controlled with citolpram    CAD (coronary artery disease) 16 YRS AGO    MI X 2. Dee Lloyd yearly    Diabetes mellitus ([facility]     GERD (gastroesophageal reflux disease)     Hematuria     Hypercholesterolemia     Hyperlipidemia     Hypertension 2014    B/P IS ELEVATED, PATIENT STATES HE NEEDS TO GET HIS LOPRESSOR REFILLED    NSTEMI (non-ST elevated myocardial infarction) ([facility] 2012    Ureteral mass       Procedure/Surgery:  L MARIO   Precautions:  Falls, WBAT LLE, anterolateral hip precautions, O2, continuous pulse ox  Equipment Needs:  none, pt has FWW    SUBJECTIVE:    Pt lives with wife and son in a 2 story home with 5 stairs to enter and 2 rail. Bed is on 2nd floor and bath is on 2nd floor with flight of stairs and 1 rail to access. Pt ambulated with no AD PTA. OBJECTIVE:   Initial Evaluation  Date: 23 Treatment Short Term/ Long Term   Goals   AM-PAC 6 Clicks 45/57     Was pt agreeable to Eval/treatment? yes     Does pt have pain?  6/10 L hip pain     Bed Mobility  Rolling: ModA  Supine to sit: ModA  Sit to supine: NT  Scooting: ModA  Rolling: SBA  Supine to sit: SBA  Sit to supine: SBA  Scooting: SBA   Transfers Sit to stand: MaxA  Stand to sit: MaxA  Stand pivot: ModA with [employer]  Sit to stand: SBA  Stand to sit: SBA  Stand pivot: SBA with [employer]   Ambulation    Few steps to chair with [employer] ModA  150 feet with [employer] SBA   Stair negotiation: ascended and descended  NT  13 steps with 1 rail SBA
Physician Progress Note      PATIENT:               Anselmo Mckeon  CSN #:                  [id number]  :                       1955  ADMIT DATE:       2023 3:35 PM  DISCH DATE:  Shraddha Louis  PROVIDER #:        Tina Good DO          QUERY TEXT:    Pt admitted with Closed fracture of neck of left femur. Pt noted to have   endured a fall, also noted to have osteopenia on imaging. If possible, please   document in progress notes and discharge summary if you are evaluating and/or   treating any of the following: The medical record reflects the following:  Risk Factors: fall, osteopenia  Clinical Indicators: Patient states that he was carrying flowers when he   missed a step, slipped an fell. Closed fracture of neck of left femur. XR Hip   \"Osteopenia\". Treatment: L MARIO    Thank you,  Katherine Siegel RN, BSN, CRCR, Clinical Documentation Improvement  Options provided:  -- Pathological femur fracture due to osteopenia  -- Pathological femur fracture due to osteopenia following fall which would   not usually break a normal, healthy bone  -- Traumatic femur fracture  -- Other - I will add my own diagnosis  -- Disagree - Not applicable / Not valid  -- Disagree - Clinically unable to determine / Unknown  -- Refer to Clinical Documentation Reviewer    PROVIDER RESPONSE TEXT:    This patient has a pathological femur fracture due to osteopenia following   fall which would not usually break a normal, healthy bone. Query created by: Pranav Caraballo on 2023 1:01 PM      Electronically signed by:   Tina Good DO 2023 9:29 AM
Surgical bath given.  Clean gown and clean linens applied
and techniques to improve independence/tolerance for self-care routine  * Functional transfer/mobility training/DME recommendations for increased independence, safety, and fall prevention  * Patient/Family education to increase follow through with safety techniques and functional independence  * Recommendation of environmental modifications for increased safety with functional transfers/mobility and ADLs  * Therapeutic exercise to improve motor endurance, ROM, and functional strength for ADLs/functional transfers  * Therapeutic activities to facilitate/challenge dynamic balance, stand tolerance for increased safety and independence with ADLs    Home Living: Pt lives wife and son (29years old) in 2 story home with 5 steps and BHR to enter. Bed and Bath on 2nd floor. Laundry basement level 3+8 steps and 1HR to access. Bathroom setup: tub/shower unit (plans to borrow extended tub bench from family member)   Equipment owned: ww, madhu    Prior Level of Function: IND with ADLs , IND with IADLs  ambulated with no AD piror  Driving: yes  Occupation: none stated    Pain Level: 6/10 LLE pain  Cognition: A&O: 4/4  Follows multi step directions   Memory:  good   Sequencing:  fair +   Problem solving:  fair +   Judgement/safety:  fair +     Functional Assessment:  AM-PAC Daily Activity Raw Score: 15/24   Initial Eval Status  Date: 5/30/23 Treatment Status  Date: STGs = LTGs  Time frame: 10-14 days   Feeding Set Up   Independent    Grooming Stand by Assist   Independent    UB Dressing Minimal Assist   Saad gown over shoulders seated EOB  Independent    LB Dressing Moderate Assist   Saad shorts seated EOB   Assist to threading LLE  SBA   Bathing Maximal Assist  SBA   Toileting Moderate Assist   Urinal bed level  SBA   Bed Mobility  Log Roll: Mod A  Supine to sit: Mod A   Sit to supine: Mod A   Supine to sit: Stand by Assist   Sit to supine: Stand by Assist    Functional Transfers Sit to stand: Max A   Stand to sit:  Max

## 2023-05-31 NOTE — DISCHARGE INSTR - COC
Continuity of Care Form    Patient Name: Jeanna Burciaga   :  1955  MRN:  [id number]    Admit date:  2023  Discharge date:  23    Code Status Order: Full Code   Advance Directives:     Admitting Physician: Wilder Morris MD  PCP: Steve Albarran DO    Discharging Nurse: [facility] Unit/Room#: 1133/1258-A  Discharging Unit Phone Number: [phone]    Emergency Contact:   Extended Emergency Contact Information  Primary Emergency Contact: Estephania Barron  Address: [address] [place] [address] [address], [address] [facility]  Mobile Phone: [phone]  Relation: Spouse   needed? No  Secondary Emergency Contact: Astrid Boxer [place] of [address] Phone: [phone]  Mobile Phone: [phone]  Relation: Child   needed? No    Past Surgical History:  Past Surgical History:   Procedure Laterality Date    ANKLE SURGERY  6 YRS AGO    right ORIF    BLADDER SURGERY Right 2023    CYSTOSCOPY RETROGRADE PYELOGRAM RIGHT URETEROSCPY, RIGHT URETERAL STENT INSERTION performed by Anderson Avelar MD at [address] [place] [address], LAPAROSCOPIC N/A 2019    CHOLECYSTECTOMY LAPAROSCOPIC ROBOTIC ASSISTED  XI performed by Elena Irizarry MD at [address] [place] [address]      COLONOSCOPY  2015    COLONOSCOPY N/A 2022    COLORECTAL CANCER SCREENING, NOT HIGH RISK performed by Elena Irizarry MD at [address] [facility]  2012    Dr. Lizbet Metzger, Stent Mid LAD    DIAGNOSTIC CARDIAC CATH LAB PROCEDURE  1998    [facility]. Cath/PTCA/stent of RCA; PTCA/stent of mid LAD with adjunctive ReoPro administration. DIAGNOSTIC CARDIAC CATH LAB PROCEDURE  2002    [facility]. Kissing balloon angioplasty LAD>diag Perclose. Dr. Lizbet Metzger and Dr. Jacquelyn Brandon. DIAGNOSTIC CARDIAC CATH LAB PROCEDURE  2005    [facility]. Cath/stent (TAX\"US) to diagonal at [facility].       ENDOSCOPY, COLON, DIAGNOSTIC

## 2023-06-01 LAB
BASOPHILS # BLD: 0.04 E9/L (ref 0–0.2)
BASOPHILS NFR BLD: 0.4 % (ref 0–2)
EOSINOPHIL # BLD: 0.23 E9/L (ref 0.05–0.5)
EOSINOPHIL NFR BLD: 2.4 % (ref 0–6)
ERYTHROCYTE [DISTWIDTH] IN BLOOD BY AUTOMATED COUNT: 13 FL (ref 11.5–15)
HCT VFR BLD AUTO: 39.4 % (ref 37–54)
HGB BLD-MCNC: 12.8 G/DL (ref 12.5–16.5)
IMM GRANULOCYTES # BLD: 0.04 E9/L
IMM GRANULOCYTES NFR BLD: 0.4 % (ref 0–5)
LYMPHOCYTES # BLD: 1.19 E9/L (ref 1.5–4)
LYMPHOCYTES NFR BLD: 12.3 % (ref 20–42)
MCH RBC QN AUTO: 29.3 PG (ref 26–35)
MCHC RBC AUTO-ENTMCNC: 32.5 % (ref 32–34.5)
MCV RBC AUTO: 90.2 FL (ref 80–99.9)
METER GLUCOSE: 123 MG/DL (ref 74–99)
METER GLUCOSE: 132 MG/DL (ref 74–99)
METER GLUCOSE: 136 MG/DL (ref 74–99)
METER GLUCOSE: 137 MG/DL (ref 74–99)
MONOCYTES # BLD: 0.82 E9/L (ref 0.1–0.95)
MONOCYTES NFR BLD: 8.5 % (ref 2–12)
NEUTROPHILS # BLD: 7.34 E9/L (ref 1.8–7.3)
NEUTS SEG NFR BLD: 76 % (ref 43–80)
PLATELET # BLD AUTO: 193 E9/L (ref 130–450)
PMV BLD AUTO: 10.1 FL (ref 7–12)
RBC # BLD AUTO: 4.37 E12/L (ref 3.8–5.8)
WBC # BLD: 9.7 E9/L (ref 4.5–11.5)

## 2023-06-01 PROCEDURE — 36415 COLL VENOUS BLD VENIPUNCTURE: CPT

## 2023-06-01 PROCEDURE — 97530 THERAPEUTIC ACTIVITIES: CPT

## 2023-06-01 PROCEDURE — 6370000000 HC RX 637 (ALT 250 FOR IP): Performed by: PHYSICAL MEDICINE & REHABILITATION

## 2023-06-01 PROCEDURE — 2700000000 HC OXYGEN THERAPY PER DAY

## 2023-06-01 PROCEDURE — 82962 GLUCOSE BLOOD TEST: CPT

## 2023-06-01 PROCEDURE — 97535 SELF CARE MNGMENT TRAINING: CPT

## 2023-06-01 PROCEDURE — 85025 COMPLETE CBC W/AUTO DIFF WBC: CPT

## 2023-06-01 PROCEDURE — 1280000000 HC REHAB R&B

## 2023-06-01 PROCEDURE — 6370000000 HC RX 637 (ALT 250 FOR IP): Performed by: INTERNAL MEDICINE

## 2023-06-01 PROCEDURE — 6360000002 HC RX W HCPCS: Performed by: INTERNAL MEDICINE

## 2023-06-01 PROCEDURE — 97165 OT EVAL LOW COMPLEX 30 MIN: CPT

## 2023-06-01 PROCEDURE — 97162 PT EVAL MOD COMPLEX 30 MIN: CPT

## 2023-06-01 RX ORDER — SENNA PLUS 8.6 MG/1
1 TABLET ORAL NIGHTLY
Status: DISCONTINUED | OUTPATIENT
Start: 2023-06-01 | End: 2023-06-09 | Stop reason: HOSPADM

## 2023-06-01 RX ORDER — BISACODYL 10 MG
10 SUPPOSITORY, RECTAL RECTAL DAILY PRN
Status: DISCONTINUED | OUTPATIENT
Start: 2023-06-01 | End: 2023-06-09 | Stop reason: HOSPADM

## 2023-06-01 RX ORDER — DOCUSATE SODIUM 100 MG/1
100 CAPSULE, LIQUID FILLED ORAL 2 TIMES DAILY
Status: DISCONTINUED | OUTPATIENT
Start: 2023-06-01 | End: 2023-06-09 | Stop reason: HOSPADM

## 2023-06-01 RX ADMIN — ATORVASTATIN CALCIUM 80 MG: 80 TABLET, FILM COATED ORAL at 10:19

## 2023-06-01 RX ADMIN — ENOXAPARIN SODIUM 40 MG: 100 INJECTION SUBCUTANEOUS at 10:21

## 2023-06-01 RX ADMIN — FERROUS SULFATE TAB 325 MG (65 MG ELEMENTAL FE) 325 MG: 325 (65 FE) TAB at 10:20

## 2023-06-01 RX ADMIN — GABAPENTIN 100 MG: 100 CAPSULE ORAL at 14:28

## 2023-06-01 RX ADMIN — SENNOSIDES 8.6 MG: 8.6 TABLET, FILM COATED ORAL at 22:33

## 2023-06-01 RX ADMIN — EZETIMIBE 10 MG: 10 TABLET ORAL at 10:20

## 2023-06-01 RX ADMIN — LOSARTAN POTASSIUM 100 MG: 50 TABLET, FILM COATED ORAL at 10:19

## 2023-06-01 RX ADMIN — METOPROLOL TARTRATE 25 MG: 25 TABLET, FILM COATED ORAL at 10:19

## 2023-06-01 RX ADMIN — AMLODIPINE BESYLATE 10 MG: 10 TABLET ORAL at 10:20

## 2023-06-01 RX ADMIN — ASPIRIN 81 MG CHEWABLE TABLET 81 MG: 81 TABLET CHEWABLE at 10:19

## 2023-06-01 RX ADMIN — PANTOPRAZOLE SODIUM 40 MG: 40 TABLET, DELAYED RELEASE ORAL at 06:18

## 2023-06-01 RX ADMIN — GABAPENTIN 100 MG: 100 CAPSULE ORAL at 10:20

## 2023-06-01 RX ADMIN — CITALOPRAM HYDROBROMIDE 60 MG: 20 TABLET ORAL at 10:19

## 2023-06-01 RX ADMIN — DOCUSATE SODIUM 100 MG: 100 CAPSULE, LIQUID FILLED ORAL at 10:20

## 2023-06-01 RX ADMIN — METOPROLOL TARTRATE 25 MG: 25 TABLET, FILM COATED ORAL at 22:33

## 2023-06-01 RX ADMIN — OXYCODONE HYDROCHLORIDE 10 MG: 5 TABLET ORAL at 14:28

## 2023-06-01 RX ADMIN — OXYCODONE HYDROCHLORIDE 10 MG: 5 TABLET ORAL at 06:18

## 2023-06-01 RX ADMIN — DOCUSATE SODIUM 100 MG: 100 CAPSULE, LIQUID FILLED ORAL at 22:33

## 2023-06-01 RX ADMIN — TAMSULOSIN HYDROCHLORIDE 0.4 MG: 0.4 CAPSULE ORAL at 10:20

## 2023-06-01 RX ADMIN — OXYCODONE HYDROCHLORIDE 10 MG: 5 TABLET ORAL at 02:59

## 2023-06-01 RX ADMIN — PROMETHAZINE HYDROCHLORIDE 12.5 MG: 12.5 TABLET ORAL at 16:54

## 2023-06-01 RX ADMIN — GABAPENTIN 100 MG: 100 CAPSULE ORAL at 22:33

## 2023-06-01 ASSESSMENT — PAIN SCALES - GENERAL
PAINLEVEL_OUTOF10: 8
PAINLEVEL_OUTOF10: 9

## 2023-06-01 ASSESSMENT — PAIN DESCRIPTION - LOCATION: LOCATION: HIP

## 2023-06-01 ASSESSMENT — PAIN SCALES - WONG BAKER
WONGBAKER_NUMERICALRESPONSE: 2
WONGBAKER_NUMERICALRESPONSE: 2

## 2023-06-01 ASSESSMENT — PAIN DESCRIPTION - DESCRIPTORS: DESCRIPTORS: ACHING;DISCOMFORT;SORE

## 2023-06-01 ASSESSMENT — PAIN DESCRIPTION - ORIENTATION: ORIENTATION: LEFT

## 2023-06-02 LAB
BASOPHILS # BLD: 0.02 E9/L (ref 0–0.2)
BASOPHILS NFR BLD: 0.2 % (ref 0–2)
EOSINOPHIL # BLD: 0.16 E9/L (ref 0.05–0.5)
EOSINOPHIL NFR BLD: 1.8 % (ref 0–6)
ERYTHROCYTE [DISTWIDTH] IN BLOOD BY AUTOMATED COUNT: 12.7 FL (ref 11.5–15)
HCT VFR BLD AUTO: 35.3 % (ref 37–54)
HGB BLD-MCNC: 11.9 G/DL (ref 12.5–16.5)
IMM GRANULOCYTES # BLD: 0.05 E9/L
IMM GRANULOCYTES NFR BLD: 0.6 % (ref 0–5)
LYMPHOCYTES # BLD: 1.03 E9/L (ref 1.5–4)
LYMPHOCYTES NFR BLD: 11.6 % (ref 20–42)
MCH RBC QN AUTO: 29.9 PG (ref 26–35)
MCHC RBC AUTO-ENTMCNC: 33.7 % (ref 32–34.5)
MCV RBC AUTO: 88.7 FL (ref 80–99.9)
METER GLUCOSE: 110 MG/DL (ref 74–99)
METER GLUCOSE: 119 MG/DL (ref 74–99)
METER GLUCOSE: 128 MG/DL (ref 74–99)
METER GLUCOSE: 131 MG/DL (ref 74–99)
MONOCYTES # BLD: 0.82 E9/L (ref 0.1–0.95)
MONOCYTES NFR BLD: 9.2 % (ref 2–12)
NEUTROPHILS # BLD: 6.79 E9/L (ref 1.8–7.3)
NEUTS SEG NFR BLD: 76.6 % (ref 43–80)
PLATELET # BLD AUTO: 209 E9/L (ref 130–450)
PMV BLD AUTO: 10.5 FL (ref 7–12)
RBC # BLD AUTO: 3.98 E12/L (ref 3.8–5.8)
WBC # BLD: 8.9 E9/L (ref 4.5–11.5)

## 2023-06-02 PROCEDURE — 97535 SELF CARE MNGMENT TRAINING: CPT

## 2023-06-02 PROCEDURE — 82962 GLUCOSE BLOOD TEST: CPT

## 2023-06-02 PROCEDURE — 97530 THERAPEUTIC ACTIVITIES: CPT

## 2023-06-02 PROCEDURE — 6370000000 HC RX 637 (ALT 250 FOR IP): Performed by: PHYSICAL MEDICINE & REHABILITATION

## 2023-06-02 PROCEDURE — 97110 THERAPEUTIC EXERCISES: CPT

## 2023-06-02 PROCEDURE — 6370000000 HC RX 637 (ALT 250 FOR IP): Performed by: INTERNAL MEDICINE

## 2023-06-02 PROCEDURE — 1280000000 HC REHAB R&B

## 2023-06-02 PROCEDURE — 85025 COMPLETE CBC W/AUTO DIFF WBC: CPT

## 2023-06-02 PROCEDURE — 36415 COLL VENOUS BLD VENIPUNCTURE: CPT

## 2023-06-02 PROCEDURE — 6360000002 HC RX W HCPCS: Performed by: INTERNAL MEDICINE

## 2023-06-02 RX ORDER — PROCHLORPERAZINE MALEATE 10 MG
10 TABLET ORAL EVERY 6 HOURS PRN
Status: DISCONTINUED | OUTPATIENT
Start: 2023-06-02 | End: 2023-06-09 | Stop reason: HOSPADM

## 2023-06-02 RX ORDER — METOCLOPRAMIDE 5 MG/1
5 TABLET ORAL
Status: DISCONTINUED | OUTPATIENT
Start: 2023-06-02 | End: 2023-06-05

## 2023-06-02 RX ORDER — POTASSIUM CHLORIDE 750 MG/1
10 TABLET, EXTENDED RELEASE ORAL ONCE
Status: COMPLETED | OUTPATIENT
Start: 2023-06-02 | End: 2023-06-02

## 2023-06-02 RX ADMIN — PROCHLORPERAZINE MALEATE 10 MG: 10 TABLET ORAL at 13:03

## 2023-06-02 RX ADMIN — OXYCODONE HYDROCHLORIDE 10 MG: 5 TABLET ORAL at 08:38

## 2023-06-02 RX ADMIN — ENOXAPARIN SODIUM 40 MG: 100 INJECTION SUBCUTANEOUS at 08:25

## 2023-06-02 RX ADMIN — GABAPENTIN 100 MG: 100 CAPSULE ORAL at 20:45

## 2023-06-02 RX ADMIN — OXYCODONE HYDROCHLORIDE 10 MG: 5 TABLET ORAL at 20:44

## 2023-06-02 RX ADMIN — EZETIMIBE 10 MG: 10 TABLET ORAL at 08:25

## 2023-06-02 RX ADMIN — OXYCODONE HYDROCHLORIDE 10 MG: 5 TABLET ORAL at 14:37

## 2023-06-02 RX ADMIN — FERROUS SULFATE TAB 325 MG (65 MG ELEMENTAL FE) 325 MG: 325 (65 FE) TAB at 08:25

## 2023-06-02 RX ADMIN — DOCUSATE SODIUM 100 MG: 100 CAPSULE, LIQUID FILLED ORAL at 20:44

## 2023-06-02 RX ADMIN — METOPROLOL TARTRATE 25 MG: 25 TABLET, FILM COATED ORAL at 20:44

## 2023-06-02 RX ADMIN — METOCLOPRAMIDE 5 MG: 5 TABLET ORAL at 11:51

## 2023-06-02 RX ADMIN — METOCLOPRAMIDE 5 MG: 5 TABLET ORAL at 16:39

## 2023-06-02 RX ADMIN — METOCLOPRAMIDE 5 MG: 5 TABLET ORAL at 20:44

## 2023-06-02 RX ADMIN — SENNOSIDES 8.6 MG: 8.6 TABLET, FILM COATED ORAL at 20:45

## 2023-06-02 RX ADMIN — DOCUSATE SODIUM 100 MG: 100 CAPSULE, LIQUID FILLED ORAL at 08:24

## 2023-06-02 RX ADMIN — ATORVASTATIN CALCIUM 80 MG: 80 TABLET, FILM COATED ORAL at 08:24

## 2023-06-02 RX ADMIN — TAMSULOSIN HYDROCHLORIDE 0.4 MG: 0.4 CAPSULE ORAL at 08:25

## 2023-06-02 RX ADMIN — GABAPENTIN 100 MG: 100 CAPSULE ORAL at 08:25

## 2023-06-02 RX ADMIN — PANTOPRAZOLE SODIUM 40 MG: 40 TABLET, DELAYED RELEASE ORAL at 06:12

## 2023-06-02 RX ADMIN — AMLODIPINE BESYLATE 10 MG: 10 TABLET ORAL at 08:24

## 2023-06-02 RX ADMIN — CITALOPRAM HYDROBROMIDE 60 MG: 20 TABLET ORAL at 08:24

## 2023-06-02 RX ADMIN — ASPIRIN 81 MG CHEWABLE TABLET 81 MG: 81 TABLET CHEWABLE at 08:25

## 2023-06-02 RX ADMIN — POTASSIUM CHLORIDE 10 MEQ: 750 TABLET, EXTENDED RELEASE ORAL at 11:51

## 2023-06-02 RX ADMIN — LOSARTAN POTASSIUM 100 MG: 50 TABLET, FILM COATED ORAL at 08:25

## 2023-06-02 RX ADMIN — METOPROLOL TARTRATE 25 MG: 25 TABLET, FILM COATED ORAL at 08:25

## 2023-06-02 RX ADMIN — OXYCODONE HYDROCHLORIDE 10 MG: 5 TABLET ORAL at 04:00

## 2023-06-02 ASSESSMENT — PAIN DESCRIPTION - ORIENTATION
ORIENTATION: LEFT

## 2023-06-02 ASSESSMENT — PAIN DESCRIPTION - DESCRIPTORS
DESCRIPTORS: SHARP;STABBING;DISCOMFORT
DESCRIPTORS: THROBBING;STABBING
DESCRIPTORS: SHARP;SHOOTING;STABBING
DESCRIPTORS: SHARP;THROBBING;SHOOTING

## 2023-06-02 ASSESSMENT — PAIN SCALES - GENERAL
PAINLEVEL_OUTOF10: 8
PAINLEVEL_OUTOF10: 4
PAINLEVEL_OUTOF10: 10
PAINLEVEL_OUTOF10: 2

## 2023-06-02 ASSESSMENT — PAIN DESCRIPTION - LOCATION
LOCATION: HIP

## 2023-06-03 LAB
BASOPHILS # BLD: 0.04 E9/L (ref 0–0.2)
BASOPHILS NFR BLD: 0.4 % (ref 0–2)
EOSINOPHIL # BLD: 0.23 E9/L (ref 0.05–0.5)
EOSINOPHIL NFR BLD: 2.5 % (ref 0–6)
ERYTHROCYTE [DISTWIDTH] IN BLOOD BY AUTOMATED COUNT: 12.9 FL (ref 11.5–15)
HCT VFR BLD AUTO: 38.7 % (ref 37–54)
HGB BLD-MCNC: 12.7 G/DL (ref 12.5–16.5)
IMM GRANULOCYTES # BLD: 0.06 E9/L
IMM GRANULOCYTES NFR BLD: 0.6 % (ref 0–5)
LYMPHOCYTES # BLD: 1.42 E9/L (ref 1.5–4)
LYMPHOCYTES NFR BLD: 15.3 % (ref 20–42)
MCH RBC QN AUTO: 29.6 PG (ref 26–35)
MCHC RBC AUTO-ENTMCNC: 32.8 % (ref 32–34.5)
MCV RBC AUTO: 90.2 FL (ref 80–99.9)
METER GLUCOSE: 111 MG/DL (ref 74–99)
METER GLUCOSE: 122 MG/DL (ref 74–99)
METER GLUCOSE: 123 MG/DL (ref 74–99)
METER GLUCOSE: 139 MG/DL (ref 74–99)
MONOCYTES # BLD: 0.99 E9/L (ref 0.1–0.95)
MONOCYTES NFR BLD: 10.7 % (ref 2–12)
NEUTROPHILS # BLD: 6.52 E9/L (ref 1.8–7.3)
NEUTS SEG NFR BLD: 70.5 % (ref 43–80)
PLATELET # BLD AUTO: 235 E9/L (ref 130–450)
PMV BLD AUTO: 10.5 FL (ref 7–12)
RBC # BLD AUTO: 4.29 E12/L (ref 3.8–5.8)
WBC # BLD: 9.3 E9/L (ref 4.5–11.5)

## 2023-06-03 PROCEDURE — 6360000002 HC RX W HCPCS: Performed by: INTERNAL MEDICINE

## 2023-06-03 PROCEDURE — 85025 COMPLETE CBC W/AUTO DIFF WBC: CPT

## 2023-06-03 PROCEDURE — 6370000000 HC RX 637 (ALT 250 FOR IP): Performed by: PHYSICAL MEDICINE & REHABILITATION

## 2023-06-03 PROCEDURE — 6370000000 HC RX 637 (ALT 250 FOR IP): Performed by: INTERNAL MEDICINE

## 2023-06-03 PROCEDURE — 82962 GLUCOSE BLOOD TEST: CPT

## 2023-06-03 PROCEDURE — 97530 THERAPEUTIC ACTIVITIES: CPT

## 2023-06-03 PROCEDURE — 1280000000 HC REHAB R&B

## 2023-06-03 PROCEDURE — 36415 COLL VENOUS BLD VENIPUNCTURE: CPT

## 2023-06-03 RX ADMIN — METOCLOPRAMIDE 5 MG: 5 TABLET ORAL at 21:22

## 2023-06-03 RX ADMIN — METOCLOPRAMIDE 5 MG: 5 TABLET ORAL at 06:36

## 2023-06-03 RX ADMIN — OXYCODONE HYDROCHLORIDE 5 MG: 5 TABLET ORAL at 12:50

## 2023-06-03 RX ADMIN — DOCUSATE SODIUM 100 MG: 100 CAPSULE, LIQUID FILLED ORAL at 21:22

## 2023-06-03 RX ADMIN — ATORVASTATIN CALCIUM 80 MG: 80 TABLET, FILM COATED ORAL at 11:35

## 2023-06-03 RX ADMIN — TAMSULOSIN HYDROCHLORIDE 0.4 MG: 0.4 CAPSULE ORAL at 11:35

## 2023-06-03 RX ADMIN — METOPROLOL TARTRATE 25 MG: 25 TABLET, FILM COATED ORAL at 11:34

## 2023-06-03 RX ADMIN — GABAPENTIN 100 MG: 100 CAPSULE ORAL at 21:22

## 2023-06-03 RX ADMIN — OXYCODONE HYDROCHLORIDE 10 MG: 5 TABLET ORAL at 06:36

## 2023-06-03 RX ADMIN — AMLODIPINE BESYLATE 10 MG: 10 TABLET ORAL at 11:34

## 2023-06-03 RX ADMIN — OXYCODONE HYDROCHLORIDE 5 MG: 5 TABLET ORAL at 16:57

## 2023-06-03 RX ADMIN — CITALOPRAM HYDROBROMIDE 60 MG: 20 TABLET ORAL at 11:35

## 2023-06-03 RX ADMIN — ENOXAPARIN SODIUM 40 MG: 100 INJECTION SUBCUTANEOUS at 11:36

## 2023-06-03 RX ADMIN — METOPROLOL TARTRATE 25 MG: 25 TABLET, FILM COATED ORAL at 21:22

## 2023-06-03 RX ADMIN — ASPIRIN 81 MG CHEWABLE TABLET 81 MG: 81 TABLET CHEWABLE at 11:34

## 2023-06-03 RX ADMIN — METOCLOPRAMIDE 5 MG: 5 TABLET ORAL at 16:53

## 2023-06-03 RX ADMIN — SENNOSIDES 8.6 MG: 8.6 TABLET, FILM COATED ORAL at 21:22

## 2023-06-03 RX ADMIN — EZETIMIBE 10 MG: 10 TABLET ORAL at 11:35

## 2023-06-03 RX ADMIN — DOCUSATE SODIUM 100 MG: 100 CAPSULE, LIQUID FILLED ORAL at 11:35

## 2023-06-03 RX ADMIN — OXYCODONE HYDROCHLORIDE 10 MG: 5 TABLET ORAL at 00:42

## 2023-06-03 RX ADMIN — GABAPENTIN 100 MG: 100 CAPSULE ORAL at 11:35

## 2023-06-03 RX ADMIN — LOSARTAN POTASSIUM 100 MG: 50 TABLET, FILM COATED ORAL at 11:34

## 2023-06-03 RX ADMIN — PANTOPRAZOLE SODIUM 40 MG: 40 TABLET, DELAYED RELEASE ORAL at 06:36

## 2023-06-03 RX ADMIN — METOCLOPRAMIDE 5 MG: 5 TABLET ORAL at 11:23

## 2023-06-03 RX ADMIN — FERROUS SULFATE TAB 325 MG (65 MG ELEMENTAL FE) 325 MG: 325 (65 FE) TAB at 11:35

## 2023-06-03 ASSESSMENT — PAIN DESCRIPTION - ORIENTATION
ORIENTATION: LEFT

## 2023-06-03 ASSESSMENT — PAIN SCALES - WONG BAKER
WONGBAKER_NUMERICALRESPONSE: 6
WONGBAKER_NUMERICALRESPONSE: 8

## 2023-06-03 ASSESSMENT — PAIN DESCRIPTION - LOCATION
LOCATION: HIP;INCISION
LOCATION: HIP
LOCATION: HIP;INCISION

## 2023-06-03 ASSESSMENT — PAIN DESCRIPTION - DESCRIPTORS
DESCRIPTORS: STABBING;THROBBING;DISCOMFORT
DESCRIPTORS: ACHING;DISCOMFORT;SORE

## 2023-06-03 ASSESSMENT — PAIN SCALES - GENERAL
PAINLEVEL_OUTOF10: 0
PAINLEVEL_OUTOF10: 8
PAINLEVEL_OUTOF10: 6
PAINLEVEL_OUTOF10: 6

## 2023-06-04 LAB
METER GLUCOSE: 119 MG/DL (ref 74–99)
METER GLUCOSE: 133 MG/DL (ref 74–99)
METER GLUCOSE: 165 MG/DL (ref 74–99)
METER GLUCOSE: 184 MG/DL (ref 74–99)

## 2023-06-04 PROCEDURE — 2580000003 HC RX 258: Performed by: INTERNAL MEDICINE

## 2023-06-04 PROCEDURE — 6370000000 HC RX 637 (ALT 250 FOR IP): Performed by: PHYSICAL MEDICINE & REHABILITATION

## 2023-06-04 PROCEDURE — 6370000000 HC RX 637 (ALT 250 FOR IP): Performed by: INTERNAL MEDICINE

## 2023-06-04 PROCEDURE — 1280000000 HC REHAB R&B

## 2023-06-04 PROCEDURE — 6360000002 HC RX W HCPCS: Performed by: INTERNAL MEDICINE

## 2023-06-04 PROCEDURE — 82962 GLUCOSE BLOOD TEST: CPT

## 2023-06-04 PROCEDURE — 97530 THERAPEUTIC ACTIVITIES: CPT

## 2023-06-04 PROCEDURE — 97535 SELF CARE MNGMENT TRAINING: CPT

## 2023-06-04 RX ADMIN — METOCLOPRAMIDE 5 MG: 5 TABLET ORAL at 20:30

## 2023-06-04 RX ADMIN — OXYCODONE HYDROCHLORIDE 10 MG: 5 TABLET ORAL at 13:36

## 2023-06-04 RX ADMIN — PANTOPRAZOLE SODIUM 40 MG: 40 TABLET, DELAYED RELEASE ORAL at 06:21

## 2023-06-04 RX ADMIN — METOCLOPRAMIDE 5 MG: 5 TABLET ORAL at 16:51

## 2023-06-04 RX ADMIN — AMLODIPINE BESYLATE 10 MG: 10 TABLET ORAL at 09:52

## 2023-06-04 RX ADMIN — DOCUSATE SODIUM 100 MG: 100 CAPSULE, LIQUID FILLED ORAL at 09:53

## 2023-06-04 RX ADMIN — ENOXAPARIN SODIUM 40 MG: 100 INJECTION SUBCUTANEOUS at 09:53

## 2023-06-04 RX ADMIN — GABAPENTIN 100 MG: 100 CAPSULE ORAL at 13:36

## 2023-06-04 RX ADMIN — LOSARTAN POTASSIUM 100 MG: 50 TABLET, FILM COATED ORAL at 09:52

## 2023-06-04 RX ADMIN — CITALOPRAM HYDROBROMIDE 60 MG: 20 TABLET ORAL at 09:52

## 2023-06-04 RX ADMIN — ASPIRIN 81 MG CHEWABLE TABLET 81 MG: 81 TABLET CHEWABLE at 09:52

## 2023-06-04 RX ADMIN — ATORVASTATIN CALCIUM 80 MG: 80 TABLET, FILM COATED ORAL at 09:52

## 2023-06-04 RX ADMIN — OXYCODONE HYDROCHLORIDE 10 MG: 5 TABLET ORAL at 01:08

## 2023-06-04 RX ADMIN — EZETIMIBE 10 MG: 10 TABLET ORAL at 09:52

## 2023-06-04 RX ADMIN — OXYCODONE HYDROCHLORIDE 10 MG: 5 TABLET ORAL at 20:28

## 2023-06-04 RX ADMIN — METOPROLOL TARTRATE 25 MG: 25 TABLET, FILM COATED ORAL at 20:27

## 2023-06-04 RX ADMIN — METOPROLOL TARTRATE 25 MG: 25 TABLET, FILM COATED ORAL at 09:52

## 2023-06-04 RX ADMIN — SENNOSIDES 8.6 MG: 8.6 TABLET, FILM COATED ORAL at 20:29

## 2023-06-04 RX ADMIN — METOCLOPRAMIDE 5 MG: 5 TABLET ORAL at 06:21

## 2023-06-04 RX ADMIN — GABAPENTIN 100 MG: 100 CAPSULE ORAL at 09:53

## 2023-06-04 RX ADMIN — DOCUSATE SODIUM 100 MG: 100 CAPSULE, LIQUID FILLED ORAL at 20:29

## 2023-06-04 RX ADMIN — SODIUM CHLORIDE, PRESERVATIVE FREE 10 ML: 5 INJECTION INTRAVENOUS at 09:53

## 2023-06-04 RX ADMIN — FERROUS SULFATE TAB 325 MG (65 MG ELEMENTAL FE) 325 MG: 325 (65 FE) TAB at 09:52

## 2023-06-04 RX ADMIN — METOCLOPRAMIDE 5 MG: 5 TABLET ORAL at 12:02

## 2023-06-04 RX ADMIN — GABAPENTIN 100 MG: 100 CAPSULE ORAL at 20:27

## 2023-06-04 RX ADMIN — TAMSULOSIN HYDROCHLORIDE 0.4 MG: 0.4 CAPSULE ORAL at 09:53

## 2023-06-04 ASSESSMENT — PAIN SCALES - GENERAL
PAINLEVEL_OUTOF10: 7
PAINLEVEL_OUTOF10: 2
PAINLEVEL_OUTOF10: 9
PAINLEVEL_OUTOF10: 2
PAINLEVEL_OUTOF10: 5

## 2023-06-04 ASSESSMENT — PAIN SCALES - WONG BAKER
WONGBAKER_NUMERICALRESPONSE: 2
WONGBAKER_NUMERICALRESPONSE: 2

## 2023-06-04 ASSESSMENT — PAIN DESCRIPTION - LOCATION
LOCATION: HIP

## 2023-06-04 ASSESSMENT — PAIN DESCRIPTION - ORIENTATION
ORIENTATION: LEFT

## 2023-06-04 ASSESSMENT — PAIN DESCRIPTION - DESCRIPTORS
DESCRIPTORS: ACHING;THROBBING;SORE
DESCRIPTORS: ACHING;SORE
DESCRIPTORS: ACHING;THROBBING;SORE

## 2023-06-05 LAB
METER GLUCOSE: 128 MG/DL (ref 74–99)
METER GLUCOSE: 136 MG/DL (ref 74–99)
METER GLUCOSE: 142 MG/DL (ref 74–99)
METER GLUCOSE: 151 MG/DL (ref 74–99)

## 2023-06-05 PROCEDURE — 97535 SELF CARE MNGMENT TRAINING: CPT

## 2023-06-05 PROCEDURE — 82962 GLUCOSE BLOOD TEST: CPT

## 2023-06-05 PROCEDURE — 6370000000 HC RX 637 (ALT 250 FOR IP): Performed by: PHYSICAL MEDICINE & REHABILITATION

## 2023-06-05 PROCEDURE — 6360000002 HC RX W HCPCS: Performed by: INTERNAL MEDICINE

## 2023-06-05 PROCEDURE — 97110 THERAPEUTIC EXERCISES: CPT

## 2023-06-05 PROCEDURE — 97530 THERAPEUTIC ACTIVITIES: CPT

## 2023-06-05 PROCEDURE — 1280000000 HC REHAB R&B

## 2023-06-05 PROCEDURE — 6370000000 HC RX 637 (ALT 250 FOR IP): Performed by: INTERNAL MEDICINE

## 2023-06-05 RX ADMIN — CITALOPRAM HYDROBROMIDE 60 MG: 20 TABLET ORAL at 10:48

## 2023-06-05 RX ADMIN — EZETIMIBE 10 MG: 10 TABLET ORAL at 10:47

## 2023-06-05 RX ADMIN — ASPIRIN 81 MG CHEWABLE TABLET 81 MG: 81 TABLET CHEWABLE at 10:47

## 2023-06-05 RX ADMIN — OXYCODONE HYDROCHLORIDE 10 MG: 5 TABLET ORAL at 16:55

## 2023-06-05 RX ADMIN — ATORVASTATIN CALCIUM 80 MG: 80 TABLET, FILM COATED ORAL at 10:47

## 2023-06-05 RX ADMIN — SENNOSIDES 8.6 MG: 8.6 TABLET, FILM COATED ORAL at 21:12

## 2023-06-05 RX ADMIN — TAMSULOSIN HYDROCHLORIDE 0.4 MG: 0.4 CAPSULE ORAL at 10:48

## 2023-06-05 RX ADMIN — FERROUS SULFATE TAB 325 MG (65 MG ELEMENTAL FE) 325 MG: 325 (65 FE) TAB at 10:47

## 2023-06-05 RX ADMIN — METOCLOPRAMIDE 5 MG: 5 TABLET ORAL at 06:37

## 2023-06-05 RX ADMIN — GABAPENTIN 100 MG: 100 CAPSULE ORAL at 21:12

## 2023-06-05 RX ADMIN — LOSARTAN POTASSIUM 100 MG: 50 TABLET, FILM COATED ORAL at 10:47

## 2023-06-05 RX ADMIN — METOPROLOL TARTRATE 25 MG: 25 TABLET, FILM COATED ORAL at 21:12

## 2023-06-05 RX ADMIN — AMLODIPINE BESYLATE 10 MG: 10 TABLET ORAL at 10:47

## 2023-06-05 RX ADMIN — GABAPENTIN 100 MG: 100 CAPSULE ORAL at 15:29

## 2023-06-05 RX ADMIN — PANTOPRAZOLE SODIUM 40 MG: 40 TABLET, DELAYED RELEASE ORAL at 06:37

## 2023-06-05 RX ADMIN — GABAPENTIN 100 MG: 100 CAPSULE ORAL at 10:47

## 2023-06-05 RX ADMIN — ENOXAPARIN SODIUM 40 MG: 100 INJECTION SUBCUTANEOUS at 10:47

## 2023-06-05 RX ADMIN — METOPROLOL TARTRATE 25 MG: 25 TABLET, FILM COATED ORAL at 10:48

## 2023-06-05 RX ADMIN — OXYCODONE HYDROCHLORIDE 10 MG: 5 TABLET ORAL at 21:12

## 2023-06-05 RX ADMIN — DOCUSATE SODIUM 100 MG: 100 CAPSULE, LIQUID FILLED ORAL at 21:12

## 2023-06-05 ASSESSMENT — PAIN SCALES - WONG BAKER: WONGBAKER_NUMERICALRESPONSE: 0

## 2023-06-05 ASSESSMENT — PAIN SCALES - GENERAL
PAINLEVEL_OUTOF10: 7
PAINLEVEL_OUTOF10: 1
PAINLEVEL_OUTOF10: 2

## 2023-06-05 ASSESSMENT — PAIN DESCRIPTION - LOCATION
LOCATION: HIP
LOCATION: HIP

## 2023-06-05 ASSESSMENT — PAIN DESCRIPTION - ORIENTATION
ORIENTATION: LEFT
ORIENTATION: LEFT

## 2023-06-05 ASSESSMENT — PAIN DESCRIPTION - DESCRIPTORS
DESCRIPTORS: PRESSURE;SHARP;THROBBING
DESCRIPTORS: SORE;DISCOMFORT;ACHING

## 2023-06-06 LAB
METER GLUCOSE: 127 MG/DL (ref 74–99)
METER GLUCOSE: 136 MG/DL (ref 74–99)
METER GLUCOSE: 142 MG/DL (ref 74–99)
METER GLUCOSE: 174 MG/DL (ref 74–99)

## 2023-06-06 PROCEDURE — 6360000002 HC RX W HCPCS: Performed by: INTERNAL MEDICINE

## 2023-06-06 PROCEDURE — 82962 GLUCOSE BLOOD TEST: CPT

## 2023-06-06 PROCEDURE — 6370000000 HC RX 637 (ALT 250 FOR IP): Performed by: PHYSICAL MEDICINE & REHABILITATION

## 2023-06-06 PROCEDURE — 97530 THERAPEUTIC ACTIVITIES: CPT

## 2023-06-06 PROCEDURE — 97535 SELF CARE MNGMENT TRAINING: CPT

## 2023-06-06 PROCEDURE — 1280000000 HC REHAB R&B

## 2023-06-06 PROCEDURE — 6370000000 HC RX 637 (ALT 250 FOR IP): Performed by: INTERNAL MEDICINE

## 2023-06-06 RX ADMIN — OXYCODONE HYDROCHLORIDE 10 MG: 5 TABLET ORAL at 05:59

## 2023-06-06 RX ADMIN — TAMSULOSIN HYDROCHLORIDE 0.4 MG: 0.4 CAPSULE ORAL at 09:54

## 2023-06-06 RX ADMIN — ATORVASTATIN CALCIUM 80 MG: 80 TABLET, FILM COATED ORAL at 10:00

## 2023-06-06 RX ADMIN — CITALOPRAM HYDROBROMIDE 60 MG: 20 TABLET ORAL at 09:54

## 2023-06-06 RX ADMIN — DOCUSATE SODIUM 100 MG: 100 CAPSULE, LIQUID FILLED ORAL at 21:27

## 2023-06-06 RX ADMIN — LOSARTAN POTASSIUM 100 MG: 50 TABLET, FILM COATED ORAL at 09:55

## 2023-06-06 RX ADMIN — OXYCODONE HYDROCHLORIDE 10 MG: 5 TABLET ORAL at 11:42

## 2023-06-06 RX ADMIN — ASPIRIN 81 MG CHEWABLE TABLET 81 MG: 81 TABLET CHEWABLE at 09:54

## 2023-06-06 RX ADMIN — GABAPENTIN 100 MG: 100 CAPSULE ORAL at 15:26

## 2023-06-06 RX ADMIN — SENNOSIDES 8.6 MG: 8.6 TABLET, FILM COATED ORAL at 21:26

## 2023-06-06 RX ADMIN — METOPROLOL TARTRATE 25 MG: 25 TABLET, FILM COATED ORAL at 09:54

## 2023-06-06 RX ADMIN — PANTOPRAZOLE SODIUM 40 MG: 40 TABLET, DELAYED RELEASE ORAL at 05:59

## 2023-06-06 RX ADMIN — AMLODIPINE BESYLATE 10 MG: 10 TABLET ORAL at 09:54

## 2023-06-06 RX ADMIN — GABAPENTIN 100 MG: 100 CAPSULE ORAL at 09:54

## 2023-06-06 RX ADMIN — OXYCODONE HYDROCHLORIDE 10 MG: 5 TABLET ORAL at 16:54

## 2023-06-06 RX ADMIN — FERROUS SULFATE TAB 325 MG (65 MG ELEMENTAL FE) 325 MG: 325 (65 FE) TAB at 09:54

## 2023-06-06 RX ADMIN — EZETIMIBE 10 MG: 10 TABLET ORAL at 09:54

## 2023-06-06 RX ADMIN — ENOXAPARIN SODIUM 40 MG: 100 INJECTION SUBCUTANEOUS at 09:55

## 2023-06-06 RX ADMIN — GABAPENTIN 100 MG: 100 CAPSULE ORAL at 21:27

## 2023-06-06 RX ADMIN — OXYCODONE HYDROCHLORIDE 10 MG: 5 TABLET ORAL at 21:27

## 2023-06-06 RX ADMIN — METOPROLOL TARTRATE 25 MG: 25 TABLET, FILM COATED ORAL at 21:27

## 2023-06-06 ASSESSMENT — PAIN SCALES - GENERAL
PAINLEVEL_OUTOF10: 7
PAINLEVEL_OUTOF10: 7
PAINLEVEL_OUTOF10: 2
PAINLEVEL_OUTOF10: 2
PAINLEVEL_OUTOF10: 4

## 2023-06-06 ASSESSMENT — PAIN DESCRIPTION - LOCATION
LOCATION: HIP
LOCATION: HIP;LEG
LOCATION: HIP;INCISION
LOCATION: LEG;HIP
LOCATION: HIP

## 2023-06-06 ASSESSMENT — PAIN DESCRIPTION - ORIENTATION
ORIENTATION: LEFT

## 2023-06-06 ASSESSMENT — PAIN DESCRIPTION - DESCRIPTORS
DESCRIPTORS: SORE;ACHING;DISCOMFORT
DESCRIPTORS: ACHING;DISCOMFORT;DULL;SORE
DESCRIPTORS: ACHING;DISCOMFORT;SORE
DESCRIPTORS: ACHING;DISCOMFORT;DULL
DESCRIPTORS: SHARP;DISCOMFORT;SORE

## 2023-06-06 ASSESSMENT — PAIN SCALES - WONG BAKER: WONGBAKER_NUMERICALRESPONSE: 4

## 2023-06-06 NOTE — PATIENT CARE CONFERENCE
independent      Safety awareness: good      Patient/family's personal goals: \"dress myself and take a shower\"  Factors supporting goal achievement:  prior independence  Factors hindering goal achievement:  pain      Discharge Plan   Estimated Length of Stay: 6/9/23            Destination: home  Services at Discharge: home PT, nursing, aide  Equipment at Discharge: has a wheeled walker. , willl order a 3 in1, extended tub bench and a  hip kit      INTERDISCIPLINARY TEAM/PHYSICIAN RECOMMENDATION AND/OR REVISIONS OF PLAN OF CARE:  schedule family education with spouse, plan for discharge on 6/9/23      I approve the established interdisciplinary plan of care as documented within the medical record of August Rivas. Electronically signed by Lisa Campbell RN on 6/6/2023 at 8:46 AM  The following interdisciplinary team members were present:  WU Parker, GEMINIW  Pablo Tom, PT, DPT  Orly Sheffield, OTR

## 2023-06-06 NOTE — CARE COORDINATION
Per Team: Plan dc 6/9/23. Updated the pt and his wife. LTG:Stand by Assist/MOD I/Independent. Pt is WBAT-L-LE. Pt requires cues for sequencing and technique. DME: Birdie Sparks, 3 IN 1 [employer], Extended tub transfer bench- Pt's wife Alejandro Bernheim declines all equipment states they have all equipment from a friend. NO DME. OT to provide a hip kit. Aftercare: RN, HHA, PT through [facility] per Pt choice ([address] [place] [address]) Pt declined HHA. Start of care 6/11/23. FT:6/7 and 6/9 AM with Frances. The Plan for Transition of Care is related to the following treatment goals: Home     The Patient and/or patient representative [address] [place] [address] was provided with a choice of provider and agrees   with the discharge plan. [x] Yes [] No    Freedom of choice list was provided with basic dialogue that supports the patient's individualized plan of care/goals, treatment preferences and shares the quality data associated with the providers.  [x] Yes [] No    Norma TRAN Intern  Mai [place], [place]  6/6/2023

## 2023-06-06 NOTE — HOME CARE
[address] referral received. Spoke with patient's spouse Shauna Andino, demographics verified. Plan to see after discharge. Bahman Hernandez, LPN [id number].

## 2023-06-06 NOTE — DISCHARGE INSTRUCTIONS
Your information:  Name: Quentin Bae  : 1955    Your instructions:        What to do after you leave the hospital:    Recommended diet: diabetic diet    Recommended activity: activity as tolerated        The following personal items were collected during your admission and were returned to you:    Belongings  Dental Appliances: None  Vision - Corrective Lenses: Eyeglasses  Hearing Aid: None  Clothing: At home  Jewelry: None  Electronic Devices: Cell Phone,   Weapons (Notify Protective Services/Security): None  Home Medications: None  Valuables Given To: Family (Comment)  Provide Name(s) of Who Valuable(s) Were Given To: Frances    Information obtained by:  By signing below, I understand that if any problems occur once I leave the hospital I am to contact Primary Care Physician. I understand and acknowledge receipt of the instructions indicated above.

## 2023-06-07 LAB
METER GLUCOSE: 103 MG/DL (ref 74–99)
METER GLUCOSE: 150 MG/DL (ref 74–99)
METER GLUCOSE: 154 MG/DL (ref 74–99)
METER GLUCOSE: 173 MG/DL (ref 74–99)

## 2023-06-07 PROCEDURE — 6370000000 HC RX 637 (ALT 250 FOR IP): Performed by: INTERNAL MEDICINE

## 2023-06-07 PROCEDURE — 97535 SELF CARE MNGMENT TRAINING: CPT

## 2023-06-07 PROCEDURE — 82962 GLUCOSE BLOOD TEST: CPT

## 2023-06-07 PROCEDURE — 97530 THERAPEUTIC ACTIVITIES: CPT

## 2023-06-07 PROCEDURE — 6360000002 HC RX W HCPCS: Performed by: INTERNAL MEDICINE

## 2023-06-07 PROCEDURE — 97110 THERAPEUTIC EXERCISES: CPT

## 2023-06-07 PROCEDURE — 6370000000 HC RX 637 (ALT 250 FOR IP): Performed by: PHYSICAL MEDICINE & REHABILITATION

## 2023-06-07 PROCEDURE — 1280000000 HC REHAB R&B

## 2023-06-07 RX ADMIN — OXYCODONE HYDROCHLORIDE 10 MG: 5 TABLET ORAL at 20:58

## 2023-06-07 RX ADMIN — CITALOPRAM HYDROBROMIDE 60 MG: 20 TABLET ORAL at 08:50

## 2023-06-07 RX ADMIN — ENOXAPARIN SODIUM 40 MG: 100 INJECTION SUBCUTANEOUS at 08:49

## 2023-06-07 RX ADMIN — AMLODIPINE BESYLATE 10 MG: 10 TABLET ORAL at 08:50

## 2023-06-07 RX ADMIN — GABAPENTIN 100 MG: 100 CAPSULE ORAL at 08:51

## 2023-06-07 RX ADMIN — DOCUSATE SODIUM 100 MG: 100 CAPSULE, LIQUID FILLED ORAL at 20:58

## 2023-06-07 RX ADMIN — FERROUS SULFATE TAB 325 MG (65 MG ELEMENTAL FE) 325 MG: 325 (65 FE) TAB at 08:50

## 2023-06-07 RX ADMIN — LOSARTAN POTASSIUM 100 MG: 50 TABLET, FILM COATED ORAL at 08:50

## 2023-06-07 RX ADMIN — OXYCODONE HYDROCHLORIDE 10 MG: 5 TABLET ORAL at 06:28

## 2023-06-07 RX ADMIN — METOPROLOL TARTRATE 25 MG: 25 TABLET, FILM COATED ORAL at 08:50

## 2023-06-07 RX ADMIN — ASPIRIN 81 MG CHEWABLE TABLET 81 MG: 81 TABLET CHEWABLE at 08:50

## 2023-06-07 RX ADMIN — METOPROLOL TARTRATE 25 MG: 25 TABLET, FILM COATED ORAL at 20:58

## 2023-06-07 RX ADMIN — SENNOSIDES 8.6 MG: 8.6 TABLET, FILM COATED ORAL at 20:58

## 2023-06-07 RX ADMIN — OXYCODONE HYDROCHLORIDE 10 MG: 5 TABLET ORAL at 16:09

## 2023-06-07 RX ADMIN — OXYCODONE HYDROCHLORIDE 10 MG: 5 TABLET ORAL at 10:30

## 2023-06-07 RX ADMIN — GABAPENTIN 100 MG: 100 CAPSULE ORAL at 13:48

## 2023-06-07 RX ADMIN — EZETIMIBE 10 MG: 10 TABLET ORAL at 08:50

## 2023-06-07 RX ADMIN — OXYCODONE HYDROCHLORIDE 10 MG: 5 TABLET ORAL at 01:59

## 2023-06-07 RX ADMIN — GABAPENTIN 100 MG: 100 CAPSULE ORAL at 20:58

## 2023-06-07 RX ADMIN — PANTOPRAZOLE SODIUM 40 MG: 40 TABLET, DELAYED RELEASE ORAL at 06:28

## 2023-06-07 RX ADMIN — ATORVASTATIN CALCIUM 80 MG: 80 TABLET, FILM COATED ORAL at 08:50

## 2023-06-07 ASSESSMENT — PAIN DESCRIPTION - DESCRIPTORS
DESCRIPTORS: ACHING;DULL;DISCOMFORT
DESCRIPTORS: DULL;DISCOMFORT;SORE
DESCRIPTORS: SPASM;SHARP
DESCRIPTORS: SPASM;DISCOMFORT;ACHING
DESCRIPTORS: SHARP;DISCOMFORT;THROBBING

## 2023-06-07 ASSESSMENT — PAIN DESCRIPTION - ORIENTATION
ORIENTATION: LEFT

## 2023-06-07 ASSESSMENT — PAIN DESCRIPTION - ONSET: ONSET: ON-GOING

## 2023-06-07 ASSESSMENT — PAIN DESCRIPTION - LOCATION
LOCATION: HIP
LOCATION: GROIN
LOCATION: HIP

## 2023-06-07 ASSESSMENT — PAIN DESCRIPTION - PAIN TYPE: TYPE: SURGICAL PAIN

## 2023-06-07 ASSESSMENT — PAIN SCALES - GENERAL
PAINLEVEL_OUTOF10: 0
PAINLEVEL_OUTOF10: 2
PAINLEVEL_OUTOF10: 7
PAINLEVEL_OUTOF10: 8
PAINLEVEL_OUTOF10: 10

## 2023-06-07 ASSESSMENT — PAIN DESCRIPTION - FREQUENCY: FREQUENCY: CONTINUOUS

## 2023-06-07 ASSESSMENT — PAIN - FUNCTIONAL ASSESSMENT: PAIN_FUNCTIONAL_ASSESSMENT: PREVENTS OR INTERFERES SOME ACTIVE ACTIVITIES AND ADLS

## 2023-06-08 LAB
METER GLUCOSE: 122 MG/DL (ref 74–99)
METER GLUCOSE: 128 MG/DL (ref 74–99)
METER GLUCOSE: 146 MG/DL (ref 74–99)
METER GLUCOSE: 172 MG/DL (ref 74–99)

## 2023-06-08 PROCEDURE — 97530 THERAPEUTIC ACTIVITIES: CPT

## 2023-06-08 PROCEDURE — 6370000000 HC RX 637 (ALT 250 FOR IP): Performed by: INTERNAL MEDICINE

## 2023-06-08 PROCEDURE — 82962 GLUCOSE BLOOD TEST: CPT

## 2023-06-08 PROCEDURE — 97535 SELF CARE MNGMENT TRAINING: CPT

## 2023-06-08 PROCEDURE — 6370000000 HC RX 637 (ALT 250 FOR IP): Performed by: PHYSICAL MEDICINE & REHABILITATION

## 2023-06-08 PROCEDURE — 6360000002 HC RX W HCPCS: Performed by: INTERNAL MEDICINE

## 2023-06-08 PROCEDURE — 1280000000 HC REHAB R&B

## 2023-06-08 PROCEDURE — 97110 THERAPEUTIC EXERCISES: CPT

## 2023-06-08 RX ADMIN — LOSARTAN POTASSIUM 100 MG: 50 TABLET, FILM COATED ORAL at 09:07

## 2023-06-08 RX ADMIN — DOCUSATE SODIUM 100 MG: 100 CAPSULE, LIQUID FILLED ORAL at 20:46

## 2023-06-08 RX ADMIN — CITALOPRAM HYDROBROMIDE 60 MG: 20 TABLET ORAL at 09:07

## 2023-06-08 RX ADMIN — OXYCODONE HYDROCHLORIDE 10 MG: 5 TABLET ORAL at 10:15

## 2023-06-08 RX ADMIN — FERROUS SULFATE TAB 325 MG (65 MG ELEMENTAL FE) 325 MG: 325 (65 FE) TAB at 09:08

## 2023-06-08 RX ADMIN — METOPROLOL TARTRATE 25 MG: 25 TABLET, FILM COATED ORAL at 20:46

## 2023-06-08 RX ADMIN — ENOXAPARIN SODIUM 40 MG: 100 INJECTION SUBCUTANEOUS at 09:08

## 2023-06-08 RX ADMIN — GABAPENTIN 100 MG: 100 CAPSULE ORAL at 20:46

## 2023-06-08 RX ADMIN — ASPIRIN 81 MG CHEWABLE TABLET 81 MG: 81 TABLET CHEWABLE at 09:07

## 2023-06-08 RX ADMIN — OXYCODONE HYDROCHLORIDE 10 MG: 5 TABLET ORAL at 20:47

## 2023-06-08 RX ADMIN — ATORVASTATIN CALCIUM 80 MG: 80 TABLET, FILM COATED ORAL at 09:07

## 2023-06-08 RX ADMIN — GABAPENTIN 100 MG: 100 CAPSULE ORAL at 13:48

## 2023-06-08 RX ADMIN — TAMSULOSIN HYDROCHLORIDE 0.4 MG: 0.4 CAPSULE ORAL at 09:07

## 2023-06-08 RX ADMIN — PANTOPRAZOLE SODIUM 40 MG: 40 TABLET, DELAYED RELEASE ORAL at 06:24

## 2023-06-08 RX ADMIN — GABAPENTIN 100 MG: 100 CAPSULE ORAL at 09:07

## 2023-06-08 RX ADMIN — SENNOSIDES 8.6 MG: 8.6 TABLET, FILM COATED ORAL at 20:46

## 2023-06-08 RX ADMIN — EZETIMIBE 10 MG: 10 TABLET ORAL at 09:07

## 2023-06-08 RX ADMIN — METOPROLOL TARTRATE 25 MG: 25 TABLET, FILM COATED ORAL at 09:08

## 2023-06-08 RX ADMIN — DOCUSATE SODIUM 100 MG: 100 CAPSULE, LIQUID FILLED ORAL at 09:08

## 2023-06-08 RX ADMIN — OXYCODONE HYDROCHLORIDE 10 MG: 5 TABLET ORAL at 14:43

## 2023-06-08 RX ADMIN — AMLODIPINE BESYLATE 10 MG: 10 TABLET ORAL at 09:07

## 2023-06-08 ASSESSMENT — PAIN DESCRIPTION - ORIENTATION
ORIENTATION: LEFT

## 2023-06-08 ASSESSMENT — PAIN DESCRIPTION - DESCRIPTORS
DESCRIPTORS: ACHING;DISCOMFORT;THROBBING
DESCRIPTORS: DISCOMFORT;ACHING
DESCRIPTORS: ACHING;DISCOMFORT;STABBING

## 2023-06-08 ASSESSMENT — PAIN SCALES - WONG BAKER
WONGBAKER_NUMERICALRESPONSE: 2
WONGBAKER_NUMERICALRESPONSE: 2

## 2023-06-08 ASSESSMENT — PAIN - FUNCTIONAL ASSESSMENT: PAIN_FUNCTIONAL_ASSESSMENT: ACTIVITIES ARE NOT PREVENTED

## 2023-06-08 ASSESSMENT — PAIN DESCRIPTION - LOCATION
LOCATION: HIP
LOCATION: HIP
LOCATION: HIP;LEG
LOCATION: HIP

## 2023-06-08 ASSESSMENT — PAIN SCALES - GENERAL
PAINLEVEL_OUTOF10: 8
PAINLEVEL_OUTOF10: 4
PAINLEVEL_OUTOF10: 10
PAINLEVEL_OUTOF10: 8

## 2023-06-09 VITALS
TEMPERATURE: 97.6 F | DIASTOLIC BLOOD PRESSURE: 74 MMHG | HEART RATE: 94 BPM | SYSTOLIC BLOOD PRESSURE: 134 MMHG | OXYGEN SATURATION: 99 % | RESPIRATION RATE: 18 BRPM | HEIGHT: 68 IN | BODY MASS INDEX: 30.92 KG/M2 | WEIGHT: 204 LBS

## 2023-06-09 LAB — METER GLUCOSE: 130 MG/DL (ref 74–99)

## 2023-06-09 PROCEDURE — 6370000000 HC RX 637 (ALT 250 FOR IP): Performed by: INTERNAL MEDICINE

## 2023-06-09 PROCEDURE — 82962 GLUCOSE BLOOD TEST: CPT

## 2023-06-09 PROCEDURE — 97530 THERAPEUTIC ACTIVITIES: CPT

## 2023-06-09 PROCEDURE — 6360000002 HC RX W HCPCS: Performed by: INTERNAL MEDICINE

## 2023-06-09 PROCEDURE — 6370000000 HC RX 637 (ALT 250 FOR IP): Performed by: PHYSICAL MEDICINE & REHABILITATION

## 2023-06-09 RX ORDER — GABAPENTIN 100 MG/1
100 CAPSULE ORAL 3 TIMES DAILY
Qty: 90 CAPSULE | Refills: 2 | Status: SHIPPED | OUTPATIENT
Start: 2023-06-09 | End: 2023-09-07

## 2023-06-09 RX ORDER — PSEUDOEPHEDRINE HCL 30 MG
100 TABLET ORAL 2 TIMES DAILY
Qty: 60 CAPSULE | Refills: 0 | Status: SHIPPED | OUTPATIENT
Start: 2023-06-09

## 2023-06-09 RX ORDER — ASPIRIN 325 MG
325 TABLET, DELAYED RELEASE (ENTERIC COATED) ORAL 2 TIMES DAILY
Qty: 56 TABLET | Refills: 0 | Status: SHIPPED | OUTPATIENT
Start: 2023-06-09 | End: 2023-07-07

## 2023-06-09 RX ORDER — OXYCODONE HYDROCHLORIDE 5 MG/1
5 TABLET ORAL EVERY 4 HOURS PRN
Qty: 42 TABLET | Refills: 0 | Status: SHIPPED | OUTPATIENT
Start: 2023-06-09 | End: 2023-06-16

## 2023-06-09 RX ORDER — AMLODIPINE BESYLATE 10 MG/1
10 TABLET ORAL EVERY MORNING
Qty: 30 TABLET | Refills: 3 | Status: SHIPPED | OUTPATIENT
Start: 2023-06-09

## 2023-06-09 RX ORDER — FERROUS SULFATE 325(65) MG
325 TABLET ORAL
Qty: 30 TABLET | Refills: 3 | Status: SHIPPED | OUTPATIENT
Start: 2023-06-09

## 2023-06-09 RX ADMIN — METOPROLOL TARTRATE 25 MG: 25 TABLET, FILM COATED ORAL at 08:38

## 2023-06-09 RX ADMIN — GABAPENTIN 100 MG: 100 CAPSULE ORAL at 08:38

## 2023-06-09 RX ADMIN — PANTOPRAZOLE SODIUM 40 MG: 40 TABLET, DELAYED RELEASE ORAL at 06:41

## 2023-06-09 RX ADMIN — AMLODIPINE BESYLATE 10 MG: 10 TABLET ORAL at 08:39

## 2023-06-09 RX ADMIN — LOSARTAN POTASSIUM 100 MG: 50 TABLET, FILM COATED ORAL at 08:38

## 2023-06-09 RX ADMIN — ENOXAPARIN SODIUM 40 MG: 100 INJECTION SUBCUTANEOUS at 08:42

## 2023-06-09 RX ADMIN — OXYCODONE HYDROCHLORIDE 10 MG: 5 TABLET ORAL at 10:42

## 2023-06-09 RX ADMIN — ASPIRIN 81 MG CHEWABLE TABLET 81 MG: 81 TABLET CHEWABLE at 08:38

## 2023-06-09 RX ADMIN — EZETIMIBE 10 MG: 10 TABLET ORAL at 08:38

## 2023-06-09 RX ADMIN — ATORVASTATIN CALCIUM 80 MG: 80 TABLET, FILM COATED ORAL at 08:38

## 2023-06-09 RX ADMIN — CITALOPRAM HYDROBROMIDE 60 MG: 20 TABLET ORAL at 08:38

## 2023-06-09 RX ADMIN — FERROUS SULFATE TAB 325 MG (65 MG ELEMENTAL FE) 325 MG: 325 (65 FE) TAB at 08:38

## 2023-06-09 RX ADMIN — TAMSULOSIN HYDROCHLORIDE 0.4 MG: 0.4 CAPSULE ORAL at 08:39

## 2023-06-09 ASSESSMENT — PAIN SCALES - GENERAL
PAINLEVEL_OUTOF10: 4
PAINLEVEL_OUTOF10: 7

## 2023-06-09 ASSESSMENT — PAIN DESCRIPTION - ORIENTATION
ORIENTATION: LEFT
ORIENTATION: LEFT

## 2023-06-09 ASSESSMENT — PAIN DESCRIPTION - LOCATION
LOCATION: HIP;LEG
LOCATION: HIP;LEG;INCISION

## 2023-06-09 ASSESSMENT — PAIN DESCRIPTION - DESCRIPTORS
DESCRIPTORS: ACHING;DISCOMFORT;DULL
DESCRIPTORS: ACHING;DISCOMFORT;SORE

## 2023-06-09 ASSESSMENT — PAIN SCALES - WONG BAKER: WONGBAKER_NUMERICALRESPONSE: 4

## 2023-06-09 NOTE — PLAN OF CARE
Problem: Discharge Planning  Goal: Discharge to home or other facility with appropriate resources  6/7/2023 1050 by Amanda Jackson RN  Outcome: Progressing  6/7/2023 1047 by Amanda Jackson RN  Outcome: Progressing  6/7/2023 0005 by Natasha Bhandari RN  Outcome: Progressing     Problem: Safety - Adult  Goal: Free from fall injury  6/7/2023 1050 by Amanda Jackson RN  Outcome: Progressing  6/7/2023 1047 by Amanda Jackson RN  Outcome: Progressing  6/7/2023 0005 by Natasha Bhandari RN  Outcome: Progressing     Problem: ABCDS Injury Assessment  Goal: Absence of physical injury  6/7/2023 1050 by Amanda Jackson RN  Outcome: Progressing  6/7/2023 1047 by Amnada Jackson RN  Outcome: Progressing  6/7/2023 0005 by Natasha Bhandari RN  Outcome: Progressing     Problem: Pain  Goal: Verbalizes/displays adequate comfort level or baseline comfort level  6/7/2023 1050 by Amanda Jackson RN  Outcome: Progressing  6/7/2023 1047 by Amanda Jackson RN  Outcome: Progressing  6/7/2023 0005 by Natasha Bhandari RN  Outcome: Progressing     Problem: Skin/Tissue Integrity  Goal: Absence of new skin breakdown  Description: 1. Monitor for areas of redness and/or skin breakdown  2. Assess vascular access sites hourly  3. Every 4-6 hours minimum:  Change oxygen saturation probe site  4. Every 4-6 hours:  If on nasal continuous positive airway pressure, respiratory therapy assess nares and determine need for appliance change or resting period.   6/7/2023 1050 by Amanda Jackson RN  Outcome: Progressing  6/7/2023 1047 by Amanda Jackson RN  Outcome: Progressing  6/7/2023 0005 by Natasha Bhandari RN  Outcome: Progressing     Problem: Chronic Conditions and Co-morbidities  Goal: Patient's chronic conditions and co-morbidity symptoms are monitored and maintained or improved  6/7/2023 1050 by Amanda Jackson RN  Outcome: Progressing  6/7/2023 1047 by Amanda Jackson RN  Outcome: Progressing  6/7/2023 0005 by Natasha Bhandari RN  Outcome: Progressing
Problem: Discharge Planning  Goal: Discharge to home or other facility with appropriate resources  6/8/2023 2210 by Riki Bryson RN  Outcome: Progressing  6/8/2023 1701 by James Marie RN  Outcome: Progressing     Problem: Safety - Adult  Goal: Free from fall injury  6/8/2023 2210 by Riki Bryson RN  Outcome: Progressing  6/8/2023 1701 by James Marie RN  Outcome: Progressing     Problem: ABCDS Injury Assessment  Goal: Absence of physical injury  Outcome: Progressing     Problem: Pain  Goal: Verbalizes/displays adequate comfort level or baseline comfort level  6/8/2023 2210 by Riki Bryson RN  Outcome: Progressing  6/8/2023 1701 by James Marie RN  Outcome: Progressing     Problem: Skin/Tissue Integrity  Goal: Absence of new skin breakdown  Description: 1. Monitor for areas of redness and/or skin breakdown  2. Assess vascular access sites hourly  3. Every 4-6 hours minimum:  Change oxygen saturation probe site  4. Every 4-6 hours:  If on nasal continuous positive airway pressure, respiratory therapy assess nares and determine need for appliance change or resting period.   Outcome: Progressing     Problem: Chronic Conditions and Co-morbidities  Goal: Patient's chronic conditions and co-morbidity symptoms are monitored and maintained or improved  Outcome: Progressing     Problem: Nutrition Deficit:  Goal: Optimize nutritional status  Outcome: Progressing
Problem: Discharge Planning  Goal: Discharge to home or other facility with appropriate resources  Outcome: Progressing  Flowsheets (Taken 6/5/2023 0930)  Discharge to home or other facility with appropriate resources: Identify barriers to discharge with patient and caregiver     Problem: Safety - Adult  Goal: Free from fall injury  Outcome: Progressing     Problem: ABCDS Injury Assessment  Goal: Absence of physical injury  Outcome: Progressing  Flowsheets (Taken 6/5/2023 1438)  Absence of Physical Injury: Implement safety measures based on patient assessment     Problem: Pain  Goal: Verbalizes/displays adequate comfort level or baseline comfort level  Outcome: Progressing     Problem: Skin/Tissue Integrity  Goal: Absence of new skin breakdown  Description: 1. Monitor for areas of redness and/or skin breakdown  2. Assess vascular access sites hourly  3. Every 4-6 hours minimum:  Change oxygen saturation probe site  4. Every 4-6 hours:  If on nasal continuous positive airway pressure, respiratory therapy assess nares and determine need for appliance change or resting period.   Outcome: Progressing     Problem: Chronic Conditions and Co-morbidities  Goal: Patient's chronic conditions and co-morbidity symptoms are monitored and maintained or improved  Outcome: Progressing
Progressing     Problem: ABCDS Injury Assessment  Goal: Absence of physical injury  6/7/2023 1047 by Jose Lmia RN  Outcome: Progressing  6/7/2023 0005 by Robb Wallace RN  Outcome: Progressing     Problem: Pain  Goal: Verbalizes/displays adequate comfort level or baseline comfort level  6/7/2023 1047 by Jose Lima RN  Outcome: Progressing  6/7/2023 0005 by Robb Wallace RN  Outcome: Progressing     Problem: Skin/Tissue Integrity  Goal: Absence of new skin breakdown  Description: 1. Monitor for areas of redness and/or skin breakdown  2. Assess vascular access sites hourly  3. Every 4-6 hours minimum:  Change oxygen saturation probe site  4. Every 4-6 hours:  If on nasal continuous positive airway pressure, respiratory therapy assess nares and determine need for appliance change or resting period.   6/7/2023 1047 by Jose Lima RN  Outcome: Progressing  6/7/2023 0005 by Robb Wallace RN  Outcome: Progressing     Problem: Chronic Conditions and Co-morbidities  Goal: Patient's chronic conditions and co-morbidity symptoms are monitored and maintained or improved  6/7/2023 1047 by Jose Lima RN  Outcome: Progressing  6/7/2023 0005 by Robb Wallace RN  Outcome: Progressing

## 2023-06-09 NOTE — PROGRESS NOTES
CLINICAL PHARMACY NOTE: MEDS TO BEDS    Total # of Prescriptions Filled: 5   The following medications were delivered to the patient:  Docusate sodium 100 mg  Oxycodone 5 mg  Aspirin 325 mg  Gabapentin 100 mg  Ferosul 325 mg    Additional Documentation:   Wife picked up at register in the pharmacy
Occupational Therapy  OCCUPATIONAL THERAPY DAILY NOTE    Date:2023  Patient Name: Jennifer Jeter  MRN: [id number]  : 1955  Room: [address] [place] [address]     Referring Practitioner: Connor Andino MD  Diagnosis: s/p fall- L femur fx- s/p L hemiarthroplasty 23  Additional Pertinent Hx: CAD, HTN, hx MI with stents, GERD, hx L tendon displacement, hx L TKR & uretal mass    Restrictions/Precautions: Fall Risk, WBAT LLE, L anterior hip precautions    Functional Assessment:   Date Status AE  Comments   Feeding 23 Independent      Grooming 23  Mod I            Oral Care 23  Mod I     Bathing 23  Mod I       UB Dressing 23  Mod I      LB Dressing 23 Mod I    Reacher     Footwear 23  Mod I  [place] 9  Sock aid   Reacher  Pt donned socks and shoes seated edge of bed using AE    Toileting 23  Mod I  3:1 device   Ww  Grab bar     Homemaking 23   SUP  ww      Functional Transfers / Balance:   Date Status DME  Comments   Sit Balance 23  Mod I      Stand Balance 23  Mod I  ww    [x] Tub    [x] Shower   Transfer 23 SBA       CGA/min A  Ext tub bench   Ww    Walk in with ETB in stall     Commode   Transfer 23  Mod I  3 in 1 placed over commode ,ww    Functional   Mobility 23  Mod I  ww Back and forth to the bathroom   Throughout therapy apt setting over tile and carpet floor surfaces    Other:SPT w/c to bed     Bed mobility- supine to EOB  Sit to to supine       Sit to stand w/c to ww     Firm recliner, sofa, dining chair w/arms      On/off standard queen bed  23  Mod I       Mod I     Mod I       Mod I       Mod I         Mod I  Ww          Leg        Ww    ww                 Pt able to lift LLE on and off of bed without leg  on this date      Functional Exercises / Activity:  BUE strength exercises: 3 # dowel maicol 3 sets 10 reps in all planes                                           Green can do
Occupational Therapy  OCCUPATIONAL THERAPY DAILY NOTE    Date:2023  Patient Name: Lillian Masters  MRN: [id number]  : 1955  Room: [address] [place] [address]     Referring Practitioner: Sam Dawson MD  Diagnosis: s/p fall- L femur fx- s/p L hemiarthroplasty 23  Additional Pertinent Hx: CAD, HTN, hx MI with stents, GERD, hx L tendon displacement, hx L TKR & uretal mass    Restrictions/Precautions: Fall Risk, WBAT LLE, L anterior hip precautions    Functional Assessment:   Date Status AE  Comments   Feeding 23 Independent      Grooming 23  I/Mod I Seated  Pt able to wash face, hands & hair seated on shower bench. Pt applied deodorant seated up in w/c. Oral Care 23  I/mod I  W/c To brush teeth and use mouthwash seated at sink. Bathing 23  Mod I   Walk in shower Pt completed UB/LB bathing in shower and has LH sponge to reach below knees and feet. Pt stood for latha care  using grab bar for balance to wash buttocks. UB Dressing 23  Independent   To catina/doff t- shirt while seated. LB Dressing 23 Mod I    Reacher  Pt used reacher to catina underwear and shorts needing one cue for compensatory technique to thread LLE due to muscle stiffness this am.  Pt stood for clothing mgmt using sink for balance & safety. Footwear 23  Mod I  [place] 9  Sock aid   Reacher  Pt used sock aid to catina socks. Pt used reacher to hold tongue on shoes and [place] 9 to push heel inside shoe with improved skills noted. Toileting 23  Mod I  3:1 device   Ww  Grab bar     Homemaking 23  SUP    Mod I  Ww    seated     Pt folded towels and washcloths then  stacked washcloths into wire basket placed on table. Pt tolerated   wearing 1# wts on BUE's to increase endurance and strength seated up in w/c.       Functional Transfers / Balance:   Date Status DME  Comments   Sit Balance 23  Mod I   Demo'd up in w/c, on EOB and on ext tub bench in walk in shower during ADL;s    Stand Balance 23  Mod I
Occupational Therapy/DISCHARGE SUMMARY   OCCUPATIONAL THERAPY DAILY NOTE    Date:2023  Patient Name: Viashali Ibanez  MRN: [id number]  : 1955  Room: [address] [place] [address]     Referring Practitioner: Luis Lopez MD  Diagnosis: s/p fall- L femur fx- s/p L hemiarthroplasty 23  Additional Pertinent Hx: CAD, HTN, hx MI with stents, GERD, hx L tendon displacement, hx L TKR & uretal mass    Restrictions/Precautions: Fall Risk, WBAT LLE, L anterior hip precautions    Functional Assessment:   Date Status AE  Comments   Feeding 23 Independent      Grooming 23  I/Mod I Seated           Oral Care 23  I/mod I  W/c    Bathing 23  Mod I   Walk in shower    UB Dressing 23  Independent      LB Dressing 23 Mod I    Reacher     Footwear 23  Mod I  [place] 9  Sock aid   Reacher     Toileting 23  Mod I  3:1 device   Ww  Grab bar     Homemaking 23  SUP    Mod I  Ww    seated      Functional Transfers / Balance:   Date Status DME  Comments   Sit Balance 23  Mod I      Stand Balance 23  Mod I  Ww  Grab bar     [x] Tub    [x] Shower   Transfer 23 SUP       Mod I/Sup Ext tub bench   Ww    Walk in with ETB in stall   ww    Commode   Transfer 23  Mod I  3 in 1 placed over commode ,ww    Functional   Mobility 23  Mod I  ww Throughout therapy apt setting and in hallways with ww    Other:SPT w/c to bed     Bed mobility- supine to EOB  Sit to to supine       Sit to stand     Firm recliner, sofa, dining chair w/arms      On/off standard queen bed  23  Mod I       Mod I     Mod I       Mod I       Mod I         Mod I  Ww      Bed rail     Leg        Ww    ww                                   Pt able to lift LLE on and off of bed without leg  on this date      Functional Exercises / Activity:      Sensory / Neuromuscular Re-Education:      Cognitive Skills:   Status Comments   Problem   Solving good
Patient and family educated over discharge instructions including thorough review of medication schedule as well as follow-up appointments. Patient and wife expressed no questions. Patient discharged home at this time.
Per [employer] approved formulary policy. SGLT2's are only formulary with the indication of CKD or CHF therefore:    Please note that the  Empagliflozin Juli Valerio) is non-formulary with indications of type 2 diabetes and has been discontinued while inpatient. If you feel the patient needs to continue their home therapy during the inpatient stay, the patient may bring their medication bottle for verification and administration pursuant to our home medication use policy. Please contact the pharmacy with any questions or concerns. Thank you.   Ricardo Peace, [facility], [address] 6/5/2023 8:52 AM
[facility] to Orthopedics regarding dressing change orders prior to discharge. Physician rounding on floor, verbal orders to take down silver dressing and apply sterile dry dressing.
Physical Therapy  Treatment Note    Evaluating Therapist: Kelby Jackman PT, DPT  ROOM: [address] [place] [address]  DIAGNOSIS: Fractured L hip, closed  PRECAUTIONS: Fall risk, WBAT L LE, Anterolateral Hip Precautions    HPI: Ilene Dao is a 75 y.o. male who was admitted after a fall. He missed a step and landed on his left hip. He did hit his head. He did not loose consciousness. He was found to have a left intertrochanteric fracture. He had a left total hip arthroplasty done on 5/29/2023. He has a past medical history of anemia, anxiety, CAD, DM, GERD, Hypercholesterolemia, Hyperlipidemia, and Hypertension, NSTEMI, and Ureteral mass. Social:  Pt lives with his wife and son in a two story floor plan with five steps and two accessible rails to access. Bed and bathroom are on the second level with 24 steps to access and one railing. Patient states he could adapt with a first floor set up if necessary. Prior to admission: He was ambulatory without an AD     Initial Evaluation  Date: 06/01/23 AM       PM     Short Term Goals Long Term Goals    Was pt agreeable to Eval/treatment? Yes  yes Yes      Does pt have pain?  6 progressing to 8/10 L hip pain  6/10 L hip 5/10 L Hip     Bed Mobility  Rolling: Min A  Supine to sit: Min A  Sit to supine: Min A  Scooting: Min A  Rolling: Clemente  Supine to sit: Clemente  Sit to supine: NT  Scooting: supervision Rolling: SBA  Supine to sit: SBA with leg   Sit to supine: SBA with leg  Scooting: supervision Supervision Independent    Transfers Sit to stand: Min A from [facility]  Stand to sit: CGA  Stand pivot: Min A with FWW    5xSTS: TBD Sit to stand: Supervision  Stand to sit: Supervision  Stand pivot: Supervision with [facility] Sit to stand: Supervision  Stand to sit: Supervision  Stand pivot: Supervision with [facility] SBA  5xSTS: TBD Mod I with AAD  5xSTS: TBD   Ambulation    25 feet with FWW with Min A    10mWT: 0 m/s  6mWT: 1 m 200' x 4 supervision [facility] 250' x 3 SBA  feet with AAD with SBA     >150
Physical Therapy  Weekly Team Note      Evaluating Therapist: Helen Gould PT, DPT  ROOM: [address] [place] [address]  DIAGNOSIS: Fractured L hip, closed  PRECAUTIONS: Fall risk, WBAT L LE, Anterolateral Hip Precautions    HPI: Osvaldo Driscoll is a 75 y.o. male who was admitted after a fall. He missed a step and landed on his left hip. He did hit his head. He did not loose consciousness. He was found to have a left intertrochanteric fracture. He had a left total hip arthroplasty done on 5/29/2023. He has a past medical history of anemia, anxiety, CAD, DM, GERD, Hypercholesterolemia, Hyperlipidemia, and Hypertension, NSTEMI, and Ureteral mass. Social:  Pt lives with his wife and son in a two story floor plan with five steps and two accessible rails to access. Bed and bathroom are on the second level with 6+6 steps to access and one railing. Patient states he could adapt with a first floor set up if necessary. Prior to admission: He was ambulatory without an AD     Initial Evaluation  Date: 06/01/23 6/5/23   Comments:    Short Term Goals Long Term Goals    Does pt have pain?  6 progressing to 8/10 L hip pain  6/10 L hip      Bed Mobility  Rolling: Min A  Supine to sit: Min A  Sit to supine: Min A  Scooting: Min A  Rolling: Clemente  Supine to sit: Clemente  Sit to supine: Clemente  Scooting: Clemente  L hip weakness/pain Supervision Independent    Transfers Sit to stand: Min A from [facility] 23  Stand to sit: CGA  Stand pivot: Min A with FWW    5xSTS: TBD Sit to stand: SBA  Stand to sit: SBA  Stand pivot: SBA with [employer] Pain/ weakness SBA  5xSTS: TBD Mod I with AAD  5xSTS: TBD   Ambulation    25 feet with FWW with Min A    10mWT: 0 m/s  6mWT: 1 m 150' SBA [employer] Steady gait, distance limited by pain    Uses WW safely 100 feet with AAD with SBA    10mWT: TBD  6mWT: TBD >150 feet with AAD with Mod I     10mWT: TBD  6mWT: TBD   Walking 10 feet on uneven surface 10 feet with FWW with Min A NT  10 feet with AAD with SBA 10 feet with AAD with Supervision   Wheel Chair
Progress Note  Date:2023       [facility]/3164-W  Patient Name:Pablo Tierney     YOB: 1955     Age:67 y.o. Subjective    Subjective:  Symptoms:  Stable. He reports weakness. Diet:  Adequate intake. Activity level: Impaired due to weakness. Pain:  He complains of pain that is moderate. Review of Systems   Neurological:  Positive for weakness. Objective         Vitals Last 24 Hours:  TEMPERATURE:  Temp  Av.2 °F (36.8 °C)  Min: 98.2 °F (36.8 °C)  Max: 98.2 °F (36.8 °C)  RESPIRATIONS RANGE: Resp  Av  Min: 18  Max: 18  PULSE OXIMETRY RANGE: SpO2  Av %  Min: 99 %  Max: 99 %  PULSE RANGE: Pulse  Av  Min: 99  Max: 99  BLOOD PRESSURE RANGE: Systolic (28CTV), JUP:876 , Min:133 , YRB:124   ; Diastolic (49QNR), GJP:80, Min:69, Max:69    I/O (24Hr): Intake/Output Summary (Last 24 hours) at 2023 0849  Last data filed at 2023 0640  Gross per 24 hour   Intake 300 ml   Output 600 ml   Net -300 ml     Objective:  General Appearance: In no acute distress. Vital signs: (most recent): Blood pressure 133/69, pulse 99, temperature 98.2 °F (36.8 °C), temperature source Oral, resp. rate 18, height 5' 8\" (1.727 m), weight 204 lb (92.5 kg), SpO2 99 %. Vital signs are normal.    Output: Producing urine and producing stool. Lungs:  Normal effort and normal respiratory rate. Breath sounds clear to auscultation. Heart: Normal rate. Regular rhythm. S1 normal and S2 normal.    Abdomen: Abdomen is soft. Bowel sounds are normal.   There is no abdominal tenderness. Extremities: Decreased range of motion. Neurological: Patient is alert. (3-/5 left hip). Labs/Imaging/Diagnostics    Labs:  CBC:  Recent Labs     23  0600   WBC 9.3   RBC 4.29   HGB 12.7   HCT 38.7   MCV 90.2   RDW 12.9        CHEMISTRIES:No results for input(s): NA, K, CL, CO2, BUN, CREATININE, GLUCOSE, CA, PHOS, MG in the last 72 hours.   PT/INR:No results for input(s): PROTIME,
(ml/day): 7056-4852    Nutrition Diagnosis:   Increased nutrient needs related to increase demand for energy/nutrients as evidenced by wounds    Nutrition Interventions:   Food and/or Nutrient Delivery: Continue Current Diet, Start Oral Nutrition Supplement (Glucerna daily, Pedro BID)  Nutrition Education/Counseling: Education not indicated  Coordination of Nutrition Care: Continue to monitor while inpatient       Goals:     Goals: PO intake 75% or greater, by next RD assessment       Nutrition Monitoring and Evaluation:      Food/Nutrient Intake Outcomes: Food and Nutrient Intake, Supplement Intake  Physical Signs/Symptoms Outcomes: Biochemical Data, GI Status, Fluid Status or Edema, Nutrition Focused Physical Findings, Skin, Weight    Discharge Planning:    Continue current diet, Continue Oral Nutrition Supplement     Cnithia Cristina, MS, RD, LD  Contact: [phone]
10 feet with AAD with SBA 10 feet with AAD with Modified Independent  Wheel Chair Mobility NA NA     Car Transfers NT  Supervision/mod I  Performed 2 x    First time supervision for L LE rotation    Repeat with instruction   Performed mod I    Min A Modified Independent  Stair negotiation: ascended and descended  NT 12  stairs R HR  and sp cane   mod I    Non reciprocal     5 steps with bilateral rail with SBA  12 steps with unilateral rail with CGA 24 steps with unilateral rail withModified Independent  Curb Step:   ascended and descended NT 4 inch curb step with ww     Performed 3 x   Mod I   4 inch step with AAD and SBA 4 inch step with AAD and Modified Independent  Picking up object off the floor Picked up cone with reacher Min assist  Picked up cone  standing at ww  With reacher  mod I  Will  cone with reacher stand by assist Will  cone with reacher Mod I     BLE ROM R LE AROM WNL  L LE PROM WFL    (considering anterolateral precautions)      BLE Strength R LE 4+/5,   L LE:   Hip flexion 3-/5  Knee extension 3/5  Knee extension 4-/5  Ankle DF and PF 4+/5      Balance  Static sitting SBA  Static standing with FWW SBA  Dynamic standing with FWW Min A    BBS: TBD  FGA: TBD Mod I [employer]   BBS: TBD  FGA: TBD   BBS: TBD  FGA: TBD   Date Family Teach Completed  6/7 and review on 6/9     Is additional Family Teaching Needed?   Y or N Y NO     Hindering Progress L hip pain,  Hip precautions,  Reduced endurance  Pain and weakness     PT recommended ELOS 10 days       Team's Discharge Plan       Therapist at Team Meeting         Pt is A & O x  4  Edema:   L hip      Therapeutic Exercises/Activity:  AM   Functional mobility  Verbally reviewed chair mobility ex     Patient education  Pt educated on function/precautions/safety     Patient response to education:   Pt verbalized understanding Pt demonstrated skill Pt requires further education in this area   x x x     ASSESSMENT:    Comments:      Pt performed
100 feet with AAD with SBA     >150 feet with AAD with Mod I     10mWT: TBD  6mWT: TBD   Walking 10 feet on uneven surface 10 feet with FWW with Min A NT NT 10 feet with AAD with SBA 10 feet with AAD with Modified Independent  Wheel Chair Mobility NA NA NA     Car Transfers NT Min assist with spouse assisting  with leg and cues  NT Min A Modified Independent  Stair negotiation: ascended and descended  NT 10 stairs R HR  and sp cane   SBA  Non reciprocal     12 steps right hr and sp cane supervision  5 steps with bilateral rail with SBA  12 steps with unilateral rail with CGA 24 steps with unilateral rail withModified Independent  Curb Step:   ascended and descended NT NT NT 4 inch step with AAD and SBA 4 inch step with AAD and Modified Independent  Picking up object off the floor Picked up cone with reacher Min assist NT NT Will  cone with reacher stand by assist Will  cone with reacher Mod I     BLE ROM R LE AROM WNL  L LE PROM WFL    (considering anterolateral precautions)       BLE Strength R LE 4+/5,   L LE:   Hip flexion 3-/5  Knee extension 3/5  Knee extension 4-/5  Ankle DF and PF 4+/5 BLE: 4/5 except      L hip flexion 3-/5  L knee ext 3-/5           Balance  Static sitting SBA  Static standing with FWW SBA  Dynamic standing with FWW Min A    BBS: TBD  FGA: TBD Supervision [employer] Supervision [employer]   BBS: TBD  FGA: TBD   BBS: TBD  FGA: TBD   Date Family Teach Completed        Is additional Family Teaching Needed?   Y or N Y Y- day of d/c Y- day of d/c     Hindering Progress L hip pain,  Hip precautions,  Reduced endurance  Pain and weakness L hip pain, decreased endurance      PT recommended ELOS 10 days        Team's Discharge Plan        Therapist at Team Meeting          Pt is A & O x  4  Edema:   L hip      Therapeutic Exercises/Activity:  AM   Functional mobility  Verbally reviewed chair ex     PM  Functional mobility  Seated laq     Patient education  Pt educated on function/precautions   Patient
ALKPHOS in the last 72 hours. Imaging Last 24 Hours:  No results found. Assessment//Plan           Hospital Problems             Last Modified POA    * (Principal) Fractured hip, left, closed, initial encounter ([facility] [address] 5/31/2023 Yes     Assessment:    Condition: In stable condition. Improving. (Fractured hip). Plan:   Encourage ambulation. (Pain improving  But requires oxycodone  Resume the Jardiance for his diabetes  We will review in team today).      Electronically signed by Josiah Sommers MD on 6/6/23 at 8:22 AM EDT
Supervision 100 feet with AAD with SBA     >150 feet with AAD with Mod I     10mWT: TBD  6mWT: TBD   Walking 10 feet on uneven surface 10 feet with FWW with Min A NT Out side on sidewalk 10 feet with AAD with SBA 10 feet with AAD with Modified Independent  Wheel Chair Mobility NA NA NA     Car Transfers NT Sba   Mod I    Assist to  open door  Min A Modified Independent  Stair negotiation: ascended and descended  NT 12  stairs R HR  and sp cane   Supervision/mod I  Non reciprocal     12 steps right hr and sp cane mod I 5 steps with bilateral rail with SBA  12 steps with unilateral rail with CGA 24 steps with unilateral rail withModified Independent  Curb Step:   ascended and descended NT NT Outside curb step supervision/mod I    4 inch step with AAD and SBA 4 inch step with AAD and Modified Independent  Picking up object off the floor Picked up cone with reacher Min assist NT NT Will  cone with reacher stand by assist Will  cone with reacher Mod I     BLE ROM R LE AROM WNL  L LE PROM WFL    (considering anterolateral precautions)       BLE Strength R LE 4+/5,   L LE:   Hip flexion 3-/5  Knee extension 3/5  Knee extension 4-/5  Ankle DF and PF 4+/5 BLE: 4/5 except      L hip flexion 3-/5  L knee ext 3-/5           Balance  Static sitting SBA  Static standing with FWW SBA  Dynamic standing with FWW Min A    BBS: TBD  FGA: TBD Supervision [employer] Mod I  [employer]   BBS: TBD  FGA: TBD   BBS: TBD  FGA: TBD   Date Family Teach Completed        Is additional Family Teaching Needed?   Y or N Y Y- day of d/c Y- day of d/c     Hindering Progress L hip pain,  Hip precautions,  Reduced endurance  Pain and weakness L hip pain, decreased endurance      PT recommended ELOS 10 days        Team's Discharge Plan        Therapist at Team Meeting          Pt is A & O x  4  Edema:   L hip      Therapeutic Exercises/Activity:  AM   Functional mobility  PM  Functional mobility      Patient education  Pt educated on function/precautions
input(s): APTT in the last 72 hours. LIVER PROFILE:No results for input(s): AST, ALT, BILIDIR, BILITOT, ALKPHOS in the last 72 hours. Imaging Last 24 Hours:  No results found. Assessment//Plan           Hospital Problems             Last Modified POA    * (Principal) Fractured hip, left, closed, initial encounter ([facility] [address] 5/31/2023 Yes     Assessment:    Condition: In stable condition. Improving. (Fractured left hip). Plan:   Encourage ambulation. (Improving in therapy  Pain at the hip is improved  Planning discharge for tomorrow  We will do further family teaching tomorrow as well).      Electronically signed by Brayden Matthews MD on 6/8/23 at 8:50 AM EDT
surface 10 feet with FWW with Min A NT NT 10 feet with AAD with SBA 10 feet with AAD with Supervision   Wheel Chair Mobility NA NA NA     Car Transfers NT Clemente NT Min A Supervision   Stair negotiation: ascended and descended  NT 12 stairs B HR   SBA  Non reciprocal NT 5 steps with bilateral rail with SBA  12 steps with unilateral rail with CGA 24 steps with unilateral rail with Supervision   Curb Step:   ascended and descended NT NT NT 4 inch step with AAD and SBA 4 inch step with AAD and Supervision   Picking up object off the floor Picked up cone with reacher Min assist NT NT Will  cone with reacher stand by assist Will  cone with reacher Mod I     BLE ROM R LE AROM WNL  L LE PROM WFL    (considering anterolateral precautions)       BLE Strength R LE 4+/5,   L LE:   Hip flexion 3-/5  Knee extension 3/5  Knee extension 4-/5  Ankle DF and PF 4+/5 BLE: 4/5 except      L hip flexion 3-/5  L knee ext 3-/5           Balance  Static sitting SBA  Static standing with FWW SBA  Dynamic standing with FWW Min A    BBS: TBD  FGA: TBD SBA [employer] SBA [employer]   BBS: TBD  FGA: TBD   BBS: TBD  FGA: TBD   Date Family Teach Completed        Is additional Family Teaching Needed? Y or N Y Y- day of d/c Y- day of d/c     Hindering Progress L hip pain,  Hip precautions,  Reduced endurance  Pain and weakness L hip pain, decreased endurance      PT recommended ELOS 10 days        Team's Discharge Plan        Therapist at Team Meeting          Pt is A & O x  4  Edema:   L hip      Therapeutic Exercises/Activity:  AM   1x 10 L hip flexion standing at [employer] SBA  Functional mobility    PM    Patient education  Pt educated on steps    Patient response to education:   Pt verbalized understanding Pt demonstrated skill Pt requires further education in this area   x x x     ASSESSMENT:    Comments:  Pt agreeable to PT. Pt demonstrates some L hip flexor weakness.  Pt performing transfers, stairs, and ambulation without assist. Pt requires assist
NT NT 4 inch step with AAD and SBA 4 inch step with AAD and    Modified Independent      Picking up object off the floor Picked up cone with reacher Min assist NT NT Will  cone with reacher stand by assist Will  cone with reacher Mod I     BLE ROM R LE AROM WNL  L LE PROM WFL     (considering anterolateral precautions)           BLE Strength R LE 4+/5,   L LE:   Hip flexion 3-/5  Knee extension 3/5  Knee extension 4-/5  Ankle DF and PF 4+/5 BLE: 4/5 except        L hip flexion 3-/5  L knee ext 3-/5                Balance  Static sitting SBA  Static standing with FWW SBA  Dynamic standing with FWW Min A     BBS: TBD  FGA: TBD Supervision [employer] Supervision [employer]    BBS: TBD  FGA: TBD    BBS: TBD  FGA: TBD   Date Family Teach Completed             Is additional Family Teaching Needed? Y or N Y Y- day of d/c Y- day of d/c       Hindering Progress L hip pain,  Hip precautions,  Reduced endurance  Pain and weakness L hip pain, decreased endurance        PT recommended ELOS 10 days            Assessment:    Condition: In stable condition. Improving. (Fractured hip). Plan:   Encourage ambulation. (Tolerating therapy well  Improving strength and mobility  Planning discharge for Friday).      Electronically signed by Jacinta Huang MD on 6/7/23 at 8:49 AM EDT
Physical Therapy  [id number]
to esnure pt safety & pt demo G- safety & insight  Long Term Goal 7: Pt demo Mod I light homemaking activity & demo G- safety & insight  Long Term Goal 8: Pt demo G- endurance for a 45-30 minute functional activity  Long Term Goal 9: Pt demo improved  & FMC/dexterity as evidenced by gains from evaluation on 6/1/23 . Pt demo the following  strength- R hand 48# & norm for pt age & gender is 91#, L hand 35# & norm for pt age & gender is 77#; 9-hole peg test- R hand 32.6sec & norm for pt age & gender is 19.9sec & L hand 36.2sec & norm for pt age & gender is 21.4sec  Patient goals : \"To dress & take a shower. \"       DISCHARGE RECOMMENDATIONS  Recommended DME:    Post Discharge Care:   []Home Independently  []Home with 24hr Care / Supervision [x]Home with Partial Supervision []Home with Home Health OT []Home with Out Pt OT []Other: ___   Comments:         Time in Time out Tx Time Breakdown  Variance:   First Session  0915  1000  [] Individual Tx-   [x] Concurrent Tx -45  [] Co-Tx -   [] Group Tx -   [] Time Missed -     Second Session [phone] [x] Individual Tx-  1430   [] Concurrent Tx -  [] Co-Tx -   [] Group Tx -   [] Time Missed -             Third Session    [] Individual Tx-   [] Concurrent Tx -  [] Co-Tx -   [] Group Tx -   [] Time Missed -         Total Tx Time: 90 mins      Leti Pérez OTR/L [id number]
toileting & demo G- safety & insight  Long Term Goal 6: Pt demo SBA tub trf  using appropriate DME to esnure pt safety & pt demo G- safety & insight  Long Term Goal 7: Pt demo Mod I light homemaking activity & demo G- safety & insight  Long Term Goal 8: Pt demo G- endurance for a 45-30 minute functional activity  Long Term Goal 9: Pt demo improved  & FMC/dexterity as evidenced by gains from evaluation on 6/1/23 . Pt demo the following  strength- R hand 48# & norm for pt age & gender is 91#, L hand 35# & norm for pt age & gender is 77#; 9-hole peg test- R hand 32.6sec & norm for pt age & gender is 19.9sec & L hand 36.2sec & norm for pt age & gender is 21.4sec  Patient goals : \"To dress & take a shower. \"  6/6/23- Pt POC & goals are updated on this date. Pt lies with his wife & son 2-story- 5 REJI 1HR & 2nd floor bed/bath. PLOF pt independent.  Pt tentative length of stay 6/9/23 Orly Nettie OTR/L [id number]       DISCHARGE RECOMMENDATIONS  Recommended DME:  Pt owns a rw & sc  Post Discharge Care:   []Home Independently  []Home with 24hr Care / Supervision []Home with Partial Supervision [x]Home with Home Health OT []Home with Out Pt OT []Other: ___   Comments:         Time in Time out Tx Time Breakdown  Variance:   First Session  0815  0900  [x] Individual Tx- 45  [] Concurrent Tx -  [] Co-Tx -   [] Group Tx -   [] Time Missed -     Second Session [id number] [x] Individual Tx-45     [] Concurrent Tx -  [] Co-Tx -   [] Group Tx -   [] Time Missed -             Third Session    [] Individual Tx-   [] Concurrent Tx -  [] Co-Tx -   [] Group Tx -   [] Time Missed -         Total Tx Time: 90 mins       Queenie Albert OTR/L [id number]

## 2023-06-10 NOTE — DISCHARGE SUMMARY
[address]                  [address] [email or web address]  [facility]                               DISCHARGE SUMMARY    PATIENT NAME: Komal Morfin                    :        1955  MED REC NO:   [id number]                            ROOM:       9089  ACCOUNT NO:   [de-identified]                           ADMIT DATE: 2023  PROVIDER:     Sary Feng MD                  DISCHARGE DATE:  2023    INTRODUCTION:  The patient had a fall and a fractured left hip. He had  hemiarthroplasty done and was transferred to acute rehab on 2023. HOSPITAL COURSE:  On admission, the patient was felt to be a good rehab  candidate. There were problems with pain initially that was controlled  with oxycodone. He began to show steady improvement with his  functioning and improved to the point that he was modified independent  at a walker level and ready for discharge home in good condition on  2023. LABORATORY DATA:  CBC and metabolic profile were unremarkable. MEDICATIONS:  1.  Norvasc 10 mg daily. 2.  Aspirin 325 mg daily. 3.  Lipitor 80 mg daily. 4.  Celexa 60 mg daily. 5.  Zetia 10 mg daily. 6.  Gabapentin 100 mg t.i.d.  7.  Cozaar 100 mg daily. 8.  Lopressor 25 mg b.i.d.  9.  Flomax 0.4 mg daily. 10.  Oxycodone as needed for pain. DIAGNOSES:  1. Fractured left hip with hemiarthroplasty. 2.  Previous left knee surgery. 3.  Hypertension. 4.  Coronary artery disease. 5.  Type 2 diabetes mellitus.         Jamie Andujar MD    D: 2023 9:20:41       T: 2023 9:23:13     /[email or web address]  Job#: [id number]     Doc#: [id number]    CC:

## 2023-06-19 ENCOUNTER — CARE COORDINATION (OUTPATIENT)
Dept: CASE MANAGEMENT | Age: 68
End: 2023-06-19

## 2023-06-19 NOTE — CARE COORDINATION
[facility] Care Transitions Follow Up Call    Patient Current Location:  Home: [address] [place] [address]    Care Transition Nurse contacted the patient by telephone to follow up after admission on 23. Verified name and  with patient as identifiers. Patient: Isabelle Seaman  Patient : 1955   MRN: [id number]  Reason for Admission:  s/p fall Closed fracture of neck of left femur, s/p Left TOTAL HIP ARTHROPLASTY  Discharge Date: 23 RARS: Readmission Risk Score: 12.4    Spoke with Stevan Minaya for follow up care transition call. He reports that he is feeling \"really good\" today. He is now using a cane to ambulate and stated [facility] signed off last week and he will start OP PT at [facility] this week. He is still utilizing ibuprofen for pain, which provides relief. He denies concerns with his incision site, which remains MANISH. He followed up with ortho last week and will see his PCP 23. Stevan Minaya denies any needs, questions, or concerns at this time and kindly declines the need for further follow up calls at this time. Follow Up  Future Appointments   Date Time Provider Gt Huang   2023 10:30 AM DO Rylie Shook [facility]   2023  8:00 AM DO Rylie Shook [facility]   2023 10:50 AM DO Brett Gardner [facility]   Care Transition Nurse reviewed discharge instructions, medical action plan, and red flags with patient and discussed any barriers to care and/or understanding of plan of care after discharge. Discussed appropriate site of care based on symptoms and resources available to patient including: PCP  Specialist. The patient agrees to contact the PCP office for questions related to their healthcare.       Care Transitions Subsequent and Final Call    Subsequent and Final Calls  Do you have any ongoing symptoms?: No  Have your medications changed?: No  Do you have any questions related to your medications?: No  Do you currently have any active

## 2023-06-21 ENCOUNTER — OFFICE VISIT (OUTPATIENT)
Dept: PRIMARY CARE CLINIC | Age: 68
End: 2023-06-21
Payer: MEDICARE

## 2023-06-21 VITALS
OXYGEN SATURATION: 97 % | WEIGHT: 193 LBS | SYSTOLIC BLOOD PRESSURE: 128 MMHG | DIASTOLIC BLOOD PRESSURE: 82 MMHG | RESPIRATION RATE: 12 BRPM | HEART RATE: 67 BPM | BODY MASS INDEX: 29.25 KG/M2 | TEMPERATURE: 98 F | HEIGHT: 68 IN

## 2023-06-21 DIAGNOSIS — E11.9 TYPE 2 DIABETES MELLITUS WITHOUT COMPLICATION, WITH LONG-TERM CURRENT USE OF INSULIN (HCC): ICD-10-CM

## 2023-06-21 DIAGNOSIS — I10 ESSENTIAL HYPERTENSION: Primary | ICD-10-CM

## 2023-06-21 DIAGNOSIS — Z79.4 TYPE 2 DIABETES MELLITUS WITHOUT COMPLICATION, WITH LONG-TERM CURRENT USE OF INSULIN (HCC): ICD-10-CM

## 2023-06-21 PROCEDURE — 3017F COLORECTAL CA SCREEN DOC REV: CPT | Performed by: INTERNAL MEDICINE

## 2023-06-21 PROCEDURE — 3074F SYST BP LT 130 MM HG: CPT | Performed by: INTERNAL MEDICINE

## 2023-06-21 PROCEDURE — G8417 CALC BMI ABV UP PARAM F/U: HCPCS | Performed by: INTERNAL MEDICINE

## 2023-06-21 PROCEDURE — 1123F ACP DISCUSS/DSCN MKR DOCD: CPT | Performed by: INTERNAL MEDICINE

## 2023-06-21 PROCEDURE — 1036F TOBACCO NON-USER: CPT | Performed by: INTERNAL MEDICINE

## 2023-06-21 PROCEDURE — 2022F DILAT RTA XM EVC RTNOPTHY: CPT | Performed by: INTERNAL MEDICINE

## 2023-06-21 PROCEDURE — 99214 OFFICE O/P EST MOD 30 MIN: CPT | Performed by: INTERNAL MEDICINE

## 2023-06-21 PROCEDURE — 3079F DIAST BP 80-89 MM HG: CPT | Performed by: INTERNAL MEDICINE

## 2023-06-21 PROCEDURE — 3044F HG A1C LEVEL LT 7.0%: CPT | Performed by: INTERNAL MEDICINE

## 2023-06-21 PROCEDURE — 1111F DSCHRG MED/CURRENT MED MERGE: CPT | Performed by: INTERNAL MEDICINE

## 2023-06-21 PROCEDURE — G8427 DOCREV CUR MEDS BY ELIG CLIN: HCPCS | Performed by: INTERNAL MEDICINE

## 2023-06-21 RX ORDER — SEMAGLUTIDE 0.68 MG/ML
INJECTION, SOLUTION SUBCUTANEOUS
Qty: 3 ML | Refills: 1 | Status: SHIPPED | OUTPATIENT
Start: 2023-06-21

## 2023-06-21 SDOH — ECONOMIC STABILITY: INCOME INSECURITY: HOW HARD IS IT FOR YOU TO PAY FOR THE VERY BASICS LIKE FOOD, HOUSING, MEDICAL CARE, AND HEATING?: NOT HARD AT ALL

## 2023-06-21 SDOH — ECONOMIC STABILITY: HOUSING INSECURITY
IN THE LAST 12 MONTHS, WAS THERE A TIME WHEN YOU DID NOT HAVE A STEADY PLACE TO SLEEP OR SLEPT IN A SHELTER (INCLUDING NOW)?: NO

## 2023-06-21 SDOH — ECONOMIC STABILITY: FOOD INSECURITY: WITHIN THE PAST 12 MONTHS, YOU WORRIED THAT YOUR FOOD WOULD RUN OUT BEFORE YOU GOT MONEY TO BUY MORE.: NEVER TRUE

## 2023-06-21 SDOH — ECONOMIC STABILITY: FOOD INSECURITY: WITHIN THE PAST 12 MONTHS, THE FOOD YOU BOUGHT JUST DIDN'T LAST AND YOU DIDN'T HAVE MONEY TO GET MORE.: NEVER TRUE

## 2023-06-21 NOTE — PROGRESS NOTES
infarction) ([facility].) 05/28/2012    Coronary artery disease involving native coronary artery of native heart without angina pectoris     Hypertension     Pure hypercholesterolemia     Anemia     GERD (gastroesophageal reflux disease)      Allergies   Allergen Reactions    Penicillins Hives     Current Outpatient Medications on File Prior to Visit   Medication Sig Dispense Refill    aspirin 325 MG EC tablet Take 1 tablet by mouth 2 times daily for 28 days 56 tablet 0    amLODIPine (NORVASC) 10 MG tablet Take 1 tablet by mouth every morning 30 tablet 3    metoprolol tartrate (LOPRESSOR) 25 MG tablet Take 1 tablet by mouth 2 times daily 60 tablet 3    docusate sodium (COLACE, DULCOLAX) 100 MG CAPS Take 100 mg by mouth 2 times daily 60 capsule 0    ferrous sulfate (IRON 325) 325 (65 Fe) MG tablet Take 1 tablet by mouth daily (with breakfast) 30 tablet 3    gabapentin (NEURONTIN) 100 MG capsule Take 1 capsule by mouth 3 times daily for 90 days. 90 capsule 2    citalopram (CELEXA) 40 MG tablet Take 1.5 tablets by mouth daily      atorvastatin (LIPITOR) 80 MG tablet Take 1 tablet by mouth daily      empagliflozin (JARDIANCE) 10 MG tablet Take 1 tablet by mouth daily      ezetimibe (ZETIA) 10 MG tablet Take 1 tablet by mouth daily      losartan (COZAAR) 100 MG tablet Take 1 tablet by mouth daily      omeprazole (PRILOSEC) 40 MG delayed release capsule Take 1 capsule by mouth in the morning and at bedtime      nitroGLYCERIN (NITROSTAT) 0.4 MG SL tablet Place 1 tablet under the tongue every 5 minutes as needed for Chest pain If chest pain: put 1 NTG tab under tongue - sit down - wait 5 min - if no relief, call 911. May repeat dose 2 more times 5 min apart while waiting for EMS (total of 3 doses). If dizzy do not take any further doses.  90 tablet 1    fluticasone (FLONASE) 50 MCG/ACT nasal spray 1 spray by Nasal route daily 16 g 0    Cholecalciferol (VITAMIN D3) 5000 UNITS TABS Take 1 tablet by mouth every morning      Ascorbic

## 2023-06-29 RX ORDER — OMEPRAZOLE 40 MG/1
CAPSULE, DELAYED RELEASE ORAL
Qty: 180 CAPSULE | Refills: 1 | Status: SHIPPED | OUTPATIENT
Start: 2023-06-29

## 2023-07-12 ENCOUNTER — OFFICE VISIT (OUTPATIENT)
Dept: PRIMARY CARE CLINIC | Age: 68
End: 2023-07-12
Payer: MEDICARE

## 2023-07-12 VITALS
DIASTOLIC BLOOD PRESSURE: 82 MMHG | HEIGHT: 68 IN | TEMPERATURE: 97.2 F | HEART RATE: 60 BPM | BODY MASS INDEX: 30.01 KG/M2 | RESPIRATION RATE: 14 BRPM | WEIGHT: 198 LBS | OXYGEN SATURATION: 95 % | SYSTOLIC BLOOD PRESSURE: 130 MMHG

## 2023-07-12 DIAGNOSIS — I10 ESSENTIAL HYPERTENSION: ICD-10-CM

## 2023-07-12 DIAGNOSIS — E78.00 PURE HYPERCHOLESTEROLEMIA: ICD-10-CM

## 2023-07-12 DIAGNOSIS — E11.9 TYPE 2 DIABETES MELLITUS WITHOUT COMPLICATION, WITH LONG-TERM CURRENT USE OF INSULIN (HCC): Primary | ICD-10-CM

## 2023-07-12 DIAGNOSIS — Z79.4 TYPE 2 DIABETES MELLITUS WITHOUT COMPLICATION, WITH LONG-TERM CURRENT USE OF INSULIN (HCC): Primary | ICD-10-CM

## 2023-07-12 PROBLEM — R55 NEAR SYNCOPE: Status: RESOLVED | Noted: 2018-03-04 | Resolved: 2023-07-12

## 2023-07-12 PROBLEM — N28.89 URETERAL MASS: Status: RESOLVED | Noted: 2023-02-27 | Resolved: 2023-07-12

## 2023-07-12 PROBLEM — K80.20 CHOLELITHIASIS: Status: RESOLVED | Noted: 2019-07-17 | Resolved: 2023-07-12
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
## 2023-07-12 PROBLEM — S72.002A FRACTURED HIP, LEFT, CLOSED, INITIAL ENCOUNTER (HCC): Status: RESOLVED | Noted: 2023-05-31 | Resolved: 2023-07-12

## 2023-07-12 PROBLEM — I25.119 ATHEROSCLEROTIC HEART DISEASE OF NATIVE CORONARY ARTERY WITH UNSPECIFIED ANGINA PECTORIS (HCC): Status: RESOLVED | Noted: 2023-01-11 | Resolved: 2023-07-12

## 2023-07-12 PROBLEM — I20.9 ANGINA PECTORIS, UNSPECIFIED (HCC): Status: RESOLVED | Noted: 2023-01-11 | Resolved: 2023-07-12

## 2023-07-12 LAB
ALBUMIN SERPL-MCNC: 4.5 G/DL (ref 3.5–5.2)
ALP SERPL-CCNC: 115 U/L (ref 40–129)
ALT SERPL-CCNC: 21 U/L (ref 0–40)
ANION GAP SERPL CALCULATED.3IONS-SCNC: 16 MMOL/L (ref 7–16)
AST SERPL-CCNC: 20 U/L (ref 0–39)
BILIRUB SERPL-MCNC: 1.6 MG/DL (ref 0–1.2)
BUN SERPL-MCNC: 15 MG/DL (ref 6–23)
CALCIUM SERPL-MCNC: 10.2 MG/DL (ref 8.6–10.2)
CHLORIDE SERPL-SCNC: 100 MMOL/L (ref 98–107)
CHOLESTEROL, TOTAL: 149 MG/DL (ref 0–199)
CO2 SERPL-SCNC: 25 MMOL/L (ref 22–29)
CREAT SERPL-MCNC: 0.8 MG/DL (ref 0.7–1.2)
ERYTHROCYTE [DISTWIDTH] IN BLOOD BY AUTOMATED COUNT: 13.5 FL (ref 11.5–15)
GLUCOSE SERPL-MCNC: 129 MG/DL (ref 74–99)
HCT VFR BLD AUTO: 47.6 % (ref 37–54)
HDLC SERPL-MCNC: 38 MG/DL
HGB BLD-MCNC: 15 G/DL (ref 12.5–16.5)
LDLC SERPL CALC-MCNC: 76 MG/DL (ref 0–99)
MCH RBC QN AUTO: 28.4 PG (ref 26–35)
MCHC RBC AUTO-ENTMCNC: 31.5 % (ref 32–34.5)
MCV RBC AUTO: 90.2 FL (ref 80–99.9)
PLATELET # BLD AUTO: 268 E9/L (ref 130–450)
PMV BLD AUTO: 10.5 FL (ref 7–12)
POTASSIUM SERPL-SCNC: 3.7 MMOL/L (ref 3.5–5)
PROT SERPL-MCNC: 7.1 G/DL (ref 6.4–8.3)
RBC # BLD AUTO: 5.28 E12/L (ref 3.8–5.8)
SODIUM SERPL-SCNC: 141 MMOL/L (ref 132–146)
TRIGL SERPL-MCNC: 173 MG/DL (ref 0–149)
VLDLC SERPL CALC-MCNC: 35 MG/DL
WBC # BLD: 10.8 E9/L (ref 4.5–11.5)

## 2023-07-12 PROCEDURE — G8427 DOCREV CUR MEDS BY ELIG CLIN: HCPCS | Performed by: INTERNAL MEDICINE

## 2023-07-12 PROCEDURE — G8417 CALC BMI ABV UP PARAM F/U: HCPCS | Performed by: INTERNAL MEDICINE

## 2023-07-12 PROCEDURE — 1123F ACP DISCUSS/DSCN MKR DOCD: CPT | Performed by: INTERNAL MEDICINE

## 2023-07-12 PROCEDURE — 3044F HG A1C LEVEL LT 7.0%: CPT | Performed by: INTERNAL MEDICINE

## 2023-07-12 PROCEDURE — 3075F SYST BP GE 130 - 139MM HG: CPT | Performed by: INTERNAL MEDICINE

## 2023-07-12 PROCEDURE — 3017F COLORECTAL CA SCREEN DOC REV: CPT | Performed by: INTERNAL MEDICINE

## 2023-07-12 PROCEDURE — 1036F TOBACCO NON-USER: CPT | Performed by: INTERNAL MEDICINE

## 2023-07-12 PROCEDURE — 99214 OFFICE O/P EST MOD 30 MIN: CPT | Performed by: INTERNAL MEDICINE

## 2023-07-12 PROCEDURE — 3079F DIAST BP 80-89 MM HG: CPT | Performed by: INTERNAL MEDICINE

## 2023-07-12 PROCEDURE — 2022F DILAT RTA XM EVC RTNOPTHY: CPT | Performed by: INTERNAL MEDICINE

## 2023-07-12 RX ORDER — ASPIRIN 81 MG/1
81 TABLET ORAL DAILY
COMMUNITY

## 2023-07-12 NOTE — PROGRESS NOTES
facility-administered medications on file prior to visit. Physical Exam   Constitutional:  Oriented to person, place, and time. Appears well-developed and well-nourished. No acute distress. HENT: No sinus tenderness or lymphadenopathy  Head: Normocephalic and atraumatic. Eyes: Eyes exhibits no discharge. No scleral icterus present. Neck: No tracheal deviation present. No thyromegaly present. Cardiovascular: Normal rate, regular rhythm, normal heart sounds and intact distal pulses. Exam reveals no gallop nor friction rub. No murmur heard. Pulmonary: Effort normal and breath sounds normal. No respiratory distress. No wheezes or rales. Abdomen: No signs of rigidity rebound or organomegaly  Musculoskeletal:  No tenderness to palpation  Neurological:Alert and oriented to person, place, and time. Skin: No diaphoresis. Psychiatric: Normal mood and affect. Behavior is Normal.     ASSESSMENT AND PLAN:        Return in about 6 months (around 1/12/2024). Electronically signed by Kristin Nolen DO on 7/12/2023 at 8:26 AM    Kristin Nolen DO    Assessment  Diagnoses and all orders for this visit:  Type 2 diabetes mellitus without complication, with long-term current use of insulin ([address])  Pure hypercholesterolemia  -     CBC; Future  -     Comprehensive Metabolic Panel; Future  -     Lipid Panel;  Future  Essential hypertension

## 2023-07-17 ENCOUNTER — PATIENT MESSAGE (OUTPATIENT)
Dept: PRIMARY CARE CLINIC | Age: 68
End: 2023-07-17

## 2023-07-17 LAB — DIABETIC RETINOPATHY: NEGATIVE

## 2023-07-18 ENCOUNTER — OFFICE VISIT (OUTPATIENT)
Dept: CARDIOLOGY CLINIC | Age: 68
End: 2023-07-18
Payer: MEDICARE

## 2023-07-18 VITALS
BODY MASS INDEX: 29.6 KG/M2 | DIASTOLIC BLOOD PRESSURE: 60 MMHG | WEIGHT: 195.3 LBS | SYSTOLIC BLOOD PRESSURE: 130 MMHG | RESPIRATION RATE: 16 BRPM | HEART RATE: 66 BPM | HEIGHT: 68 IN

## 2023-07-18 DIAGNOSIS — Z01.810 PREOPERATIVE CARDIOVASCULAR EXAMINATION: ICD-10-CM

## 2023-07-18 DIAGNOSIS — I25.10 CORONARY ARTERY DISEASE INVOLVING NATIVE CORONARY ARTERY OF NATIVE HEART WITHOUT ANGINA PECTORIS: Primary | ICD-10-CM

## 2023-07-18 DIAGNOSIS — I10 PRIMARY HYPERTENSION: ICD-10-CM

## 2023-07-18 DIAGNOSIS — E11.9 TYPE 2 DIABETES MELLITUS WITHOUT COMPLICATION, WITHOUT LONG-TERM CURRENT USE OF INSULIN (HCC): ICD-10-CM

## 2023-07-18 DIAGNOSIS — I45.10 RBBB: ICD-10-CM

## 2023-07-18 PROCEDURE — 99214 OFFICE O/P EST MOD 30 MIN: CPT | Performed by: INTERNAL MEDICINE

## 2023-07-18 PROCEDURE — 1036F TOBACCO NON-USER: CPT | Performed by: INTERNAL MEDICINE

## 2023-07-18 PROCEDURE — 3017F COLORECTAL CA SCREEN DOC REV: CPT | Performed by: INTERNAL MEDICINE

## 2023-07-18 PROCEDURE — 1123F ACP DISCUSS/DSCN MKR DOCD: CPT | Performed by: INTERNAL MEDICINE

## 2023-07-18 PROCEDURE — G8417 CALC BMI ABV UP PARAM F/U: HCPCS | Performed by: INTERNAL MEDICINE

## 2023-07-18 PROCEDURE — 93000 ELECTROCARDIOGRAM COMPLETE: CPT | Performed by: INTERNAL MEDICINE

## 2023-07-18 PROCEDURE — 3044F HG A1C LEVEL LT 7.0%: CPT | Performed by: INTERNAL MEDICINE

## 2023-07-18 PROCEDURE — G8427 DOCREV CUR MEDS BY ELIG CLIN: HCPCS | Performed by: INTERNAL MEDICINE

## 2023-07-18 PROCEDURE — 2022F DILAT RTA XM EVC RTNOPTHY: CPT | Performed by: INTERNAL MEDICINE

## 2023-07-18 PROCEDURE — 3075F SYST BP GE 130 - 139MM HG: CPT | Performed by: INTERNAL MEDICINE

## 2023-07-18 PROCEDURE — 3078F DIAST BP <80 MM HG: CPT | Performed by: INTERNAL MEDICINE

## 2023-07-18 NOTE — PROGRESS NOTES
OUTPATIENT CARDIOLOGY FOLLOW-UP    Name: Tracie Marie    Age: 70 y.o. Primary Care Physician: Sofya Peraza DO    Date of Service: 7/18/2023    Chief Complaint:   Chief Complaint   Patient presents with    Coronary Artery Disease     Patient has no complaints. Cardiac Clearance        Interim History:   Here for follow-up regarding coronary disease. Status post multiple PCI's over the years to the RCA and mid LAD in 1998, 2002 angioplasty alone to the LAD into the diagonal, diagonal drug-eluting stent 2005, and most recent with NSTEMI in 2012 with drug-eluting stent to the mid LAD. He also has hypertension hyperlipidemia. Presents today for routine annual follow-up, as well as preoperative risk assessment prior to cystoscopy. Last evaluation was in 2021. Earlier this year had a mechanical fall and suffered hip fracture requiring replacement. Also was found to have hematuria and diagnosed with ureteral cancer, status postresection, and is having a routine relook cystoscopy next week. Feels well. Denies chest pain, shortness breath, palpitations. Recovering from the hip surgery with plans to go back to [address]. Review of Systems:   Negative except as described above    Past Medical History:  Past Medical History:   Diagnosis Date    Anemia     Anxiety attack     controlled with citolpram    CAD (coronary artery disease) 16 YRS AGO    MI X 2.  Eric Bhandari Phlegm yearly    Diabetes mellitus ([address])     GERD (gastroesophageal reflux disease)     Hematuria     Hypercholesterolemia 1994    Hyperlipidemia     Hypertension 06/12/2014    B/P IS ELEVATED, PATIENT STATES HE NEEDS TO GET HIS LOPRESSOR REFILLED    NSTEMI (non-ST elevated myocardial infarction) ([address]) 05/28/2012    Ureteral mass        Past Surgical History:  Past Surgical History:   Procedure Laterality Date    ANKLE SURGERY  6 YRS AGO    right ORIF    BLADDER SURGERY Right 2/27/2023

## 2023-07-22 DIAGNOSIS — I25.10 ATHEROSCLEROTIC HEART DISEASE OF NATIVE CORONARY ARTERY WITHOUT ANGINA PECTORIS: ICD-10-CM

## 2023-07-24 ENCOUNTER — PREP FOR PROCEDURE (OUTPATIENT)
Dept: UROLOGY | Age: 68
End: 2023-07-24

## 2023-07-24 RX ORDER — SODIUM CHLORIDE 9 MG/ML
INJECTION, SOLUTION INTRAVENOUS PRN
Status: CANCELLED | OUTPATIENT
Start: 2023-07-24

## 2023-07-24 RX ORDER — SODIUM CHLORIDE 0.9 % (FLUSH) 0.9 %
5-40 SYRINGE (ML) INJECTION PRN
Status: CANCELLED | OUTPATIENT
Start: 2023-07-24

## 2023-07-24 RX ORDER — SODIUM CHLORIDE 9 MG/ML
INJECTION, SOLUTION INTRAVENOUS CONTINUOUS
Status: CANCELLED | OUTPATIENT
Start: 2023-07-24

## 2023-07-24 RX ORDER — SODIUM CHLORIDE 0.9 % (FLUSH) 0.9 %
5-40 SYRINGE (ML) INJECTION EVERY 12 HOURS SCHEDULED
Status: CANCELLED | OUTPATIENT
Start: 2023-07-24

## 2023-07-24 RX ORDER — AMLODIPINE BESYLATE 10 MG/1
TABLET ORAL
Qty: 90 TABLET | Refills: 1 | Status: SHIPPED | OUTPATIENT
Start: 2023-07-24

## 2023-07-24 NOTE — PROGRESS NOTES
[address] PRE-ADMISSION TESTING INSTRUCTIONS    The Preadmission Testing patient is instructed accordingly using the following criteria (check applicable):    ARRIVAL INSTRUCTIONS:  [x] Parking the day of Surgery is located in the Main Entrance lot. Upon entering the door, make an immediate right to the surgery reception desk    [x] Bring photo ID and insurance card    [] Bring in a copy of Living will or Durable Power of  papers. [x] Please be sure to arrange for responsible adult to provide transportation to and from the hospital    [x] Please arrange for responsible adult to be with you for the 24 hour period post procedure due to having anesthesia    [x] If you awake am of surgery not feeling well or have temperature >100 please call [phone]    GENERAL INSTRUCTIONS:    [x] Nothing by mouth after midnight, including gum, candy, mints or water    [x] You may brush your teeth, but do not swallow any water    [x] Take medications as instructed with 1-2 oz of water    [x] Stop herbal supplements and vitamins 5 days prior to procedure    [] Follow preop dosing of blood thinners per physician instructions    [] Take 1/2 dose of evening insulin, but no insulin after midnight    [] No oral diabetic medications after midnight    [x] If diabetic and have low blood sugar or feel symptomatic, take 1-2oz apple juice only    [] Bring inhalers day of surgery    [] Bring C-PAP/ Bi-Pap day of surgery    [] Bring urine specimen day of surgery    [x] Shower or bath with soap, lather and rinse well, AM of Surgery, no lotion, powders or creams to surgical site    [] Follow bowel prep as instructed per surgeon    [x] No tobacco products within 24 hours of surgery     [x] No alcohol or illegal drug use within 24 hours of surgery.     [x] Jewelry, body piercing's, eyeglasses, contact lenses and dentures are not permitted into surgery (bring cases)      [] Please do not wear any nail polish,

## 2023-07-25 ENCOUNTER — ANESTHESIA EVENT (OUTPATIENT)
Dept: OPERATING ROOM | Age: 68
End: 2023-07-25
Payer: MEDICARE

## 2023-07-26 ENCOUNTER — HOSPITAL ENCOUNTER (OUTPATIENT)
Age: 68
Setting detail: OUTPATIENT SURGERY
Discharge: HOME | End: 2023-07-26
Attending: STUDENT IN AN ORGANIZED HEALTH CARE EDUCATION/TRAINING PROGRAM | Admitting: STUDENT IN AN ORGANIZED HEALTH CARE EDUCATION/TRAINING PROGRAM
Payer: MEDICARE

## 2023-07-26 ENCOUNTER — ANESTHESIA (OUTPATIENT)
Dept: OPERATING ROOM | Age: 68
End: 2023-07-26
Payer: MEDICARE

## 2023-07-26 ENCOUNTER — HOSPITAL ENCOUNTER (OUTPATIENT)
Dept: GENERAL RADIOLOGY | Age: 68
Setting detail: OUTPATIENT SURGERY
Discharge: HOME | End: 2023-07-28
Attending: STUDENT IN AN ORGANIZED HEALTH CARE EDUCATION/TRAINING PROGRAM
Payer: MEDICARE

## 2023-07-26 VITALS
WEIGHT: 190 LBS | DIASTOLIC BLOOD PRESSURE: 82 MMHG | TEMPERATURE: 97.8 F | BODY MASS INDEX: 28.79 KG/M2 | HEART RATE: 69 BPM | RESPIRATION RATE: 18 BRPM | HEIGHT: 68 IN | OXYGEN SATURATION: 92 % | SYSTOLIC BLOOD PRESSURE: 158 MMHG

## 2023-07-26 DIAGNOSIS — G89.18 POST-OPERATIVE PAIN: Primary | ICD-10-CM

## 2023-07-26 DIAGNOSIS — R52 PAIN: ICD-10-CM

## 2023-07-26 LAB
METER GLUCOSE: 126 MG/DL (ref 74–99)
METER GLUCOSE: 127 MG/DL (ref 74–99)

## 2023-07-26 PROCEDURE — 3600000013 HC SURGERY LEVEL 3 ADDTL 15MIN: Performed by: STUDENT IN AN ORGANIZED HEALTH CARE EDUCATION/TRAINING PROGRAM

## 2023-07-26 PROCEDURE — 6360000004 HC RX CONTRAST MEDICATION: Performed by: STUDENT IN AN ORGANIZED HEALTH CARE EDUCATION/TRAINING PROGRAM

## 2023-07-26 PROCEDURE — 3700000001 HC ADD 15 MINUTES (ANESTHESIA): Performed by: STUDENT IN AN ORGANIZED HEALTH CARE EDUCATION/TRAINING PROGRAM

## 2023-07-26 PROCEDURE — 2500000003 HC RX 250 WO HCPCS: Performed by: NURSE ANESTHETIST, CERTIFIED REGISTERED

## 2023-07-26 PROCEDURE — 2580000003 HC RX 258: Performed by: NURSE PRACTITIONER

## 2023-07-26 PROCEDURE — C1769 GUIDE WIRE: HCPCS | Performed by: STUDENT IN AN ORGANIZED HEALTH CARE EDUCATION/TRAINING PROGRAM

## 2023-07-26 PROCEDURE — 6360000002 HC RX W HCPCS: Performed by: NURSE ANESTHETIST, CERTIFIED REGISTERED

## 2023-07-26 PROCEDURE — 7100000000 HC PACU RECOVERY - FIRST 15 MIN: Performed by: STUDENT IN AN ORGANIZED HEALTH CARE EDUCATION/TRAINING PROGRAM

## 2023-07-26 PROCEDURE — 6360000002 HC RX W HCPCS: Performed by: NURSE PRACTITIONER

## 2023-07-26 PROCEDURE — C2617 STENT, NON-COR, TEM W/O DEL: HCPCS | Performed by: STUDENT IN AN ORGANIZED HEALTH CARE EDUCATION/TRAINING PROGRAM

## 2023-07-26 PROCEDURE — C1894 INTRO/SHEATH, NON-LASER: HCPCS | Performed by: STUDENT IN AN ORGANIZED HEALTH CARE EDUCATION/TRAINING PROGRAM

## 2023-07-26 PROCEDURE — C1758 CATHETER, URETERAL: HCPCS | Performed by: STUDENT IN AN ORGANIZED HEALTH CARE EDUCATION/TRAINING PROGRAM

## 2023-07-26 PROCEDURE — 2709999900 HC NON-CHARGEABLE SUPPLY: Performed by: STUDENT IN AN ORGANIZED HEALTH CARE EDUCATION/TRAINING PROGRAM

## 2023-07-26 PROCEDURE — 7100000010 HC PHASE II RECOVERY - FIRST 15 MIN: Performed by: STUDENT IN AN ORGANIZED HEALTH CARE EDUCATION/TRAINING PROGRAM

## 2023-07-26 PROCEDURE — 74420 UROGRAPHY RTRGR +-KUB: CPT

## 2023-07-26 PROCEDURE — 3700000000 HC ANESTHESIA ATTENDED CARE: Performed by: STUDENT IN AN ORGANIZED HEALTH CARE EDUCATION/TRAINING PROGRAM

## 2023-07-26 PROCEDURE — 82947 ASSAY GLUCOSE BLOOD QUANT: CPT

## 2023-07-26 PROCEDURE — 3600000003 HC SURGERY LEVEL 3 BASE: Performed by: STUDENT IN AN ORGANIZED HEALTH CARE EDUCATION/TRAINING PROGRAM

## 2023-07-26 PROCEDURE — 7100000001 HC PACU RECOVERY - ADDTL 15 MIN: Performed by: STUDENT IN AN ORGANIZED HEALTH CARE EDUCATION/TRAINING PROGRAM

## 2023-07-26 PROCEDURE — 7100000011 HC PHASE II RECOVERY - ADDTL 15 MIN: Performed by: STUDENT IN AN ORGANIZED HEALTH CARE EDUCATION/TRAINING PROGRAM

## 2023-07-26 DEVICE — URETERAL STENT
Type: IMPLANTABLE DEVICE | Site: URETER | Status: FUNCTIONAL
Brand: POLARIS™ ULTRA

## 2023-07-26 RX ORDER — SODIUM CHLORIDE 9 MG/ML
INJECTION, SOLUTION INTRAVENOUS CONTINUOUS
Status: DISCONTINUED | OUTPATIENT
Start: 2023-07-26 | End: 2023-07-26 | Stop reason: HOSPADM

## 2023-07-26 RX ORDER — SODIUM CHLORIDE 9 MG/ML
INJECTION, SOLUTION INTRAVENOUS PRN
Status: DISCONTINUED | OUTPATIENT
Start: 2023-07-26 | End: 2023-07-26 | Stop reason: HOSPADM

## 2023-07-26 RX ORDER — MIDAZOLAM HYDROCHLORIDE 1 MG/ML
INJECTION INTRAMUSCULAR; INTRAVENOUS PRN
Status: DISCONTINUED | OUTPATIENT
Start: 2023-07-26 | End: 2023-07-26 | Stop reason: SDUPTHER

## 2023-07-26 RX ORDER — SODIUM CHLORIDE 0.9 % (FLUSH) 0.9 %
5-40 SYRINGE (ML) INJECTION PRN
Status: DISCONTINUED | OUTPATIENT
Start: 2023-07-26 | End: 2023-07-26 | Stop reason: HOSPADM

## 2023-07-26 RX ORDER — FENTANYL CITRATE 50 UG/ML
INJECTION, SOLUTION INTRAMUSCULAR; INTRAVENOUS PRN
Status: DISCONTINUED | OUTPATIENT
Start: 2023-07-26 | End: 2023-07-26 | Stop reason: SDUPTHER

## 2023-07-26 RX ORDER — PROPOFOL 10 MG/ML
INJECTION, EMULSION INTRAVENOUS CONTINUOUS PRN
Status: DISCONTINUED | OUTPATIENT
Start: 2023-07-26 | End: 2023-07-26 | Stop reason: SDUPTHER

## 2023-07-26 RX ORDER — ONDANSETRON 2 MG/ML
INJECTION INTRAMUSCULAR; INTRAVENOUS PRN
Status: DISCONTINUED | OUTPATIENT
Start: 2023-07-26 | End: 2023-07-26 | Stop reason: SDUPTHER

## 2023-07-26 RX ORDER — SODIUM CHLORIDE 0.9 % (FLUSH) 0.9 %
5-40 SYRINGE (ML) INJECTION EVERY 12 HOURS SCHEDULED
Status: DISCONTINUED | OUTPATIENT
Start: 2023-07-26 | End: 2023-07-26 | Stop reason: HOSPADM

## 2023-07-26 RX ORDER — GLYCOPYRROLATE 0.2 MG/ML
INJECTION INTRAMUSCULAR; INTRAVENOUS PRN
Status: DISCONTINUED | OUTPATIENT
Start: 2023-07-26 | End: 2023-07-26 | Stop reason: SDUPTHER

## 2023-07-26 RX ORDER — LEVOFLOXACIN 5 MG/ML
500 INJECTION, SOLUTION INTRAVENOUS
Status: COMPLETED | OUTPATIENT
Start: 2023-07-26 | End: 2023-07-26

## 2023-07-26 RX ORDER — KETAMINE HYDROCHLORIDE 10 MG/ML
INJECTION INTRAMUSCULAR; INTRAVENOUS PRN
Status: DISCONTINUED | OUTPATIENT
Start: 2023-07-26 | End: 2023-07-26 | Stop reason: SDUPTHER

## 2023-07-26 RX ORDER — OXYCODONE HYDROCHLORIDE AND ACETAMINOPHEN 5; 325 MG/1; MG/1
1 TABLET ORAL EVERY 6 HOURS PRN
Qty: 12 TABLET | Refills: 0 | Status: SHIPPED | OUTPATIENT
Start: 2023-07-26 | End: 2023-07-29

## 2023-07-26 RX ADMIN — MIDAZOLAM 2 MG: 1 INJECTION INTRAMUSCULAR; INTRAVENOUS at 09:09

## 2023-07-26 RX ADMIN — LEVOFLOXACIN 500 MG: 5 INJECTION, SOLUTION INTRAVENOUS at 09:01

## 2023-07-26 RX ADMIN — ONDANSETRON 4 MG: 2 INJECTION INTRAMUSCULAR; INTRAVENOUS at 09:16

## 2023-07-26 RX ADMIN — SODIUM CHLORIDE: 9 INJECTION, SOLUTION INTRAVENOUS at 08:58

## 2023-07-26 RX ADMIN — FENTANYL CITRATE 50 MCG: 50 INJECTION, SOLUTION INTRAMUSCULAR; INTRAVENOUS at 09:13

## 2023-07-26 RX ADMIN — FENTANYL CITRATE 25 MCG: 50 INJECTION, SOLUTION INTRAMUSCULAR; INTRAVENOUS at 09:23

## 2023-07-26 RX ADMIN — GLYCOPYRROLATE 0.2 MG: 0.2 INJECTION, SOLUTION INTRAMUSCULAR; INTRAVENOUS at 09:16

## 2023-07-26 RX ADMIN — SODIUM CHLORIDE: 9 INJECTION, SOLUTION INTRAVENOUS at 09:09

## 2023-07-26 RX ADMIN — KETAMINE HYDROCHLORIDE 20 MG: 10 INJECTION INTRAMUSCULAR; INTRAVENOUS at 09:16

## 2023-07-26 RX ADMIN — PROPOFOL 200 MCG/KG/MIN: 10 INJECTION, EMULSION INTRAVENOUS at 09:13

## 2023-07-26 ASSESSMENT — PAIN DESCRIPTION - DESCRIPTORS: DESCRIPTORS: SORE

## 2023-07-26 ASSESSMENT — PAIN - FUNCTIONAL ASSESSMENT: PAIN_FUNCTIONAL_ASSESSMENT: 0-10

## 2023-07-26 ASSESSMENT — LIFESTYLE VARIABLES: SMOKING_STATUS: 0

## 2023-07-26 ASSESSMENT — ENCOUNTER SYMPTOMS
SHORTNESS OF BREATH: 1
DYSPNEA ACTIVITY LEVEL: NO INTERVAL CHANGE

## 2023-07-26 NOTE — OP NOTE
Operative Note      Patient: Ilene Villalpando  YOB: 1955  MRN: [id number]    Date of Procedure: 7/26/2023    Pre-Op Diagnosis Codes:     * Malignant neoplasm of ureter, unspecified laterality ([address]) [C66.9]     * Hydroureter [N13.4]    Post-Op Diagnosis: Same       Procedure(s):  CYSTOSCOPY RETROGRADE PYELOGRAM URETEROSCOPY RIGHT STENT INSERTION    Surgeon(s):  Jessica Gallegos MD    Assistant:   * No surgical staff found *    Anesthesia: Monitor Anesthesia Care    Estimated Blood Loss (mL): Minimal    Complications: None    Specimens:   * No specimens in log *    Implants:  Implant Name Type Inv. Item Serial No.  Lot No. LRB No. Used Action   STENT URET 6FR L26CM PERCFLX HYDR+ DBL PGTL THRD 2 - [id number]  STENT URET 6FR L26CM PERCFLX HYDR+ DBL PGTL THRD 2  [employer]WD [id number] Right 1 Implanted         Drains:   Ureteral Drain/Stent 07/26/23 Right Ureter (Active)       Findings: no right ureteral tumor recurrance. Detailed Description of Procedure:     Patient is a 54-year-old previously was on the right ureteral tumor this was later made by myself. 27 kidney patient presented for surveillance ureteroscopy overspent alternatives jorge with patient informed consent obtained and signed. Operation    The patient was brought back in the operative suite. Intraoperatively the patient identified his name and number. Patient transferred operative and placed in supine position. Anesthesia was delivered without any complication surgical timeout taken on the agreement. 22 persistent since her urethra atraumatically into the ureter but the patient does have a large middle lobe. With the difficulty being scope into the bladder. Identified right after orifice. Left ureteral ureteral orifice was encountered in 5 to catheterization then. Retrograde pattern did not any filling defect hydronephrosis and hydroureter this was a normal left retrograde pattern.   A right retrograde was

## 2023-07-26 NOTE — H&P
Anjelica Melo is a 70 y.o. 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 No new changes. See paper h and p    Past Medical History:   Diagnosis Date    Anemia     Anxiety attack     controlled with citolpram    Arthritis     CAD (coronary artery disease) 16 YRS AGO    MI X 2. Mickie Montes De Oca Ree yearly    Diabetes mellitus ([address])     GERD (gastroesophageal reflux disease)     Hematuria     Hypercholesterolemia 1994    Hyperlipidemia     Hypertension 06/12/2014    B/P IS ELEVATED, PATIENT STATES HE NEEDS TO GET HIS LOPRESSOR REFILLED    NSTEMI (non-ST elevated myocardial infarction) ([address]) 05/28/2012    Ureteral mass        Past Surgical History:   Procedure Laterality Date    ANKLE SURGERY  6 YRS AGO    right ORIF    BLADDER SURGERY Right 2/27/2023    CYSTOSCOPY RETROGRADE PYELOGRAM RIGHT URETEROSCPY, RIGHT URETERAL STENT INSERTION performed by Marisol Beaver MD at [address] [facility], LAPAROSCOPIC N/A 7/19/2019    CHOLECYSTECTOMY LAPAROSCOPIC ROBOTIC ASSISTED  XI performed by Steffanie Dey MD at [facility]      COLONOSCOPY  07/20/2015    COLONOSCOPY N/A 9/16/2022    COLORECTAL CANCER SCREENING, NOT HIGH RISK performed by Steffanie Dey MD at [address]  5/29/2012    Dr. Deborah Mark, Stent Mid LAD    DIAGNOSTIC CARDIAC CATH LAB PROCEDURE  9/17/1998    [facility]. Cath/PTCA/stent of RCA; PTCA/stent of mid LAD with adjunctive ReoPro administration. DIAGNOSTIC CARDIAC CATH LAB PROCEDURE  6/25/2002    [facility]. Kissing balloon angioplasty LAD>diag Perclose. Dr. Deborah Mark and Dr. Jessica Ledezma. DIAGNOSTIC CARDIAC CATH LAB PROCEDURE  4/13/2005    [facility]. Cath/stent (TAX\"US) to diagonal at [facility]. ENDOSCOPY, COLON, DIAGNOSTIC      FINGER TRIGGER RELEASE Right 11/20/2015    Release right long trigger finger.   Chandu Burch MD    HERNIA REPAIR Left YRS AGO    [facility]    HIATAL HERNIA REPAIR N/A 10/22/2019    LAPAROSCOPIC ROBOTIC XI ASSISTED

## 2023-07-26 NOTE — PROGRESS NOTES
CLINICAL PHARMACY NOTE: MEDS TO BEDS    Total # of Prescriptions Filled: 1   The following medications were delivered to the patient:  Percocet 5    Additional Documentation:

## 2023-07-27 ENCOUNTER — OFFICE VISIT (OUTPATIENT)
Dept: ORTHOPEDIC SURGERY | Age: 68
End: 2023-07-27

## 2023-07-27 DIAGNOSIS — Z96.642 STATUS POST LEFT HIP REPLACEMENT: Primary | ICD-10-CM

## 2023-07-27 NOTE — ANESTHESIA POSTPROCEDURE EVALUATION
Department of Anesthesiology  Postprocedure Note    Patient: Anjelica Melo  MRN: [id number]  YOB: 1955  Date of evaluation: 7/26/2023      Procedure Summary     Date: 07/26/23 Room / Location: [facility] OR 06 / [facility]    Anesthesia Start: 9503 Anesthesia Stop: 4450    Procedure: CYSTOSCOPY RETROGRADE PYELOGRAM URETEROSCOPY RIGHT STENT INSERTION (Right: Penis) Diagnosis:       Malignant neoplasm of ureter, unspecified laterality ([address])      Hydroureter      (Malignant neoplasm of ureter, unspecified laterality ([address]) [C66.9])      (Hydroureter [N13.4])    Surgeons: Marisol Beaver MD Responsible Provider: Debby Van MD    Anesthesia Type: MAC ASA Status: 4          Anesthesia Type: MAC    Wilman Phase I: Wilman Score: 9    Wilman Phase II: Wilman Score: 10      Anesthesia Post Evaluation    Patient location during evaluation: PACU  Patient participation: complete - patient participated  Level of consciousness: awake and alert  Pain score: 2  Airway patency: patent  Nausea & Vomiting: no vomiting and no nausea  Complications: no  Cardiovascular status: hemodynamically stable  Respiratory status: spontaneous ventilation, nonlabored ventilation and acceptable  Hydration status: stable  Multimodal analgesia pain management approach

## 2023-08-03 ENCOUNTER — OFFICE VISIT (OUTPATIENT)
Dept: ORTHOPEDIC SURGERY | Age: 68
End: 2023-08-03

## 2023-08-03 VITALS — HEIGHT: 69 IN | BODY MASS INDEX: 28.14 KG/M2 | WEIGHT: 190 LBS

## 2023-08-03 DIAGNOSIS — M25.512 LEFT SHOULDER PAIN, UNSPECIFIED CHRONICITY: Primary | ICD-10-CM

## 2023-08-03 RX ORDER — BUPIVACAINE HYDROCHLORIDE 2.5 MG/ML
2 INJECTION, SOLUTION INFILTRATION; PERINEURAL ONCE
Status: COMPLETED | OUTPATIENT
Start: 2023-08-03 | End: 2023-08-03

## 2023-08-03 RX ORDER — TRIAMCINOLONE ACETONIDE 40 MG/ML
40 INJECTION, SUSPENSION INTRA-ARTICULAR; INTRAMUSCULAR ONCE
Status: COMPLETED | OUTPATIENT
Start: 2023-08-03 | End: 2023-08-03

## 2023-08-03 RX ADMIN — TRIAMCINOLONE ACETONIDE 40 MG: 40 INJECTION, SUSPENSION INTRA-ARTICULAR; INTRAMUSCULAR at 14:47

## 2023-08-03 RX ADMIN — BUPIVACAINE HYDROCHLORIDE 5 MG: 2.5 INJECTION, SOLUTION INFILTRATION; PERINEURAL at 14:47

## 2023-08-03 NOTE — PROGRESS NOTES
Follow Up Post Operative Visit     Surgery: Left total hip arthroplasty for femoral neck fracture  Date: 5/29/2023    Subjective:    Yeny Gibson is here for follow up visit s/p above procedure. He is doing very well. Is completed therapy and has some generalized soreness but is very happy with his results. He is back to activities normal limit. Also complaining of continued left shoulder pain from his cuff tear arthropathy. Controlled Substances Monitoring:        Physical Exam:    Height: 5' 9\" (1.753 m), Weight - Scale: 190 lb (86.2 kg)    General: Alert and oriented x3, no acute distress  Cardiovascular/pulmonary: No labored breathing, peripheral perfusion intact  Musculoskeletal:    Left hip: He is ambulate with a walker with minimal antalgia no assistive ice. He is mildly tender palpation over his incision. His thigh is soft compressible. He has full knee range of motion flexion extension. He has no pain with hip range of motion    Shoulder Exam:   Left shoulder: Near full range of motion in forward flexion abduction external and internal rotation. He has 4-5 strength with resisted abduction and forward flexion. He is 5 out of 5 strength with resisted internal and external rotation. Negative belly press. Negative drop arm. Negative empty can. Positive impingement test.  Tender to palpation over his AC joint. His elbow range of motion is full and unrestricted. His hand is warm and well-perfused    Imaging: Multiple views of the left hip and pelvis independently interpreted myself and discussed with patient. He has a well-placed total hip arthroplasty without evidence of complication    Assessment and Plan: 2 months status post  left total hip arthroplasty for femoral neck fracture  -Left shoulder rotator cuff tear arthropathy      -Continue current protocol with his hip. Weightbearing as tolerated with no restrictions. He does not need any more DVT prophylaxis.   I explained his abductor

## 2023-08-20 DIAGNOSIS — I25.10 ATHEROSCLEROTIC HEART DISEASE OF NATIVE CORONARY ARTERY WITHOUT ANGINA PECTORIS: ICD-10-CM

## 2023-08-21 RX ORDER — ATORVASTATIN CALCIUM 80 MG/1
TABLET, FILM COATED ORAL
Qty: 90 TABLET | Refills: 1 | Status: SHIPPED | OUTPATIENT
Start: 2023-08-21

## 2023-09-02 DIAGNOSIS — E11.9 TYPE 2 DIABETES MELLITUS WITHOUT COMPLICATION, WITH LONG-TERM CURRENT USE OF INSULIN (HCC): ICD-10-CM

## 2023-09-02 DIAGNOSIS — Z79.4 TYPE 2 DIABETES MELLITUS WITHOUT COMPLICATION, WITH LONG-TERM CURRENT USE OF INSULIN (HCC): ICD-10-CM

## 2023-09-05 RX ORDER — CITALOPRAM 40 MG/1
TABLET ORAL
Qty: 135 TABLET | Refills: 1 | Status: SHIPPED | OUTPATIENT
Start: 2023-09-05

## 2023-09-05 RX ORDER — SEMAGLUTIDE 0.68 MG/ML
INJECTION, SOLUTION SUBCUTANEOUS
Qty: 3 ML | Refills: 1 | Status: SHIPPED | OUTPATIENT
Start: 2023-09-05

## 2023-09-27 DIAGNOSIS — G72.0 DRUG-INDUCED MYOPATHY: ICD-10-CM

## 2023-09-27 RX ORDER — EZETIMIBE 10 MG/1
10 TABLET ORAL DAILY
Qty: 90 TABLET | Refills: 1 | Status: SHIPPED | OUTPATIENT
Start: 2023-09-27

## 2023-09-27 RX ORDER — LOSARTAN POTASSIUM 100 MG/1
100 TABLET ORAL DAILY
Qty: 90 TABLET | Refills: 1 | Status: SHIPPED | OUTPATIENT
Start: 2023-09-27

## 2023-10-05 ENCOUNTER — OFFICE VISIT (OUTPATIENT)
Dept: ORTHOPEDIC SURGERY | Age: 68
End: 2023-10-05

## 2023-10-05 VITALS — BODY MASS INDEX: 28.14 KG/M2 | HEIGHT: 69 IN | WEIGHT: 190 LBS

## 2023-10-05 DIAGNOSIS — Z96.642 STATUS POST LEFT HIP REPLACEMENT: Primary | ICD-10-CM

## 2023-10-05 NOTE — PROGRESS NOTES
Follow Up Post Operative Visit     Surgery: Left total hip arthroplasty for femoral neck fracture  Date: 5/29/2023    Subjective:    Radha Connelly is here for follow up visit s/p above procedure. Overall he is doing very well. He is pleased with his results. He has about a 3 out of 10 pain which is worse as he gets. His shoulders responded well to continue conservative treatment. Denies any fevers or chills. Denies numbness tingling. He is happy with his surgical management of his hip. Controlled Substances Monitoring:        Physical Exam:    Height: 5' 9\" (1.753 m), Weight - Scale: 190 lb (86.2 kg)    General: Alert and oriented x3, no acute distress  Cardiovascular/pulmonary: No labored breathing, peripheral perfusion intact  Musculoskeletal:    Left hip: He is ambulate with a walker with minimal antalgia no assistive ice. He is mildly tender palpation over his incision. His thigh is soft compressible. He has full knee range of motion flexion extension. He has no pain with hip range of motion        Imaging: Multiple views of the left hip and pelvis independently interpreted myself and discussed with patient. He has a well-placed total hip arthroplasty without evidence of complication    Assessment and Plan: 4 months status post  left total hip arthroplasty for femoral neck fracture    Plan:  -This point he can continue activity without restrictions. No further need for DVT prophylaxis. I think he has completed physical therapy and has had a good result. He is ambulating normally. Some of the pain laterally should continue to resolve with time. I will see him back in about 4 months or to schedule follow-up appointment for shoulder. All of his questions answered in detail.   He is happy with his hip replacement            Amanda Angelo DO   Orthopaedic Surgery   10/5/23  2:53 PM

## 2023-10-11 DIAGNOSIS — E11.9 TYPE 2 DIABETES MELLITUS WITHOUT COMPLICATION, WITH LONG-TERM CURRENT USE OF INSULIN (HCC): ICD-10-CM

## 2023-10-11 DIAGNOSIS — Z79.4 TYPE 2 DIABETES MELLITUS WITHOUT COMPLICATION, WITH LONG-TERM CURRENT USE OF INSULIN (HCC): ICD-10-CM

## 2023-10-12 RX ORDER — SEMAGLUTIDE 0.68 MG/ML
0.5 INJECTION, SOLUTION SUBCUTANEOUS WEEKLY
Qty: 3 ML | Refills: 5 | Status: SHIPPED | OUTPATIENT
Start: 2023-10-12

## 2023-11-01 ENCOUNTER — TELEPHONE (OUTPATIENT)
Dept: PRIMARY CARE CLINIC | Age: 68
End: 2023-11-01

## 2023-11-01 DIAGNOSIS — G72.0 DRUG-INDUCED MYOPATHY: Primary | ICD-10-CM

## 2023-11-01 NOTE — TELEPHONE ENCOUNTER
Ravi Micheal would like a handicap placard due to his broken hip .  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

## 2023-11-22 LAB
MICROORGANISM SPEC CULT: NO GROWTH
SPECIMEN DESCRIPTION: NORMAL

## 2023-11-26 DIAGNOSIS — I25.10 ATHEROSCLEROTIC HEART DISEASE OF NATIVE CORONARY ARTERY WITHOUT ANGINA PECTORIS: ICD-10-CM

## 2023-11-27 DIAGNOSIS — I25.10 ATHEROSCLEROTIC HEART DISEASE OF NATIVE CORONARY ARTERY WITHOUT ANGINA PECTORIS: ICD-10-CM

## 2023-11-27 RX ORDER — AMLODIPINE BESYLATE 10 MG/1
TABLET ORAL
Qty: 90 TABLET | Refills: 1 | Status: SHIPPED | OUTPATIENT
Start: 2023-11-27

## 2023-11-27 RX ORDER — ATORVASTATIN CALCIUM 80 MG/1
80 TABLET, FILM COATED ORAL DAILY
Qty: 90 TABLET | Refills: 1 | Status: SHIPPED | OUTPATIENT
Start: 2023-11-27

## 2023-12-07 ENCOUNTER — OFFICE VISIT (OUTPATIENT)
Dept: ORTHOPEDIC SURGERY | Age: 68
End: 2023-12-07

## 2023-12-07 DIAGNOSIS — M25.512 LEFT SHOULDER PAIN, UNSPECIFIED CHRONICITY: Primary | ICD-10-CM

## 2023-12-07 RX ORDER — BUPIVACAINE HYDROCHLORIDE 2.5 MG/ML
2 INJECTION, SOLUTION INFILTRATION; PERINEURAL ONCE
Status: COMPLETED | OUTPATIENT
Start: 2023-12-07 | End: 2023-12-07

## 2023-12-07 RX ORDER — TRIAMCINOLONE ACETONIDE 40 MG/ML
40 INJECTION, SUSPENSION INTRA-ARTICULAR; INTRAMUSCULAR ONCE
Status: COMPLETED | OUTPATIENT
Start: 2023-12-07 | End: 2023-12-07

## 2023-12-07 RX ADMIN — TRIAMCINOLONE ACETONIDE 40 MG: 40 INJECTION, SUSPENSION INTRA-ARTICULAR; INTRAMUSCULAR at 10:32

## 2023-12-07 RX ADMIN — BUPIVACAINE HYDROCHLORIDE 5 MG: 2.5 INJECTION, SOLUTION INFILTRATION; PERINEURAL at 10:31

## 2023-12-07 NOTE — PROGRESS NOTES
MG EC tablet, Take 1 tablet by mouth daily, Disp: , Rfl:     omeprazole (PRILOSEC) 40 MG delayed release capsule, TAKE 1 CAPSULE BY MOUTH TWICE A DAY (Patient taking differently: Take 1 capsule by mouth daily), Disp: 180 capsule, Rfl: 1    docusate sodium (COLACE, DULCOLAX) 100 MG CAPS, Take 100 mg by mouth 2 times daily (Patient taking differently: Take 100 mg by mouth as needed), Disp: 60 capsule, Rfl: 0    ferrous sulfate (IRON 325) 325 (65 Fe) MG tablet, Take 1 tablet by mouth daily (with breakfast), Disp: 30 tablet, Rfl: 3    gabapentin (NEURONTIN) 100 MG capsule, Take 1 capsule by mouth 3 times daily for 90 days. , Disp: 90 capsule, Rfl: 2    nitroGLYCERIN (NITROSTAT) 0.4 MG SL tablet, Place 1 tablet under the tongue every 5 minutes as needed for Chest pain If chest pain: put 1 NTG tab under tongue - sit down - wait 5 min - if no relief, call 911. May repeat dose 2 more times 5 min apart while waiting for EMS (total of 3 doses). If dizzy do not take any further doses. , Disp: 90 tablet, Rfl: 1    Cholecalciferol (VITAMIN D3) 5000 UNITS TABS, Take 1 tablet by mouth every morning, Disp: , Rfl:     Ascorbic Acid (VITAMIN C) 500 MG tablet, Take 1 tablet by mouth every morning, Disp: , Rfl:     fish oil-omega-3 fatty acids 1000 MG capsule, Take 1 capsule by mouth every morning, Disp: , Rfl:     ALLERGIES  Allergies   Allergen Reactions    Penicillins Hives       Controlled Substances Monitoring:        REVIEW OF SYSTEMS:     Constitutional:  Negative for weight loss, fevers, chills, fatigue  Cardiovascular: Negative for chest pain, palpitations  Pulmonary: Negative for shortness of breath, labored breathing, cough  GI: negative for abdominal pain, nausea, vomitting   MSK: per HPI  Skin: negative for rash, open wounds    All other systems reviewed and are negative           PHYSICAL EXAM     There were no vitals filed for this visit.       Height:    Weight: [unfilled]  BMI:  There is no height or weight on file to

## 2023-12-12 ENCOUNTER — PREP FOR PROCEDURE (OUTPATIENT)
Dept: UROLOGY | Age: 68
End: 2023-12-12

## 2023-12-12 RX ORDER — SODIUM CHLORIDE 0.9 % (FLUSH) 0.9 %
5-40 SYRINGE (ML) INJECTION PRN
Status: CANCELLED | OUTPATIENT
Start: 2023-12-12

## 2023-12-12 RX ORDER — SODIUM CHLORIDE 0.9 % (FLUSH) 0.9 %
5-40 SYRINGE (ML) INJECTION EVERY 12 HOURS SCHEDULED
Status: CANCELLED | OUTPATIENT
Start: 2023-12-12

## 2023-12-12 RX ORDER — SODIUM CHLORIDE 9 MG/ML
INJECTION, SOLUTION INTRAVENOUS PRN
Status: CANCELLED | OUTPATIENT
Start: 2023-12-12

## 2023-12-12 RX ORDER — SODIUM CHLORIDE 9 MG/ML
INJECTION, SOLUTION INTRAVENOUS CONTINUOUS
Status: CANCELLED | OUTPATIENT
Start: 2023-12-12

## 2023-12-27 DIAGNOSIS — E11.9 TYPE 2 DIABETES MELLITUS WITHOUT COMPLICATION, WITH LONG-TERM CURRENT USE OF INSULIN (HCC): ICD-10-CM

## 2023-12-27 DIAGNOSIS — Z79.4 TYPE 2 DIABETES MELLITUS WITHOUT COMPLICATION, WITH LONG-TERM CURRENT USE OF INSULIN (HCC): ICD-10-CM

## 2023-12-28 RX ORDER — SEMAGLUTIDE 0.68 MG/ML
0.5 INJECTION, SOLUTION SUBCUTANEOUS WEEKLY
Qty: 3 ML | Refills: 5 | Status: SHIPPED | OUTPATIENT
Start: 2023-12-28

## 2024-01-12 ENCOUNTER — OFFICE VISIT (OUTPATIENT)
Dept: PRIMARY CARE CLINIC | Age: 69
End: 2024-01-12

## 2024-01-12 VITALS
DIASTOLIC BLOOD PRESSURE: 100 MMHG | BODY MASS INDEX: 30.56 KG/M2 | SYSTOLIC BLOOD PRESSURE: 166 MMHG | OXYGEN SATURATION: 97 % | HEART RATE: 66 BPM | WEIGHT: 201 LBS | TEMPERATURE: 97.7 F

## 2024-01-12 DIAGNOSIS — Z12.5 PROSTATE CANCER SCREENING: Primary | ICD-10-CM

## 2024-01-12 DIAGNOSIS — C66.9 MALIGNANT NEOPLASM OF URETER, UNSPECIFIED LATERALITY (HCC): ICD-10-CM

## 2024-01-12 DIAGNOSIS — E11.9 TYPE 2 DIABETES MELLITUS WITHOUT COMPLICATION, WITHOUT LONG-TERM CURRENT USE OF INSULIN (HCC): ICD-10-CM

## 2024-01-12 LAB
CREATININE URINE: 110.7 MG/DL (ref 40–278)
TOTAL PROTEIN, URINE: 13 MG/DL (ref 0–12)
URINE TOTAL PROTEIN CREATININE RATIO: 0.11 (ref 0–0.2)

## 2024-01-12 RX ORDER — CITALOPRAM 40 MG/1
TABLET ORAL
Qty: 135 TABLET | Refills: 1 | Status: SHIPPED | OUTPATIENT
Start: 2024-01-12

## 2024-01-12 RX ORDER — BENZONATATE 100 MG/1
CAPSULE ORAL
COMMUNITY
Start: 2023-12-30

## 2024-01-12 ASSESSMENT — PATIENT HEALTH QUESTIONNAIRE - PHQ9
1. LITTLE INTEREST OR PLEASURE IN DOING THINGS: 0
SUM OF ALL RESPONSES TO PHQ QUESTIONS 1-9: 0
SUM OF ALL RESPONSES TO PHQ QUESTIONS 1-9: 0
2. FEELING DOWN, DEPRESSED OR HOPELESS: 0
SUM OF ALL RESPONSES TO PHQ9 QUESTIONS 1 & 2: 0
SUM OF ALL RESPONSES TO PHQ QUESTIONS 1-9: 0
SUM OF ALL RESPONSES TO PHQ QUESTIONS 1-9: 0

## 2024-01-12 NOTE — PROGRESS NOTES
1/12/24    Pablo Ortega, a male of 68 y.o. presents today for 6 Month Follow-Up            Diagnoses and all orders for this visit:  Prostate cancer screening  -     PSA Screening  Malignant neoplasm of ureter, unspecified laterality (HCC)  Type 2 diabetes mellitus without complication, without long-term current use of insulin (HCC)  -     Comprehensive Metabolic Panel; Future  -     Hemoglobin A1C  -     Lipid Panel; Future  -     CBC; Future  -     Protein / creatinine ratio, urine; Future  Other orders  -     citalopram (CELEXA) 40 MG tablet; TAKE 1 AND 1/2 TABLETS BY MOUTH EVERY MORNING            Electronically signed by Francisco Yip DO on 1/12/2024 at 8:24 AM                          BP (!) 166/100   Pulse 66   Temp 97.7 °F (36.5 °C)   Wt 91.2 kg (201 lb)   SpO2 97%   BMI 30.56 kg/m²     Review of Systems  Constitutional:Negative for activity change, appetite change, chills, fatigue and fever.   Respiratory: Negative for choking, chest tightness, shortness of breath and wheezing.  Cardiovascular: Negative for chest pain, palpitations and leg swelling.   Gastrointestinal: Negative for abdominal distention, constipation, diarrhea, nausea and vomiting.   Musculoskeletal: Negative for arthralgias, back pain, gait problem and joint swelling.   Neurological: Negative for dizziness, weakness,numbness and headaches.     Patient Active Problem List    Diagnosis Date Noted    Malignant neoplasm of ureter, unspecified laterality (HCC) 01/12/2024    Type 2 diabetes mellitus without complication, with long-term current use of insulin (HCC) 07/12/2023    Essential hypertension 07/12/2023    Combined forms of age-related cataract of both eyes 11/10/2021    Hiatal hernia     Coronary artery disease involving native coronary artery of native heart without angina pectoris     Pure hypercholesterolemia     GERD (gastroesophageal reflux disease)      Allergies   Allergen Reactions    Penicillins Hives

## 2024-01-13 LAB
ALBUMIN SERPL-MCNC: 4.2 G/DL (ref 3.5–5.2)
ALP BLD-CCNC: 132 U/L (ref 40–129)
ALT SERPL-CCNC: 25 U/L (ref 0–40)
ANION GAP SERPL CALCULATED.3IONS-SCNC: 18 MMOL/L (ref 7–16)
AST SERPL-CCNC: 20 U/L (ref 0–39)
BILIRUB SERPL-MCNC: 0.9 MG/DL (ref 0–1.2)
BUN BLDV-MCNC: 15 MG/DL (ref 6–23)
CALCIUM SERPL-MCNC: 9.8 MG/DL (ref 8.6–10.2)
CHLORIDE BLD-SCNC: 101 MMOL/L (ref 98–107)
CHOLESTEROL: 143 MG/DL
CO2: 23 MMOL/L (ref 22–29)
CREAT SERPL-MCNC: 0.9 MG/DL (ref 0.7–1.2)
GFR SERPL CREATININE-BSD FRML MDRD: >60 ML/MIN/1.73M2
GLUCOSE BLD-MCNC: 168 MG/DL (ref 74–99)
HBA1C MFR BLD: 6.8 % (ref 4–5.6)
HCT VFR BLD CALC: 47.8 % (ref 37–54)
HDLC SERPL-MCNC: 39 MG/DL
HEMOGLOBIN: 15.9 G/DL (ref 12.5–16.5)
LDL CHOLESTEROL: 74 MG/DL
MCH RBC QN AUTO: 30.2 PG (ref 26–35)
MCHC RBC AUTO-ENTMCNC: 33.3 G/DL (ref 32–34.5)
MCV RBC AUTO: 90.7 FL (ref 80–99.9)
PDW BLD-RTO: 13.2 % (ref 11.5–15)
PLATELET # BLD: 249 K/UL (ref 130–450)
PMV BLD AUTO: 10.5 FL (ref 7–12)
POTASSIUM SERPL-SCNC: 3.9 MMOL/L (ref 3.5–5)
PROSTATE SPECIFIC ANTIGEN: 4.84 NG/ML (ref 0–4)
RBC # BLD: 5.27 M/UL (ref 3.8–5.8)
SODIUM BLD-SCNC: 142 MMOL/L (ref 132–146)
TOTAL PROTEIN: 6.8 G/DL (ref 6.4–8.3)
TRIGL SERPL-MCNC: 151 MG/DL
VLDLC SERPL CALC-MCNC: 30 MG/DL
WBC # BLD: 10.5 K/UL (ref 4.5–11.5)

## 2024-01-23 ENCOUNTER — TELEPHONE (OUTPATIENT)
Dept: CARDIOLOGY CLINIC | Age: 69
End: 2024-01-23

## 2024-01-23 NOTE — TELEPHONE ENCOUNTER
Patient needs cardiac clearance for cataract surgery right eye to be done at [facility] on 3/19/24.     Patient denies any chest pain, shortness of breath or palpitations since last visit in 7/18/23.  Patient is able to complete all activities of daily living, including; climbing one flight of stairs and walking one block without cardiac symptoms. Please advise.

## 2024-02-01 ENCOUNTER — OFFICE VISIT (OUTPATIENT)
Dept: ORTHOPEDIC SURGERY | Age: 69
End: 2024-02-01
Payer: COMMERCIAL

## 2024-02-01 DIAGNOSIS — Z96.642 STATUS POST LEFT HIP REPLACEMENT: Primary | ICD-10-CM

## 2024-02-01 PROCEDURE — 99213 OFFICE O/P EST LOW 20 MIN: CPT | Performed by: STUDENT IN AN ORGANIZED HEALTH CARE EDUCATION/TRAINING PROGRAM

## 2024-02-01 PROCEDURE — 1123F ACP DISCUSS/DSCN MKR DOCD: CPT | Performed by: STUDENT IN AN ORGANIZED HEALTH CARE EDUCATION/TRAINING PROGRAM

## 2024-02-01 NOTE — PROGRESS NOTES
Follow Up Post Operative Visit     Surgery: Left total hip arthroplasty for femoral neck fracture  Date: 5/29/2023    Subjective:    Pablo Ortega is here for follow up visit s/p above procedure.  He is now pain-free 0 out of 10 pain.  Has been working out twice and doing much better.  He is very happy with his results.      Controlled Substances Monitoring:        Physical Exam:    No data recorded    General: Alert and oriented x3, no acute distress  Cardiovascular/pulmonary: No labored breathing, peripheral perfusion intact  Musculoskeletal:    Left hip: He is ambulating normally without antalgia.  No assist device..  He is nontender over his hip..  His thigh is soft compressible.  He has full knee range of motion flexion extension.  He has no pain with hip range of motion        Imaging: Multiple views of the left hip and pelvis independently interpreted myself and discussed with patient.  He has a well-placed total hip arthroplasty without evidence of complication    Assessment and Plan: 8 months status post  left total hip arthroplasty for femoral neck fracture    Plan:  -He has achieved good result.  He is back to his baseline activity.  He is very happy with his results.  He can follow-up on an as-needed basis for his hip moving forward                    Hubert Andrea DO   Orthopaedic Surgery   2/1/24  2:27 PM    Note: This report was completed using reMail voiced recognition software.  Every effort has been made to ensure accuracy; however, inadvertent computerized transcription errors may be present.

## 2024-02-02 ENCOUNTER — OFFICE VISIT (OUTPATIENT)
Dept: PRIMARY CARE CLINIC | Age: 69
End: 2024-02-02

## 2024-02-02 VITALS
WEIGHT: 202 LBS | TEMPERATURE: 97.2 F | OXYGEN SATURATION: 98 % | HEART RATE: 67 BPM | DIASTOLIC BLOOD PRESSURE: 92 MMHG | BODY MASS INDEX: 30.71 KG/M2 | SYSTOLIC BLOOD PRESSURE: 150 MMHG

## 2024-02-02 DIAGNOSIS — I10 ESSENTIAL HYPERTENSION: Primary | ICD-10-CM

## 2024-02-02 DIAGNOSIS — I10 PRIMARY HYPERTENSION: ICD-10-CM

## 2024-02-02 DIAGNOSIS — F41.9 ANXIETY: ICD-10-CM

## 2024-02-02 RX ORDER — CHLORTHALIDONE 25 MG/1
25 TABLET ORAL DAILY
Qty: 30 TABLET | Refills: 3 | Status: SHIPPED | OUTPATIENT
Start: 2024-02-02

## 2024-02-02 RX ORDER — SEMAGLUTIDE 1.34 MG/ML
0.5 INJECTION, SOLUTION SUBCUTANEOUS WEEKLY
Qty: 4 ADJUSTABLE DOSE PRE-FILLED PEN SYRINGE | Refills: 2 | Status: SHIPPED | OUTPATIENT
Start: 2024-02-02

## 2024-02-02 NOTE — TELEPHONE ENCOUNTER
Cataract surgery very low risk.  May proceed without further cardiac testing.  If they need him to hold aspirin that is fine, but usually it does not need to be held before cataract surgery.  Can clarify with Dr. Campos.

## 2024-02-02 NOTE — PROGRESS NOTES
2/2/24    Pablo Ortega, a male of 68 y.o. presents today for Hypertension (Follow up for blood pressure still elevated feels lightheaded)      Diagnoses and all orders for this visit:  Essential hypertension  Primary hypertension  -     chlorthalidone (HYGROTON) 25 MG tablet; Take 1 tablet by mouth daily  Anxiety  Other orders  -     Semaglutide,0.25 or 0.5MG/DOS, (OZEMPIC, 0.25 OR 0.5 MG/DOSE,) 2 MG/1.5ML SOPN; Inject 0.5 mg into the skin once a week            Electronically signed by Francisco Yip DO on 2/2/2024 at 11:31 AM                          BP (!) 150/92 (Site: Left Upper Arm, Position: Sitting, Cuff Size: Large Adult)   Pulse 67   Temp 97.2 °F (36.2 °C)   Wt 91.6 kg (202 lb)   SpO2 98%   BMI 30.71 kg/m²     Review of Systems  Constitutional:Negative for activity change, appetite change, chills, fatigue and fever.   Respiratory: Negative for choking, chest tightness, shortness of breath and wheezing.  Cardiovascular: Negative for chest pain, palpitations and leg swelling.   Gastrointestinal: Negative for abdominal distention, constipation, diarrhea, nausea and vomiting.   Musculoskeletal: Negative for arthralgias, back pain, gait problem and joint swelling.   Neurological: Negative for dizziness, weakness,numbness and headaches.     Patient Active Problem List    Diagnosis Date Noted    Malignant neoplasm of ureter, unspecified laterality ([facility]) 01/12/2024    Type 2 diabetes mellitus without complication, with long-term current use of insulin ([facility]) 07/12/2023    Essential hypertension 07/12/2023    Combined forms of age-related cataract of both eyes 11/10/2021    Hiatal hernia     Coronary artery disease involving native coronary artery of native heart without angina pectoris     Pure hypercholesterolemia     GERD (gastroesophageal reflux disease)      Allergies   Allergen Reactions    Penicillins Hives     Current Outpatient Medications on File Prior to Visit   Medication Sig

## 2024-02-26 DIAGNOSIS — E11.9 TYPE 2 DIABETES MELLITUS WITHOUT COMPLICATION, WITH LONG-TERM CURRENT USE OF INSULIN (HCC): ICD-10-CM

## 2024-02-26 DIAGNOSIS — Z79.4 TYPE 2 DIABETES MELLITUS WITHOUT COMPLICATION, WITH LONG-TERM CURRENT USE OF INSULIN (HCC): ICD-10-CM

## 2024-02-26 RX ORDER — SEMAGLUTIDE 0.68 MG/ML
0.5 INJECTION, SOLUTION SUBCUTANEOUS WEEKLY
Qty: 9 ML | Refills: 5 | Status: SHIPPED | OUTPATIENT
Start: 2024-02-26

## 2024-04-03 ENCOUNTER — TELEPHONE (OUTPATIENT)
Dept: OTHER | Age: 69
End: 2024-04-03

## 2024-04-03 NOTE — TELEPHONE ENCOUNTER
Patient's wife anya left message on phone regarding patient having an appointment on 4/5/24 at 0815. Patient does not have an appointment on 4/5/24. Tried to call wife back but she did not answer so I left her message stating the above. And to call us back if she has any questions or would like to make an appointment for her .

## 2024-04-17 ENCOUNTER — OFFICE VISIT (OUTPATIENT)
Dept: PRIMARY CARE CLINIC | Age: 69
End: 2024-04-17

## 2024-04-17 VITALS
DIASTOLIC BLOOD PRESSURE: 74 MMHG | OXYGEN SATURATION: 96 % | TEMPERATURE: 97.6 F | SYSTOLIC BLOOD PRESSURE: 122 MMHG | WEIGHT: 204 LBS | HEART RATE: 52 BPM | BODY MASS INDEX: 31.02 KG/M2

## 2024-04-17 DIAGNOSIS — Z13.220 LIPID SCREENING: ICD-10-CM

## 2024-04-17 DIAGNOSIS — G72.0 DRUG-INDUCED MYOPATHY: ICD-10-CM

## 2024-04-17 DIAGNOSIS — R73.01 IFG (IMPAIRED FASTING GLUCOSE): ICD-10-CM

## 2024-04-17 DIAGNOSIS — Z00.00 INITIAL MEDICARE ANNUAL WELLNESS VISIT: Primary | ICD-10-CM

## 2024-04-17 DIAGNOSIS — R10.9 FLANK PAIN: ICD-10-CM

## 2024-04-17 LAB
ALBUMIN: 4.3 G/DL (ref 3.5–5.2)
ALP BLD-CCNC: 97 U/L (ref 40–129)
ALT SERPL-CCNC: 22 U/L (ref 0–40)
ANION GAP SERPL CALCULATED.3IONS-SCNC: 16 MMOL/L (ref 7–16)
AST SERPL-CCNC: 21 U/L (ref 0–39)
BILIRUB SERPL-MCNC: 1.2 MG/DL (ref 0–1.2)
BILIRUBIN, POC: NORMAL
BLOOD URINE, POC: NORMAL
BUN BLDV-MCNC: 19 MG/DL (ref 6–23)
CALCIUM SERPL-MCNC: 9.5 MG/DL (ref 8.6–10.2)
CHLORIDE BLD-SCNC: 98 MMOL/L (ref 98–107)
CHOLESTEROL: 149 MG/DL
CLARITY, POC: CLEAR
CO2: 26 MMOL/L (ref 22–29)
COLOR, POC: YELLOW
CREAT SERPL-MCNC: 1.1 MG/DL (ref 0.7–1.2)
GFR SERPL CREATININE-BSD FRML MDRD: 72 ML/MIN/1.73M2
GLUCOSE BLD-MCNC: 169 MG/DL (ref 74–99)
GLUCOSE URINE, POC: NORMAL
HBA1C MFR BLD: 6.4 % (ref 4–5.6)
HCT VFR BLD CALC: 45.4 % (ref 37–54)
HDLC SERPL-MCNC: 32 MG/DL
HEMOGLOBIN: 15.8 G/DL (ref 12.5–16.5)
KETONES, POC: NORMAL
LDL CHOLESTEROL: 78 MG/DL
LEUKOCYTE EST, POC: NORMAL
MCH RBC QN AUTO: 30.6 PG (ref 26–35)
MCHC RBC AUTO-ENTMCNC: 34.8 G/DL (ref 32–34.5)
MCV RBC AUTO: 88 FL (ref 80–99.9)
NITRITE, POC: NORMAL
PDW BLD-RTO: 13.2 % (ref 11.5–15)
PH, POC: 5.5
PLATELET # BLD: 266 K/UL (ref 130–450)
PMV BLD AUTO: 10.9 FL (ref 7–12)
POTASSIUM SERPL-SCNC: 3.6 MMOL/L (ref 3.5–5)
PROTEIN, POC: NORMAL
RBC # BLD: 5.16 M/UL (ref 3.8–5.8)
SODIUM BLD-SCNC: 140 MMOL/L (ref 132–146)
SPECIFIC GRAVITY, POC: 1.02
TOTAL PROTEIN: 7.1 G/DL (ref 6.4–8.3)
TRIGL SERPL-MCNC: 193 MG/DL
UROBILINOGEN, POC: 0.2
VLDLC SERPL CALC-MCNC: 39 MG/DL
WBC # BLD: 9.9 K/UL (ref 4.5–11.5)

## 2024-04-17 RX ORDER — ATORVASTATIN CALCIUM 80 MG/1
80 TABLET, FILM COATED ORAL DAILY
Qty: 90 TABLET | Refills: 1 | Status: SHIPPED | OUTPATIENT
Start: 2024-04-17

## 2024-04-17 RX ORDER — CELECOXIB 200 MG/1
200 CAPSULE ORAL DAILY
Qty: 30 CAPSULE | Refills: 0 | Status: SHIPPED | OUTPATIENT
Start: 2024-04-17

## 2024-04-17 RX ORDER — EZETIMIBE 10 MG/1
10 TABLET ORAL DAILY
Qty: 90 TABLET | Refills: 1 | Status: SHIPPED | OUTPATIENT
Start: 2024-04-17

## 2024-04-17 ASSESSMENT — PATIENT HEALTH QUESTIONNAIRE - PHQ9
SUM OF ALL RESPONSES TO PHQ QUESTIONS 1-9: 0
SUM OF ALL RESPONSES TO PHQ QUESTIONS 1-9: 0
2. FEELING DOWN, DEPRESSED OR HOPELESS: NOT AT ALL
SUM OF ALL RESPONSES TO PHQ QUESTIONS 1-9: 0
1. LITTLE INTEREST OR PLEASURE IN DOING THINGS: NOT AT ALL
SUM OF ALL RESPONSES TO PHQ QUESTIONS 1-9: 0
SUM OF ALL RESPONSES TO PHQ9 QUESTIONS 1 & 2: 0

## 2024-04-17 ASSESSMENT — LIFESTYLE VARIABLES
HOW MANY STANDARD DRINKS CONTAINING ALCOHOL DO YOU HAVE ON A TYPICAL DAY: 1 OR 2
HOW OFTEN DO YOU HAVE A DRINK CONTAINING ALCOHOL: 2-4 TIMES A MONTH

## 2024-04-17 NOTE — PROGRESS NOTES
Francisco SOOD DO   citalopram (CELEXA) 40 MG tablet TAKE 1 AND 1/2 TABLETS BY MOUTH EVERY MORNING  Patient taking differently: Take 1 tablet by mouth daily  Francisco Yip DO   amLODIPine (NORVASC) 10 MG tablet TAKE 1 TABLET BY MOUTH EVERY DAY  Francisco Yip DO   metoprolol tartrate (LOPRESSOR) 25 MG tablet TAKE 1 TABLET BY MOUTH TWICE A DAY  Francisco Yip DO   Handicap Placard MISC by Does not apply route Dx - Gait instability  Duration - 5 years from today's date  Francisco Yip DO   losartan (COZAAR) 100 MG tablet TAKE 1 TABLET BY MOUTH EVERY DAY  Francisco Yip DO   aspirin 81 MG EC tablet Take 1 tablet by mouth daily  ProviderRaul MD   omeprazole (PRILOSEC) 40 MG delayed release capsule TAKE 1 CAPSULE BY MOUTH TWICE A DAY  Patient taking differently: Take 1 capsule by mouth daily  Francisco Yip DO   docusate sodium (COLACE, DULCOLAX) 100 MG CAPS Take 100 mg by mouth 2 times daily  Patient taking differently: Take 100 mg by mouth as needed  Juan Alberto Dao MD   ferrous sulfate (IRON 325) 325 (65 Fe) MG tablet Take 1 tablet by mouth daily (with breakfast)  Juan Alberto Dao MD   nitroGLYCERIN (NITROSTAT) 0.4 MG SL tablet Place 1 tablet under the tongue every 5 minutes as needed for Chest pain If chest pain: put 1 NTG tab under tongue - sit down - wait 5 min - if no relief, call 911.  May repeat dose 2 more times 5 min apart while waiting for EMS (total of 3 doses).  If dizzy do not take any further doses.  Francisco Yip DO   Cholecalciferol (VITAMIN D3) 5000 UNITS TABS Take 1 tablet by mouth every morning  Raul Sin MD   Ascorbic Acid (VITAMIN C) 500 MG tablet Take 1 tablet by mouth every morning  Raul Sin MD   fish oil-omega-3 fatty acids 1000 MG capsule Take 1 capsule by mouth every morning  Raul Sin MD CareTeam (Including outside providers/suppliers regularly involved in providing care):   Patient Care Team:  [place]

## 2024-04-17 NOTE — PATIENT INSTRUCTIONS
Learning About Being Active as an Older Adult  Why is being active important as you get older?     Being active is one of the best things you can do for your health. And it's never too late to start. Being active--or getting active, if you aren't already--has definite benefits. It can:  Give you more energy,  Keep your mind sharp.  Improve balance to reduce your risk of falls.  Help you manage chronic illness with fewer medicines.  No matter how old you are, how fit you are, or what health problems you have, there is a form of activity that will work for you. And the more physical activity you can do, the better your overall health will be.  What kinds of activity can help you stay healthy?  Being more active will make your daily activities easier. Physical activity includes planned exercise and things you do in daily life. There are four types of activity:  Aerobic.  Doing aerobic activity makes your heart and lungs strong.  Includes walking, dancing, and gardening.  Aim for at least 2½ hours spread throughout the week.  It improves your energy and can help you sleep better.  Muscle-strengthening.  This type of activity can help maintain muscle and strengthen bones.  Includes climbing stairs, using resistance bands, and lifting or carrying heavy loads.  Aim for at least twice a week.  It can help protect the knees and other joints.  Stretching.  Stretching gives you better range of motion in joints and muscles.  Includes upper arm stretches, calf stretches, and gentle yoga.  Aim for at least twice a week, preferably after your muscles are warmed up from other activities.  It can help you function better in daily life.  Balancing.  This helps you stay coordinated and have good posture.  Includes heel-to-toe walking, mary chi, and certain types of yoga.  Aim for at least 3 days a week.  It can reduce your risk of falling.  Even if you have a hard time meeting the recommendations, it's better to be more active

## 2024-04-18 RX ORDER — OMEGA-3-ACID ETHYL ESTERS 1 G/1
2 CAPSULE, LIQUID FILLED ORAL 2 TIMES DAILY
Qty: 60 CAPSULE | Refills: 3 | Status: SHIPPED | OUTPATIENT
Start: 2024-04-18

## 2024-04-19 RX ORDER — OMEPRAZOLE 40 MG/1
40 CAPSULE, DELAYED RELEASE ORAL DAILY
Qty: 90 CAPSULE | Refills: 1 | Status: SHIPPED | OUTPATIENT
Start: 2024-04-19

## 2024-04-23 RX ORDER — SEMAGLUTIDE 0.68 MG/ML
INJECTION, SOLUTION SUBCUTANEOUS
Qty: 9 ML | Refills: 3 | Status: SHIPPED | OUTPATIENT
Start: 2024-04-23

## 2024-04-25 NOTE — OP NOTE
Operative Note      Patient: You Sebastian  YOB: 1955  MRN: [id number]    Date of Procedure: 3/22/2023    Pre-Op Diagnosis: Malignant neoplasm of ureter, right    Post-Op Diagnosis: Same       Procedure(s):  CYSTOSCOPY RETROGRADE PYELOGRAM URETEROSCOPY LASER ABLATION OF URETERAL TUMOR RIGHT STENT CHANGE    Surgeon(s):  Matthew Lindsay MD    Assistant:   * No surgical staff found *    Anesthesia: Monitor Anesthesia Care    Estimated Blood Loss (mL): Minimal    Complications: None    Specimens:   * No specimens in log *    Implants:  Implant Name Type Inv. Item Serial No.  Lot No. LRB No. Used Action   Bennetta Imus [facility] PERCFLX FIRM DUROMETER DBL Erskin Asp [id number]  Thuan Rolling 6FR [facility] PERCFLX FIRM DUROMETER DBL Jair Lands [employer] [id number] Right 1 Implanted         Drains:   Ureteral Drain/Stent 03/22/23 Right Ureter (Active)       [REMOVED] Ureteral Drain/Stent 02/27/23 Right Ureter (Removed)   Urine Color Mackenzie 02/28/23 2236   Urine Appearance Clear 02/28/23 2236       [REMOVED] Urinary Catheter 02/27/23 2 Way (Removed)   Catheter Indications Perioperative use for selected surgical procedures 02/27/23 1230   Urine Color Bloody 02/28/23 0458   Urine Appearance Clear 02/28/23 0458   Catheter Care  Other (comment) 02/28/23 0458   Catheter Best Practices  Bag below bladder;Catheter secured to thigh 02/28/23 0458   Status Draining 02/28/23 0458   Manual Irrigation Volume Input (mL) 60 mL 02/28/23 0458   Output (mL) 150 mL 02/28/23 0458       Findings: right ureteral tumor    Detailed Description of Procedure:     Patient is a 25-year-old male who is known to the right ureteral tumor. He presents back for definitive management all risk benefits and alternatives were discussed with the patient informed consent was obtained and signed. Operation,    Patient was wheeled back to the operative suite.   Upon entering the operative suite patient was identified using the Embeda 61.2

## 2024-04-29 ENCOUNTER — TELEPHONE (OUTPATIENT)
Dept: PRIMARY CARE CLINIC | Age: 69
End: 2024-04-29

## 2024-04-29 DIAGNOSIS — R10.9 ABDOMINAL PAIN, UNSPECIFIED ABDOMINAL LOCATION: Primary | ICD-10-CM

## 2024-04-29 NOTE — TELEPHONE ENCOUNTER
Pt called stating that he was in about 10 days ago with R flank pain, he was given a medication for anti inflammatory purposes but it hasn't worked. Discussed that a CAT scan would be the next step. Please advise.

## 2024-05-02 RX ORDER — OMEGA-3-ACID ETHYL ESTERS 1 G/1
2 CAPSULE, LIQUID FILLED ORAL 2 TIMES DAILY
Qty: 360 CAPSULE | Refills: 1 | Status: SHIPPED | OUTPATIENT
Start: 2024-05-02

## 2024-05-13 ENCOUNTER — HOSPITAL ENCOUNTER (OUTPATIENT)
Dept: CT IMAGING | Age: 69
Discharge: HOME | End: 2024-05-15
Payer: COMMERCIAL

## 2024-05-13 DIAGNOSIS — R10.9 ABDOMINAL PAIN, UNSPECIFIED ABDOMINAL LOCATION: ICD-10-CM

## 2024-05-13 PROCEDURE — 74177 CT ABD & PELVIS W/CONTRAST: CPT

## 2024-05-13 PROCEDURE — 6360000004 HC RX CONTRAST MEDICATION: Performed by: RADIOLOGY

## 2024-05-13 RX ADMIN — IOPAMIDOL 18 ML: 755 INJECTION, SOLUTION INTRAVENOUS at 12:08

## 2024-05-13 RX ADMIN — IOPAMIDOL 75 ML: 755 INJECTION, SOLUTION INTRAVENOUS at 12:08

## 2024-05-22 ENCOUNTER — TELEPHONE (OUTPATIENT)
Dept: CARDIOLOGY CLINIC | Age: 69
End: 2024-05-22

## 2024-05-29 DIAGNOSIS — I10 PRIMARY HYPERTENSION: ICD-10-CM

## 2024-05-29 RX ORDER — CHLORTHALIDONE 25 MG/1
25 TABLET ORAL DAILY
Qty: 90 TABLET | Refills: 1 | Status: SHIPPED | OUTPATIENT
Start: 2024-05-29

## 2024-05-31 RX ORDER — LOSARTAN POTASSIUM 100 MG/1
100 TABLET ORAL DAILY
Qty: 90 TABLET | Refills: 1 | Status: SHIPPED | OUTPATIENT
Start: 2024-05-31

## 2024-05-31 NOTE — TELEPHONE ENCOUNTER
Last Appointment:  4/17/2024  Future Appointments   Date Time Provider Department Center   7/2/2024 10:30 AM Erasmo Craig MD [facility]   4/23/2025 10:00 AM Francisco Yip, DO [facility]

## 2024-06-10 NOTE — TELEPHONE ENCOUNTER
Per Dr. Verdis Essex, patient is scheduled for EGD possible biopsy at [address] [place] [address] on 7/12/21. Surgery scheduling form faxed with confirmation, surgeon's calendar updated. Dr. Verdis Essex to enter orders. Follow up appointment scheduled.    Electronically signed by Bruce Salcedo RN on 6/30/2021 at 11:27 AM no 0

## 2024-06-21 DIAGNOSIS — I25.10 ATHEROSCLEROTIC HEART DISEASE OF NATIVE CORONARY ARTERY WITHOUT ANGINA PECTORIS: ICD-10-CM

## 2024-06-24 RX ORDER — AMLODIPINE BESYLATE 10 MG/1
TABLET ORAL
Qty: 90 TABLET | Refills: 1 | Status: SHIPPED | OUTPATIENT
Start: 2024-06-24

## 2024-07-02 ENCOUNTER — OFFICE VISIT (OUTPATIENT)
Dept: CARDIOLOGY CLINIC | Age: 69
End: 2024-07-02
Payer: COMMERCIAL

## 2024-07-02 VITALS
WEIGHT: 206.6 LBS | BODY MASS INDEX: 31.31 KG/M2 | HEART RATE: 64 BPM | DIASTOLIC BLOOD PRESSURE: 78 MMHG | SYSTOLIC BLOOD PRESSURE: 130 MMHG | RESPIRATION RATE: 18 BRPM | HEIGHT: 68 IN

## 2024-07-02 DIAGNOSIS — I10 PRIMARY HYPERTENSION: ICD-10-CM

## 2024-07-02 DIAGNOSIS — I25.10 CORONARY ARTERY DISEASE INVOLVING NATIVE CORONARY ARTERY OF NATIVE HEART WITHOUT ANGINA PECTORIS: Primary | ICD-10-CM

## 2024-07-02 DIAGNOSIS — I45.10 RBBB: ICD-10-CM

## 2024-07-02 PROCEDURE — 1123F ACP DISCUSS/DSCN MKR DOCD: CPT | Performed by: INTERNAL MEDICINE

## 2024-07-02 PROCEDURE — 99214 OFFICE O/P EST MOD 30 MIN: CPT | Performed by: INTERNAL MEDICINE

## 2024-07-02 PROCEDURE — 3075F SYST BP GE 130 - 139MM HG: CPT | Performed by: INTERNAL MEDICINE

## 2024-07-02 PROCEDURE — 93000 ELECTROCARDIOGRAM COMPLETE: CPT | Performed by: INTERNAL MEDICINE

## 2024-07-02 PROCEDURE — 3078F DIAST BP <80 MM HG: CPT | Performed by: INTERNAL MEDICINE

## 2024-07-02 RX ORDER — PREDNISOLONE ACETATE 10 MG/ML
SUSPENSION/ DROPS OPHTHALMIC
COMMUNITY
Start: 2024-06-24

## 2024-07-02 RX ORDER — KETOROLAC TROMETHAMINE 4 MG/ML
SOLUTION/ DROPS OPHTHALMIC
COMMUNITY
Start: 2024-06-26

## 2024-07-02 NOTE — PROGRESS NOTES
OUTPATIENT CARDIOLOGY FOLLOW-UP    Name: Pablo Ortega    Age: 69 y.o.    Primary Care Physician: Francisco Yip DO    Date of Service: 7/2/2024    Chief Complaint:   Chief Complaint   Patient presents with    Annual Exam        Interim History:   Here for follow-up regarding coronary disease.  Status post multiple PCI's over the years to the RCA and mid LAD in 1998, 2002 angioplasty alone to the LAD into the diagonal, diagonal drug-eluting stent 2005, and most recent with NSTEMI in 2012 with drug-eluting stent to the mid LAD.  He also has hypertension hyperlipidemia.  Presents today for routine annual follow-up.    Feels well.  Denies chest pain, shortness breath, palpitations.  Doing some exercise programs at the gym.  Does yoga.  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    Hip doing much better.    Will need another cystoscopy later this year for surveillance.    Review of Systems:   Negative except as described above    Past Medical History:  Past Medical History:   Diagnosis Date    Anemia     Anxiety attack     controlled with citolpram    Arthritis     CAD (coronary artery disease) 17 YRS AGO    MI X 2. WITH STENTS, FOLLOWS WITH Dr. Craig yearly    Diabetes mellitus (HCC)     GERD (gastroesophageal reflux disease)     Hematuria     Hypercholesterolemia 1994    Hyperlipidemia     Hypertension 06/12/2014    NSTEMI (non-ST elevated myocardial infarction) (HCC) 05/28/2012       Past Surgical History:  Past Surgical History:   Procedure Laterality Date    ANKLE SURGERY  6 YRS AGO    right ORIF    BLADDER SURGERY Right 2/27/2023    CYSTOSCOPY RETROGRADE PYELOGRAM RIGHT URETEROSCPY, RIGHT URETERAL STENT INSERTION performed by Primo Moeller MD at [facility] OR    BLADDER SURGERY Right 7/26/2023    CYSTOSCOPY RETROGRADE PYELOGRAM URETEROSCOPY RIGHT STENT INSERTION performed by Primo Moeller MD at [facility] OR    CHOLECYSTECTOMY, LAPAROSCOPIC N/A 7/19/2019    CHOLECYSTECTOMY

## 2024-07-05 LAB — NON-GYN CYTOLOGY REPORT: NORMAL

## 2024-07-11 ENCOUNTER — TELEPHONE (OUTPATIENT)
Dept: PRIMARY CARE CLINIC | Age: 69
End: 2024-07-11

## 2024-07-12 RX ORDER — VALACYCLOVIR HYDROCHLORIDE 1 G/1
1000 TABLET, FILM COATED ORAL 3 TIMES DAILY
Qty: 21 TABLET | Refills: 0 | Status: SHIPPED | OUTPATIENT
Start: 2024-07-12 | End: 2024-07-19

## 2024-07-12 NOTE — TELEPHONE ENCOUNTER
I will send in for a medication.    Please have him schedule an appointment if his symptoms fail to improve.

## 2024-07-15 RX ORDER — TIRZEPATIDE 2.5 MG/.5ML
2.5 INJECTION, SOLUTION SUBCUTANEOUS WEEKLY
Qty: 4 EACH | Refills: 0 | Status: SHIPPED | OUTPATIENT
Start: 2024-07-15

## 2024-08-15 RX ORDER — OMEGA-3-ACID ETHYL ESTERS 1 G/1
2 CAPSULE, LIQUID FILLED ORAL 2 TIMES DAILY
Qty: 360 CAPSULE | Refills: 1 | Status: SHIPPED | OUTPATIENT
Start: 2024-08-15

## 2024-08-19 DIAGNOSIS — I25.10 ATHEROSCLEROTIC HEART DISEASE OF NATIVE CORONARY ARTERY WITHOUT ANGINA PECTORIS: ICD-10-CM

## 2024-08-19 RX ORDER — ATORVASTATIN CALCIUM 80 MG/1
80 TABLET, FILM COATED ORAL DAILY
Qty: 90 TABLET | Refills: 1 | Status: SHIPPED | OUTPATIENT
Start: 2024-08-19

## 2024-08-22 ENCOUNTER — OFFICE VISIT (OUTPATIENT)
Dept: ORTHOPEDIC SURGERY | Age: 69
End: 2024-08-22

## 2024-08-22 DIAGNOSIS — M25.512 LEFT SHOULDER PAIN, UNSPECIFIED CHRONICITY: Primary | ICD-10-CM

## 2024-08-22 RX ORDER — TRIAMCINOLONE ACETONIDE 40 MG/ML
40 INJECTION, SUSPENSION INTRA-ARTICULAR; INTRAMUSCULAR ONCE
Status: COMPLETED | OUTPATIENT
Start: 2024-08-22 | End: 2024-08-22

## 2024-08-22 RX ORDER — BUPIVACAINE HYDROCHLORIDE 2.5 MG/ML
2 INJECTION, SOLUTION INFILTRATION; PERINEURAL ONCE
Status: COMPLETED | OUTPATIENT
Start: 2024-08-22 | End: 2024-08-22

## 2024-08-22 RX ADMIN — BUPIVACAINE HYDROCHLORIDE 5 MG: 2.5 INJECTION, SOLUTION INFILTRATION; PERINEURAL at 09:33

## 2024-08-22 RX ADMIN — TRIAMCINOLONE ACETONIDE 40 MG: 40 INJECTION, SUSPENSION INTRA-ARTICULAR; INTRAMUSCULAR at 09:34

## 2024-08-22 NOTE — PROGRESS NOTES
losartan (COZAAR) 100 MG tablet, Take 1 tablet by mouth daily, Disp: 90 tablet, Rfl: 1    omeprazole (PRILOSEC) 40 MG delayed release capsule, Take 1 capsule by mouth daily, Disp: 90 capsule, Rfl: 1    ezetimibe (ZETIA) 10 MG tablet, Take 1 tablet by mouth daily, Disp: 90 tablet, Rfl: 1    citalopram (CELEXA) 40 MG tablet, TAKE 1 AND 1/2 TABLETS BY MOUTH EVERY MORNING (Patient taking differently: Take 1 tablet by mouth daily 6o mg daily), Disp: 135 tablet, Rfl: 1    Handicap Placard MISC, by Does not apply route Dx - Gait instability Duration - 5 years from today's date, Disp: 1 each, Rfl: 0    aspirin 81 MG EC tablet, Take 1 tablet by mouth daily, Disp: , Rfl:     docusate sodium (COLACE, DULCOLAX) 100 MG CAPS, Take 100 mg by mouth 2 times daily (Patient taking differently: Take 100 mg by mouth as needed), Disp: 60 capsule, Rfl: 0    ferrous sulfate (IRON 325) 325 (65 Fe) MG tablet, Take 1 tablet by mouth daily (with breakfast), Disp: 30 tablet, Rfl: 3    nitroGLYCERIN (NITROSTAT) 0.4 MG SL tablet, Place 1 tablet under the tongue every 5 minutes as needed for Chest pain If chest pain: put 1 NTG tab under tongue - sit down - wait 5 min - if no relief, call 911.  May repeat dose 2 more times 5 min apart while waiting for EMS (total of 3 doses).  If dizzy do not take any further doses., Disp: 90 tablet, Rfl: 1    Cholecalciferol (VITAMIN D3) 5000 UNITS TABS, Take 1 tablet by mouth every morning, Disp: , Rfl:     Ascorbic Acid (VITAMIN C) 500 MG tablet, Take 1 tablet by mouth every morning, Disp: , Rfl:     fish oil-omega-3 fatty acids 1000 MG capsule, Take 1 capsule by mouth every morning, Disp: , Rfl:     ALLERGIES  Allergies   Allergen Reactions    Penicillins Hives       Controlled Substances Monitoring:        REVIEW OF SYSTEMS:     Constitutional:  Negative for weight loss, fevers, chills, fatigue  Cardiovascular: Negative for chest pain, palpitations  Pulmonary: Negative for shortness of breath, labored

## 2024-09-03 ENCOUNTER — TELEPHONE (OUTPATIENT)
Dept: PRIMARY CARE CLINIC | Age: 69
End: 2024-09-03

## 2024-09-03 RX ORDER — OMEPRAZOLE 40 MG/1
40 CAPSULE, DELAYED RELEASE ORAL DAILY
Qty: 90 CAPSULE | Refills: 1 | Status: SHIPPED | OUTPATIENT
Start: 2024-09-03

## 2024-09-03 NOTE — TELEPHONE ENCOUNTER
Pt needs a refill on his    Omeprazole 40 MG delayed release capsule     [facility] on [address] in [place]

## 2024-10-11 RX ORDER — CITALOPRAM HYDROBROMIDE 40 MG/1
TABLET ORAL
Qty: 135 TABLET | Refills: 1 | Status: SHIPPED | OUTPATIENT
Start: 2024-10-11

## 2024-10-28 ENCOUNTER — TELEPHONE (OUTPATIENT)
Dept: PRIMARY CARE CLINIC | Age: 69
End: 2024-10-28

## 2024-10-28 RX ORDER — OMEPRAZOLE 40 MG/1
40 CAPSULE, DELAYED RELEASE ORAL 2 TIMES DAILY
Qty: 180 CAPSULE | Refills: 1 | Status: SHIPPED | OUTPATIENT
Start: 2024-10-28

## 2024-10-28 NOTE — TELEPHONE ENCOUNTER
Patient is requesting a refill for his Omeprazole 40mg.    HE TAKES 1 BID    KENNETH Eisenberg    Thank you

## 2024-11-06 ENCOUNTER — OFFICE VISIT (OUTPATIENT)
Age: 69
End: 2024-11-06

## 2024-11-06 VITALS
DIASTOLIC BLOOD PRESSURE: 82 MMHG | RESPIRATION RATE: 16 BRPM | BODY MASS INDEX: 31.43 KG/M2 | HEART RATE: 64 BPM | OXYGEN SATURATION: 97 % | SYSTOLIC BLOOD PRESSURE: 130 MMHG | HEIGHT: 68 IN | WEIGHT: 207.4 LBS | TEMPERATURE: 98.3 F

## 2024-11-06 DIAGNOSIS — J40 SINOBRONCHITIS: Primary | ICD-10-CM

## 2024-11-06 DIAGNOSIS — J32.9 SINOBRONCHITIS: Primary | ICD-10-CM

## 2024-11-06 DIAGNOSIS — J02.9 ACUTE SORE THROAT: ICD-10-CM

## 2024-11-06 LAB — S PYO AG THROAT QL: NORMAL

## 2024-11-06 RX ORDER — CEFDINIR 300 MG/1
300 CAPSULE ORAL 2 TIMES DAILY
Qty: 20 CAPSULE | Refills: 0 | Status: SHIPPED | OUTPATIENT
Start: 2024-11-06 | End: 2024-11-16

## 2024-11-06 NOTE — PROGRESS NOTES
cough, pharyngitis and ear pain.    Diagnoses and all orders for this visit:    Acute sore throat  -     POCT rapid strep A    Other orders  -     cefdinir (OMNICEF) 300 MG capsule; Take 1 capsule by mouth 2 times daily for 10 days      Disposition:  Disposition: Discharge to home.    Vital signs, past medical history, medications, and allergies reviewed at visit.    Script written for cefdinir, side effects discussed.     Increase fluids and rest. Symptomatic relief discussed.     F/u PCP in 5-7 days if symptoms persist. ED sooner if symptoms worsen or change. Red flag symptoms discussed. Pt is in agreement with this care plan. All questions answered.    Brandy Sheridan, APRN - CNP    **This report was transcribed using voice recognition software. Every effort was made to ensure accuracy; however, inadvertent computerized transcription errors may be present.

## 2024-11-13 LAB
MICROORGANISM SPEC CULT: NO GROWTH
SPECIMEN DESCRIPTION: NORMAL

## 2024-11-16 DIAGNOSIS — I10 PRIMARY HYPERTENSION: ICD-10-CM

## 2024-11-18 RX ORDER — CHLORTHALIDONE 25 MG/1
25 TABLET ORAL DAILY
Qty: 90 TABLET | Refills: 1 | Status: SHIPPED | OUTPATIENT
Start: 2024-11-18

## 2024-11-18 NOTE — TELEPHONE ENCOUNTER
Name of Medication(s) Requested:  Requested Prescriptions     Pending Prescriptions Disp Refills    chlorthalidone (HYGROTON) 25 MG tablet [Pharmacy Med Name: CHLORTHALIDONE 25 MG TABLET] 90 tablet 1     Sig: TAKE 1 TABLET BY MOUTH EVERY DAY       Medication is on current medication list  No    Dosage and directions were verified? Yes    Quantity verified: 90 day supply     Pharmacy Verified?  Yes    Last Appointment:  4/17/2024    Future appts:  Future Appointments   Date Time Provider Department Center   4/23/2025 10:00 AM Francisco Yip, DO [place] DE[facility]        (If no appt send self scheduling link. .REFILLAPPT)  Scheduling request sent?     [] Yes  [x] No    Does patient need updated?  [] Yes  [x] No

## 2024-12-10 ENCOUNTER — TELEPHONE (OUTPATIENT)
Dept: CARDIOLOGY CLINIC | Age: 69
End: 2024-12-10

## 2024-12-10 NOTE — TELEPHONE ENCOUNTER
Spoke to patient denies any cardiac symptoms at this time. Patient needs clearance and to hold aspirin for 7 days  for Cystoscopy retrograde pyelogram plasma loop transurethral resection of the prostate to be done on 12/30/24 at [facility] By Dr. Primo Moeller.    Patient denies any chest pain, shortness of breath or palpitations since last visit in 7/2/24.  Patient is able to complete all activities of daily living, including; climbing one flight of stairs and walking one block without cardiac symptoms. Please advise.

## 2024-12-16 ENCOUNTER — TELEPHONE (OUTPATIENT)
Dept: PRIMARY CARE CLINIC | Age: 69
End: 2024-12-16

## 2024-12-16 DIAGNOSIS — I25.10 ATHEROSCLEROTIC HEART DISEASE OF NATIVE CORONARY ARTERY WITHOUT ANGINA PECTORIS: ICD-10-CM

## 2024-12-16 RX ORDER — ATORVASTATIN CALCIUM 80 MG/1
80 TABLET, FILM COATED ORAL DAILY
Qty: 90 TABLET | Refills: 1 | Status: SHIPPED | OUTPATIENT
Start: 2024-12-16

## 2024-12-19 ENCOUNTER — OFFICE VISIT (OUTPATIENT)
Dept: PRIMARY CARE CLINIC | Age: 69
End: 2024-12-19
Payer: COMMERCIAL

## 2024-12-19 VITALS
HEART RATE: 72 BPM | SYSTOLIC BLOOD PRESSURE: 110 MMHG | WEIGHT: 204 LBS | BODY MASS INDEX: 31.02 KG/M2 | TEMPERATURE: 97 F | DIASTOLIC BLOOD PRESSURE: 82 MMHG | OXYGEN SATURATION: 98 %

## 2024-12-19 DIAGNOSIS — Z11.59 NEED FOR HEPATITIS C SCREENING TEST: ICD-10-CM

## 2024-12-19 DIAGNOSIS — E11.9 CONTROLLED TYPE 2 DIABETES MELLITUS WITHOUT COMPLICATION, WITHOUT LONG-TERM CURRENT USE OF INSULIN (HCC): Primary | ICD-10-CM

## 2024-12-19 DIAGNOSIS — I10 ESSENTIAL HYPERTENSION: ICD-10-CM

## 2024-12-19 DIAGNOSIS — E11.9 CONTROLLED TYPE 2 DIABETES MELLITUS WITHOUT COMPLICATION, WITHOUT LONG-TERM CURRENT USE OF INSULIN (HCC): ICD-10-CM

## 2024-12-19 LAB
BASOPHILS ABSOLUTE: 0.06 K/UL (ref 0–0.2)
BASOPHILS RELATIVE PERCENT: 1 % (ref 0–2)
EOSINOPHILS ABSOLUTE: 0.29 K/UL (ref 0.05–0.5)
EOSINOPHILS RELATIVE PERCENT: 3 % (ref 0–6)
HBA1C MFR BLD: 6.3 % (ref 4–5.6)
HCT VFR BLD CALC: 45.6 % (ref 37–54)
HEMOGLOBIN: 15.7 G/DL (ref 12.5–16.5)
IMMATURE GRANULOCYTES %: 1 % (ref 0–5)
IMMATURE GRANULOCYTES ABSOLUTE: 0.05 K/UL (ref 0–0.58)
LYMPHOCYTES ABSOLUTE: 2.01 K/UL (ref 1.5–4)
LYMPHOCYTES RELATIVE PERCENT: 21 % (ref 20–42)
MCH RBC QN AUTO: 30.3 PG (ref 26–35)
MCHC RBC AUTO-ENTMCNC: 34.4 G/DL (ref 32–34.5)
MCV RBC AUTO: 87.9 FL (ref 80–99.9)
MONOCYTES ABSOLUTE: 0.69 K/UL (ref 0.1–0.95)
MONOCYTES RELATIVE PERCENT: 7 % (ref 2–12)
NEUTROPHILS ABSOLUTE: 6.72 K/UL (ref 1.8–7.3)
NEUTROPHILS RELATIVE PERCENT: 68 % (ref 43–80)
PDW BLD-RTO: 12.6 % (ref 11.5–15)
PLATELET # BLD: 286 K/UL (ref 130–450)
PMV BLD AUTO: 10.7 FL (ref 7–12)
RBC # BLD: 5.19 M/UL (ref 3.8–5.8)
WBC # BLD: 9.8 K/UL (ref 4.5–11.5)

## 2024-12-19 PROCEDURE — 3074F SYST BP LT 130 MM HG: CPT | Performed by: INTERNAL MEDICINE

## 2024-12-19 PROCEDURE — 99214 OFFICE O/P EST MOD 30 MIN: CPT | Performed by: INTERNAL MEDICINE

## 2024-12-19 PROCEDURE — 1123F ACP DISCUSS/DSCN MKR DOCD: CPT | Performed by: INTERNAL MEDICINE

## 2024-12-19 PROCEDURE — 3044F HG A1C LEVEL LT 7.0%: CPT | Performed by: INTERNAL MEDICINE

## 2024-12-19 PROCEDURE — 3079F DIAST BP 80-89 MM HG: CPT | Performed by: INTERNAL MEDICINE

## 2024-12-19 PROCEDURE — 1159F MED LIST DOCD IN RCRD: CPT | Performed by: INTERNAL MEDICINE

## 2024-12-19 SDOH — ECONOMIC STABILITY: INCOME INSECURITY: HOW HARD IS IT FOR YOU TO PAY FOR THE VERY BASICS LIKE FOOD, HOUSING, MEDICAL CARE, AND HEATING?: NOT HARD AT ALL

## 2024-12-19 SDOH — ECONOMIC STABILITY: FOOD INSECURITY: WITHIN THE PAST 12 MONTHS, YOU WORRIED THAT YOUR FOOD WOULD RUN OUT BEFORE YOU GOT MONEY TO BUY MORE.: NEVER TRUE

## 2024-12-19 SDOH — ECONOMIC STABILITY: FOOD INSECURITY: WITHIN THE PAST 12 MONTHS, THE FOOD YOU BOUGHT JUST DIDN'T LAST AND YOU DIDN'T HAVE MONEY TO GET MORE.: NEVER TRUE

## 2024-12-19 NOTE — PROGRESS NOTES
12/19/24    Pablo Ortega, a male of 69 y.o. presents today for Pre-op Exam      At last appointment, he was advised to start Celebrex for flank pain. We discussed the possibility of a referral to Urology vs CT abdomen.  He was seen in urgent care in November for upper respiratory tract infection symptoms. He was treated with Omnicef. He has followed with Dr. Andrea for shoulder joint injections. He has also seen Cardiology who performed EKG testing.  He is going to have a TURP.    The patient can perform moderate house work, achieving greater than 4 METS on the Duke activity score.  They are therefore a low preoperative cardiac risk    Diagnoses and all orders for this visit:  Controlled type 2 diabetes mellitus without complication, without long-term current use of insulin (HCC)  -     Comprehensive Metabolic Panel; Future  -     Hemoglobin A1C; Future  -     CBC with Auto Differential; Future  -     Lipid Panel; Future  Need for hepatitis C screening test  -     Hepatitis C Antibody; Future      Assessment and Plan  Low cardiac risk for surgery  Obtain routine blood work  Physically feeling well  Blood pressure well controlled          Electronically signed by Francisco Yip DO on 12/19/2024 at 1:51 PM                          /82   Pulse 72   Temp 97 °F (36.1 °C)   Wt 92.5 kg (204 lb)   SpO2 98%   BMI 31.02 kg/m²     Review of Systems  Constitutional:Negative for activity change, appetite change, chills, fatigue and fever.   Respiratory: Negative for choking, chest tightness, shortness of breath and wheezing.  Cardiovascular: Negative for chest pain, palpitations and leg swelling.   Gastrointestinal: Negative for abdominal distention, constipation, diarrhea, nausea and vomiting.   Musculoskeletal: Negative for arthralgias, back pain, gait problem and joint swelling.   Neurological: Negative for dizziness, weakness,numbness and headaches.     Patient Active Problem List    Diagnosis

## 2024-12-20 LAB
ALBUMIN: 4.4 G/DL (ref 3.5–5.2)
ALP BLD-CCNC: 83 U/L (ref 40–129)
ALT SERPL-CCNC: 35 U/L (ref 0–40)
ANION GAP SERPL CALCULATED.3IONS-SCNC: 14 MMOL/L (ref 7–16)
AST SERPL-CCNC: 28 U/L (ref 0–39)
BILIRUB SERPL-MCNC: 1.4 MG/DL (ref 0–1.2)
BUN BLDV-MCNC: 19 MG/DL (ref 6–23)
CALCIUM SERPL-MCNC: 9.9 MG/DL (ref 8.6–10.2)
CHLORIDE BLD-SCNC: 96 MMOL/L (ref 98–107)
CHOLESTEROL, TOTAL: 183 MG/DL
CO2: 27 MMOL/L (ref 22–29)
CREAT SERPL-MCNC: 1.1 MG/DL (ref 0.7–1.2)
GFR, ESTIMATED: 69 ML/MIN/1.73M2
GLUCOSE BLD-MCNC: 144 MG/DL (ref 74–99)
HDLC SERPL-MCNC: 34 MG/DL
HEPATITIS C ANTIBODY: NONREACTIVE
LDL CHOLESTEROL: 112 MG/DL
POTASSIUM SERPL-SCNC: 3.6 MMOL/L (ref 3.5–5)
SODIUM BLD-SCNC: 137 MMOL/L (ref 132–146)
TOTAL PROTEIN: 7.3 G/DL (ref 6.4–8.3)
TRIGL SERPL-MCNC: 186 MG/DL
VLDLC SERPL CALC-MCNC: 37 MG/DL

## 2024-12-30 ENCOUNTER — HOSPITAL ENCOUNTER (OUTPATIENT)
Age: 69
Discharge: HOME | End: 2025-01-01

## 2024-12-31 ENCOUNTER — HOSPITAL ENCOUNTER (OUTPATIENT)
Age: 69
Discharge: HOME | End: 2025-01-02

## 2024-12-31 LAB
ANION GAP SERPL CALCULATED.3IONS-SCNC: 10 MMOL/L (ref 7–16)
BUN SERPL-MCNC: 21 MG/DL (ref 6–23)
CALCIUM SERPL-MCNC: 8.8 MG/DL (ref 8.6–10.2)
CHLORIDE SERPL-SCNC: 99 MMOL/L (ref 98–107)
CO2 SERPL-SCNC: 28 MMOL/L (ref 22–29)
CREAT SERPL-MCNC: 1.2 MG/DL (ref 0.7–1.2)
ERYTHROCYTE [DISTWIDTH] IN BLOOD BY AUTOMATED COUNT: 12.7 % (ref 11.5–15)
GFR, ESTIMATED: 65 ML/MIN/1.73M2
GLUCOSE SERPL-MCNC: 117 MG/DL (ref 74–99)
HCT VFR BLD AUTO: 38.6 % (ref 37–54)
HGB BLD-MCNC: 13.3 G/DL (ref 12.5–16.5)
MCH RBC QN AUTO: 30.5 PG (ref 26–35)
MCHC RBC AUTO-ENTMCNC: 34.5 G/DL (ref 32–34.5)
MCV RBC AUTO: 88.5 FL (ref 80–99.9)
PLATELET # BLD AUTO: 203 K/UL (ref 130–450)
PMV BLD AUTO: 10.7 FL (ref 7–12)
POTASSIUM SERPL-SCNC: 3.6 MMOL/L (ref 3.5–5)
RBC # BLD AUTO: 4.36 M/UL (ref 3.8–5.8)
SODIUM SERPL-SCNC: 137 MMOL/L (ref 132–146)
WBC OTHER # BLD: 11.6 K/UL (ref 4.5–11.5)

## 2024-12-31 PROCEDURE — 85027 COMPLETE CBC AUTOMATED: CPT

## 2024-12-31 PROCEDURE — 80048 BASIC METABOLIC PNL TOTAL CA: CPT

## 2025-01-06 LAB — SURGICAL PATHOLOGY REPORT: NORMAL

## 2025-01-08 DIAGNOSIS — G72.0 DRUG-INDUCED MYOPATHY: ICD-10-CM

## 2025-01-08 RX ORDER — EZETIMIBE 10 MG/1
10 TABLET ORAL DAILY
Qty: 90 TABLET | Refills: 1 | Status: SHIPPED | OUTPATIENT
Start: 2025-01-08

## 2025-01-14 ENCOUNTER — TELEPHONE (OUTPATIENT)
Dept: PRIMARY CARE CLINIC | Age: 70
End: 2025-01-14

## 2025-01-15 DIAGNOSIS — I25.10 ATHEROSCLEROTIC HEART DISEASE OF NATIVE CORONARY ARTERY WITHOUT ANGINA PECTORIS: ICD-10-CM

## 2025-01-15 RX ORDER — AMLODIPINE BESYLATE 10 MG/1
TABLET ORAL
Qty: 90 TABLET | Refills: 1 | Status: SHIPPED | OUTPATIENT
Start: 2025-01-15

## 2025-01-15 RX ORDER — METOPROLOL TARTRATE 25 MG/1
TABLET, FILM COATED ORAL
Qty: 180 TABLET | Refills: 1 | Status: SHIPPED | OUTPATIENT
Start: 2025-01-15

## 2025-02-14 RX ORDER — LOSARTAN POTASSIUM 100 MG/1
100 TABLET ORAL DAILY
Qty: 90 TABLET | Refills: 1 | Status: SHIPPED | OUTPATIENT
Start: 2025-02-14

## 2025-03-04 ENCOUNTER — TELEPHONE (OUTPATIENT)
Dept: PRIMARY CARE CLINIC | Age: 70
End: 2025-03-04

## 2025-03-04 RX ORDER — LOSARTAN POTASSIUM 100 MG/1
100 TABLET ORAL DAILY
Qty: 90 TABLET | Refills: 1 | Status: SHIPPED | OUTPATIENT
Start: 2025-03-04

## 2025-03-04 NOTE — TELEPHONE ENCOUNTER
Please refill:    Losartan 100MG      Please change all medication refills to  [facility] [address]  [phone]      Thank you

## 2025-03-13 ENCOUNTER — TELEPHONE (OUTPATIENT)
Dept: PRIMARY CARE CLINIC | Age: 70
End: 2025-03-13

## 2025-03-13 DIAGNOSIS — I10 PRIMARY HYPERTENSION: ICD-10-CM

## 2025-03-13 NOTE — TELEPHONE ENCOUNTER
Pablo needs a refill on his Chlorthalidone 25 mg for a 90 day supply    [facility]

## 2025-03-14 RX ORDER — CHLORTHALIDONE 25 MG/1
25 TABLET ORAL DAILY
Qty: 90 TABLET | Refills: 1 | Status: SHIPPED | OUTPATIENT
Start: 2025-03-14

## 2025-03-20 ENCOUNTER — OFFICE VISIT (OUTPATIENT)
Dept: ORTHOPEDIC SURGERY | Age: 70
End: 2025-03-20

## 2025-03-20 VITALS
TEMPERATURE: 99 F | HEART RATE: 60 BPM | DIASTOLIC BLOOD PRESSURE: 82 MMHG | SYSTOLIC BLOOD PRESSURE: 122 MMHG | OXYGEN SATURATION: 95 %

## 2025-03-20 DIAGNOSIS — M65.351 TRIGGER LITTLE FINGER OF RIGHT HAND: ICD-10-CM

## 2025-03-20 DIAGNOSIS — M25.511 RIGHT SHOULDER PAIN, UNSPECIFIED CHRONICITY: Primary | ICD-10-CM

## 2025-03-20 RX ORDER — TRIAMCINOLONE ACETONIDE 40 MG/ML
40 INJECTION, SUSPENSION INTRA-ARTICULAR; INTRAMUSCULAR ONCE
Status: COMPLETED | OUTPATIENT
Start: 2025-03-20 | End: 2025-03-20

## 2025-03-20 RX ORDER — SEMAGLUTIDE 0.68 MG/ML
INJECTION, SOLUTION SUBCUTANEOUS
COMMUNITY

## 2025-03-20 RX ORDER — BUPIVACAINE HYDROCHLORIDE 2.5 MG/ML
2 INJECTION, SOLUTION INFILTRATION; PERINEURAL ONCE
Status: COMPLETED | OUTPATIENT
Start: 2025-03-20 | End: 2025-03-20

## 2025-03-20 RX ORDER — BUPIVACAINE HYDROCHLORIDE 2.5 MG/ML
0.5 INJECTION, SOLUTION INFILTRATION; PERINEURAL ONCE
Status: COMPLETED | OUTPATIENT
Start: 2025-03-20 | End: 2025-03-20

## 2025-03-20 RX ORDER — TRIAMCINOLONE ACETONIDE 40 MG/ML
20 INJECTION, SUSPENSION INTRA-ARTICULAR; INTRAMUSCULAR ONCE
Status: COMPLETED | OUTPATIENT
Start: 2025-03-20 | End: 2025-03-20

## 2025-03-20 RX ADMIN — BUPIVACAINE HYDROCHLORIDE 5 MG: 2.5 INJECTION, SOLUTION INFILTRATION; PERINEURAL at 12:58

## 2025-03-20 RX ADMIN — TRIAMCINOLONE ACETONIDE 20 MG: 40 INJECTION, SUSPENSION INTRA-ARTICULAR; INTRAMUSCULAR at 12:59

## 2025-03-20 RX ADMIN — TRIAMCINOLONE ACETONIDE 40 MG: 40 INJECTION, SUSPENSION INTRA-ARTICULAR; INTRAMUSCULAR at 13:00

## 2025-03-20 RX ADMIN — BUPIVACAINE HYDROCHLORIDE 1.25 MG: 2.5 INJECTION, SOLUTION INFILTRATION; PERINEURAL at 11:53

## 2025-03-20 NOTE — PROGRESS NOTES
New Shoulder Patient Visit     Referring Provider:   No referring provider defined for this encounter.    CHIEF COMPLAINT:   Chief Complaint   Patient presents with    Shoulder Pain     Right shoulder pain x several months. No injury    Pain     Right small finger trigger finger        HPI:      Pablo Ortega is a 69 y.o. year old male who presents with right shoulder pain for the past several months.  He complains of pain with overhead activity that is worse at night.  Denies any specific injury.  This is just been getting chronically worse.  Denies any weakness.  Denies numbness tingling.  Also complains of pain at the base of his small finger and triggering.  He does have a history of a right middle finger trigger release.  He is right-hand dominant.    PAST MEDICAL HISTORY  Past Medical History:   Diagnosis Date    Anemia     Anxiety attack     controlled with citolpram    Arthritis     CAD (coronary artery disease) 17 YRS AGO    MI X 2. WITH STENTS, FOLLOWS WITH Dr. Craig yearly    Diabetes mellitus (HCC)     GERD (gastroesophageal reflux disease)     Hematuria     Hypercholesterolemia 1994    Hyperlipidemia     Hypertension 06/12/2014    NSTEMI (non-ST elevated myocardial infarction) (HCC) 05/28/2012       PAST SURGICAL HISTORY  Past Surgical History:   Procedure Laterality Date    ANKLE SURGERY  6 YRS AGO    right ORIF    BLADDER SURGERY Right 2/27/2023    CYSTOSCOPY RETROGRADE PYELOGRAM RIGHT URETEROSCPY, RIGHT URETERAL STENT INSERTION performed by Primo Moeller MD at [facility] OR    BLADDER SURGERY Right 7/26/2023    CYSTOSCOPY RETROGRADE PYELOGRAM URETEROSCOPY RIGHT STENT INSERTION performed by Primo Moeller MD at [facility] OR    CHOLECYSTECTOMY, LAPAROSCOPIC N/A 7/19/2019    CHOLECYSTECTOMY LAPAROSCOPIC ROBOTIC ASSISTED  XI performed by Ayse Leos MD at [facility] OR    COLONOSCOPY      COLONOSCOPY  07/20/2015    COLONOSCOPY N/A 9/16/2022    COLORECTAL CANCER SCREENING, NOT HIGH RISK performed by Ayse CASTELAN

## 2025-04-14 DIAGNOSIS — I25.10 ATHEROSCLEROTIC HEART DISEASE OF NATIVE CORONARY ARTERY WITHOUT ANGINA PECTORIS: ICD-10-CM

## 2025-04-14 RX ORDER — OMEPRAZOLE 40 MG/1
CAPSULE, DELAYED RELEASE ORAL
Qty: 180 CAPSULE | Refills: 1 | Status: SHIPPED | OUTPATIENT
Start: 2025-04-14

## 2025-04-14 RX ORDER — OMEGA-3-ACID ETHYL ESTERS 1 G/1
2 CAPSULE, LIQUID FILLED ORAL 2 TIMES DAILY
Qty: 360 CAPSULE | Refills: 1 | Status: SHIPPED | OUTPATIENT
Start: 2025-04-14

## 2025-04-14 RX ORDER — CITALOPRAM HYDROBROMIDE 40 MG/1
TABLET ORAL
Qty: 135 TABLET | Refills: 1 | Status: SHIPPED | OUTPATIENT
Start: 2025-04-14

## 2025-04-14 RX ORDER — ATORVASTATIN CALCIUM 80 MG/1
80 TABLET, FILM COATED ORAL DAILY
Qty: 90 TABLET | Refills: 1 | Status: SHIPPED | OUTPATIENT
Start: 2025-04-14

## 2025-04-14 NOTE — TELEPHONE ENCOUNTER
Patients last appointment 12/19/2024.  Patients next scheduled appointment   Future Appointments   Date Time Provider Department Center   5/9/2025  2:00 PM Francisco Yip, DO [facility] DEP

## 2025-05-05 DIAGNOSIS — I25.10 ATHEROSCLEROTIC HEART DISEASE OF NATIVE CORONARY ARTERY WITHOUT ANGINA PECTORIS: ICD-10-CM

## 2025-05-05 NOTE — TELEPHONE ENCOUNTER
Pt needs a refill     Metoprolol Tartrate 25  tablets take 1 tablet by mouth twice a day  Amlodipine 10 MG 90 day supply tale 1 tablet of mouth every day    [id number] - [place], [place] - [address] - P [phone] - F [phone] [address] [place] [address] [place] [address]  Phone: [phone]  Fax: [phone]

## 2025-05-06 RX ORDER — METOPROLOL TARTRATE 25 MG/1
25 TABLET, FILM COATED ORAL 2 TIMES DAILY
Qty: 180 TABLET | Refills: 1 | Status: SHIPPED | OUTPATIENT
Start: 2025-05-06

## 2025-05-06 RX ORDER — AMLODIPINE BESYLATE 10 MG/1
10 TABLET ORAL DAILY
Qty: 90 TABLET | Refills: 1 | Status: SHIPPED | OUTPATIENT
Start: 2025-05-06

## 2025-05-09 ENCOUNTER — OFFICE VISIT (OUTPATIENT)
Dept: PRIMARY CARE CLINIC | Age: 70
End: 2025-05-09
Payer: MEDICARE

## 2025-05-09 VITALS
HEIGHT: 68 IN | DIASTOLIC BLOOD PRESSURE: 72 MMHG | OXYGEN SATURATION: 98 % | TEMPERATURE: 97.7 F | WEIGHT: 202 LBS | BODY MASS INDEX: 30.62 KG/M2 | SYSTOLIC BLOOD PRESSURE: 110 MMHG | HEART RATE: 52 BPM

## 2025-05-09 DIAGNOSIS — Z12.5 PROSTATE CANCER SCREENING: ICD-10-CM

## 2025-05-09 DIAGNOSIS — Z00.00 MEDICARE ANNUAL WELLNESS VISIT, SUBSEQUENT: Primary | ICD-10-CM

## 2025-05-09 DIAGNOSIS — E11.9 CONTROLLED TYPE 2 DIABETES MELLITUS WITHOUT COMPLICATION, WITHOUT LONG-TERM CURRENT USE OF INSULIN (HCC): ICD-10-CM

## 2025-05-09 PROCEDURE — 1123F ACP DISCUSS/DSCN MKR DOCD: CPT | Performed by: INTERNAL MEDICINE

## 2025-05-09 PROCEDURE — G0439 PPPS, SUBSEQ VISIT: HCPCS | Performed by: INTERNAL MEDICINE

## 2025-05-09 PROCEDURE — 3074F SYST BP LT 130 MM HG: CPT | Performed by: INTERNAL MEDICINE

## 2025-05-09 PROCEDURE — 3078F DIAST BP <80 MM HG: CPT | Performed by: INTERNAL MEDICINE

## 2025-05-09 PROCEDURE — 1159F MED LIST DOCD IN RCRD: CPT | Performed by: INTERNAL MEDICINE

## 2025-05-09 RX ORDER — OMEPRAZOLE 40 MG/1
40 CAPSULE, DELAYED RELEASE ORAL DAILY
Qty: 180 CAPSULE | Refills: 1 | Status: SHIPPED | OUTPATIENT
Start: 2025-05-09

## 2025-05-09 SDOH — ECONOMIC STABILITY: FOOD INSECURITY: WITHIN THE PAST 12 MONTHS, YOU WORRIED THAT YOUR FOOD WOULD RUN OUT BEFORE YOU GOT MONEY TO BUY MORE.: NEVER TRUE

## 2025-05-09 SDOH — ECONOMIC STABILITY: FOOD INSECURITY: WITHIN THE PAST 12 MONTHS, THE FOOD YOU BOUGHT JUST DIDN'T LAST AND YOU DIDN'T HAVE MONEY TO GET MORE.: NEVER TRUE

## 2025-05-09 ASSESSMENT — PATIENT HEALTH QUESTIONNAIRE - PHQ9
SUM OF ALL RESPONSES TO PHQ QUESTIONS 1-9: 0
SUM OF ALL RESPONSES TO PHQ QUESTIONS 1-9: 0
2. FEELING DOWN, DEPRESSED OR HOPELESS: NOT AT ALL
SUM OF ALL RESPONSES TO PHQ QUESTIONS 1-9: 0
1. LITTLE INTEREST OR PLEASURE IN DOING THINGS: NOT AT ALL
SUM OF ALL RESPONSES TO PHQ QUESTIONS 1-9: 0

## 2025-05-09 ASSESSMENT — LIFESTYLE VARIABLES
HOW MANY STANDARD DRINKS CONTAINING ALCOHOL DO YOU HAVE ON A TYPICAL DAY: PATIENT DOES NOT DRINK
HOW OFTEN DO YOU HAVE A DRINK CONTAINING ALCOHOL: NEVER

## 2025-05-09 NOTE — PROGRESS NOTES
tablet by mouth daily Yes Raul Sin MD   docusate sodium (COLACE, DULCOLAX) 100 MG CAPS Take 100 mg by mouth 2 times daily Yes Juan Alberto Dao MD   ferrous sulfate (IRON 325) 325 (65 Fe) MG tablet Take 1 tablet by mouth daily (with breakfast) Yes Juan Alberto Dao MD   nitroGLYCERIN (NITROSTAT) 0.4 MG SL tablet Place 1 tablet under the tongue every 5 minutes as needed for Chest pain If chest pain: put 1 NTG tab under tongue - sit down - wait 5 min - if no relief, call 911.  May repeat dose 2 more times 5 min apart while waiting for EMS (total of 3 doses).  If dizzy do not take any further doses. Yes Francisco Yip DO   Cholecalciferol (VITAMIN D3) 5000 UNITS TABS Take 1 tablet by mouth every morning Yes Raul Sin MD   Ascorbic Acid (VITAMIN C) 500 MG tablet Take 1 tablet by mouth every morning Yes Raul Sni MD   fish oil-omega-3 fatty acids 1000 MG capsule Take 1 capsule by mouth every morning Yes Raul Sin MD       CareTeam (Including outside providers/suppliers regularly involved in providing care):   Patient Care Team:  Francisco Yip DO as PCP - General (Internal Medicine)  Francisco Yip DO as PCP - Empaneled Provider     Recommendations for Preventive Services Due: see orders and patient instructions/AVS.  Recommended screening schedule for the next 5-10 years is provided to the patient in written form: see Patient Instructions/AVS.     Reviewed and updated this visit:  Allergies  Meds  Sexual Hx                  The patient (or guardian, if applicable) and other individuals in attendance with the patient were advised that Artificial Intelligence will be utilized during this visit to record and process the conversation to generate a clinical note. The patient (or guardian, if applicable) and other individuals in attendance at the appointment consented to the use of AI, including the recording.

## 2025-05-09 NOTE — PATIENT INSTRUCTIONS
have any problems.  Where can you learn more?  Go to https://www.healthNiteTables.net/patientEd and enter F075 to learn more about \"A Healthy Heart: Care Instructions.\"  Current as of: July 31, 2024  Content Version: 14.4  © 7772-7700 [employer].   Care instructions adapted under license by [employer]. If you have questions about a medical condition or this instruction, always ask your healthcare professional. [employer] disclaims any warranty or liability for your use of this information.    Personalized Preventive Plan for Pablo Ortega - 5/9/2025  Medicare offers a range of preventive health benefits. Some of the tests and screenings are paid in full while other may be subject to a deductible, co-insurance, and/or copay.  Some of these benefits include a comprehensive review of your medical history including lifestyle, illnesses that may run in your family, and various assessments and screenings as appropriate.  After reviewing your medical record and screening and assessments performed today your provider may have ordered immunizations, labs, imaging, and/or referrals for you.  A list of these orders (if applicable) as well as your Preventive Care list are included within your After Visit Summary for your review.      
NEGATIVE

## 2025-05-21 ENCOUNTER — HOSPITAL ENCOUNTER (OUTPATIENT)
Age: 70
Discharge: HOME | End: 2025-05-21
Payer: MEDICARE

## 2025-05-21 DIAGNOSIS — Z12.5 PROSTATE CANCER SCREENING: ICD-10-CM

## 2025-05-21 DIAGNOSIS — E11.9 CONTROLLED TYPE 2 DIABETES MELLITUS WITHOUT COMPLICATION, WITHOUT LONG-TERM CURRENT USE OF INSULIN (HCC): ICD-10-CM

## 2025-05-21 LAB
ALBUMIN SERPL-MCNC: 4.4 G/DL (ref 3.5–5.2)
ALP SERPL-CCNC: 79 U/L (ref 40–129)
ALT SERPL-CCNC: 37 U/L (ref 0–40)
ANION GAP SERPL CALCULATED.3IONS-SCNC: 12 MMOL/L (ref 7–16)
AST SERPL-CCNC: 25 U/L (ref 0–39)
BASOPHILS # BLD: 0.07 K/UL (ref 0–0.2)
BASOPHILS NFR BLD: 1 % (ref 0–2)
BILIRUB SERPL-MCNC: 1.5 MG/DL (ref 0–1.2)
BUN SERPL-MCNC: 22 MG/DL (ref 6–23)
CALCIUM SERPL-MCNC: 9.8 MG/DL (ref 8.6–10.2)
CHLORIDE SERPL-SCNC: 93 MMOL/L (ref 98–107)
CO2 SERPL-SCNC: 30 MMOL/L (ref 22–29)
CREAT SERPL-MCNC: 1.1 MG/DL (ref 0.7–1.2)
CREAT UR-MCNC: 179 MG/DL (ref 40–278)
EOSINOPHIL # BLD: 0.26 K/UL (ref 0.05–0.5)
EOSINOPHILS RELATIVE PERCENT: 2 % (ref 0–6)
ERYTHROCYTE [DISTWIDTH] IN BLOOD BY AUTOMATED COUNT: 13.3 % (ref 11.5–15)
GFR, ESTIMATED: 69 ML/MIN/1.73M2
GLUCOSE SERPL-MCNC: 141 MG/DL (ref 74–99)
HBA1C MFR BLD: 7.5 % (ref 4–5.6)
HCT VFR BLD AUTO: 42.8 % (ref 37–54)
HGB BLD-MCNC: 15 G/DL (ref 12.5–16.5)
IMM GRANULOCYTES # BLD AUTO: 0.07 K/UL (ref 0–0.58)
IMM GRANULOCYTES NFR BLD: 1 % (ref 0–5)
LYMPHOCYTES NFR BLD: 2.17 K/UL (ref 1.5–4)
LYMPHOCYTES RELATIVE PERCENT: 19 % (ref 20–42)
MCH RBC QN AUTO: 29.9 PG (ref 26–35)
MCHC RBC AUTO-ENTMCNC: 35 G/DL (ref 32–34.5)
MCV RBC AUTO: 85.4 FL (ref 80–99.9)
MICROALBUMIN UR-MCNC: 17 MG/L (ref 0–20)
MICROALBUMIN/CREAT UR-RTO: 9 MCG/MG CREAT (ref 0–30)
MONOCYTES NFR BLD: 0.89 K/UL (ref 0.1–0.95)
MONOCYTES NFR BLD: 8 % (ref 2–12)
NEUTROPHILS NFR BLD: 70 % (ref 43–80)
NEUTS SEG NFR BLD: 7.96 K/UL (ref 1.8–7.3)
PLATELET # BLD AUTO: 233 K/UL (ref 130–450)
PMV BLD AUTO: 9.9 FL (ref 7–12)
POTASSIUM SERPL-SCNC: 3.4 MMOL/L (ref 3.5–5)
PROT SERPL-MCNC: 7.1 G/DL (ref 6.4–8.3)
RBC # BLD AUTO: 5.01 M/UL (ref 3.8–5.8)
SODIUM SERPL-SCNC: 135 MMOL/L (ref 132–146)
WBC OTHER # BLD: 11.4 K/UL (ref 4.5–11.5)

## 2025-05-21 PROCEDURE — 80061 LIPID PANEL: CPT

## 2025-05-21 PROCEDURE — 82043 UR ALBUMIN QUANTITATIVE: CPT

## 2025-05-21 PROCEDURE — 36415 COLL VENOUS BLD VENIPUNCTURE: CPT

## 2025-05-21 PROCEDURE — G0103 PSA SCREENING: HCPCS

## 2025-05-21 PROCEDURE — 85025 COMPLETE CBC W/AUTO DIFF WBC: CPT

## 2025-05-21 PROCEDURE — 80053 COMPREHEN METABOLIC PANEL: CPT

## 2025-05-21 PROCEDURE — 82570 ASSAY OF URINE CREATININE: CPT

## 2025-05-21 PROCEDURE — 83036 HEMOGLOBIN GLYCOSYLATED A1C: CPT

## 2025-05-22 LAB
CHOLEST SERPL-MCNC: 140 MG/DL
HDLC SERPL-MCNC: 45 MG/DL
LDLC SERPL CALC-MCNC: 67 MG/DL
PSA SERPL-MCNC: 2.73 NG/ML (ref 0–4)
TRIGL SERPL-MCNC: 141 MG/DL
VLDLC SERPL CALC-MCNC: 28 MG/DL

## 2025-05-23 ENCOUNTER — RESULTS FOLLOW-UP (OUTPATIENT)
Dept: PRIMARY CARE CLINIC | Age: 70
End: 2025-05-23

## 2025-05-23 DIAGNOSIS — R73.01 IFG (IMPAIRED FASTING GLUCOSE): Primary | ICD-10-CM

## 2025-05-23 NOTE — RESULT ENCOUNTER NOTE
Noted worsening hemoglobin A1c to 7.5 ---- Ozempic was increased at his last appointment. Repeat blood work in 3 months.    Noted hypokalemia. I would recommend potassium dense foods. I would like to repeat his blood work in 2 weeks. (The orders will be placed now).

## 2025-05-29 ENCOUNTER — OFFICE VISIT (OUTPATIENT)
Dept: ORTHOPEDIC SURGERY | Age: 70
End: 2025-05-29

## 2025-05-29 VITALS
TEMPERATURE: 98 F | RESPIRATION RATE: 18 BRPM | OXYGEN SATURATION: 97 % | SYSTOLIC BLOOD PRESSURE: 120 MMHG | HEART RATE: 62 BPM | DIASTOLIC BLOOD PRESSURE: 71 MMHG

## 2025-05-29 DIAGNOSIS — M75.52 BURSITIS OF LEFT SHOULDER: ICD-10-CM

## 2025-05-29 DIAGNOSIS — M25.512 LEFT SHOULDER PAIN, UNSPECIFIED CHRONICITY: Primary | ICD-10-CM

## 2025-05-29 RX ORDER — TRIAMCINOLONE ACETONIDE 40 MG/ML
40 INJECTION, SUSPENSION INTRA-ARTICULAR; INTRAMUSCULAR ONCE
Status: COMPLETED | OUTPATIENT
Start: 2025-05-29 | End: 2025-05-29

## 2025-05-29 RX ORDER — BUPIVACAINE HYDROCHLORIDE 2.5 MG/ML
2 INJECTION, SOLUTION INFILTRATION; PERINEURAL ONCE
Status: COMPLETED | OUTPATIENT
Start: 2025-05-29 | End: 2025-05-29

## 2025-05-29 RX ORDER — NEMOLIZUMAB-ILTO 30 MG/100MG
30 INJECTION, POWDER, LYOPHILIZED, FOR SOLUTION SUBCUTANEOUS
COMMUNITY
Start: 2025-05-05

## 2025-05-29 RX ADMIN — TRIAMCINOLONE ACETONIDE 40 MG: 40 INJECTION, SUSPENSION INTRA-ARTICULAR; INTRAMUSCULAR at 11:29

## 2025-05-29 RX ADMIN — BUPIVACAINE HYDROCHLORIDE 5 MG: 2.5 INJECTION, SOLUTION INFILTRATION; PERINEURAL at 11:29

## 2025-05-29 NOTE — PROGRESS NOTES
Referring Provider:   No referring provider defined for this encounter.    CHIEF COMPLAINT:   Chief Complaint   Patient presents with    Shoulder Pain     Requesting left shoulder injection.  Last injection 8/22/2024     HPI update 5/29/2025: Pablo is here today for follow up of his left shoulder. He has previous injection in August which provided him good relief up until recently when he feels he over did it. He denies any injury since last visit. He is happy with conservative treatment and would like to repeat this injection today. Denies numbness tingling, fever or chills. He states his little finger is much better injection.    HPI:     Pablo Ortega is a 69 y.o. year old male who presents with right shoulder pain for the past several months.  He complains of pain with overhead activity that is worse at night.  Denies any specific injury.  This is just been getting chronically worse.  Denies any weakness.  Denies numbness tingling.  Also complains of pain at the base of his small finger and triggering.  He does have a history of a right middle finger trigger release.  He is right-hand dominant.    PAST MEDICAL HISTORY  Past Medical History:   Diagnosis Date    Anemia     Anxiety attack     controlled with citolpram    Arthritis     CAD (coronary artery disease) 17 YRS AGO    MI X 2. WITH STENTS, FOLLOWS WITH Dr. Craig yearly    Diabetes mellitus (HCC)     GERD (gastroesophageal reflux disease)     Hematuria     Hypercholesterolemia 1994    Hyperlipidemia     Hypertension 06/12/2014    NSTEMI (non-ST elevated myocardial infarction) ([facility]) 05/28/2012       PAST SURGICAL HISTORY  Past Surgical History:   Procedure Laterality Date    ANKLE SURGERY  6 YRS AGO    right ORIF    BLADDER SURGERY Right 2/27/2023    CYSTOSCOPY RETROGRADE PYELOGRAM RIGHT URETEROSCPY, RIGHT URETERAL STENT INSERTION performed by Primo Moeller MD at [facility] OR    BLADDER SURGERY Right 7/26/2023    CYSTOSCOPY RETROGRADE PYELOGRAM URETEROSCOPY

## 2025-06-04 RX ORDER — LOSARTAN POTASSIUM 100 MG/1
100 TABLET ORAL DAILY
Qty: 90 TABLET | Refills: 1 | Status: SHIPPED | OUTPATIENT
Start: 2025-06-04

## 2025-06-04 NOTE — TELEPHONE ENCOUNTER
Patients last appointment 5/9/2025.  Patients next scheduled appointment   Future Appointments   Date Time Provider Department Center   8/27/2025 10:00 AM Francisco Yip DO [facility] DEP   11/10/2025  1:30 PM Francisco Yip, DO [facility] DEP

## 2025-06-05 ENCOUNTER — HOSPITAL ENCOUNTER (OUTPATIENT)
Age: 70
Discharge: HOME | End: 2025-06-05
Payer: MEDICARE

## 2025-06-05 ENCOUNTER — RESULTS FOLLOW-UP (OUTPATIENT)
Dept: PRIMARY CARE CLINIC | Age: 70
End: 2025-06-05

## 2025-06-05 DIAGNOSIS — I10 PRIMARY HYPERTENSION: Primary | ICD-10-CM

## 2025-06-05 DIAGNOSIS — R73.01 IFG (IMPAIRED FASTING GLUCOSE): ICD-10-CM

## 2025-06-05 LAB
ALBUMIN SERPL-MCNC: 4.2 G/DL (ref 3.5–5.2)
ALP SERPL-CCNC: 77 U/L (ref 40–129)
ALT SERPL-CCNC: 28 U/L (ref 0–40)
ANION GAP SERPL CALCULATED.3IONS-SCNC: 10 MMOL/L (ref 7–16)
AST SERPL-CCNC: 18 U/L (ref 0–39)
BILIRUB SERPL-MCNC: 1.5 MG/DL (ref 0–1.2)
BUN SERPL-MCNC: 32 MG/DL (ref 6–23)
CALCIUM SERPL-MCNC: 10 MG/DL (ref 8.6–10.2)
CHLORIDE SERPL-SCNC: 99 MMOL/L (ref 98–107)
CO2 SERPL-SCNC: 28 MMOL/L (ref 22–29)
CREAT SERPL-MCNC: 1.3 MG/DL (ref 0.7–1.2)
GFR, ESTIMATED: 62 ML/MIN/1.73M2
GLUCOSE SERPL-MCNC: 169 MG/DL (ref 74–99)
POTASSIUM SERPL-SCNC: 3.7 MMOL/L (ref 3.5–5)
PROT SERPL-MCNC: 7 G/DL (ref 6.4–8.3)
SODIUM SERPL-SCNC: 137 MMOL/L (ref 132–146)

## 2025-06-05 PROCEDURE — 36415 COLL VENOUS BLD VENIPUNCTURE: CPT

## 2025-06-05 PROCEDURE — 80053 COMPREHEN METABOLIC PANEL: CPT

## 2025-06-18 DIAGNOSIS — I10 PRIMARY HYPERTENSION: ICD-10-CM

## 2025-06-18 RX ORDER — CHLORTHALIDONE 25 MG/1
25 TABLET ORAL DAILY
Qty: 90 TABLET | Refills: 1 | Status: SHIPPED | OUTPATIENT
Start: 2025-06-18

## 2025-06-18 NOTE — TELEPHONE ENCOUNTER
Patients last appointment 5/9/2025.  Patients next scheduled appointment   Future Appointments   Date Time Provider Department Center   8/27/2025 10:00 AM Francisco Yip DO [facility] DEP   11/10/2025  1:30 PM Francisco Yip, DO [place] DEP

## 2025-06-23 ENCOUNTER — TELEPHONE (OUTPATIENT)
Dept: PRIMARY CARE CLINIC | Age: 70
End: 2025-06-23

## 2025-06-23 RX ORDER — NITROGLYCERIN 0.4 MG/1
0.4 TABLET SUBLINGUAL EVERY 5 MIN PRN
Qty: 90 TABLET | Refills: 1 | Status: SHIPPED | OUTPATIENT
Start: 2025-06-23

## 2025-07-01 ENCOUNTER — TELEPHONE (OUTPATIENT)
Dept: CARDIOLOGY CLINIC | Age: 70
End: 2025-07-01

## 2025-07-01 NOTE — TELEPHONE ENCOUNTER
Pt phnd to schedule annual appt with Dr Craig (pt will be out of town week of 7/20/25).  No appts available.  Please call to schedule [phone]

## 2025-07-07 ENCOUNTER — HOSPITAL ENCOUNTER (OUTPATIENT)
Age: 70
Discharge: HOME | End: 2025-07-07
Payer: MEDICARE

## 2025-07-07 ENCOUNTER — RESULTS FOLLOW-UP (OUTPATIENT)
Dept: PRIMARY CARE CLINIC | Age: 70
End: 2025-07-07

## 2025-07-07 DIAGNOSIS — I10 PRIMARY HYPERTENSION: ICD-10-CM

## 2025-07-07 LAB
ANION GAP SERPL CALCULATED.3IONS-SCNC: 13 MMOL/L (ref 7–16)
BUN SERPL-MCNC: 23 MG/DL (ref 8–23)
CALCIUM SERPL-MCNC: 10.4 MG/DL (ref 8.8–10.2)
CHLORIDE SERPL-SCNC: 96 MMOL/L (ref 98–107)
CO2 SERPL-SCNC: 31 MMOL/L (ref 22–29)
CREAT SERPL-MCNC: 1.1 MG/DL (ref 0.7–1.2)
GFR, ESTIMATED: 74 ML/MIN/1.73M2
GLUCOSE SERPL-MCNC: 164 MG/DL (ref 74–99)
POTASSIUM SERPL-SCNC: 3.3 MMOL/L (ref 3.5–5.1)
SODIUM SERPL-SCNC: 140 MMOL/L (ref 136–145)

## 2025-07-07 PROCEDURE — 36415 COLL VENOUS BLD VENIPUNCTURE: CPT

## 2025-07-07 PROCEDURE — 80048 BASIC METABOLIC PNL TOTAL CA: CPT

## 2025-07-08 RX ORDER — POTASSIUM CHLORIDE 1500 MG/1
20 TABLET, EXTENDED RELEASE ORAL DAILY
Qty: 30 TABLET | Refills: 5 | Status: SHIPPED | OUTPATIENT
Start: 2025-07-08

## 2025-07-15 ENCOUNTER — TELEPHONE (OUTPATIENT)
Dept: PRIMARY CARE CLINIC | Age: 70
End: 2025-07-15

## 2025-07-15 DIAGNOSIS — G72.0 DRUG-INDUCED MYOPATHY: ICD-10-CM

## 2025-07-15 RX ORDER — EZETIMIBE 10 MG/1
10 TABLET ORAL DAILY
Qty: 90 TABLET | Refills: 1 | Status: SHIPPED | OUTPATIENT
Start: 2025-07-15

## 2025-07-15 NOTE — TELEPHONE ENCOUNTER
Pablo called and stated he was getting sick while taking 20 mg of his potassium. He started taking 10 mg, and he feels much better. He has been taking 10 mg since last Thursday.     Is it ok that he only takes 10 mg?    Please advise    Thank you

## 2025-07-18 DIAGNOSIS — I25.10 ATHEROSCLEROTIC HEART DISEASE OF NATIVE CORONARY ARTERY WITHOUT ANGINA PECTORIS: ICD-10-CM

## 2025-07-18 RX ORDER — OMEPRAZOLE 40 MG/1
CAPSULE, DELAYED RELEASE ORAL
Qty: 180 CAPSULE | Refills: 1 | Status: SHIPPED | OUTPATIENT
Start: 2025-07-18

## 2025-07-18 RX ORDER — CITALOPRAM HYDROBROMIDE 40 MG/1
TABLET ORAL
Qty: 135 TABLET | Refills: 1 | Status: SHIPPED | OUTPATIENT
Start: 2025-07-18

## 2025-07-18 RX ORDER — ATORVASTATIN CALCIUM 80 MG/1
80 TABLET, FILM COATED ORAL DAILY
Qty: 90 TABLET | Refills: 1 | Status: SHIPPED | OUTPATIENT
Start: 2025-07-18

## 2025-07-18 NOTE — TELEPHONE ENCOUNTER
Patients last appointment 5/9/2025.  Patients next scheduled appointment   Future Appointments   Date Time Provider Department Center   8/13/2025  8:15 AM Erasmo Craig MD [facility]   8/27/2025 10:00 AM Francisco Yip DO [facility] DEP   11/10/2025  1:30 PM Francisco Yip DO [facility] DEP

## 2025-07-30 RX ORDER — OMEGA-3-ACID ETHYL ESTERS 1 G/1
2 CAPSULE, LIQUID FILLED ORAL 2 TIMES DAILY
Qty: 360 CAPSULE | Refills: 1 | Status: SHIPPED | OUTPATIENT
Start: 2025-07-30

## 2025-07-30 NOTE — TELEPHONE ENCOUNTER
Patients last appointment 5/9/2025.  Patients next scheduled appointment   Future Appointments   Date Time Provider Department Center   8/13/2025  8:15 AM Erasmo Craig MD [facility]   8/27/2025 10:00 AM Francisco Yip DO [facility] DEP   11/10/2025  1:30 PM Francisco Yip DO [facility] DEP

## 2025-08-05 DIAGNOSIS — I25.10 ATHEROSCLEROTIC HEART DISEASE OF NATIVE CORONARY ARTERY WITHOUT ANGINA PECTORIS: ICD-10-CM

## 2025-08-05 RX ORDER — METOPROLOL TARTRATE 25 MG/1
25 TABLET, FILM COATED ORAL 2 TIMES DAILY
Qty: 180 TABLET | Refills: 1 | Status: SHIPPED | OUTPATIENT
Start: 2025-08-05

## 2025-08-05 RX ORDER — AMLODIPINE BESYLATE 10 MG/1
10 TABLET ORAL DAILY
Qty: 90 TABLET | Refills: 1 | Status: SHIPPED | OUTPATIENT
Start: 2025-08-05

## 2025-08-08 ENCOUNTER — TELEPHONE (OUTPATIENT)
Dept: PRIMARY CARE CLINIC | Age: 70
End: 2025-08-08

## 2025-08-13 ENCOUNTER — OFFICE VISIT (OUTPATIENT)
Dept: CARDIOLOGY CLINIC | Age: 70
End: 2025-08-13
Payer: MEDICARE

## 2025-08-13 VITALS
OXYGEN SATURATION: 96 % | DIASTOLIC BLOOD PRESSURE: 78 MMHG | BODY MASS INDEX: 30.31 KG/M2 | HEIGHT: 68 IN | HEART RATE: 67 BPM | WEIGHT: 200 LBS | RESPIRATION RATE: 18 BRPM | TEMPERATURE: 97 F | SYSTOLIC BLOOD PRESSURE: 134 MMHG

## 2025-08-13 DIAGNOSIS — E78.5 HYPERLIPIDEMIA, UNSPECIFIED HYPERLIPIDEMIA TYPE: ICD-10-CM

## 2025-08-13 DIAGNOSIS — E87.6 HYPOKALEMIA: ICD-10-CM

## 2025-08-13 DIAGNOSIS — I25.10 CORONARY ARTERY DISEASE INVOLVING NATIVE CORONARY ARTERY OF NATIVE HEART WITHOUT ANGINA PECTORIS: Primary | ICD-10-CM

## 2025-08-13 DIAGNOSIS — I45.10 RBBB: ICD-10-CM

## 2025-08-13 PROCEDURE — 3075F SYST BP GE 130 - 139MM HG: CPT | Performed by: INTERNAL MEDICINE

## 2025-08-13 PROCEDURE — 99214 OFFICE O/P EST MOD 30 MIN: CPT | Performed by: INTERNAL MEDICINE

## 2025-08-13 PROCEDURE — 3078F DIAST BP <80 MM HG: CPT | Performed by: INTERNAL MEDICINE

## 2025-08-13 PROCEDURE — 1159F MED LIST DOCD IN RCRD: CPT | Performed by: INTERNAL MEDICINE

## 2025-08-13 PROCEDURE — 93000 ELECTROCARDIOGRAM COMPLETE: CPT | Performed by: INTERNAL MEDICINE

## 2025-08-13 PROCEDURE — 1123F ACP DISCUSS/DSCN MKR DOCD: CPT | Performed by: INTERNAL MEDICINE

## 2025-08-13 RX ORDER — IBUPROFEN 800 MG/1
800 TABLET, FILM COATED ORAL EVERY 8 HOURS PRN
COMMUNITY

## 2025-08-13 RX ORDER — FLUTICASONE FUROATE 27.5 UG/1
SPRAY, METERED NASAL
COMMUNITY
Start: 2024-09-07

## 2025-08-25 RX ORDER — NITROGLYCERIN 0.4 MG/1
TABLET SUBLINGUAL
Qty: 100 TABLET | Refills: 0 | Status: SHIPPED | OUTPATIENT
Start: 2025-08-25

## 2025-08-27 ENCOUNTER — OFFICE VISIT (OUTPATIENT)
Dept: PRIMARY CARE CLINIC | Age: 70
End: 2025-08-27
Payer: MEDICARE

## 2025-08-27 VITALS
HEIGHT: 68 IN | BODY MASS INDEX: 30.62 KG/M2 | HEART RATE: 69 BPM | TEMPERATURE: 97.3 F | WEIGHT: 202 LBS | OXYGEN SATURATION: 98 % | DIASTOLIC BLOOD PRESSURE: 78 MMHG | SYSTOLIC BLOOD PRESSURE: 125 MMHG

## 2025-08-27 DIAGNOSIS — C66.9 MALIGNANT NEOPLASM OF URETER, UNSPECIFIED LATERALITY (HCC): ICD-10-CM

## 2025-08-27 DIAGNOSIS — R23.3 EASY BRUISING: ICD-10-CM

## 2025-08-27 DIAGNOSIS — E11.9 CONTROLLED TYPE 2 DIABETES MELLITUS WITHOUT COMPLICATION, WITHOUT LONG-TERM CURRENT USE OF INSULIN (HCC): Primary | ICD-10-CM

## 2025-08-27 LAB
ALBUMIN: 4.3 G/DL (ref 3.5–5.2)
ALP BLD-CCNC: 79 U/L (ref 40–129)
ALT SERPL-CCNC: 39 U/L (ref 0–50)
ANION GAP SERPL CALCULATED.3IONS-SCNC: 14 MMOL/L (ref 7–16)
AST SERPL-CCNC: 31 U/L (ref 0–50)
BASOPHILS ABSOLUTE: 0.08 K/UL (ref 0–0.2)
BASOPHILS RELATIVE PERCENT: 1 % (ref 0–2)
BILIRUB SERPL-MCNC: 1.1 MG/DL (ref 0–1.2)
BUN BLDV-MCNC: 18 MG/DL (ref 8–23)
CALCIUM SERPL-MCNC: 10.3 MG/DL (ref 8.8–10.2)
CHLORIDE BLD-SCNC: 97 MMOL/L (ref 98–107)
CHOLESTEROL, TOTAL: 122 MG/DL
CO2: 27 MMOL/L (ref 22–29)
CREAT SERPL-MCNC: 1.1 MG/DL (ref 0.7–1.2)
CREATININE URINE: 251 MG/DL (ref 40–278)
EOSINOPHILS ABSOLUTE: 0.24 K/UL (ref 0.05–0.5)
EOSINOPHILS RELATIVE PERCENT: 2 % (ref 0–6)
GFR, ESTIMATED: 75 ML/MIN/1.73M2
GLUCOSE BLD-MCNC: 160 MG/DL (ref 74–99)
HBA1C MFR BLD: 6.3 % (ref 4–5.6)
HCT VFR BLD CALC: 42.7 % (ref 37–54)
HDLC SERPL-MCNC: 41 MG/DL
HEMOGLOBIN: 14.8 G/DL (ref 12.5–16.5)
IMMATURE GRANULOCYTES %: 1 % (ref 0–5)
IMMATURE GRANULOCYTES ABSOLUTE: 0.12 K/UL (ref 0–0.58)
INR BLD: 0.9
LDL CHOLESTEROL: 56 MG/DL
LYMPHOCYTES ABSOLUTE: 2.01 K/UL (ref 1.5–4)
LYMPHOCYTES RELATIVE PERCENT: 17 % (ref 20–42)
MCH RBC QN AUTO: 31 PG (ref 26–35)
MCHC RBC AUTO-ENTMCNC: 34.7 G/DL (ref 32–34.5)
MCV RBC AUTO: 89.5 FL (ref 80–99.9)
MICROALBUMIN/CREAT 24H UR: 26 MG/L (ref 0–20)
MICROALBUMIN/CREAT UR-RTO: 10 MCG/MG CREAT (ref 0–30)
MONOCYTES ABSOLUTE: 0.8 K/UL (ref 0.1–0.95)
MONOCYTES RELATIVE PERCENT: 7 % (ref 2–12)
NEUTROPHILS ABSOLUTE: 8.49 K/UL (ref 1.8–7.3)
NEUTROPHILS RELATIVE PERCENT: 72 % (ref 43–80)
PDW BLD-RTO: 13.1 % (ref 11.5–15)
PLATELET # BLD: 251 K/UL (ref 130–450)
PMV BLD AUTO: 10.4 FL (ref 7–12)
POTASSIUM SERPL-SCNC: 3.6 MMOL/L (ref 3.5–5.1)
PROTHROMBIN TIME: 10.1 SEC (ref 9.3–12.4)
RBC # BLD: 4.77 M/UL (ref 3.8–5.8)
SODIUM BLD-SCNC: 138 MMOL/L (ref 136–145)
TOTAL PROTEIN: 7.2 G/DL (ref 6.4–8.3)
TRIGL SERPL-MCNC: 127 MG/DL
VLDLC SERPL CALC-MCNC: 25 MG/DL
WBC # BLD: 11.7 K/UL (ref 4.5–11.5)

## 2025-08-27 PROCEDURE — 1159F MED LIST DOCD IN RCRD: CPT | Performed by: INTERNAL MEDICINE

## 2025-08-27 PROCEDURE — 3051F HG A1C>EQUAL 7.0%<8.0%: CPT | Performed by: INTERNAL MEDICINE

## 2025-08-27 PROCEDURE — 3078F DIAST BP <80 MM HG: CPT | Performed by: INTERNAL MEDICINE

## 2025-08-27 PROCEDURE — 1123F ACP DISCUSS/DSCN MKR DOCD: CPT | Performed by: INTERNAL MEDICINE

## 2025-08-27 PROCEDURE — 3074F SYST BP LT 130 MM HG: CPT | Performed by: INTERNAL MEDICINE

## 2025-08-27 PROCEDURE — 99214 OFFICE O/P EST MOD 30 MIN: CPT | Performed by: INTERNAL MEDICINE

## 2025-08-27 PROCEDURE — 36415 COLL VENOUS BLD VENIPUNCTURE: CPT | Performed by: INTERNAL MEDICINE

## 2025-09-02 ENCOUNTER — TELEPHONE (OUTPATIENT)
Dept: PRIMARY CARE CLINIC | Age: 70
End: 2025-09-02

## 2025-09-03 RX ORDER — LOSARTAN POTASSIUM 100 MG/1
100 TABLET ORAL DAILY
Qty: 90 TABLET | Refills: 1 | Status: SHIPPED | OUTPATIENT
Start: 2025-09-03

## (undated) DEVICE — GUIDEWIRE UROLOGICAL STR STD 0.035 IN X150 CM (QTY = BOX OF 5)

## (undated) DEVICE — GLASSES SAFETY PROTCT GRN

## (undated) DEVICE — 1060 S-DRAPE SPCL INCISE 10/BX 4BX/CS: Brand: STERI-DRAPE™

## (undated) DEVICE — GRADUATE TRIANG MEASURE 1000ML BLK PRNT

## (undated) DEVICE — [HIGH FLOW INSUFFLATOR,  DO NOT USE IF PACKAGE IS DAMAGED,  KEEP DRY,  KEEP AWAY FROM SUNLIGHT,  PROTECT FROM HEAT AND RADIOACTIVE SOURCES.]: Brand: PNEUMOSURE

## (undated) DEVICE — KIT BEDSIDE REVITAL OX 500ML

## (undated) DEVICE — DRESSING,GAUZE,XEROFORM,CURAD,5"X9",ST: Brand: CURAD

## (undated) DEVICE — Device

## (undated) DEVICE — ARM DRAPE

## (undated) DEVICE — SOLUTION IRRIG BSS ST 500ML

## (undated) DEVICE — CAMERA STRYKER 1488 HD GEN

## (undated) DEVICE — SOLUTION IRRIG 3000ML STRL H2O USP UROMATIC PLAS CONT

## (undated) DEVICE — SYRINGE, LUER LOCK, 10ML: Brand: MEDLINE

## (undated) DEVICE — GLOVE SURG SZ 65 CRM LTX FREE POLYISOPRENE POLYMER BEAD ANTI

## (undated) DEVICE — BLADELESS OBTURATOR: Brand: WECK VISTA

## (undated) DEVICE — URETERAL ACCESS SHEATH SET: Brand: NAVIGATOR HD

## (undated) DEVICE — CYSTO PACK: Brand: MEDLINE INDUSTRIES, INC.

## (undated) DEVICE — KENDALL 450 SERIES MONITORING FOAM ELECTRODE - RECTANGULAR SHAPE ( 3/PK): Brand: KENDALL

## (undated) DEVICE — GOWN,SIRUS,FABRNF,XL,20/CS: Brand: MEDLINE

## (undated) DEVICE — TIP-UP FENESTRATED GRASPER: Brand: ENDOWRIST

## (undated) DEVICE — TUBING, SUCTION, 3/16" X 12', STRAIGHT: Brand: MEDLINE

## (undated) DEVICE — WARMER SCP LAP

## (undated) DEVICE — GOWN ISOLATN REG YEL M WT MULTIPLY SIDETIE LEV 2

## (undated) DEVICE — VALVE SUCTION AIR H2O HYDR H2O JET CONN STRL ORCA POD + DISP

## (undated) DEVICE — MARKER,SKIN,WI/RULER AND LABELS: Brand: MEDLINE

## (undated) DEVICE — HARMONIC ACE

## (undated) DEVICE — MEDI-VAC YANKAUER SUCTION HANDLE W/BULBOUS TIP: Brand: CARDINAL HEALTH

## (undated) DEVICE — SUCTION IRRIGATOR: Brand: ENDOWRIST

## (undated) DEVICE — GLOVE SURG SZ 85 L12IN FNGR ORTHO 126MIL CRM LTX FREE

## (undated) DEVICE — PATIENT RETURN ELECTRODE, SINGLE-USE, CONTACT QUALITY MONITORING, ADULT, WITH 9FT CORD, FOR PATIENTS WEIGING OVER 33LBS. (15KG): Brand: MEGADYNE

## (undated) DEVICE — GLOVE ORANGE PI 8 1/2   MSG9085

## (undated) DEVICE — Z INACTIVE USE 2660664 SOLUTION IRRIG 3000ML 0.9% SOD CHL USP UROMATIC PLAS CONT

## (undated) DEVICE — GASTRIC 45 DEGREE LENS

## (undated) DEVICE — ELECTRO LUBE IS A SINGLE PATIENT USE DEVICE THAT IS INTENDED TO BE USED ON ELECTROSURGICAL ELECTRODES TO REDUCE STICKING.: Brand: KEY SURGICAL ELECTRO LUBE

## (undated) DEVICE — GOWN,SIRUS,FABRNF,L,20/CS: Brand: MEDLINE

## (undated) DEVICE — 4-PORT MANIFOLD: Brand: NEPTUNE 2

## (undated) DEVICE — SOLUTION IRRIG 3000ML 0.9% SOD CHL USP UROMATIC PLAS CONT

## (undated) DEVICE — SHIELD EYE W3XL2.5IN UNIV CLR PLAS LTWT

## (undated) DEVICE — LUBRICANT SURG JELLY ST BACTER TUBE 4.25OZ

## (undated) DEVICE — TROCAR: Brand: KII FIOS FIRST ENTRY

## (undated) DEVICE — NEEDLE HYPO 25GA L0.625IN BLU POLYPR HUB S STL REG BVL STR

## (undated) DEVICE — SYRINGE MED 30ML STD CLR PLAS LUERLOCK TIP N CTRL DISP

## (undated) DEVICE — SYRINGE 20ML LL S/C 50

## (undated) DEVICE — PACK SURG LAP CHOLE CUSTOM

## (undated) DEVICE — Device: Brand: INSTRUSAFE

## (undated) DEVICE — DOUBLE BASIN SET: Brand: MEDLINE INDUSTRIES, INC.

## (undated) DEVICE — SATINCRESCENT® KNIFE ANGLED BEVEL UP: Brand: SATINCRESCENT®

## (undated) DEVICE — FORCEPS BX L240CM JAW DIA2.8MM L CAP W/ NDL MIC MESH TOOTH

## (undated) DEVICE — SOLUTION IV IRRIG WATER 1000ML POUR BRL 2F7114

## (undated) DEVICE — COVER,LIGHT HANDLE,FLX,1/PK: Brand: MEDLINE INDUSTRIES, INC.

## (undated) DEVICE — SCISSORS SURG DIA8MM MPLR CRV ENDOWRIST

## (undated) DEVICE — 6 X 9  1.75MIL 4-WALL LABGUARD: Brand: MINIGRIP COMMERCIAL LLC

## (undated) DEVICE — SCOPE DAVINCI XI 30 DEG W/CORD

## (undated) DEVICE — SOLUTION SURG PREP ANTIMICROBIAL 4 OZ SKIN WND EXIDINE

## (undated) DEVICE — COLUMN DRAPE

## (undated) DEVICE — SOLUTION IRRIGATION BAL SALT SOLUTION 15 ML STRL BSS

## (undated) DEVICE — PERMANENT CAUTERY HOOK: Brand: ENDOWRIST

## (undated) DEVICE — CONTAINER SPEC 480ML CLR POLYSTYR 10% NEUT BUFF FRMLN ZN

## (undated) DEVICE — GARMENT,MEDLINE,DVT,INT,CALF,MED, GEN2: Brand: MEDLINE

## (undated) DEVICE — MEGA SUTURECUT ND: Brand: ENDOWRIST

## (undated) DEVICE — TROCAR: Brand: KII SLEEVE

## (undated) DEVICE — STANDARD HYPODERMIC NEEDLE,POLYPROPYLENE HUB: Brand: MONOJECT

## (undated) DEVICE — CONTAINER SPEC COLL 960ML POLYPR TRIANG GRAD INTAKE/OUTPUT

## (undated) DEVICE — ANCHOR TISSUE RETRIEVAL SYSTEM, BAG SIZE 125 ML, PORT SIZE 8 MM: Brand: ANCHOR TISSUE RETRIEVAL SYSTEM

## (undated) DEVICE — PACK PROCEDURE SURG SURG CATARACT CUSTOM

## (undated) DEVICE — CLEARCUT® SLIT KNIFE INTREPID MICRO-COAXIAL SYSTEM 2.4 SB: Brand: CLEARCUT®; INTREPID

## (undated) DEVICE — SPONGE GZ W4XL4IN RAYON POLY FILL CVR W/ NONWOVEN FAB

## (undated) DEVICE — TIP COVER ACCESSORY

## (undated) DEVICE — MASK,FACE,MAXFLUIDPROTECT,SHIELD/ERLPS: Brand: MEDLINE

## (undated) DEVICE — PACK PROCEDURE SURG GEN CUST

## (undated) DEVICE — GUIDEWIRE ENDOSCP L150CM DIA0.025IN TIP L3CM NIT HYDRPHLC

## (undated) DEVICE — CANNULA SEAL

## (undated) DEVICE — NEEDLE CLOSURE OMNICLOSE

## (undated) DEVICE — GUIDEWIRE ENDOSCP L150CM DIA0.035IN TIP 3CM PTFE NIT

## (undated) DEVICE — SOLUTION SCRB 4OZ 4% CHG H2O AIDED FOR PREOPERATIVE SKIN

## (undated) DEVICE — GOWN,SIRUS,FABRNF,2XL,18/CS: Brand: MEDLINE

## (undated) DEVICE — THE MONARCH® "D" CARTRIDGE IS A SINGLE-USE POLYPROPYLENE CARTRIDGE FOR POSTERIOR CHAMBER IOL DELIVERY: Brand: MONARCH® III

## (undated) DEVICE — 3M™ IOBAN™ 2 ANTIMICROBIAL INCISE DRAPE 6650EZ: Brand: IOBAN™ 2

## (undated) DEVICE — GAUZE,SPONGE,4"X4",8PLY,STRL,LF,10/TRAY: Brand: MEDLINE

## (undated) DEVICE — SPONGE GZ 4IN 4IN 4 PLY N WVN AVANT

## (undated) DEVICE — GUIDEWIRE ENDO L150CM DIA0.035IN STR TIP

## (undated) DEVICE — 1 ML TUBERCULIN SYRINGE,DETACHABLE NEEDLE: Brand: MONOJECT

## (undated) DEVICE — BLADE CLIPPER GEN PURP NS

## (undated) DEVICE — ANCHOR TISSUE RETRIEVAL SYSTEM, BAG SIZE 175 ML, PORT SIZE 10 MM: Brand: ANCHOR TISSUE RETRIEVAL SYSTEM

## (undated) DEVICE — TRAY EYE EXTRAS REUSABLE

## (undated) DEVICE — SHEET DRAPE FULL 70X100

## (undated) DEVICE — CHLORAPREP 26ML ORANGE

## (undated) DEVICE — MEDICINE CUP, GRADUATED, STER: Brand: MEDLINE

## (undated) DEVICE — DRAPE,LAP,CHOLE,W/TROUGHS,STERILE: Brand: MEDLINE

## (undated) DEVICE — CADIERE FORCEPS: Brand: ENDOWRIST

## (undated) DEVICE — MEDI-VAC NON-CONDUCTIVE SUCTION TUBING: Brand: CARDINAL HEALTH

## (undated) DEVICE — FLEXIVA  PULSE  AND  FLEXIVA  PULSE  TRACTIP  LASER  FIBERS  ARE  HIGH  POWER  SINGLE-USE FIBER: Brand: FLEXIVA PULSE ID

## (undated) DEVICE — SKIN AFFIX SURG ADHESIVE 72/CS 0.55ML: Brand: MEDLINE

## (undated) DEVICE — Z CONVERTED USE 2273263 SWABSTICK CLN SZ 1S 7.5% PVP IOD SCRB DUO-SWAB

## (undated) DEVICE — Z DUP USE 2641840 CLIP INT L POLYMER LOK LIG HEM O LOK

## (undated) DEVICE — INSUFFLATION NEEDLE TO ESTABLISH PNEUMOPERITONEUM.: Brand: INSUFFLATION NEEDLE

## (undated) DEVICE — STRYKER PERFORMANCE SERIES SAGITTAL BLADE: Brand: STRYKER PERFORMANCE SERIES

## (undated) DEVICE — BIT DRILL SINGLE USE

## (undated) DEVICE — CURVED SCISSORS: Brand: ENDOWRIST;DAVINCI SI

## (undated) DEVICE — INTENDED FOR TISSUE SEPARATION, AND OTHER PROCEDURES THAT REQUIRE A SHARP SURGICAL BLADE TO PUNCTURE OR CUT.: Brand: BARD-PARKER ® STAINLESS STEEL BLADES

## (undated) DEVICE — PROBE HEMSTAT 18GA ERAS BVL TIP STR BPLR WET-FIELD

## (undated) DEVICE — SET ENDO INSTR LAPAROSCOPIC INCISIONAL

## (undated) DEVICE — CANNULA OPHTH 25GA 7 8IN ORNG 45DEG ANG 4MM FR END DOME SHP

## (undated) DEVICE — BLOCK BITE 60FR CAREGUARD

## (undated) DEVICE — TOWEL,OR,DSP,ST,BLUE,STD,6/PK,12PK/CS: Brand: MEDLINE

## (undated) DEVICE — GLOVE ORANGE PI 8   MSG9080

## (undated) DEVICE — BAG DRNGE COMB PK

## (undated) DEVICE — PLEDGET SURG W3.5XL7MM THK1.5MM WHT PTFE RECT FIRM TFE

## (undated) DEVICE — COVER MICROSCOPE KNOB LG

## (undated) DEVICE — BIT DRL L5IN DIA2.8MM STD ST S STL TWST BUSA

## (undated) DEVICE — CATHETER URET 5FR L70CM OPN END SGL LUMN INJ HUB FLEXIMA

## (undated) DEVICE — NEEDLE FLTR 18GA L1.5IN MEM THK5UM BLNT DISP

## (undated) DEVICE — BLADE OPHTH GRN ROUNDED TIP 1 SIDE SHRP GRINDLESS MINI-BLDE

## (undated) DEVICE — COVER,TABLE,44X90,STERILE: Brand: MEDLINE

## (undated) DEVICE — Device: Brand: DEFENDO VALVE AND CONNECTOR KIT

## (undated) DEVICE — TRAY PHACO REUSABLE

## (undated) DEVICE — CONTROL SYRINGE LUER-LOCK TIP: Brand: MONOJECT

## (undated) DEVICE — PACK PROC FLD MGMT SYS CENTURION CUST

## (undated) DEVICE — GLOVE ORANGE PI 7 1/2   MSG9075

## (undated) DEVICE — CATHETER URETH 18FR BLLN 5CC STD LTX 2 W F SGL DRNGE EYE M

## (undated) DEVICE — SINGLE USE MEDICAL DEVICE FOR OPHTHALMIC SURGERY: Brand: SIL. COATED I/A 45 MIL 12/B

## (undated) DEVICE — Z INACTIVE USE 2641839 CLIP INT M L POLYMER LOK LIG HEM O LOK

## (undated) DEVICE — MEDIUM-LARGE CLIP APPLIER: Brand: ENDOWRIST

## (undated) DEVICE — KIT,ANTI FOG,W/SPONGE & FLUID,SOFT PACK: Brand: MEDLINE

## (undated) DEVICE — NEEDLE HYPO 21GA L1.5IN GRN POLYPR HUB S STL REG BVL STR

## (undated) DEVICE — PAD PREP L 2 PLY 70% ISO ALC NONWOVEN SFT ABSRB TOP ANTISEP

## (undated) DEVICE — SCALPEL 15 DEG NO 715

## (undated) DEVICE — GOWN,SIRUS,NONRNF,SETINSLV,XL,20/CS: Brand: MEDLINE

## (undated) DEVICE — 40436 HEAD REST OCULAR: Brand: 40436 HEAD REST OCULAR